# Patient Record
Sex: FEMALE | Race: WHITE | NOT HISPANIC OR LATINO | Employment: OTHER | URBAN - METROPOLITAN AREA
[De-identification: names, ages, dates, MRNs, and addresses within clinical notes are randomized per-mention and may not be internally consistent; named-entity substitution may affect disease eponyms.]

---

## 2017-01-04 ENCOUNTER — HOSPITAL ENCOUNTER (OUTPATIENT)
Dept: NEUROLOGY | Facility: AMBULATORY SURGERY CENTER | Age: 77
Discharge: HOME | End: 2017-01-04
Payer: COMMERCIAL

## 2017-01-04 DIAGNOSIS — M79.671 RIGHT FOOT PAIN: ICD-10-CM

## 2017-01-04 PROCEDURE — 95909 NRV CNDJ TST 5-6 STUDIES: CPT

## 2017-01-04 PROCEDURE — 95886 MUSC TEST DONE W/N TEST COMP: CPT

## 2017-01-11 ENCOUNTER — ALLSCRIPTS OFFICE VISIT (OUTPATIENT)
Dept: OTHER | Facility: OTHER | Age: 77
End: 2017-01-11

## 2017-03-10 ENCOUNTER — GENERIC CONVERSION - ENCOUNTER (OUTPATIENT)
Dept: OTHER | Facility: OTHER | Age: 77
End: 2017-03-10

## 2017-03-10 ENCOUNTER — HOSPITAL ENCOUNTER (OUTPATIENT)
Dept: RADIOLOGY | Age: 77
Discharge: HOME | End: 2017-03-10
Payer: COMMERCIAL

## 2017-03-10 DIAGNOSIS — Z12.31 VISIT FOR SCREENING MAMMOGRAM: ICD-10-CM

## 2017-03-10 PROCEDURE — G0202 SCR MAMMO BI INCL CAD: HCPCS

## 2017-04-03 ENCOUNTER — GENERIC CONVERSION - ENCOUNTER (OUTPATIENT)
Dept: OTHER | Facility: OTHER | Age: 77
End: 2017-04-03

## 2017-05-15 ENCOUNTER — GENERIC CONVERSION - ENCOUNTER (OUTPATIENT)
Dept: OTHER | Facility: OTHER | Age: 77
End: 2017-05-15

## 2017-05-22 ENCOUNTER — GENERIC CONVERSION - ENCOUNTER (OUTPATIENT)
Dept: OTHER | Facility: OTHER | Age: 77
End: 2017-05-22

## 2017-05-22 DIAGNOSIS — Z79.899 OTHER LONG TERM (CURRENT) DRUG THERAPY: ICD-10-CM

## 2017-05-22 DIAGNOSIS — J44.9 CHRONIC OBSTRUCTIVE PULMONARY DISEASE (HCC): ICD-10-CM

## 2017-05-22 DIAGNOSIS — K76.89 OTHER SPECIFIED DISORDERS OF LIVER: ICD-10-CM

## 2017-05-22 DIAGNOSIS — B02.29 OTHER POSTHERPETIC NERVOUS SYSTEM INVOLVEMENT: ICD-10-CM

## 2017-05-22 DIAGNOSIS — M47.26 OTHER SPONDYLOSIS WITH RADICULOPATHY, LUMBAR REGION: ICD-10-CM

## 2017-05-22 DIAGNOSIS — Z72.0 TOBACCO USE: ICD-10-CM

## 2017-05-22 DIAGNOSIS — N28.9 DISORDER OF KIDNEY AND URETER: ICD-10-CM

## 2017-05-22 DIAGNOSIS — H40.9 GLAUCOMA: ICD-10-CM

## 2017-05-22 DIAGNOSIS — M85.80 OTHER SPECIFIED DISORDERS OF BONE DENSITY AND STRUCTURE, UNSPECIFIED SITE: ICD-10-CM

## 2017-06-08 ENCOUNTER — LAB CONVERSION - ENCOUNTER (OUTPATIENT)
Dept: OTHER | Facility: OTHER | Age: 77
End: 2017-06-08

## 2017-06-08 LAB
25(OH)D3 SERPL-MCNC: 35 NG/ML (ref 30–100)
A/G RATIO (HISTORICAL): 1.3 (CALC) (ref 1–2.5)
ALBUMIN SERPL BCP-MCNC: 3.6 G/DL (ref 3.6–5.1)
ALP SERPL-CCNC: 80 U/L (ref 33–130)
ALT SERPL W P-5'-P-CCNC: 12 U/L (ref 6–29)
AST SERPL W P-5'-P-CCNC: 22 U/L (ref 10–35)
BILIRUB SERPL-MCNC: 0.5 MG/DL (ref 0.2–1.2)
BUN SERPL-MCNC: 15 MG/DL (ref 7–25)
BUN/CREA RATIO (HISTORICAL): 16 (CALC) (ref 6–22)
CALCIUM SERPL-MCNC: 9 MG/DL (ref 8.6–10.4)
CHLORIDE SERPL-SCNC: 102 MMOL/L (ref 98–110)
CHOLEST SERPL-MCNC: 159 MG/DL (ref 125–200)
CHOLEST/HDLC SERPL: 2.7 (CALC)
CO2 SERPL-SCNC: 31 MMOL/L (ref 20–31)
CREAT SERPL-MCNC: 0.95 MG/DL (ref 0.6–0.93)
EGFR AFRICAN AMERICAN (HISTORICAL): 67 ML/MIN/1.73M2
EGFR-AMERICAN CALC (HISTORICAL): 58 ML/MIN/1.73M2
GAMMA GLOBULIN (HISTORICAL): 2.7 G/DL (CALC) (ref 1.9–3.7)
GLUCOSE (HISTORICAL): 84 MG/DL (ref 65–99)
HDLC SERPL-MCNC: 58 MG/DL
LDL CHOLESTEROL (HISTORICAL): 73 MG/DL (CALC)
NON-HDL-CHOL (CHOL-HDL) (HISTORICAL): 101 MG/DL (CALC)
POTASSIUM SERPL-SCNC: 4.1 MMOL/L (ref 3.5–5.3)
SODIUM SERPL-SCNC: 137 MMOL/L (ref 135–146)
TOTAL PROTEIN (HISTORICAL): 6.3 G/DL (ref 6.1–8.1)
TRIGL SERPL-MCNC: 139 MG/DL
TSH SERPL DL<=0.05 MIU/L-ACNC: 2.01 MIU/L (ref 0.4–4.5)

## 2017-06-12 ENCOUNTER — ALLSCRIPTS OFFICE VISIT (OUTPATIENT)
Dept: OTHER | Facility: OTHER | Age: 77
End: 2017-06-12

## 2017-07-06 ENCOUNTER — HOSPITAL ENCOUNTER (OUTPATIENT)
Dept: RADIOLOGY | Age: 77
Discharge: HOME | End: 2017-07-06
Payer: COMMERCIAL

## 2017-07-07 ENCOUNTER — GENERIC CONVERSION - ENCOUNTER (OUTPATIENT)
Dept: OTHER | Facility: OTHER | Age: 77
End: 2017-07-07

## 2017-12-04 DIAGNOSIS — K76.89 OTHER SPECIFIED DISORDERS OF LIVER: ICD-10-CM

## 2017-12-04 DIAGNOSIS — N28.9 DISORDER OF KIDNEY AND URETER: ICD-10-CM

## 2017-12-04 DIAGNOSIS — M51.36 OTHER INTERVERTEBRAL DISC DEGENERATION, LUMBAR REGION: ICD-10-CM

## 2017-12-04 DIAGNOSIS — Z72.0 TOBACCO USE: ICD-10-CM

## 2017-12-04 DIAGNOSIS — J44.9 CHRONIC OBSTRUCTIVE PULMONARY DISEASE (HCC): ICD-10-CM

## 2017-12-22 LAB
A/G RATIO (HISTORICAL): 1.3 (CALC) (ref 1–2.5)
ALBUMIN SERPL BCP-MCNC: 3.7 G/DL (ref 3.6–5.1)
ALP SERPL-CCNC: 71 U/L (ref 33–130)
ALT SERPL W P-5'-P-CCNC: 11 U/L (ref 6–29)
AST SERPL W P-5'-P-CCNC: 22 U/L (ref 10–35)
BILIRUB SERPL-MCNC: 0.3 MG/DL (ref 0.2–1.2)
BUN SERPL-MCNC: 19 MG/DL (ref 7–25)
BUN/CREA RATIO (HISTORICAL): 18 (CALC) (ref 6–22)
CALCIUM SERPL-MCNC: 9 MG/DL (ref 8.6–10.4)
CHLORIDE SERPL-SCNC: 111 MMOL/L (ref 98–110)
CHOLEST SERPL-MCNC: 131 MG/DL
CHOLEST/HDLC SERPL: 2.8 (CALC)
CO2 SERPL-SCNC: 28 MMOL/L (ref 20–31)
CREAT SERPL-MCNC: 1.06 MG/DL (ref 0.6–0.93)
EGFR AFRICAN AMERICAN (HISTORICAL): 58 ML/MIN/1.73M2
EGFR-AMERICAN CALC (HISTORICAL): 50 ML/MIN/1.73M2
GAMMA GLOBULIN (HISTORICAL): 2.9 G/DL (CALC) (ref 1.9–3.7)
GLUCOSE (HISTORICAL): 98 MG/DL (ref 65–99)
HDLC SERPL-MCNC: 47 MG/DL
LDL CHOLESTEROL (HISTORICAL): 63 MG/DL (CALC)
NON-HDL-CHOL (CHOL-HDL) (HISTORICAL): 84 MG/DL (CALC)
POTASSIUM SERPL-SCNC: 4.5 MMOL/L (ref 3.5–5.3)
SODIUM SERPL-SCNC: 145 MMOL/L (ref 135–146)
TOTAL PROTEIN (HISTORICAL): 6.6 G/DL (ref 6.1–8.1)
TRIGL SERPL-MCNC: 126 MG/DL

## 2017-12-28 ENCOUNTER — GENERIC CONVERSION - ENCOUNTER (OUTPATIENT)
Dept: OTHER | Facility: OTHER | Age: 77
End: 2017-12-28
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
## 2018-01-12 NOTE — RESULT NOTES
Message   Recorded as Task   Date: 10/07/2016 03:19 PM, Created By: Saul Delgado   Task Name: Follow Up   Assigned To: [facility]   Regarding Patient: Levon Talavera, Status: In Progress   CommentLauren Carroll - 07 Oct 2016 3:19 PM     TASK CREATED  Pt  left message on [facility] procedure line stating she will be out of Gabapentin 600mg on Sunday  She wants to know if she can have a refill on this? If so, she would like it sent to [facility]  Liat Chen - 07 Oct 2016 4:13 PM     TASK REPLIED TO: Previously Assigned To [facility]                      Renewed   Yessy Gandhi - 07 Oct 2016 4:27 PM     TASK EDITED                Attempted to contact pt, LMOM that script was sent to pharmacy and she should call with questions or concerns  **********   Lorelei Guido - 10 Oct 2016 9:24 AM     TASK EDITED  2nd vm left for pt on both home & cell letting pt know her RX was sent to her pharmacy on friday, if she has already p/u the RX there is no need to c/b  Pt only to c/b if she has questions or concerns          Signatures   Electronically signed by : Renetta Walls, ; Oct 11 2016  9:20AM EST                       (Author)

## 2018-01-12 NOTE — PROGRESS NOTES
Assessment    1  Chronic obstructive pulmonary disease (496) (J44 9)   2  Osteopenia (733 90) (M85 80)   3  Postherpetic polyneuropathy (053 13) (B02 23)   4  Bilateral impacted cerumen (380 4) (H61 23)   5  Tobacco use (305 1) (Z72 0)   6  Radicular pain of right lower extremity (724 4) (M54 10)   7  Paresthesia of right foot (782 0) (R20 2)   8  Encounter for preventive health examination (V70 0) (Z00 00)    Plan   Chronic obstructive pulmonary disease    · (1) COMPREHENSIVE METABOLIC PANEL; Status:Active; Requested for:46Njn0943;   Paresthesia of right foot, Radicular pain of right lower extremity    · Meloxicam 7 5 MG Oral Tablet; TAKE 1 -2 TABLET DAILY W/ FOOD  X ONE WEEK  then as needed   · * MRI LUMBAR SPINE WO CONTRAST; Status:Need Information - Financial  Authorization; Requested for:53Xue2280;     Medicare Annual Wellness Visit; Status:Resulted - Requires Verification;   Done: 97CQH2626 12:00AM  ZDE:74HKG4891;JAXRUQW;    For:Health Maintenance; Ordered By:Jesse Wilkinson;      Discussion/Summary  Impression: Initial Annual Wellness Visit, with preventive exam as well as age and risk appropriate counseling completed  Cardiovascular screening and counseling: the risks and benefits of screening were discussed, the patient declines screening, counseling was given on maintaining a healthy diet, counseling was given on maintaining a healthy weight and counseling was given on tobacco cessation  Diabetes screening and counseling: the risks and benefits of screening were discussed and screening not indicated  Colorectal cancer screening and counseling: screening not indicated  Breast cancer screening and counseling: H/O BREASTCA - FOLLOWS W/ DR LUIS  Cervical cancer screening and counseling: screening not indicated  Osteoporosis screening and counseling: the risks and benefits of screening were discussed and screening is current     Abdominal aortic aneurysm screening and counseling: the risks and benefits of screening were discussed and screening not indicated  HIV screening and counseling: the risks and benefits of screening were discussed and screening not indicated  Immunizations: the patient declines the influenza vaccination, the risks and benefits of pneumococcal vaccination were discussed with the patient, hepatitis B vaccination series is not indicated at this time due to the patient's low risk of raquel the disease, the risks and benefits of the Zostavax vaccine were discussed with the patient and the patient declines the Zostavax vaccine  Tobacco Cessation: an intermediate session is done today and tobacco cessation counseling declined  Advance Directive Planning: not complete, paperwork and instructions were given to the patient  Patient Discussion: plan discussed with the patient, follow-up visit needed in one year  Self Referrals: No      History of Present Illness  The patient is being seen for the initial annual wellness visit  Medicare Screening and Risk Factors   Hospitalizations: no previous hospitalizations  Once per lifetime medicare screening tests: ECG has not been done and AAA screening US has not yet been done  Medicare Screening Tests Risk Questions   Osteoporosis risk assessment: , female gender, over 48years of age and tobacco use  HIV risk assessment: none indicated  Drug and Alcohol Use: The patient is a current cigarette smoker, currently smokes 1 packs per day and less than a 1ppd  She has smoked for 50 year(s) and has 1 pack year(s) of cigarette use  She has declined tobacco cessation intervention and is cutting back on tobacco use  The patient reports drinking 2 beers with ice juice drinks per day  Alcohol concern:   The patient has no concerns about alcohol abuse  She has never used illicit drugs     Diet and Physical Activity: Current diet includes well balanced meals, 2 servings of fruit per day, 1 servings of vegetables per day, 1 servings of meat per day, 2 servings of whole grains per day, 2 servings of simple carbohydrates per day, 1 servings of dairy products per day, 1 cups of coffee per day, 5 cups of tea per day, 0 cans of regular soda per day, 0 cans of diet soda per day and 4 glasses of water a day  The patient does not exercise  Mood Disorder and Cognitive Impairment Screening: She denies feeling down, depressed, or hopeless over the past two weeks  She denies feeling little interest or pleasure in doing things over the past two weeks  Cognitive impairment screening: denies difficulty learning/retaining new information, denies difficulty handling complex tasks, denies difficulty with reasoning, denies difficulty with spatial ability and orientation, denies difficulty with language and denies difficulty with behavior  Functional Ability/Level of Safety: Hearing is normal bilaterally and a hearing aid is not used  She denies hearing difficulties  The patient is currently able to do activities of daily living without limitations, able to do instrumental activities of daily living without limitations, able to participate in social activities without limitations and able to drive without limitations  Activities of daily living details: does not need help using the phone, no transportation help needed, does not need help shopping, no meal preparation help needed, does not need help doing housework, does not need help doing laundry, does not need help managing medications and does not need help managing money  Fall risk factors: The patient fell 0 times in the past 12 months , no polypharmacy, no alcohol use, no mobility impairment, no antidepressant use, no deconditioning, no postural hypotension, no sedative use, no visual impairment, no urinary incontinence, no antihypertensive use, no cognitive impairment, up and go test was normal and no previous fall   Home safety risk factors:  no unfamiliar surroundings, no loose rugs, no poor household lighting, no uneven floors, no household clutter, grab bars in the bathroom and handrails on the stairs  Advance Directives: Advance directives: no living will, no durable power of  for health care directives and no advance directives  Co-Managers and Medical Equipment/Suppliers: See Patient Care Team   Falls Risk: The patient fell 0 times in the past 12 months  The patient currently has no urinary incontinence symptoms  Date of last glaucoma screen was 3/2016      Patient Care Team    Care Team Member Role Specialty Office Number   [address] 8 DPM  Podiatry [phone]     Active Problems    1  Bilateral impacted cerumen (380 4) (H61 23)   2  Chronic obstructive pulmonary disease (496) (J44 9)   3  Encounter for Hemoccult screening (V76 51) (Z12 11)   4  Encounter for immunization (V03 89) (Z23)   5  Encounter for screening colonoscopy (V76 51) (Z12 11)   6  Encounter for smoking cessation counseling (V65 42,305 1) (Z71 6,Z72 0)   7  Glaucoma (365 9) (H40 9)   8  High risk medication use (V58 69) (Z79 899)   9  Osteopenia (733 90) (M85 80)   10  Postherpetic polyneuropathy (053 13) (B02 23)   11  Tobacco use (305 1) (Z72 0)    Past Medical History    1  Encounter for smoking cessation counseling (V65 42,305 1) (Z71 6,Z72 0)   2  Glaucoma (365 9) (H40 9)   3  History of contact dermatitis (V13 3) (Z87 2)   4  History of herpes zoster (V12 09) (Z86 19)   5  History of sciatica (V12 49) (Z86 69)    Surgical History    1  History of Breast Surgery Lumpectomy   2  History of Hysterectomy    Family History  Mother    1  Family history of Breast Cancer (V16 3)   2  Family history of Parkinsonism   3  Family history of Stroke Syndrome (V17 1)  Father    4  Family history of Heart Disease (V17 49)    Social History    · Current Every Day Smoker (305 1)   · Never Drank Alcohol   · Occupation:   · Tobacco use (305 1) (Z72 0)    Current Meds   1   Anoro Ellipta 62 5-25 MCG/INH Inhalation Aerosol Powder Breath Activated; one puff   daily; Therapy: 87UXL5367 to (Last R66QRC2391) Ordered   2  Calcium + D TABS; Therapy: (Recorded:2016) to Recorded   3  Gabapentin 400 MG Oral Capsule; take 1 capsule three times a day; Therapy: 24DMQ9489 to Recorded   4  Vitamin D3 2000 UNIT Oral Tablet; Use two tab daily for one month then decrease to one   tab daily; Therapy: 59AXE1452 to (Evaluate:97Tsz1706); Last LO:39MMT4211 Ordered    Allergies    1  Penicillins    Immunizations  Influenza --- Victor M Shown: Temporarily Deferred: Pt refuses, As of: 77KHM9229, Defer for 1 Years   Prevnar 13 Intramuscular Suspension --- Victor M Shown: 79JBM4274   Tdap --- Victor M Shown: Temporarily Deferred: Pt refuses, As of: 97FVH7171, Defer for 1 Years     Vitals  Signs [Data Includes: Current Encounter]   Recorded: 69Ejk1674 11:30AM   Temperature: 98 5 F  Heart Rate: 70  Systolic: 558  Diastolic: 58  Height: 4 ft 11 in  Weight: 115 lb 4 00 oz  BMI Calculated: 23 28  BSA Calculated: 1 46  O2 Saturation: 94  Pain Scale: 0    Health Management  Health Maintenance   Medicare Annual Wellness Visit; every 1 year; Last 54WBK4801; Next Due: 05PBX9947; Active    Attending Note  Collaborating Physician Note: Collaborating Note: I agree with the Advanced Practitioner note        Future Appointments    Date/Time Provider Specialty Site   2016 11:00 AM Tan[facility] Solorzano [facility]   2016 11:00 AM [address]ane Sol[address]ano [address] [facility]     Signatures   Electronically signed by : Chet Hilliard; 2016  1:12PM EST                       (Author)    Electronically signed by : YUMIKO Diaz ; 2016  8:59AM EST                       (Author)

## 2018-01-13 VITALS
TEMPERATURE: 98.6 F | DIASTOLIC BLOOD PRESSURE: 62 MMHG | HEART RATE: 64 BPM | SYSTOLIC BLOOD PRESSURE: 118 MMHG | BODY MASS INDEX: 22.98 KG/M2 | WEIGHT: 114 LBS | HEIGHT: 59 IN

## 2018-01-13 NOTE — MISCELLANEOUS
Message   Recorded as Task   Date: 05/12/2017 09:20 AM, Created By: Dara Coley   Task Name: Miscellaneous   Assigned To: [facility]   Regarding Patient: Josse Najera, Status: In Progress   Comment:    QuinnkarenStephanie johnston - 12 May 2017 9:20 AM     TASK CREATED  Pt called requesting refill of Gabapentin  She will be out of meds on Sat (5 pills left)  She uses [facility] on [address]  in [place]  Pt can be reached at [phone] after 2:00  She also made f/u w/Dr Madhav Coughlin in [place] for 6/26/17  Nano Rasheed - 12 May 2017 10:19 AM     TASK EDITED  Looks like last ordered on 3/10 c/ 1 refill  AS oncall   **** [facility] pt thanks****   DENZELNMPO - 12 May 2017 10:23 AM     TASK REPLIED TO: Previously Assigned To Nano Barnard - 12 May 2017 10:26 AM     TASK EDITED  Attempted to call pt's cell phone number and left a detailed mom in regards to the previous task  Nano Rasheed - 12 May 2017 10:26 AM     TASK IN PROGRESS        Active Problems    1  Bilateral impacted cerumen (380 4) (H61 23)   2  Chronic obstructive pulmonary disease (496) (J44 9)   3  Encounter for Hemoccult screening (V76 51) (Z12 11)   4  Encounter for immunization (V03 89) (Z23)   5  Encounter for screening colonoscopy (V76 51) (Z12 11)   6  Encounter for smoking cessation counseling (V65 42,305 1) (Z71 6,Z72 0)   7  Glaucoma (365 9) (H40 9)   8  High risk medication use (V58 69) (Z79 899)   9  Influenza vaccine needed (V04 81) (Z23)   10  Liver cyst (573 8) (K76 89)   11  Osteoarthritis of spine with radiculopathy, lumbar region (721 3) (M47 26)   12  Osteopenia (733 90) (M85 80)   13  Pain in right foot (729 5) (M79 671)   14  Paresthesia of right foot (782 0) (R20 2)   15  Post herpetic neuralgia (053 19) (B02 29)   16  Postherpetic polyneuropathy (053 13) (B02 23)   17  Radicular pain of right lower extremity (724 4) (M54 10)   18  Renal insufficiency (593 9) (N28 9)   19   Screening for other and unspecified genitourinary condition (V81 6) (Z13 89)   20  Special screening for other neurological conditions (V80 09) (Z13 89)   21  Tobacco use (305 1) (Z72 0)    Current Meds   1  Anoro Ellipta 62 5-25 MCG/INH Inhalation Aerosol Powder Breath Activated; one puff   daily; Therapy: 51SJE2374 to (Last Bartolome Gregory)  Requested for: 25Oct2016 Ordered   2  Calcium + D TABS; Therapy: (Recorded:08Jan2016) to Recorded   3  Gabapentin 600 MG Oral Tablet; TAKE 1 TABLET 3 TIMES DAILY; Therapy: 47Zey1466 to (Evaluate:11Jun2017)  Requested for: 94HYB9933; Last   Rx:12May2017 Ordered   4  Lidocaine 5 % External Patch; APPLY 1 PATCH TO THE AFFECTED AREA AND LEAVE   IN PLACE FOR 12 HOURS, THEN REMOVE AND LEAVE OFF FOR 12 HOURS; Therapy: 23PGV3610 to (Evaluate:12Mar2017)  Requested for: 70OPS4249; Last   Rx:11Jan2017 Ordered   5  Vitamin D3 2000 UNIT Oral Tablet; Use two tab daily for one month then decrease to one   tab daily; Therapy: 92QTA6440 to (Evaluate:07May2016); Last MICHAEL:39GVY6677 Ordered    Allergies    1   Penicillins    Signatures   Electronically signed by : Dee Maravilla, ; May 15 2017  8:08AM EST                       (Author)

## 2018-01-14 NOTE — PROGRESS NOTES
Assessment    1  Chronic obstructive pulmonary disease (496) (J44 9)   2  Osteopenia (733 90) (M85 80)   3  Postherpetic polyneuropathy (053 13) (B02 23)   4  Bilateral impacted cerumen (380 4) (H61 23)   · tiny canals   5  Tobacco use (305 1) (Z72 0)   6  Radicular pain of right lower extremity (724 4) (M54 10)   7  Paresthesia of right foot (782 0) (R20 2)   8  Encounter for preventive health examination (V70 0) (Z00 00)    Plan   Chronic obstructive pulmonary disease    · (1) COMPREHENSIVE METABOLIC PANEL; Status:Active; Requested for:11Nov2016;   Paresthesia of right foot, Radicular pain of right lower extremity    · Meloxicam 7 5 MG Oral Tablet; TAKE 1 -2 TABLET DAILY W/ FOOD  X ONE WEEK  then as needed   · * MRI LUMBAR SPINE WO CONTRAST; Status:Need Information - Financial  Authorization; Requested for:[id number];     Medicare Annual Wellness Visit; Status:Resulted - Requires Verification;   Done: 02KEF6425 12:00AM  OPD:[id number];XGTPGDB;    For:Health Maintenance; Ordered By:Sathya Wilkinson;      Discussion/Summary    F/U in one month - RT extrem radicular pain - review MRI  Exam nl - no weakness  Denies saddle anesth  F/U in 4 mos after labs  No chg in meds  Possible side effects of new medications were reviewed with the patient/guardian today  The treatment plan was reviewed with the patient/guardian  The patient/guardian understands and agrees with the treatment plan   The patient was counseled regarding diagnostic results, instructions for management, risk factor reductions, prognosis, patient and family education, impressions, risks and benefits of treatment options, importance of compliance with treatment  Self Referrals: No      Chief Complaint  follow up COPD and osteopenia  stop taking Lumigan, Debrox and now taking Gabapentin 400mg 3 tablets a day      History of Present Illness  COPD - STATES REVELES UPON STEPS IS DECREASED - BETTER EXERCISE TOLERANCE - Has a 3 story home    +smoke 15 cigs a day   Smoking since 23 yo   1ppd x 50 years  Cutting down/ not interested in further assistance - does not want to quit  Glaucoma - s/p laser- followed by specialist  5 min discussion about COPD progression and smoking  only complaint is new white phlegm production ea am     OSTEOPENIA: on calcium/vit d daily - hard working  CC: worsening numbness/tingling rt foot 3-4 months  H/O of shingles years back - post herpetic neuropathy  On gabapentin 400 mg 3 x a day; not getting better w/ inc med  Reports pain is worse at end of day - and that pain character is dull ache/w numbness-tingling  Labs noted  Review of Systems    Constitutional: feeling poorly, but No fever, no chills, feels well, no tiredness, no recent weight gain or weight loss and as noted in HPI  Eyes: No complaints of eye pain, no red eyes, no eyesight problems, no discharge, no dry eyes, no itching of eyes  ENT: no complaints of earache, no loss of hearing, no nose bleeds, no nasal discharge, no sore throat, no hoarseness  Cardiovascular: No complaints of slow heart rate, no fast heart rate, no chest pain, no palpitations, no leg claudication, no lower extremity edema  Respiratory: COPD, but No complaints of shortness of breath, no wheezing, no cough, no SOB on exertion, no orthopnea, no PND and as noted in HPI  Gastrointestinal: No complaints of abdominal pain, no constipation, no nausea or vomiting, no diarrhea, no bloody stools  Genitourinary: No complaints of dysuria, no incontinence, no pelvic pain, no dysmenorrhea, no vaginal discharge or bleeding  Musculoskeletal: No complaints of arthralgias, no myalgias, no joint swelling or stiffness, no limb pain or swelling and as noted in HPI  Integumentary: No complaints of skin rash or lesions, no itching, no skin wounds, no breast pain or lump     Neurological: numbness, but No complaints of headache, no confusion, no convulsions, no numbness, no dizziness or fainting, no tingling, no limb weakness, no difficulty walking and as noted in HPI  Psychiatric: Not suicidal, no sleep disturbance, no anxiety or depression, no change in personality, no emotional problems  Endocrine: No complaints of proptosis, no hot flashes, no muscle weakness, no deepening of the voice, no feelings of weakness  Hematologic/Lymphatic: No complaints of swollen glands, no swollen glands in the neck, does not bleed easily, does not bruise easily  Active Problems    1  Bilateral impacted cerumen (380 4) (H61 23)   2  Chronic obstructive pulmonary disease (496) (J44 9)   3  Encounter for Hemoccult screening (V76 51) (Z12 11)   4  Encounter for immunization (V03 89) (Z23)   5  Encounter for screening colonoscopy (V76 51) (Z12 11)   6  Encounter for smoking cessation counseling (V65 42,305 1) (Z71 6,Z72 0)   7  Glaucoma (365 9) (H40 9)   8  High risk medication use (V58 69) (Z79 899)   9  Osteopenia (733 90) (M85 80)   10  Postherpetic polyneuropathy (053 13) (B02 23)   11  Tobacco use (305 1) (Z72 0)    Past Medical History    1  Encounter for smoking cessation counseling (V65 42,305 1) (Z71 6,Z72 0)   2  Glaucoma (365 9) (H40 9)   3  History of contact dermatitis (V13 3) (Z87 2)   4  History of herpes zoster (V12 09) (Z86 19)   5  History of sciatica (V12 49) (Z86 69)    The active problems and past medical history were reviewed and updated today  Surgical History    1  History of Breast Surgery Lumpectomy   2  History of Hysterectomy    The surgical history was reviewed and updated today  Family History  Mother    1  Family history of Breast Cancer (V16 3)   2  Family history of Parkinsonism   3  Family history of Stroke Syndrome (V17 1)  Father    4  Family history of Heart Disease (V17 49)  Family History    5  Family history of Does not use illicit drugs   6  No family history of mental disorder    The family history was reviewed and updated today         Social History    · Current Every Day Smoker (305 1)   · Does not exercise (V69 0) (Z72 3)   · Does not use illicit drugs (D14 72) (Z78 9)   · Never Drank Alcohol   · Occupation:   · Seeing a dentist   · Tobacco use (305 1) (Z72 0)  The social history was reviewed and updated today  Current Meds   1  Anoro Ellipta 62 5-25 MCG/INH Inhalation Aerosol Powder Breath Activated; one puff   daily; Therapy: 39VPE4895 to (Last AN:29JAH2563) Ordered   2  Calcium + D TABS; Therapy: (Recorded:08Jan2016) to Recorded   3  Gabapentin 400 MG Oral Capsule; take 1 capsule three times a day; Therapy: 73LHC0661 to Recorded   4  Vitamin D3 2000 UNIT Oral Tablet; Use two tab daily for one month then decrease to one   tab daily; Therapy: 97ZSK9022 to (Evaluate:14Ufe0171); Last Rx:08Jan2016 Ordered    The medication list was reviewed and updated today  Allergies    1  Penicillins    Vitals  Vital Signs [Data Includes: Current Encounter]    Recorded: 01Irq3572 11:30AM   Temperature 98 5 F   Heart Rate 70   Systolic 329   Diastolic 58   Height 4 ft 11 in   Weight 115 lb 4 00 oz   BMI Calculated 23 28   BSA Calculated 1 46   O2 Saturation 94   Pain Scale 0     Physical Exam    Constitutional   General appearance: No acute distress, well appearing and well nourished  Eyes   Conjunctiva and lids: No swelling, erythema or discharge  Pupils and irises: Equal, round and reactive to light  Ears, Nose, Mouth, and Throat   External inspection of ears and nose: Normal     Otoscopic examination: Abnormal   The left tympanic membrane was obscured  The right external canal had a cerumen impaction  The left external canal had a cerumen impaction  Nasal mucosa, septum, and turbinates: Normal without edema or erythema  Oropharynx: Normal with no erythema, edema, exudate or lesions  Pulmonary   Respiratory effort: No increased work of breathing or signs of respiratory distress  Auscultation of lungs: Clear to auscultation      Cardiovascular Palpation of heart: Normal PMI, no thrills  Auscultation of heart: Normal rate and rhythm, normal S1 and S2, without murmurs  Examination of extremities for edema and/or varicosities: Normal     Carotid pulses: Normal     Abdomen   Abdomen: Non-tender, no masses  Liver and spleen: No hepatomegaly or splenomegaly  Lymphatic   Palpation of lymph nodes in neck: No lymphadenopathy  Musculoskeletal   Gait and station: Normal     Digits and nails: Normal without clubbing or cyanosis  Inspection/palpation of joints, bones, and muscles: Normal     Skin   Skin and subcutaneous tissue: Normal without rashes or lesions  Neurologic   Cranial nerves: Cranial nerves 2-12 intact  Reflexes: 2+ and symmetric  Sensation: No sensory loss  Psychiatric   Orientation to person, place, and time: Normal     Mood and affect: Normal          Results/Data  Results   (1) COMPREHENSIVE METABOLIC PANEL 76ZDQ1087 06:37NO Adonis RegainGo     Test Name Result Flag Reference   GLUCOSE 82 mg/dL  65-99   Fasting reference interval   UREA NITROGEN (BUN) 19 mg/dL  7-25   CREATININE 0 88 mg/dL  0 60-0 93   For patients >52years of age, the reference limit  for Creatinine is approximately 13% higher for people  identified as -American  eGFR NON-AFR   AMERICAN 64 mL/min/1 73m2  > OR = 60   eGFR AFRICAN AMERICAN 74 mL/min/1 73m2  > OR = 60   BUN/CREATININE RATIO   5-79   NOT APPLICABLE (calc)   SODIUM 139 mmol/L  135-146   POTASSIUM 4 5 mmol/L  3 5-5 3   CHLORIDE 103 mmol/L     CARBON DIOXIDE 27 mmol/L  19-30   CALCIUM 9 2 mg/dL  8 6-10 4   PROTEIN, TOTAL 6 4 g/dL  6 1-8 1   ALBUMIN 3 7 g/dL  3 6-5 1   GLOBULIN 2 7 g/dL (calc)  1 9-3 7   ALBUMIN/GLOBULIN RATIO 1 4 (calc)  1 0-2 5   BILIRUBIN, TOTAL 0 5 mg/dL  0 2-1 2   ALKALINE PHOSPHATASE 69 U/L     AST 21 U/L  10-35   ALT 13 U/L  6-29     (1) LIPID PANEL, FASTING 32BYB8826 09:17AM Adonis RegainGo     Test Name Result Flag Reference   CHOLESTEROL, TOTAL 153 mg/dL  125-200   HDL CHOLESTEROL 56 mg/dL  > OR = 46   TRIGLICERIDES 457 mg/dL  <150   LDL-CHOLESTEROL 70 mg/dL (calc)  <130   Desirable range <100 mg/dL for patients with CHD or  diabetes and <70 mg/dL for diabetic patients with  known heart disease  CHOL/HDLC RATIO 2 7 (calc)  < OR = 5 0   NON HDL CHOLESTEROL 97 mg/dL (calc)     Target for non-HDL cholesterol is 30 mg/dL higher than   LDL cholesterol target  (1) CBC/PLT/DIFF 66IWE8055 09:17AM Oneida Camacho     Test Name Result Flag Reference   WHITE BLOOD CELL COUNT 7 7 Thousand/uL  3 8-10 8   RED BLOOD CELL COUNT 5 15 Million/uL H 3 80-5 10   HEMOGLOBIN 15 2 g/dL  11 7-15 5   HEMATOCRIT 46 8 % H 35 0-45 0   MCV 90 9 fL  80 0-100 0   MCH 29 5 pg  27 0-33 0   MCHC 32 5 g/dL  32 0-36 0   RDW 13 6 %  11 0-15 0   PLATELET COUNT 662 Thousand/uL  140-400   MPV 8 5 fL  7 5-11 5   ABSOLUTE NEUTROPHILS 5113 cells/uL  2854-7375   ABSOLUTE LYMPHOCYTES 1740 cells/uL  850-3900   ABSOLUTE MONOCYTES 624 cells/uL  200-950   ABSOLUTE EOSINOPHILS 177 cells/uL     ABSOLUTE BASOPHILS 46 cells/uL  0-200   NEUTROPHILS 66 4 %     LYMPHOCYTES 22 6 %     MONOCYTES 8 1 %     EOSINOPHILS 2 3 %     BASOPHILS 0 6 %       (Q) TSH, 3RD GENERATION W/REFLEX TO FT4 [id number] 09:17AM Oneida Camacho   REPORT COMMENT:  FASTING:YES     Test Name Result Flag Reference   TSH W/REFLEX TO FT4 1 85 mIU/L  0 40-4 50     * Dexa Scan Axial Skel (Hip, Pelvis, Spine) 13FKE3167 02:16PM Oneida Doug     Test Name Result Flag Reference   Dexa Scan (Report)     [facility];[address] [facility];;[place];[address]   01/05/2016 1500   01/05/2016 1530   N/A     CENTRAL DXA SCAN     CLINICAL HISTORY-  67year old post-menopausal  female  Osteopenia  TECHNIQUE- Bone densitometry was performed using a Horizon A bone   densitometer  Regions of interest appear properly placed  There are no   obvious fractures or other confounding variables which could limit the   study  COMPARISON- 10/15/2013     RESULTS-    LUMBAR SPINE- L1-L4-   BMD 0 905 gm/cm2   T-score -1 3   Z-score 1 2     LEFT TOTAL HIP-   BMD 0 866 gm/cm2   T-score -0 6   Z-score 1 2     LEFT FEMORAL NECK-   BMD 0 706 gm/cm2   T-score -1 3   Z-score 0 9         ASSESSMENT-   1  Based on the WHO classification, the T-score of -1 3 is consistent   with low bone mineral density  2  Since the prior study, there have been no statistically significant   changes  3  The 10 year risk of hip fracture is 22 %, with the 10 year risk of   major osteoporotic fracture being 32 %, as calculated by the WHO   fracture risk assessment tool (FRAX)  The current NOF guidelines   recommend treating patients with FRAX 10 year risk score of >3% for hip   fracture and >20% for major osteoporotic fracture  4  Any secondary causes of low bone mineral density should be excluded   prior to treatment, if clinically indicated  5  A daily intake of at least 1200 mg calcium and 800 - 1000 IU of   Vitamin D, as well as weight bearing and muscle strengthening exercise,   fall prevention and avoidance of tobacco and excessive alcohol intake   as basic preventive measures are suggested  6  Repeat DXA scan in 18-24 months as clinically indicated           WHO CLASSIFICATION-   Normal (a T-score of -1 0 or higher)   Low bone mineral density (a T-score of less than -1 0 but higher than   -2 5)   Osteoporosis (a T-score of -2 5 or less)   Severe osteoporosis (a T-score of -2 5 or less with a fragility   fracture)               Transcribed on- [id number]     - MARCEL Blackman MD   Reading Radiologist- MARCEL Blackman MD   Electronically Signed- MARCEL Blackman MD   Released Date Time- 01/05/16 1524   ------------------------------------------------------------------------------   42974^ALFREDO LING   90663^ALFREDO LING     *(Q) VITAMIN D, 25-HYDROXY, LC/MS/MS 86RYR4326 09:00AM Sena Stone   REPORT COMMENT:  FASTING:YES     Test Name Result Flag Reference   VITAMIN D, 25-OH, TOTAL 34 ng/mL     Vitamin D Status         25-OH Vitamin D:     Deficiency:                    <20 ng/mL  Insufficiency:             20 - 29 ng/mL  Optimal:                 > or = 30 ng/mL     For 25-OH Vitamin D testing on patients on   D2-supplementation and patients for whom quantitation   of D2 and D3 fractions is required, the QuestAssureD(TM)  25-OH VIT D, (D2,D3), LC/MS/MS is recommended: order   code 02612 (patients >2yrs)  For more information on this test, go to:  http://Nanoference/faq/XIC613     Health Management  Health Maintenance   Medicare Annual Wellness Visit; every 1 year; Last 84LWX9240; Next Due: 24JPC4852; Active    Attending Note  Collaborating Note: I agree with the Advanced Practitioner note        Future Appointments    Date/Time Provider Specialty Site   08/16/2016 11:00 AM Yumiko Shelton[facility] [address]   11/14/2016 11:00 AM Ariane Shelton [address] [facility]     Signatures   Electronically signed by : Nii Pozo; Jul 11 2016  1:31PM EST                       (Author)    Electronically signed by : YUMIKO Foy ; Jul 12 2016  8:59AM EST                       (Author)

## 2018-01-15 VITALS
WEIGHT: 114 LBS | BODY MASS INDEX: 22.38 KG/M2 | SYSTOLIC BLOOD PRESSURE: 114 MMHG | DIASTOLIC BLOOD PRESSURE: 58 MMHG | HEART RATE: 72 BPM | TEMPERATURE: 98.2 F | RESPIRATION RATE: 20 BRPM | OXYGEN SATURATION: 96 % | HEIGHT: 60 IN

## 2018-01-15 NOTE — MISCELLANEOUS
Signatures   Electronically signed by : Mayank Zavala DO;  Apr  3 2017  4:20PM EST                       (Author)

## 2018-01-15 NOTE — MISCELLANEOUS
Message   Recorded as Task   Date: 08/31/2016 11:47 AM, Created By: [address] [place] [address]   Task Name: Care Coordination   Assigned To: [facility] [place] [facility],Team   Regarding Patient: Bryson Gu, Status: In Progress   Comment:    Coni Richards - 31 Aug 2016 11:47 AM     TASK CREATED  Caller: Josesito Ambrocio, Pharmacist; Care Coordination; [phone]  Pharmacist from [facility] is inquiring in the sdtatus of the pt's authorization for lidocaine patches  Form was sent to the [address] [place] [address] office on 8/26/16  Requesting a callback at [facility] - 31 Aug 2016 1:21 PM     TASK EDITED   Dawitemma Camposagne - 01 Sep 8987 4:73 PM     TASK IN PROGRESS   Dawit Andresagne - 01 Sep 9000 4:37 PM     TASK EDITED                 Faxed PA for Lidocine patch     Sent through cover [facility]   Dawitemma Camposagne - 06 Sep 1171 07:86 AM     TASK EDITED                Lidocaine patch approved from 7/1/2016 until 8/31/2017     MSG left for pharmacy to fill and advise patient  Active Problems    1  Bilateral impacted cerumen (380 4) (H61 23)   2  Chronic obstructive pulmonary disease (496) (J44 9)   3  Encounter for Hemoccult screening (V76 51) (Z12 11)   4  Encounter for immunization (V03 89) (Z23)   5  Encounter for screening colonoscopy (V76 51) (Z12 11)   6  Encounter for smoking cessation counseling (V65 42,305 1) (Z71 6,Z72 0)   7  Glaucoma (365 9) (H40 9)   8  High risk medication use (V58 69) (Z79 899)   9  Liver cyst (573 8) (K76 89)   10  Osteoarthritis of spine with radiculopathy, lumbar region (721 3) (M47 26)   11  Osteopenia (733 90) (M85 80)   12  Pain in right foot (729 5) (M79 671)   13  Paresthesia of right foot (782 0) (R20 2)   14  Post herpetic neuralgia (053 19) (B02 29)   15  Postherpetic polyneuropathy (053 13) (B02 23)   16  Radicular pain of right lower extremity (724 4) (M54 10)   17  Tobacco use (305 1) (Z72 0)    Current Meds   1   Anoro Ellipta 62 5-25 MCG/INH Inhalation Aerosol Powder Breath Activated; one puff   daily; Therapy: 99FDD4584 to (Last FW:60FGZ9203) Ordered   2  Calcium + D TABS; Therapy: (Recorded:08Jan2016) to Recorded   3  Gabapentin 400 MG Oral Capsule; take 1 capsule three times a day; Therapy: 92CNY9452 to Recorded   4  Gabapentin 600 MG Oral Tablet; TAKE 1 TABLET 3 TIMES DAILY; Therapy: 63Kuj2958 to (Evaluate:09Uhe3879)  Requested for: 60Upl4141; Last   Rx:[id number] Ordered   5  Lidocaine 5 % External Patch; APPLY 1 PATCH TO THE AFFECTED AREA AND LEAVE   IN PLACE FOR 12 HOURS, THEN REMOVE AND LEAVE OFF FOR 12 HOURS; Therapy: 42Wtj3424 to (Evaluate:68Ull3568)  Requested for: 84Orp5473; Last   Rx:24Xuc2297 Ordered   6  Meloxicam 7 5 MG Oral Tablet; Therapy: (Alysa Johnson) to Recorded   7  Vitamin D3 2000 UNIT Oral Tablet; Use two tab daily for one month then decrease to one   tab daily; Therapy: 50OTC7947 to (Evaluate:06Nou5063); Last RD:99YNT5053 Ordered    Allergies    1   Penicillins    Signatures   Electronically signed by : Tony Shepherd, ; Sep  6 2016 10:49AM EST                       (Author)

## 2018-01-16 NOTE — RESULT NOTES
Message   No sign abn - needs follow up visit  Verified Results  (1) COMPREHENSIVE METABOLIC PANEL [id number] 52:43YQ Wash Dais     Test Name Result Flag Reference   GLUCOSE 82 mg/dL  65-99   Fasting reference interval   UREA NITROGEN (BUN) 19 mg/dL  7-25   CREATININE 0 88 mg/dL  0 60-0 93   For patients >52years of age, the reference limit  for Creatinine is approximately 13% higher for people  identified as -American  eGFR NON-AFR  AMERICAN 64 mL/min/1 73m2  > OR = 60   eGFR AFRICAN AMERICAN 74 mL/min/1 73m2  > OR = 60   BUN/CREATININE RATIO   9-80   NOT APPLICABLE (calc)   SODIUM 139 mmol/L  135-146   POTASSIUM 4 5 mmol/L  3 5-5 3   CHLORIDE 103 mmol/L     CARBON DIOXIDE 27 mmol/L  19-30   CALCIUM 9 2 mg/dL  8 6-10 4   PROTEIN, TOTAL 6 4 g/dL  6 1-8 1   ALBUMIN 3 7 g/dL  3 6-5 1   GLOBULIN 2 7 g/dL (calc)  1 9-3 7   ALBUMIN/GLOBULIN RATIO 1 4 (calc)  1 0-2 5   BILIRUBIN, TOTAL 0 5 mg/dL  0 2-1 2   ALKALINE PHOSPHATASE 69 U/L     AST 21 U/L  10-35   ALT 13 U/L  6-29     (1) LIPID PANEL, FASTING 57YDU2430 09:17AM Wash Dais     Test Name Result Flag Reference   CHOLESTEROL, TOTAL 153 mg/dL  125-200   HDL CHOLESTEROL 56 mg/dL  > OR = 46   TRIGLICERIDES 542 mg/dL  <150   LDL-CHOLESTEROL 70 mg/dL (calc)  <130   Desirable range <100 mg/dL for patients with CHD or  diabetes and <70 mg/dL for diabetic patients with  known heart disease  CHOL/HDLC RATIO 2 7 (calc)  < OR = 5 0   NON HDL CHOLESTEROL 97 mg/dL (calc)     Target for non-HDL cholesterol is 30 mg/dL higher than   LDL cholesterol target       (1) CBC/PLT/DIFF 90PMT9359 09:17AM Wash Dais     Test Name Result Flag Reference   WHITE BLOOD CELL COUNT 7 7 Thousand/uL  3 8-10 8   RED BLOOD CELL COUNT 5 15 Million/uL H 3 80-5 10   HEMOGLOBIN 15 2 g/dL  11 7-15 5   HEMATOCRIT 46 8 % H 35 0-45 0   MCV 90 9 fL  80 0-100 0   MCH 29 5 pg  27 0-33 0   MCHC 32 5 g/dL  32 0-36 0   RDW 13 6 %  11 0-15 0   PLATELET COUNT 847 Thousand/uL 140-400   MPV 8 5 fL  7 5-11 5   ABSOLUTE NEUTROPHILS 5113 cells/uL  1707-0464   ABSOLUTE LYMPHOCYTES 1740 cells/uL  850-3900   ABSOLUTE MONOCYTES 624 cells/uL  200-950   ABSOLUTE EOSINOPHILS 177 cells/uL     ABSOLUTE BASOPHILS 46 cells/uL  0-200   NEUTROPHILS 66 4 %     LYMPHOCYTES 22 6 %     MONOCYTES 8 1 %     EOSINOPHILS 2 3 %     BASOPHILS 0 6 %       (Q) TSH, 3RD GENERATION W/REFLEX TO FT4 [id number] 09:17AM Debra Wilkinson   REPORT COMMENT:  FASTING:YES     Test Name Result Flag Reference   TSH W/REFLEX TO FT4 1 85 mIU/L  0 40-4 50

## 2018-01-17 NOTE — RESULT NOTES
Message   MRI discussed w/ pt - she is amendable to Spine and pain consult - and possible CHAYA injection  She reprots that she has had a Liver cyst for years - has no sxs of abd pain/n/v/weight loss - and declining any further work up  She was encouraged to call if she changes her mind or has any inc sxs/chg in sxs  She has been informed that smoking cessation would help her back sxs  She is not interested in cessation at this time  Verified Results  * MRI LUMBAR SPINE WO CONTRAST [id number] 12:09PM Katarina Sindy     Test Name Result Flag Reference   MRI LUMBAR SPINE WO CONTRAST (Report)     MRI LUMBAR SPINE WITHOUT CONTRAST     INDICATION: Right leg pain for last few months  Paresthesias  COMPARISON: Radiograph 5/13/2013     TECHNIQUE: Sagittal T1, sagittal T2, sagittal inversion recovery, axial T1 and axial T2, coronal T2       IMAGE QUALITY: Motion artifact limits portions of the study  Some diagnostic formation is obtained  FINDINGS:     ALIGNMENT: Kyphotic angulation lower thoracic spine secondary to mild, chronic anterior wedge deformity of T11  There is approximately 25% loss of height of T11  Mild dextroscoliosis of the lower thoracic spine  Minimal levoscoliosis mid to lower    lumbar spine  No subluxation  MARROW SIGNAL: Mild scattered multilevel degenerative endplate changes  No focally suspicious marrow lesions  No compression abnormality  Endplate edema at E4-N8 without bony destruction or T2 hyperintensity likely related to active endplate    degeneration  DISTAL CORD AND CONUS: Normal size and signal within the distal cord and conus  The conus ends at the inferior endplate of L1 level  PARASPINAL SOFT TISSUES: Liver is enlarged with a prominent Riedel's lobe  There is either a bilobed structure or 2 adjacent structures in aggregate measuring 7 5 x 5 2 cm within the liver, markedly hyperintense on T2-weighted images, possibly cysts    but incompletely imaged   Right upper quadrant ultrasound advised further characterization  SACRUM: Normal signal within the sacrum  No evidence of insufficiency or stress fracture  LOWER THORACIC DISC SPACES: Small central disc protrusions T9-T10, T10-T11, T11-T12 and T12-L1 with minimal central canal narrowing  No gross neural foraminal narrowing  LUMBAR DISC SPACES:        L1-L2: Normal      L2-L3: Mild loss of disc height and signal  Small left neural foraminal disc protrusion  Mild left neural foraminal narrowing  Central canal and right neural foramen patent  L3-L4: There is a diffuse disk bulge  No significant central canal or neural foraminal narrowing  L4-L5: There is a diffuse disk bulge  No significant central canal or neural foraminal narrowing  L5-S1: There is loss of disc height and signal  There is a diffuse disk bulge  No significant central canal or neural foraminal narrowing  IMPRESSION:       1  Multilevel spondylosis overall mild with endplate edema at U5-F6 suggesting active endplate degeneration of these levels  2  Chronic, mild anterior compression abnormality of T11   3  Prominent T2 hyperintense structure in the liver, possibly a bilobed cyst or 2 adjacent cysts  Other hepatic mass lesions not entirely excluded    Right upper quadrant ultrasound advised for further characterization  ##[id number]##sigslh       Workstation performed: [id number]     Signed by:    Corry Sierra MD   7/25/16       Plan  Chronic obstructive pulmonary disease    · Anoro Ellipta 62 5-25 MCG/INH Inhalation Aerosol Powder Breath Activated;  one puff daily  Osteoarthritis of spine with radiculopathy, lumbar region    · *0 - [address] [facility] Physician Referral  Consult for \[facility] [place] [facility]\"" & HEALTH SERVICES injection  Status:  Active  Requested for: [id number]  Care Summary provided  : Yes

## 2018-01-17 NOTE — RESULT NOTES
Verified Results  * CT LUNG SCREENING PROGRAM [id number] 02:11AM Beatrice Davis Order Number: [id number]    - Patient Instructions: To schedule this appointment, please contact Central Scheduling at [phone]  Test Name Result Flag Reference   CT LUNG SCREENING PROGRAM (Report)     CT CHEST LUNG CANCER SCREENING WITHOUT IV CONTRAST     INDICATION: Long term smoking history  COMPARISON: None  TECHNIQUE: Unenhanced CT examination of the chest was performed utilizing a low dose protocol  Reformatted images were created in axial, sagittal, and coronal planes  Radiation dose length product (DLP) for this visit: 61 mGy-cm   This examination, like all CT scans performed in the [facility], was performed utilizing techniques to minimize radiation dose exposure, including the use of iterative    reconstruction and automated exposure control  FINDINGS:     LUNGS: Scarring in the lingula and right middle lobe  Upper lobe emphysema  No pulmonary mass  No tracheal or endobronchial lesion  PLEURA: Unremarkable  HEART/GREAT VESSELS: Unremarkable for patient's age  MEDIASTINUM AND CINDY: Unremarkable  CHEST WALL AND LOWER NECK: Right axillary clips  VISUALIZED STRUCTURES IN THE UPPER ABDOMEN: Hepatic cysts the largest in the posterior right lobe measures 7 7 x 7 7 cm  Upper abdomen otherwise unremarkable  OSSEOUS STRUCTURES: No acute fracture  No destructive osseous lesion  IMPRESSION:     Upper lobe emphysema  No lung nodule or focal consolidation  Hepatic cysts the largest measuring 7 7 x 7 7 cm  Lung-RADS: Lung-RADS1, negative  Continued annual screening with LDCT in 12 months  Workstation performed: [id number]     Signed by:    Klaus Khan MD   7/7/17

## 2018-01-17 NOTE — RESULT NOTES
Message   Recorded as Task   Date: 03/10/2017 09:44 AM, Created By: Orly Blanc   Task Name: Miscellaneous   Assigned To: [facility],Team   Regarding Patient: Carissa Vera, Status: Active   Comment:    LicoStephanie - 10 Mar 2017 9:44 AM     TASK CREATED  Pt called requesting a medication refill prior to her 4/3 appt  She does not have enough to last until then  Pls call pt at [phone]  Nano Rasheed - 10 Mar 2017 9:52 AM     TASK EDITED  S/w the pt  and she would like a refill on the gabapentin 600mg TID, last ordered on 1/17 c/ 1 refill  She stated she does not have enough until 4/3 sovs  Ashley Philip to advise  Thanks   Rip Ramirez - 10 Mar 2017 10:18 AM     TASK REPLIED TO: Previously Assigned To Rip Ramirez                      Refill sent to pharmacy   Nano Rasheed - 10 Mar 2017 10:27 AM     TASK EDITED  S/w the pt  and she is aware          Signatures   Electronically signed by : Louie Aguilar, ; Mar 10 2017 10:27AM EST                       (Author)

## 2018-01-24 VITALS
BODY MASS INDEX: 21.79 KG/M2 | HEIGHT: 60 IN | DIASTOLIC BLOOD PRESSURE: 72 MMHG | TEMPERATURE: 98 F | OXYGEN SATURATION: 95 % | WEIGHT: 111 LBS | RESPIRATION RATE: 18 BRPM | HEART RATE: 71 BPM | SYSTOLIC BLOOD PRESSURE: 130 MMHG

## 2018-01-29 DIAGNOSIS — N18.2 CHRONIC KIDNEY DISEASE, STAGE II (MILD): ICD-10-CM

## 2018-02-15 ENCOUNTER — OFFICE VISIT (OUTPATIENT)
Dept: FAMILY MEDICINE CLINIC | Facility: CLINIC | Age: 78
End: 2018-02-15
Payer: COMMERCIAL

## 2018-02-15 VITALS
HEIGHT: 59 IN | BODY MASS INDEX: 22.78 KG/M2 | WEIGHT: 113 LBS | OXYGEN SATURATION: 95 % | DIASTOLIC BLOOD PRESSURE: 60 MMHG | HEART RATE: 89 BPM | TEMPERATURE: 98.7 F | SYSTOLIC BLOOD PRESSURE: 110 MMHG

## 2018-02-15 DIAGNOSIS — Z13.820 SCREENING FOR OSTEOPOROSIS: ICD-10-CM

## 2018-02-15 DIAGNOSIS — Z00.00 HEALTHCARE MAINTENANCE: ICD-10-CM

## 2018-02-15 DIAGNOSIS — N18.2 CHRONIC KIDNEY DISEASE (CKD), STAGE II (MILD): Primary | ICD-10-CM

## 2018-02-15 PROBLEM — M51.36 DISC DEGENERATION, LUMBAR: Status: ACTIVE | Noted: 2017-06-12

## 2018-02-15 PROBLEM — M51.369 DISC DEGENERATION, LUMBAR: Status: ACTIVE | Noted: 2017-06-12

## 2018-02-15 PROBLEM — D22.9 SUSPICIOUS NEVUS: Status: ACTIVE | Noted: 2017-12-28

## 2018-02-15 PROCEDURE — 1101F PT FALLS ASSESS-DOCD LE1/YR: CPT | Performed by: NURSE PRACTITIONER

## 2018-02-15 PROCEDURE — 3725F SCREEN DEPRESSION PERFORMED: CPT | Performed by: NURSE PRACTITIONER

## 2018-02-15 PROCEDURE — G0439 PPPS, SUBSEQ VISIT: HCPCS | Performed by: NURSE PRACTITIONER

## 2018-02-15 RX ORDER — GABAPENTIN 600 MG/1
1 TABLET ORAL 3 TIMES DAILY
COMMUNITY
Start: 2016-08-26 | End: 2018-04-12 | Stop reason: SDUPTHER

## 2018-02-15 RX ORDER — ACETAMINOPHEN 160 MG
TABLET,DISINTEGRATING ORAL
COMMUNITY
Start: 2016-01-08

## 2018-02-15 RX ORDER — LIDOCAINE 50 MG/G
1 PATCH TOPICAL
COMMUNITY
Start: 2016-10-17 | End: 2019-04-24

## 2018-02-15 RX ORDER — LANOLIN ALCOHOL/MO/W.PET/CERES
CREAM (GRAM) TOPICAL
COMMUNITY

## 2018-02-15 NOTE — PATIENT INSTRUCTIONS

## 2018-02-15 NOTE — PROGRESS NOTES
Assessment/Plan:  awv updated  Refuses vaccines  Update dexa  Cont mammo d/t breats cancer history  Add hep c titer  CKD -2 stable = cont to monitor  Has f/u in May after labs       Diagnoses and all orders for this visit:    Chronic kidney disease (CKD), stage II (mild)    Healthcare maintenance  -     Hepatitis C antibody; Future    Screening for osteoporosis  -     DXA bone density spine hip and pelvis    Other orders  -     Umeclidinium-Vilanterol (ANORO ELLIPTA) 62 5-25 MCG/INH AEPB; Inhale  -     calcium citrate-vitamin D (CITRACAL+D) 315-200 MG-UNIT per tablet; Take by mouth  -     gabapentin (NEURONTIN) 600 MG tablet; Take 1 tablet by mouth 3 (three) times a day  -     lidocaine (LIDODERM) 5 %; Apply 1 patch topically  -     Cholecalciferol (VITAMIN D3) 2000 units capsule; Take by mouth          Subjective:   Pt here for follow up visit  Pt had labs done  Pt declined flu vaccine today  Depression scale negative     Patient ID: Soo Guerrero is a 65 y o  female  Patient here for a [employer] and review of BMP after asking patient to increase water intake  Patient was diagnosed last visit with chronic kidney disease stage 2 with a GFR in the 46s  BMP MP was reviewed and GFR remains stable however patient does admit to not drinking any additional water  A [place] paper brought in and updated in the EMR  Patient was without falls  She does continue to work at [employer] as a   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  COPD is under control with her current inhaler  She needs updated DEXA scan  Mammogram is up-to-date  -she follows due to history of breast cancer status post lumpectomy right breast in 1998  Took estrogen for 5 years  Took tamoxifen for 5 years  Patient is an every day smoker, and has smoked for 41 years  Patient's BMI is 22 7  Patient has cut down on smoking to half a pack per day          The following portions of the patient's history were reviewed and updated as appropriate:   She  has a past medical history of Glaucoma  She  does not have any pertinent problems on file  She  has a past surgical history that includes Breast lumpectomy and Hysterectomy  Her family history includes Breast cancer in her mother; Heart disease in her father; Parkinsonism in her mother; Stroke in her mother  She  reports that she has been smoking  She has never used smokeless tobacco  She reports that she does not drink alcohol or use drugs  Current Outpatient Prescriptions   Medication Sig Dispense Refill    calcium citrate-vitamin D (CITRACAL+D) 315-200 MG-UNIT per tablet Take by mouth      Cholecalciferol (VITAMIN D3) 2000 units capsule Take by mouth      gabapentin (NEURONTIN) 600 MG tablet Take 1 tablet by mouth 3 (three) times a day      lidocaine (LIDODERM) 5 % Apply 1 patch topically      Umeclidinium-Vilanterol (ANORO ELLIPTA) 62 5-25 MCG/INH AEPB Inhale       No current facility-administered medications for this visit  Nayla Sandoval HPI:  Yasmeen Galloway is a 65 y o  female here for her Subsequent Wellness Visit      Patient Active Problem List   Diagnosis    Chronic kidney disease (CKD), stage II (mild)    Chronic obstructive pulmonary disease (HCC)    Disc degeneration, lumbar    Glaucoma    Liver cyst    Osteopenia    Post herpetic neuralgia    Suspicious nevus     Past Medical History:   Diagnosis Date    Glaucoma      Past Surgical History:   Procedure Laterality Date    BREAST LUMPECTOMY      HYSTERECTOMY       Family History   Problem Relation Age of Onset   Radha Mare Breast cancer Mother     Parkinsonism Mother     Stroke Mother     Heart disease Father      History   Smoking Status    Current Every Day Smoker   Smokeless Tobacco    Never Used     Comment: 48 pp Year cutting down slowly now at 1/2 a day     History   Alcohol Use No      History   Drug Use No       Current Outpatient Prescriptions   Medication Sig Dispense Refill    calcium citrate-vitamin D (CITRACAL+D) 315-200 MG-UNIT per tablet Take by mouth      Cholecalciferol (VITAMIN D3) 2000 units capsule Take by mouth      gabapentin (NEURONTIN) 600 MG tablet Take 1 tablet by mouth 3 (three) times a day      lidocaine (LIDODERM) 5 % Apply 1 patch topically      Umeclidinium-Vilanterol (ANORO ELLIPTA) 62 5-25 MCG/INH AEPB Inhale       No current facility-administered medications for this visit        Allergies   Allergen Reactions    Penicillins Hives     Immunization History   Administered Date(s) Administered    Influenza 11/14/2016    Influenza Split High Dose Preservative Free IM 11/14/2016    Pneumococcal Conjugate 13-Valent 01/08/2016       Patient Care Team:  Anne Marie Laughlin as PCP - TREVOR Salinas Notice, DO      Medicare Screening Tests and Risk Assessments:  [facility]     ISAR:       Once in a Lifetime Medicare Screening:       Medicare Screening Tests and Risk Assessment:   AAA Risk Assessment     Family history of AAA:  Yes   Osteoporosis Risk Assessment    HIV Risk Assessment        Drug and Alcohol Use:   Tobacco use    Tobacco use duration    Tobacco Cessation Readiness    Alcohol use    Alcohol Treatment Readiness   Illicit Drug Use        Diet & Exercise:   Diet   How many servings a day of the following:   Exercise        Cognitive Impairment Screening:   Cognitive Impairment Screening        Functional Ability/Level of Safety:   Hearing    Hearing Impairment Assessment    Current Activities    Help needed with the folllowing:    ADL    Fall Risk   Injury History       Home Safety:   Home Safety Risk Factors       Advanced Directives:   Advanced Directives    Patient's End of Life Decisions        Urinary Incontinence:       Glaucoma:            Provider Screening     Preventative Screening/Counseling:   Cardiovascular Screening/Counseling:   (Labs Q5 years, EKG optional one-time)   General:  Risks and Benefits Discussed, Screening Current Counseling:  Healthy Diet, Healthy Weight, Tobacco Cessation          Diabetes Screening/Counseling:   (2 tests/year if Pre-Diabetes or 1 test/year if no Diabetes)   General:  Risks and Benefits Discussed, Screening Current Counseling:  Improve Physical Activity          Colorectal Cancer Screening/Counseling:   (FOBT Q1 yr; Flex Sig Q4 yrs or Q10 yrs after Screening Colonoscopy; Screening Colonoscpy Q2 yrs High Risk or Q10 yrs Low Risk; Barium Enema Q2 yrs High Risk or Q4 yrs Low Risk)   General:  Screening Not Indicated Counseling:  high fiber diet          Prostate Cancer Screening/Counseling:   (Annual)          Breast Cancer Screening/Counseling:   (Baseline Age 28 - 43; Annual Age 36+)   General:  Screening Not Indicated          Cervical Cancer Screening/Counseling:   (Annual for High Risk or Childbearing Age with Abnormal Pap in Last 3 yrs; Every 2 all others)   General:  Screening Not Indicated           Osteoporosis Screening/Counseling:   (Every 2 Yrs if at risk or more if medically necessary)   General:  Risks and Benefits Discussed Counseling:  Calcium and Vitamin D Intake, Regular Weightbearing Exercise Due for: Studies:  Bone Density Ultrasound         AAA Screening/Counseling:   (Once per Lifetime with risk factors)    Family History of AAA:  Yes     General:  Risks and Benefits Discussed Counseling:  tobacco cessation counseling given          Glaucoma Screening/Counseling:   (Annual)   General:  Risks and Benefits Discussed, Screening Current          HIV Screening/Counseling:   (Voluntary; Once annually for high risk OR 3 times for Pregnancy at diagnosis of IUP; 3rd trimester; and at Labor   General:  Screening Not Indicated           Hepatitis C Screening:   Hepatitis C Counseling Provided:  Yes Hepatitis C Screening Accepted:   Yes              Immunizations:   Influenza (annual):  Patient Declines   Pneumococcal (Once in a Lifetime):  Patient Declines   Hepatitis B Series (low risk patients): Prevention Counseling   Hepatitis B Series (medium to high risk patients scheduled series):  Patient Declines   Zostavax (Medicare D Coverage, Pt >70 yo):  Vaccine Status Unknown   Tdap (Non-Medicare Wellness Visit required):  Vaccine Status Unknown       Other Preventative Couseling (Non-Medicare Wellness Visit Required):       Referrals (Non-Medicare Wellness Visit Required):       Medical Equipment/Suppliers:           Reviewed Updated [facility] Prior Wellness Visits:   Last Medicare wellness visit information was reviewed, patient interviewed , no change since last AWVyes  Last Medicare wellness visit information was reviewed, patient interviewed and updates made to the record today yes    Assessment and Plan:  1  Chronic kidney disease (CKD), stage II (mild)     2  Healthcare maintenance  Hepatitis C antibody   3  Screening for osteoporosis  DXA bone density spine hip and pelvis       Health Maintenance Due   Topic Date Due    SLP PLAN OF CARE  1940    DTaP,Tdap,and Td Vaccines (1 - Tdap) 09/10/1947    Fall Risk  09/10/2004    Urinary Incontinence Screening  09/10/2004    PNEUMOCOCCAL POLYSACCHARIDE VACCINE AGE 65 AND OVER  09/10/2004    GLAUCOMA SCREENING 67+ YR  09/10/2006         Review of Systems   Constitutional: Negative for activity change and appetite change  HENT: Negative  Eyes: Negative  Respiratory: Negative  Cardiovascular: Negative  Negative for chest pain  Gastrointestinal: Negative  Endocrine: Negative  Negative for cold intolerance  Genitourinary: Negative  Musculoskeletal: Negative  Skin: Negative  Allergic/Immunologic: Negative  Neurological: Negative  Hematological: Negative  Psychiatric/Behavioral: Negative  All other systems reviewed and are negative          Objective:    Vitals:    02/15/18 1606   BP: 110/60   Pulse: 89   Temp: 98 7 °F (37 1 °C)   SpO2: 95%        Physical Exam   Constitutional: She is oriented to person, place, and time  She appears well-developed and well-nourished  HENT:   Head: Normocephalic  Right Ear: External ear normal    Left Ear: External ear normal    Mouth/Throat: Oropharynx is clear and moist    Eyes: Conjunctivae are normal  Pupils are equal, round, and reactive to light  Neck: Normal range of motion  Neck supple  No thyromegaly present  Cardiovascular: Normal rate, regular rhythm, normal heart sounds and intact distal pulses  No murmur heard  Pulmonary/Chest: Effort normal and breath sounds normal  No respiratory distress  Abdominal: Soft  Bowel sounds are normal    Musculoskeletal: Normal range of motion  Neurological: She is alert and oriented to person, place, and time  She has normal reflexes  Skin: Skin is warm and dry  Psychiatric: She has a normal mood and affect   Her behavior is normal  Judgment and thought content normal

## 2018-04-12 DIAGNOSIS — R20.2 TINGLING: Primary | ICD-10-CM

## 2018-04-12 RX ORDER — GABAPENTIN 600 MG/1
TABLET ORAL
Qty: 90 TABLET | Refills: 5 | Status: SHIPPED | OUTPATIENT
Start: 2018-04-12 | End: 2018-10-12 | Stop reason: SDUPTHER

## 2018-04-19 ENCOUNTER — TELEPHONE (OUTPATIENT)
Dept: FAMILY MEDICINE CLINIC | Facility: CLINIC | Age: 78
End: 2018-04-19

## 2018-04-19 DIAGNOSIS — R20.2 TINGLING: ICD-10-CM

## 2018-04-19 RX ORDER — GABAPENTIN 600 MG/1
600 TABLET ORAL 3 TIMES DAILY
Qty: 90 TABLET | Refills: 0 | Status: CANCELLED | OUTPATIENT
Start: 2018-04-19

## 2018-05-28 DIAGNOSIS — Z72.0 TOBACCO USE: ICD-10-CM

## 2018-05-28 DIAGNOSIS — N18.2 CHRONIC KIDNEY DISEASE, STAGE II (MILD): ICD-10-CM

## 2018-05-28 DIAGNOSIS — K76.89 OTHER SPECIFIED DISEASES OF LIVER (CODE): ICD-10-CM

## 2018-05-28 DIAGNOSIS — D22.9 MELANOCYTIC NEVUS: ICD-10-CM

## 2018-05-28 DIAGNOSIS — J44.9 CHRONIC OBSTRUCTIVE PULMONARY DISEASE (HCC): ICD-10-CM

## 2018-05-28 DIAGNOSIS — M85.80 OTHER SPECIFIED DISORDERS OF BONE DENSITY AND STRUCTURE, UNSPECIFIED SITE (CODE): ICD-10-CM

## 2018-06-18 ENCOUNTER — OFFICE VISIT (OUTPATIENT)
Dept: FAMILY MEDICINE CLINIC | Facility: CLINIC | Age: 78
End: 2018-06-18
Payer: COMMERCIAL

## 2018-06-18 VITALS
OXYGEN SATURATION: 95 % | SYSTOLIC BLOOD PRESSURE: 120 MMHG | BODY MASS INDEX: 21.87 KG/M2 | TEMPERATURE: 98.4 F | WEIGHT: 111.4 LBS | HEIGHT: 60 IN | HEART RATE: 70 BPM | RESPIRATION RATE: 16 BRPM | DIASTOLIC BLOOD PRESSURE: 62 MMHG

## 2018-06-18 DIAGNOSIS — Z01.818 PRE-OP EVALUATION: Primary | ICD-10-CM

## 2018-06-18 PROCEDURE — 4040F PNEUMOC VAC/ADMIN/RCVD: CPT | Performed by: NURSE PRACTITIONER

## 2018-06-18 PROCEDURE — 4004F PT TOBACCO SCREEN RCVD TLK: CPT | Performed by: NURSE PRACTITIONER

## 2018-06-18 PROCEDURE — 1160F RVW MEDS BY RX/DR IN RCRD: CPT | Performed by: NURSE PRACTITIONER

## 2018-06-18 PROCEDURE — 99214 OFFICE O/P EST MOD 30 MIN: CPT | Performed by: NURSE PRACTITIONER

## 2018-06-18 PROCEDURE — 3008F BODY MASS INDEX DOCD: CPT | Performed by: NURSE PRACTITIONER

## 2018-06-18 RX ORDER — BESIFLOXACIN 6 MG/ML
SUSPENSION OPHTHALMIC
Refills: 0 | COMMUNITY
Start: 2018-06-04 | End: 2019-04-24

## 2018-06-18 RX ORDER — KETOROLAC TROMETHAMINE 5 MG/ML
SOLUTION OPHTHALMIC
Refills: 0 | COMMUNITY
Start: 2018-06-01 | End: 2019-04-24

## 2018-06-18 RX ORDER — TRIPROLIDINE/PSEUDOEPHEDRINE 2.5MG-60MG
TABLET ORAL
Refills: 0 | COMMUNITY
Start: 2018-06-01 | End: 2019-04-24

## 2018-06-18 NOTE — PROGRESS NOTES
Assessment/Plan:  Assessment:    65 y o  female with planned surgery  Known risk factors for perioperative complications: Renal dysfunction and copd    Cardiac Risk Estimation: low risk for cv risk hilda op for elective cataract surg under MAC  Copd stable on current regimen - ANORO daily - can do 2 flights of stairs and walk 2 blocks w/out sign REVELES  Creat 12/2017 1 06    Current medications which may produce withdrawal symptoms if withheld perioperatively: none    Plan:    1  Preoperative workup as follows ECG - NSR; no STTW abn  2  Change in medication regimen before surgery: none, continue medication regimen including morning of surgery, with sip of water  3  Prophylaxis for cardiac events with perioperative beta-blockers: should be considered, specific regimen per anesthesia  4  Invasive hemodynamic monitoring perioperatively: not indicated  5  Deep vein thrombosis prophylaxis postoperatively:regimen to be chosen by surgical team   6  Surveillance for postoperative MI with ECG immediately postoperatively and on postoperative days 1 and 2 AND troponin levels 24 hours postoperatively and on day 4 or hospital discharge (whichever comes first): at the discretion of anesthesiologist   7  Other measures: Preoperative tobacco abstention 2-3 days  Diagnoses and all orders for this visit:    Pre-op evaluation    Other orders  -     BESIVANCE 0 6 % SUSP;   -     DUREZOL 0 05 % EMUL; ISTILL 1 DROP OPTHALMICALLY FOUR TIMES A DAY INTO THE OPERATED EY     (REFER TO PRESCRIPTION NOTES)  -     ketorolac (ACULAR) 0 5 % ophthalmic solution; INSTILL 1 DROP OPTHALMICALLY INTO BOTH EYES TWO TIMES A DAY, START 2 DAYS BEFORE SURGERY  Subjective:   Chief Complaint   Patient presents with    Pre-op Exam     Patient will be having right eye cateract surgery by Dr Rufina Workman   EKG done       Patient ID: Ajnelica Gomez is a 65 y o  female      Subjective:    Anjelica Gomez is a 65 y o  female who presents to the office today for a preoperative consultation at the request of surgeon DR Alva Vickers who plans on performing cataract removal Rt eye on June 20  This consultation is requested for the specific conditions prompting preoperative evaluation (i e  because of potential affect on operative risk): [place] Planned anesthesia: IV sedation  The patient has the following known anesthesia issues: none  Patients bleeding risk: no recent abnormal bleeding  Patient does not have objections to receiving blood products if needed  Predictors of intubation difficulty:   Morbid obesity? no   Anatomically abnormal facies? no   Prominent incisors? no   Receding mandible? no   Short, thick neck? no   Neck range of motion: normal   Mallampati score: III (soft and hard palate and base of uvula visible)    Dentition: No chipped, loose, or missing teeth  Cardiographics  ECG: normal sinus rhythm, no blocks or conduction defects, no ischemic changes  Echocardiogram: not done    Imaging  Chest x-ray: not done     Lab Review   Lab Results       Component                Value               Date                       NA                       145                 12/21/2017                 K                        4 5                 12/21/2017                 CL                       111 (H)             12/21/2017                 CO2                      28                  12/21/2017                 BUN                      19                  12/21/2017                 CREATININE               1 06 (H)            12/21/2017                 CALCIUM                  9 0                 12/21/2017                      The following portions of the patient's history were reviewed and updated as appropriate: allergies, past social history, past surgical history and problem list     Review of Systems   Constitutional: Negative for activity change and appetite change  HENT: Negative  Negative for congestion  Eyes: Negative  Respiratory: Negative  Negative for cough, shortness of breath and wheezing  Cardiovascular: Negative  Negative for chest pain, palpitations and leg swelling  Gastrointestinal: Negative  Negative for abdominal pain  Endocrine: Negative  Negative for cold intolerance  Genitourinary: Negative  Musculoskeletal: Negative  Skin: Negative  Allergic/Immunologic: Negative  Neurological: Negative  Hematological: Negative  Psychiatric/Behavioral: Negative  All other systems reviewed and are negative  Objective:      /62 (BP Location: Left arm, Patient Position: Sitting, Cuff Size: Adult)   Pulse 70   Temp 98 4 °F (36 9 °C) (Oral)   Resp 16   Ht 4' 11 84" (1 52 m)   Wt 50 5 kg (111 lb 6 4 oz)   SpO2 95%   BMI 21 87 kg/m²          Physical Exam   Constitutional: She is oriented to person, place, and time  She appears well-developed and well-nourished  No distress  HENT:   Head: Normocephalic  Right Ear: Tympanic membrane and external ear normal  No decreased hearing is noted  Left Ear: Tympanic membrane and external ear normal  No decreased hearing is noted  Mouth/Throat: Oropharynx is clear and moist    Eyes: Conjunctivae are normal  Pupils are equal, round, and reactive to light  Neck: Normal range of motion  Neck supple  No thyromegaly present  Cardiovascular: Normal rate, regular rhythm, normal heart sounds and intact distal pulses  No murmur heard  Pulmonary/Chest: Effort normal and breath sounds normal  No respiratory distress  Abdominal: Soft  Bowel sounds are normal  She exhibits no distension  Musculoskeletal: Normal range of motion  Lymphadenopathy:     She has no cervical adenopathy  Neurological: She is alert and oriented to person, place, and time  She has normal reflexes  No cranial nerve deficit  Coordination normal    Skin: Skin is warm and dry  Psychiatric: She has a normal mood and affect   Her behavior is normal  Judgment and thought content normal

## 2018-10-12 DIAGNOSIS — R20.2 TINGLING: ICD-10-CM

## 2018-10-12 RX ORDER — GABAPENTIN 600 MG/1
TABLET ORAL
Qty: 90 TABLET | Refills: 5 | Status: SHIPPED | OUTPATIENT
Start: 2018-10-12 | End: 2019-04-11 | Stop reason: SDUPTHER

## 2018-11-13 DIAGNOSIS — J43.9 PULMONARY EMPHYSEMA, UNSPECIFIED EMPHYSEMA TYPE (HCC): Primary | ICD-10-CM

## 2018-11-13 RX ORDER — UMECLIDINIUM BROMIDE AND VILANTEROL TRIFENATATE 62.5; 25 UG/1; UG/1
POWDER RESPIRATORY (INHALATION)
Qty: 1 INHALER | Refills: 6 | Status: SHIPPED | OUTPATIENT
Start: 2018-11-13 | End: 2019-06-14 | Stop reason: SDUPTHER

## 2019-04-08 ENCOUNTER — TELEPHONE (OUTPATIENT)
Dept: FAMILY MEDICINE CLINIC | Facility: CLINIC | Age: 79
End: 2019-04-08

## 2019-04-08 DIAGNOSIS — J44.9 CHRONIC OBSTRUCTIVE PULMONARY DISEASE, UNSPECIFIED COPD TYPE (HCC): Primary | ICD-10-CM

## 2019-04-08 DIAGNOSIS — K76.89 LIVER CYST: ICD-10-CM

## 2019-04-08 DIAGNOSIS — N18.2 CHRONIC KIDNEY DISEASE (CKD), STAGE II (MILD): ICD-10-CM

## 2019-04-11 DIAGNOSIS — R20.2 TINGLING: ICD-10-CM

## 2019-04-11 RX ORDER — GABAPENTIN 600 MG/1
600 TABLET ORAL 3 TIMES DAILY
Qty: 90 TABLET | Refills: 5 | Status: SHIPPED | OUTPATIENT
Start: 2019-04-11 | End: 2019-10-13 | Stop reason: SDUPTHER

## 2019-04-24 ENCOUNTER — OFFICE VISIT (OUTPATIENT)
Dept: FAMILY MEDICINE CLINIC | Facility: CLINIC | Age: 79
End: 2019-04-24
Payer: COMMERCIAL

## 2019-04-24 VITALS
TEMPERATURE: 97.9 F | RESPIRATION RATE: 16 BRPM | DIASTOLIC BLOOD PRESSURE: 80 MMHG | SYSTOLIC BLOOD PRESSURE: 130 MMHG | OXYGEN SATURATION: 97 % | BODY MASS INDEX: 23.48 KG/M2 | HEART RATE: 67 BPM | WEIGHT: 116.5 LBS | HEIGHT: 59 IN

## 2019-04-24 DIAGNOSIS — Z00.00 MEDICARE ANNUAL WELLNESS VISIT, SUBSEQUENT: ICD-10-CM

## 2019-04-24 DIAGNOSIS — N18.2 CHRONIC KIDNEY DISEASE (CKD), STAGE II (MILD): ICD-10-CM

## 2019-04-24 DIAGNOSIS — B02.29 POST HERPETIC NEURALGIA: ICD-10-CM

## 2019-04-24 DIAGNOSIS — L30.9 DERMATITIS: ICD-10-CM

## 2019-04-24 DIAGNOSIS — Z23 NEED FOR PNEUMOCOCCAL VACCINATION: ICD-10-CM

## 2019-04-24 DIAGNOSIS — J43.9 PULMONARY EMPHYSEMA, UNSPECIFIED EMPHYSEMA TYPE (HCC): Primary | ICD-10-CM

## 2019-04-24 DIAGNOSIS — Z72.0 TOBACCO ABUSE: ICD-10-CM

## 2019-04-24 DIAGNOSIS — M85.80 OSTEOPENIA, UNSPECIFIED LOCATION: ICD-10-CM

## 2019-04-24 PROCEDURE — 1160F RVW MEDS BY RX/DR IN RCRD: CPT | Performed by: NURSE PRACTITIONER

## 2019-04-24 PROCEDURE — 90732 PPSV23 VACC 2 YRS+ SUBQ/IM: CPT

## 2019-04-24 PROCEDURE — 99214 OFFICE O/P EST MOD 30 MIN: CPT | Performed by: NURSE PRACTITIONER

## 2019-04-24 PROCEDURE — G0439 PPPS, SUBSEQ VISIT: HCPCS | Performed by: NURSE PRACTITIONER

## 2019-04-24 PROCEDURE — G0009 ADMIN PNEUMOCOCCAL VACCINE: HCPCS

## 2019-04-24 PROCEDURE — 1125F AMNT PAIN NOTED PAIN PRSNT: CPT | Performed by: NURSE PRACTITIONER

## 2019-04-24 PROCEDURE — 4040F PNEUMOC VAC/ADMIN/RCVD: CPT

## 2019-04-24 PROCEDURE — 1170F FXNL STATUS ASSESSED: CPT | Performed by: NURSE PRACTITIONER

## 2019-04-24 PROCEDURE — 3725F SCREEN DEPRESSION PERFORMED: CPT | Performed by: NURSE PRACTITIONER

## 2019-04-24 PROCEDURE — 1101F PT FALLS ASSESS-DOCD LE1/YR: CPT | Performed by: NURSE PRACTITIONER

## 2019-04-24 PROCEDURE — 4004F PT TOBACCO SCREEN RCVD TLK: CPT | Performed by: NURSE PRACTITIONER

## 2019-04-24 RX ORDER — MOMETASONE FUROATE 1 MG/G
CREAM TOPICAL DAILY
Qty: 45 G | Refills: 0 | Status: SHIPPED | OUTPATIENT
Start: 2019-04-24

## 2019-06-04 ENCOUNTER — HOSPITAL ENCOUNTER (OUTPATIENT)
Dept: RADIOLOGY | Age: 79
End: 2019-06-04
Payer: COMMERCIAL

## 2019-06-04 ENCOUNTER — HOSPITAL ENCOUNTER (OUTPATIENT)
Dept: RADIOLOGY | Age: 79
Discharge: HOME | End: 2019-06-04
Payer: COMMERCIAL

## 2019-06-04 DIAGNOSIS — M85.80 OSTEOPENIA, UNSPECIFIED LOCATION: ICD-10-CM

## 2019-06-04 DIAGNOSIS — N18.2 CHRONIC KIDNEY DISEASE (CKD), STAGE II (MILD): ICD-10-CM

## 2019-06-04 PROCEDURE — 77080 DXA BONE DENSITY AXIAL: CPT

## 2019-06-14 DIAGNOSIS — J43.9 PULMONARY EMPHYSEMA, UNSPECIFIED EMPHYSEMA TYPE (HCC): ICD-10-CM

## 2019-10-13 DIAGNOSIS — R20.2 TINGLING: ICD-10-CM

## 2019-10-14 RX ORDER — GABAPENTIN 600 MG/1
TABLET ORAL
Qty: 90 TABLET | Refills: 1 | Status: SHIPPED | OUTPATIENT
Start: 2019-10-14 | End: 2019-12-19 | Stop reason: SDUPTHER

## 2019-12-19 DIAGNOSIS — R20.2 TINGLING: ICD-10-CM

## 2019-12-19 RX ORDER — GABAPENTIN 600 MG/1
TABLET ORAL
Qty: 90 TABLET | Refills: 0 | Status: SHIPPED | OUTPATIENT
Start: 2019-12-19 | End: 2020-01-16 | Stop reason: SDUPTHER

## 2019-12-19 NOTE — TELEPHONE ENCOUNTER
Called and spoke with patient she says she will call in January to schedule her appointment      Patient advised to get he labs done before appointment

## 2020-01-16 ENCOUNTER — OFFICE VISIT (OUTPATIENT)
Dept: FAMILY MEDICINE CLINIC | Facility: CLINIC | Age: 80
End: 2020-01-16
Payer: COMMERCIAL

## 2020-01-16 VITALS
RESPIRATION RATE: 16 BRPM | WEIGHT: 114 LBS | DIASTOLIC BLOOD PRESSURE: 70 MMHG | OXYGEN SATURATION: 98 % | TEMPERATURE: 97.6 F | BODY MASS INDEX: 22.98 KG/M2 | SYSTOLIC BLOOD PRESSURE: 120 MMHG | HEIGHT: 59 IN | HEART RATE: 71 BPM

## 2020-01-16 DIAGNOSIS — B02.29 POST HERPETIC NEURALGIA: ICD-10-CM

## 2020-01-16 DIAGNOSIS — N18.2 CHRONIC KIDNEY DISEASE (CKD), STAGE II (MILD): ICD-10-CM

## 2020-01-16 DIAGNOSIS — J43.9 PULMONARY EMPHYSEMA, UNSPECIFIED EMPHYSEMA TYPE (HCC): Primary | ICD-10-CM

## 2020-01-16 DIAGNOSIS — Z72.0 TOBACCO ABUSE: ICD-10-CM

## 2020-01-16 DIAGNOSIS — M85.80 OSTEOPENIA, UNSPECIFIED LOCATION: ICD-10-CM

## 2020-01-16 DIAGNOSIS — J43.9 PULMONARY EMPHYSEMA, UNSPECIFIED EMPHYSEMA TYPE (HCC): ICD-10-CM

## 2020-01-16 DIAGNOSIS — R20.2 TINGLING: ICD-10-CM

## 2020-01-16 DIAGNOSIS — E87.5 HYPERKALEMIA: ICD-10-CM

## 2020-01-16 PROCEDURE — 99214 OFFICE O/P EST MOD 30 MIN: CPT | Performed by: NURSE PRACTITIONER

## 2020-01-16 PROCEDURE — 4004F PT TOBACCO SCREEN RCVD TLK: CPT | Performed by: NURSE PRACTITIONER

## 2020-01-16 PROCEDURE — 1160F RVW MEDS BY RX/DR IN RCRD: CPT | Performed by: NURSE PRACTITIONER

## 2020-01-16 RX ORDER — GABAPENTIN 600 MG/1
600 TABLET ORAL 3 TIMES DAILY
Qty: 90 TABLET | Refills: 5 | Status: SHIPPED | OUTPATIENT
Start: 2020-01-16 | End: 2020-07-21 | Stop reason: SDUPTHER

## 2020-01-16 NOTE — PROGRESS NOTES
Subjective  Follow up for chronic disease - no complaints    Uli Flanagan is a [de-identified] y o  female here for  Follow up chronic disease management and evaluation and treatment of COPD, post herpetic neuralgia and mild CKD-2  The patient is not currently have symptoms / an exacerbation  The patient has COPD for approximately 5 years  Symptoms in previous episodes have included dyspnea and cough, and typically relieved w/ rest  excellent control w/ ANORO  Previous episodes have been exacerbated by climbing stairs and strenuous activity  Current treatment includes ANORO, which generally provides complete resolution of symptoms  She uses 2 pillows at night  Patient currently is not on home oxygen therapy    The patient is having no constitutional symptoms, denying fever, chills, anorexia, or weight loss  The patient has not been hospitalized for this condition before  She currently smokes 1/2 packs per day  The patient is experiencing exercise intolerance (difficulty climbing 2 flights of stairs)      Previous Report Reviewed: historical medical records and imaging reports: lung CT + upper lobe emphysema - no nodules 2017      The following portions of the patient's history were reviewed in this encounter and updated as appropriate:     Review of Systems  Constitutional: negative for anorexia, chills, fatigue and weight loss  Eyes: negative  Ears, nose, mouth, throat, and face: positive for hearing loss and cerumen ( appointment to see ENT)  Respiratory: positive for cough, dyspnea on exertion and emphysema  Cardiovascular: negative for chest pain, exertional chest pressure/discomfort, irregular heart beat, lower extremity edema, palpitations and syncope  Gastrointestinal: negative  Genitourinary:negative  Integument/breast: positive for s/p lumpectomy for brest ca rt breast 2004  Hematologic/lymphatic: negative  Musculoskeletal:negative  Neurological: positive for paresthesia  Behavioral/Psych: negative  Endocrine: negative  Allergic/Immunologic: negative    Objective     /70 (BP Location: Left arm, Patient Position: Sitting, Cuff Size: Adult)   Pulse 71   Temp 97 6 °F (36 4 °C) (Tympanic)   Resp 16   Ht 4' 11 45" (1 51 m)   Wt 51 7 kg (114 lb)   SpO2 98%   BMI 22 68 kg/m²     General Appearance:    Alert, cooperative, no distress, appears stated age   Head:    Normocephalic, without obvious abnormality, atraumatic   Eyes:    PERRL, conjunctiva/corneas clear, EOM's intact, fundi     benign, both eyes   Ears:    Cerumen impaction   Nose:   Nares normal, septum midline, mucosa normal, no drainage    or sinus tenderness   Throat:   Lips, mucosa, and tongue normal; teeth and gums normal   Neck:   Supple, symmetrical, trachea midline, no adenopathy;     thyroid:  no enlargement/tenderness/nodules; no carotid    bruit or JVD   Back:     Symmetric, no curvature, ROM normal, no CVA tenderness   Lungs:     Clear to auscultation bilaterally, respirations unlabored   Chest Wall:    No tenderness or deformity    Heart:    Regular rate and rhythm, S1 and S2 normal, no murmur, rub   or gallop   Breast Exam:    No tenderness, masses, or nipple abnormality   Abdomen:     Soft, non-tender, bowel sounds active all four quadrants,     no masses, no organomegaly   Genitalia:   defer   Rectal:   defer   Extremities:   Extremities normal, atraumatic, no cyanosis or edema   Pulses:   2+ and symmetric all extremities   Skin:   Skin color, texture, turgor normal, no rashes or lesions   Lymph nodes:   Cervical, supraclavicular, and axillary nodes normal   Neurologic:   CNII-XII intact, normal strength, sensation and reflexes     throughout       Diagnostic Review  CT of chest performed on 2017 without contrast revealed  Upper lobe emphysema - INS denied subsequent CT scan  Assessment/Plan    Diagnosis ICD-10-CM Associated Orders   1  Pulmonary emphysema, unspecified emphysema type ([address] ) J43 9 CT chest wo contrast   2   Post herpetic neuralgia B02 29 gabapentin (NEURONTIN) 600 MG tablet   3  Chronic kidney disease (CKD), stage II (mild) N18 2    4  Osteopenia, unspecified location M85 80    5  Tobacco abuse Z72 0 CT chest wo contrast   6  Tingling R20 2    7  Hyperkalemia A32 2 Basic metabolic panel     Emphysema  Stable/ controlled on Anoro daily    Discussed diagnosis, its evaluation, treatment and usual course  All questions answered  Follow up with PCP in 6 month or as needed

## 2020-01-16 NOTE — ASSESSMENT & PLAN NOTE
Creatinine at baseline - however potassium 5 3 not on diuretics, and denies new vitamins/herbs ; water consumption at 8 ou 3 times a day  RE emeliak ABDON in one month

## 2020-01-16 NOTE — ASSESSMENT & PLAN NOTE
Unable to cut back anymore then 1/2 pack a day  LDCT pt aged out  INS Denied last lung ct scan  - pt wants to try again this year

## 2020-07-06 ENCOUNTER — TELEPHONE (OUTPATIENT)
Dept: FAMILY MEDICINE CLINIC | Facility: CLINIC | Age: 80
End: 2020-07-06

## 2020-07-06 DIAGNOSIS — E87.5 HYPERKALEMIA: ICD-10-CM

## 2020-07-06 DIAGNOSIS — N18.2 CHRONIC KIDNEY DISEASE (CKD), STAGE II (MILD): Primary | ICD-10-CM

## 2020-07-06 DIAGNOSIS — E55.9 VITAMIN D DEFICIENCY: ICD-10-CM

## 2020-07-06 DIAGNOSIS — E78.1 HYPERTRIGLYCERIDEMIA, ESSENTIAL: ICD-10-CM

## 2020-07-20 DIAGNOSIS — J43.9 PULMONARY EMPHYSEMA, UNSPECIFIED EMPHYSEMA TYPE (HCC): ICD-10-CM

## 2020-07-20 NOTE — TELEPHONE ENCOUNTER
Will be here for her visit but is using her last one today  Plus she will be out and about tomorrow if you could fill this today she would appreciate it

## 2020-07-21 ENCOUNTER — OFFICE VISIT (OUTPATIENT)
Dept: FAMILY MEDICINE CLINIC | Facility: CLINIC | Age: 80
End: 2020-07-21
Payer: COMMERCIAL

## 2020-07-21 VITALS
OXYGEN SATURATION: 96 % | RESPIRATION RATE: 16 BRPM | BODY MASS INDEX: 23.39 KG/M2 | WEIGHT: 116 LBS | HEART RATE: 63 BPM | HEIGHT: 59 IN | TEMPERATURE: 98.3 F | DIASTOLIC BLOOD PRESSURE: 78 MMHG | SYSTOLIC BLOOD PRESSURE: 158 MMHG

## 2020-07-21 DIAGNOSIS — J43.9 PULMONARY EMPHYSEMA, UNSPECIFIED EMPHYSEMA TYPE (HCC): ICD-10-CM

## 2020-07-21 DIAGNOSIS — Z72.0 TOBACCO ABUSE: ICD-10-CM

## 2020-07-21 DIAGNOSIS — B02.29 POST HERPETIC NEURALGIA: ICD-10-CM

## 2020-07-21 DIAGNOSIS — N18.31 CKD STAGE G3A/A1, GFR 45-59 AND ALBUMIN CREATININE RATIO <30 MG/G (HCC): Primary | ICD-10-CM

## 2020-07-21 PROBLEM — E87.5 HYPERKALEMIA: Status: RESOLVED | Noted: 2020-01-16 | Resolved: 2020-07-21

## 2020-07-21 PROCEDURE — 4004F PT TOBACCO SCREEN RCVD TLK: CPT | Performed by: FAMILY MEDICINE

## 2020-07-21 PROCEDURE — 3077F SYST BP >= 140 MM HG: CPT | Performed by: FAMILY MEDICINE

## 2020-07-21 PROCEDURE — 1170F FXNL STATUS ASSESSED: CPT | Performed by: FAMILY MEDICINE

## 2020-07-21 PROCEDURE — 1160F RVW MEDS BY RX/DR IN RCRD: CPT | Performed by: FAMILY MEDICINE

## 2020-07-21 PROCEDURE — 3078F DIAST BP <80 MM HG: CPT | Performed by: FAMILY MEDICINE

## 2020-07-21 PROCEDURE — 99213 OFFICE O/P EST LOW 20 MIN: CPT | Performed by: FAMILY MEDICINE

## 2020-07-21 PROCEDURE — 1125F AMNT PAIN NOTED PAIN PRSNT: CPT | Performed by: FAMILY MEDICINE

## 2020-07-21 PROCEDURE — 3008F BODY MASS INDEX DOCD: CPT | Performed by: FAMILY MEDICINE

## 2020-07-21 PROCEDURE — 4040F PNEUMOC VAC/ADMIN/RCVD: CPT | Performed by: FAMILY MEDICINE

## 2020-07-21 PROCEDURE — G0439 PPPS, SUBSEQ VISIT: HCPCS | Performed by: FAMILY MEDICINE

## 2020-07-21 RX ORDER — ALBUTEROL SULFATE 90 UG/1
2 AEROSOL, METERED RESPIRATORY (INHALATION) EVERY 6 HOURS PRN
Qty: 1 INHALER | Refills: 5 | Status: SHIPPED | OUTPATIENT
Start: 2020-07-21 | End: 2021-11-22

## 2020-07-21 RX ORDER — GABAPENTIN 600 MG/1
600 TABLET ORAL 2 TIMES DAILY
Qty: 180 TABLET | Refills: 1 | Status: SHIPPED | OUTPATIENT
Start: 2020-07-21 | End: 2021-02-03 | Stop reason: SDUPTHER

## 2020-07-21 RX ORDER — GABAPENTIN 100 MG/1
100 CAPSULE ORAL 2 TIMES DAILY
Qty: 180 CAPSULE | Refills: 1 | Status: SHIPPED | OUTPATIENT
Start: 2020-07-21 | End: 2021-01-11

## 2020-07-21 NOTE — ASSESSMENT & PLAN NOTE
Well controlled  Smokes 1/2 PPD  Does not want to quit  UTD with pneumococcal vaccines  Sent albuterol today

## 2020-07-21 NOTE — PATIENT INSTRUCTIONS
Medicare Preventive Visit Patient Instructions  Thank you for completing your Welcome to Medicare Visit or Medicare Annual Wellness Visit today  Your next wellness visit will be due in one year (7/21/2021)  The screening/preventive services that you may require over the next 5-10 years are detailed below  Some tests may not apply to you based off risk factors and/or age  Screening tests ordered at today's visit but not completed yet may show as past due  Also, please note that scanned in results may not display below  Preventive Screenings:  Service Recommendations Previous Testing/Comments   Colorectal Cancer Screening  * Colonoscopy    * Fecal Occult Blood Test (FOBT)/Fecal Immunochemical Test (FIT)  * Fecal DNA/Cologuard Test  * Flexible Sigmoidoscopy Age: 54-65 years old   Colonoscopy: every 10 years (may be performed more frequently if at higher risk)  OR  FOBT/FIT: every 1 year  OR  Cologuard: every 3 years  OR  Sigmoidoscopy: every 5 years  Screening may be recommended earlier than age 48 if at higher risk for colorectal cancer  Also, an individualized decision between you and your healthcare provider will decide whether screening between the ages of 74-80 would be appropriate  Colonoscopy: Not on file  FOBT/FIT: Not on file  Cologuard: Not on file  Sigmoidoscopy: Not on file         Breast Cancer Screening Age: 36 years old  Frequency: every 1-2 years  Not required if history of left and right mastectomy Mammogram: 03/10/2017       Cervical Cancer Screening Between the ages of 21-29, pap smear recommended once every 3 years  Between the ages of 33-67, can perform pap smear with HPV co-testing every 5 years     Recommendations may differ for women with a history of total hysterectomy, cervical cancer, or abnormal pap smears in past  Pap Smear: Not on file    Screening Not Indicated   Hepatitis C Screening Once for adults born between [facility]  More frequently in patients at high risk for Hepatitis C Hep C Antibody: Not on file       Diabetes Screening 1-2 times per year if you're at risk for diabetes or have pre-diabetes Fasting glucose: No results in last 5 years   A1C: No results in last 5 years       Cholesterol Screening Once every 5 years if you don't have a lipid disorder  May order more often based on risk factors  Lipid panel: 12/21/2017    Screening Not Indicated  History Lipid Disorder     Other Preventive Screenings Covered by Medicare:  1  Abdominal Aortic Aneurysm (AAA) Screening: covered once if your at risk  You're considered to be at risk if you have a family history of AAA  2  Lung Cancer Screening: covers low dose CT scan once per year if you meet all of the following conditions: (1) Age 50-69; (2) No signs or symptoms of lung cancer; (3) Current smoker or have quit smoking within the last 15 years; (4) You have a tobacco smoking history of at least 30 pack years (packs per day multiplied by number of years you smoked); (5) You get a written order from a healthcare provider  3  Glaucoma Screening: covered annually if you're considered high risk: (1) You have diabetes OR (2) Family history of glaucoma OR (3)  aged 48 and older OR (3)  American aged 72 and older  3  Osteoporosis Screening: covered every 2 years if you meet one of the following conditions: (1) You're estrogen deficient and at risk for osteoporosis based off medical history and other findings; (2) Have a vertebral abnormality; (3) On glucocorticoid therapy for more than 3 months; (4) Have primary hyperparathyroidism; (5) On osteoporosis medications and need to assess response to drug therapy  · Last bone density test (DXA Scan): 06/04/2019   5  HIV Screening: covered annually if you're between the age of 15-65  Also covered annually if you are younger than 13 and older than 72 with risk factors for HIV infection   For pregnant patients, it is covered up to 3 times per pregnancy  Immunizations:  Immunization Recommendations   Influenza Vaccine Annual influenza vaccination during flu season is recommended for all persons aged >= 6 months who do not have contraindications   Pneumococcal Vaccine (Prevnar and Pneumovax)  * Prevnar = PCV13  * Pneumovax = PPSV23   Adults 25-60 years old: 1-3 doses may be recommended based on certain risk factors  Adults 72 years old: Prevnar (PCV13) vaccine recommended followed by Pneumovax (PPSV23) vaccine  If already received PPSV23 since turning 65, then PCV13 recommended at least one year after PPSV23 dose  Hepatitis B Vaccine 3 dose series if at intermediate or high risk (ex: diabetes, end stage renal disease, liver disease)   Tetanus (Td) Vaccine - COST NOT COVERED BY MEDICARE PART B Following completion of primary series, a booster dose should be given every 10 years to maintain immunity against tetanus  Td may also be given as tetanus wound prophylaxis  Tdap Vaccine - COST NOT COVERED BY MEDICARE PART B Recommended at least once for all adults  For pregnant patients, recommended with each pregnancy  Shingles Vaccine (Shingrix) - COST NOT COVERED BY MEDICARE PART B  2 shot series recommended in those aged [address] and above     Health Maintenance Due:  There are no preventive care reminders to display for this patient  Immunizations Due:      Topic Date Due    DTaP,Tdap,and Td Vaccines (1 - Tdap) 09/10/1951    Influenza Vaccine  07/01/2020     Advance Directives   What are advance directives? Advance directives are legal documents that state your wishes and plans for medical care  These plans are made ahead of time in case you lose your ability to make decisions for yourself  Advance directives can apply to any medical decision, such as the treatments you want, and if you want to donate organs  What are the types of advance directives? There are many types of advance directives, and each state has rules about how to use them   You may choose a combination of any of the following:  · Living will: This is a written record of the treatment you want  You can also choose which treatments you do not want, which to limit, and which to stop at a certain time  This includes surgery, medicine, IV fluid, and tube feedings  · Durable power of  for healthcare [facility]): This is a written record that states who you want to make healthcare choices for you when you are unable to make them for yourself  This person, called a proxy, is usually a family member or a friend  You may choose more than 1 proxy  · Do not resuscitate (DNR) order:  A DNR order is used in case your heart stops beating or you stop breathing  It is a request not to have certain forms of treatment, such as CPR  A DNR order may be included in other types of advance directives  · Medical directive: This covers the care that you want if you are in a coma, near death, or unable to make decisions for yourself  You can list the treatments you want for each condition  Treatment may include pain medicine, surgery, blood transfusions, dialysis, IV or tube feedings, and a ventilator (breathing machine)  · Values history: This document has questions about your views, beliefs, and how you feel and think about life  This information can help others choose the care that you would choose  Why are advance directives important? An advance directive helps you control your care  Although spoken wishes may be used, it is better to have your wishes written down  Spoken wishes can be misunderstood, or not followed  Treatments may be given even if you do not want them  An advance directive may make it easier for your family to make difficult choices about your care  Cigarette Smoking and Your Health   Risks to your health if you smoke:  Nicotine and other chemicals found in tobacco damage every cell in your body   Even if you are a light smoker, you have an increased risk for cancer, heart disease, and lung disease  If you are pregnant or have diabetes, smoking increases your risk for complications  Benefits to your health if you stop smoking:   · You decrease respiratory symptoms such as coughing, wheezing, and shortness of breath  · You reduce your risk for cancers of the lung, mouth, throat, kidney, bladder, pancreas, stomach, and cervix  If you already have cancer, you increase the benefits of chemotherapy  You also reduce your risk for cancer returning or a second cancer from developing  · You reduce your risk for heart disease, blood clots, heart attack, and stroke  · You reduce your risk for lung infections, and diseases such as pneumonia, asthma, chronic bronchitis, and emphysema  · Your circulation improves  More oxygen can be delivered to your body  If you have diabetes, you lower your risk for complications, such as kidney, artery, and eye diseases  You also lower your risk for nerve damage  Nerve damage can lead to amputations, poor vision, and blindness  · You improve your body's ability to heal and to fight infections  For more information and support to stop smoking:   · [facility]  Phone: 1- 937 - 810-0805  Web Address: www [employer]   [employer] 2018 Information is for End User's use only and may not be sold, redistributed or otherwise used for commercial purposes   All illustrations and images included in CareNotes® are the copyrighted property of A D A M , Inc  or [address] [place] [address]

## 2020-07-21 NOTE — PROGRESS NOTES
Assessment/Plan:    CKD stage G3a/A1, GFR 45-59 and albumin creatinine ratio <30 mg/g (HCC)  Decrease neurontin to 700 bid because of kidneyfunction 6000 TID is too much  Increase fluids  Does not use NSAIDS  Recheck 6 months  Chronic obstructive pulmonary disease (HCC)  Well controlled  Smokes 1/2 PPD  Does not want to quit  UTD with pneumococcal vaccines  Sent albuterol today  Tobacco abuse  Tobacco Cessation Counseling: Tobacco cessation counseling and education was provided  The patient is sincerely urged to quit consumption of tobacco  She is not ready to quit tobacco  The numerous health risks of tobacco consumption were discussed  If she decides to quit, there are a number of helpful adjunctive aids, and she can see me to discuss nicotine replacement therapy, chantix, or bupropion anytime in the future  Diagnoses and all orders for this visit:    CKD stage G3a/A1, GFR 45-59 and albumin creatinine ratio <30 mg/g (HCC)    Post herpetic neuralgia    Pulmonary emphysema, unspecified emphysema type (HCC)    Tobacco abuse          Subjective: Annual wellness visit     Patient ID: Anthony Evans is a [de-identified] y o  female  With a history of CKD stage 2, COPD, osteopenia, tobacco abuse  HPI   NO complaints today  Labs reviewed with patient GFR decreased mildly from 60 lb of 51  Lipid panel total cholesterol 151 triglycerides 114 HDL 54 and LDL 74  Vitamin-D 61  The following portions of the patient's history were reviewed and updated as appropriate: allergies, current medications, past family history, past medical history, past social history, past surgical history and problem list     Review of Systems   Constitutional: Negative for fever and unexpected weight change  HENT: Negative for ear pain, sore throat and trouble swallowing  Eyes: Negative for pain and visual disturbance  Respiratory: Negative for cough, chest tightness, shortness of breath and wheezing  Cardiovascular: Negative for chest pain  Gastrointestinal: Negative for abdominal distention, abdominal pain, blood in stool, constipation, diarrhea, nausea and vomiting  Endocrine: Negative for polydipsia and polyuria  Genitourinary: Negative for dysuria and hematuria  Musculoskeletal: Negative for back pain and myalgias  Skin: Negative for rash  Neurological: Negative for syncope and headaches  Psychiatric/Behavioral: Negative for suicidal ideas  PHQ-9 Depression Screening    PHQ-9:    Frequency of the following problems over the past two weeks:       Little interest or pleasure in doing things:  0 - not at all  Feeling down, depressed, or hopeless:  0 - not at all  PHQ-2 Score:  0           Objective:      /78 (BP Location: Left arm, Patient Position: Sitting, Cuff Size: Standard)   Pulse 63   Temp 98 3 °F (36 8 °C) (Tympanic)   Resp 16   Ht 4' 11" (1 499 m)   Wt 52 6 kg (116 lb)   SpO2 96%   BMI 23 43 kg/m²          Physical Exam   Constitutional: She is oriented to person, place, and time  She appears well-developed and well-nourished  HENT:   Head: Normocephalic and atraumatic  Right Ear: External ear normal    Left Ear: External ear normal    Mouth/Throat: No oropharyngeal exudate  Eyes: Pupils are equal, round, and reactive to light  Conjunctivae and EOM are normal  No scleral icterus  Neck: Normal range of motion  Neck supple  Cardiovascular: Normal rate, regular rhythm and intact distal pulses  Exam reveals no gallop and no friction rub  No murmur heard  Pulmonary/Chest: Effort normal and breath sounds normal  No respiratory distress  She has no wheezes  She has no rales  Abdominal: Soft  Bowel sounds are normal  She exhibits no distension and no mass  There is no tenderness  There is no rebound  Musculoskeletal: Normal range of motion  She exhibits no edema  Neurological: She is alert and oriented to person, place, and time     Skin: Skin is warm and dry    Psychiatric: She has a normal mood and affect

## 2020-07-21 NOTE — ASSESSMENT & PLAN NOTE
Decrease neurontin to 700 bid because of kidneyfunction 6000 TID is too much  Increase fluids  Does not use NSAIDS  Recheck 6 months

## 2020-08-05 ENCOUNTER — CLINICAL SUPPORT (OUTPATIENT)
Dept: FAMILY MEDICINE CLINIC | Facility: CLINIC | Age: 80
End: 2020-08-05

## 2020-08-05 VITALS — SYSTOLIC BLOOD PRESSURE: 160 MMHG | DIASTOLIC BLOOD PRESSURE: 78 MMHG

## 2020-08-05 DIAGNOSIS — I10 ESSENTIAL HYPERTENSION: Primary | ICD-10-CM

## 2020-08-05 RX ORDER — LISINOPRIL 10 MG/1
10 TABLET ORAL DAILY
Qty: 30 TABLET | Refills: 2 | Status: SHIPPED | OUTPATIENT
Start: 2020-08-05 | End: 2020-10-28

## 2020-09-14 ENCOUNTER — OFFICE VISIT (OUTPATIENT)
Dept: FAMILY MEDICINE CLINIC | Facility: CLINIC | Age: 80
End: 2020-09-14
Payer: COMMERCIAL

## 2020-09-14 VITALS
HEIGHT: 59 IN | RESPIRATION RATE: 16 BRPM | OXYGEN SATURATION: 96 % | DIASTOLIC BLOOD PRESSURE: 60 MMHG | WEIGHT: 113.2 LBS | TEMPERATURE: 98.3 F | SYSTOLIC BLOOD PRESSURE: 128 MMHG | HEART RATE: 64 BPM | BODY MASS INDEX: 22.82 KG/M2

## 2020-09-14 DIAGNOSIS — I10 ESSENTIAL HYPERTENSION: ICD-10-CM

## 2020-09-14 DIAGNOSIS — N18.31 CKD STAGE G3A/A1, GFR 45-59 AND ALBUMIN CREATININE RATIO <30 MG/G (HCC): ICD-10-CM

## 2020-09-14 DIAGNOSIS — E55.9 VITAMIN D DEFICIENCY: Primary | ICD-10-CM

## 2020-09-14 DIAGNOSIS — E78.1 HYPERTRIGLYCERIDEMIA, ESSENTIAL: ICD-10-CM

## 2020-09-14 PROCEDURE — 4004F PT TOBACCO SCREEN RCVD TLK: CPT | Performed by: FAMILY MEDICINE

## 2020-09-14 PROCEDURE — 99213 OFFICE O/P EST LOW 20 MIN: CPT | Performed by: FAMILY MEDICINE

## 2020-09-14 PROCEDURE — 1101F PT FALLS ASSESS-DOCD LE1/YR: CPT | Performed by: FAMILY MEDICINE

## 2020-09-14 PROCEDURE — 3074F SYST BP LT 130 MM HG: CPT | Performed by: FAMILY MEDICINE

## 2020-09-14 PROCEDURE — 3078F DIAST BP <80 MM HG: CPT | Performed by: FAMILY MEDICINE

## 2020-09-14 PROCEDURE — 1160F RVW MEDS BY RX/DR IN RCRD: CPT | Performed by: FAMILY MEDICINE

## 2020-09-14 PROCEDURE — 3288F FALL RISK ASSESSMENT DOCD: CPT | Performed by: FAMILY MEDICINE

## 2020-09-14 NOTE — ASSESSMENT & PLAN NOTE
- Controlled  - Blood pressure goal per JNC VIII would be <150/90  - On  Lisinopril 10,  Patient educated to hold lisinopril if sick or dehydrated  - Restrict daily salt intake up to 2 4 g  - Advised to walk 30 minutes a day 5 times a week and practice stress relieving measures

## 2020-09-14 NOTE — PROGRESS NOTES
Assessment/Plan:    Essential hypertension  - Controlled  - Blood pressure goal per JNC VIII would be <150/90  - On  Lisinopril 10,  Patient educated to hold lisinopril if sick or dehydrated  - Restrict daily salt intake up to 2 4 g  - Advised to walk 30 minutes a day 5 times a week and practice stress relieving measures         Diagnoses and all orders for this visit:    Vitamin D deficiency  -     Vitamin D 25 hydroxy; Future    Hypertriglyceridemia, essential  -     Lipid panel; Future    CKD stage G3a/A1, GFR 45-59 and albumin creatinine ratio <30 mg/g ([facility])  -     Basic metabolic panel; Future    Essential hypertension          Subjective:  Blood pressure elevation     Patient ID: Anthony Evans is a 79 y o  female  HPI   the patient is here for a evaluation her blood pressure  She was seen as a nurse visit about a month ago and blood pressure remains significantly elevated therefore she was started on lisinopril 10 mg daily  A BMP was checked and there was no acute kidney injury  The following portions of the patient's history were reviewed and updated as appropriate: allergies, current medications, past family history, past medical history, past social history, past surgical history and problem list     Review of Systems      PHQ-9 Depression Screening    PHQ-9:    Frequency of the following problems over the past two weeks:                Objective:      /60 (BP Location: Left arm, Patient Position: Sitting, Cuff Size: Adult)   Pulse 64   Temp 98 3 °F (36 8 °C) (Tympanic)   Resp 16   Ht 4' 11" (1 499 m)   Wt 51 3 kg (113 lb 3 2 oz)   SpO2 96%   BMI 22 86 kg/m²          Physical Exam  Constitutional:       Appearance: She is well-developed  HENT:      Head: Normocephalic and atraumatic  Right Ear: External ear normal       Left Ear: External ear normal       Mouth/Throat:      Pharynx: No oropharyngeal exudate  Eyes:      General: No scleral icterus       Conjunctiva/sclera: Conjunctivae normal       Pupils: Pupils are equal, round, and reactive to light  Neck:      Musculoskeletal: Normal range of motion and neck supple  Cardiovascular:      Rate and Rhythm: Normal rate and regular rhythm  Heart sounds: No murmur  No friction rub  No gallop  Pulmonary:      Effort: Pulmonary effort is normal  No respiratory distress  Breath sounds: Normal breath sounds  No wheezing or rales  Musculoskeletal: Normal range of motion  Skin:     General: Skin is warm and dry  Neurological:      Mental Status: She is alert and oriented to person, place, and time

## 2020-10-28 DIAGNOSIS — I10 ESSENTIAL HYPERTENSION: ICD-10-CM

## 2020-10-28 RX ORDER — LISINOPRIL 10 MG/1
TABLET ORAL
Qty: 30 TABLET | Refills: 2 | Status: SHIPPED | OUTPATIENT
Start: 2020-10-28 | End: 2021-01-04 | Stop reason: SDUPTHER

## 2021-01-04 DIAGNOSIS — I10 ESSENTIAL HYPERTENSION: ICD-10-CM

## 2021-01-04 RX ORDER — LISINOPRIL 10 MG/1
10 TABLET ORAL DAILY
Qty: 30 TABLET | Refills: 2 | Status: SHIPPED | OUTPATIENT
Start: 2021-01-04 | End: 2021-04-29

## 2021-01-11 DIAGNOSIS — B02.29 POST HERPETIC NEURALGIA: ICD-10-CM

## 2021-01-11 RX ORDER — GABAPENTIN 100 MG/1
CAPSULE ORAL
Qty: 180 CAPSULE | Refills: 1 | Status: SHIPPED | OUTPATIENT
Start: 2021-01-11 | End: 2021-05-04

## 2021-01-13 DIAGNOSIS — J43.9 PULMONARY EMPHYSEMA, UNSPECIFIED EMPHYSEMA TYPE (HCC): ICD-10-CM

## 2021-01-13 RX ORDER — UMECLIDINIUM BROMIDE AND VILANTEROL TRIFENATATE 62.5; 25 UG/1; UG/1
POWDER RESPIRATORY (INHALATION)
Qty: 1 INHALER | Refills: 5 | Status: SHIPPED | OUTPATIENT
Start: 2021-01-13 | End: 2021-07-15

## 2021-02-03 DIAGNOSIS — B02.29 POST HERPETIC NEURALGIA: ICD-10-CM

## 2021-02-03 RX ORDER — GABAPENTIN 600 MG/1
600 TABLET ORAL 2 TIMES DAILY
Qty: 180 TABLET | Refills: 1 | Status: SHIPPED | OUTPATIENT
Start: 2021-02-03 | End: 2021-08-09 | Stop reason: SDUPTHER

## 2021-03-12 ENCOUNTER — IMMUNIZATIONS (OUTPATIENT)
Dept: FAMILY MEDICINE CLINIC | Facility: HOSPITAL | Age: 81
End: 2021-03-12

## 2021-03-12 DIAGNOSIS — Z23 ENCOUNTER FOR IMMUNIZATION: Primary | ICD-10-CM

## 2021-03-12 PROCEDURE — 91300 SARS-COV-2 / COVID-19 MRNA VACCINE (PFIZER-BIONTECH) 30 MCG: CPT

## 2021-03-12 PROCEDURE — 0001A SARS-COV-2 / COVID-19 MRNA VACCINE (PFIZER-BIONTECH) 30 MCG: CPT

## 2021-04-02 ENCOUNTER — IMMUNIZATIONS (OUTPATIENT)
Dept: FAMILY MEDICINE CLINIC | Facility: HOSPITAL | Age: 81
End: 2021-04-02

## 2021-04-02 DIAGNOSIS — Z23 ENCOUNTER FOR IMMUNIZATION: Primary | ICD-10-CM

## 2021-04-02 PROCEDURE — 0002A SARS-COV-2 / COVID-19 MRNA VACCINE (PFIZER-BIONTECH) 30 MCG: CPT

## 2021-04-02 PROCEDURE — 91300 SARS-COV-2 / COVID-19 MRNA VACCINE (PFIZER-BIONTECH) 30 MCG: CPT

## 2021-04-29 DIAGNOSIS — I10 ESSENTIAL HYPERTENSION: ICD-10-CM

## 2021-04-29 RX ORDER — LISINOPRIL 10 MG/1
TABLET ORAL
Qty: 30 TABLET | Refills: 2 | Status: SHIPPED | OUTPATIENT
Start: 2021-04-29 | End: 2021-08-01

## 2021-05-03 DIAGNOSIS — B02.29 POST HERPETIC NEURALGIA: ICD-10-CM

## 2021-05-04 RX ORDER — GABAPENTIN 100 MG/1
CAPSULE ORAL
Qty: 180 CAPSULE | Refills: 1 | Status: SHIPPED | OUTPATIENT
Start: 2021-05-04 | End: 2022-02-03 | Stop reason: SDUPTHER

## 2021-07-15 DIAGNOSIS — J43.9 PULMONARY EMPHYSEMA, UNSPECIFIED EMPHYSEMA TYPE (HCC): ICD-10-CM

## 2021-07-15 RX ORDER — UMECLIDINIUM BROMIDE AND VILANTEROL TRIFENATATE 62.5; 25 UG/1; UG/1
POWDER RESPIRATORY (INHALATION)
Qty: 60 BLISTER | Refills: 6 | Status: SHIPPED | OUTPATIENT
Start: 2021-07-15 | End: 2022-02-16

## 2021-07-31 DIAGNOSIS — I10 ESSENTIAL HYPERTENSION: ICD-10-CM

## 2021-08-01 RX ORDER — LISINOPRIL 10 MG/1
TABLET ORAL
Qty: 30 TABLET | Refills: 2 | Status: SHIPPED | OUTPATIENT
Start: 2021-08-01 | End: 2021-09-29 | Stop reason: SDUPTHER

## 2021-08-09 DIAGNOSIS — B02.29 POST HERPETIC NEURALGIA: ICD-10-CM

## 2021-08-09 RX ORDER — GABAPENTIN 600 MG/1
600 TABLET ORAL 2 TIMES DAILY
Qty: 180 TABLET | Refills: 1 | Status: SHIPPED | OUTPATIENT
Start: 2021-08-09 | End: 2022-02-10 | Stop reason: SDUPTHER

## 2021-08-31 ENCOUNTER — RA CDI HCC (OUTPATIENT)
Dept: OTHER | Facility: HOSPITAL | Age: 81
End: 2021-08-31

## 2021-08-31 NOTE — PROGRESS NOTES
Re: Chandni Zamudio    Based on clinical documentation indicated in your record, it appears that the patient may have the following conditions:    N18 30 Chronic kidney disease, stage 3      J44 9 COPD       If this is correct, please document and assess at your next visit     [place] [address]  coding opportunities          Number of diagnosis code(s) already on the problem list added to FYI fla                     Patients insurance company: [employer] (Medicare Advantage and Commercial)             [facility]  coding opportunities          Number of diagnosis code(s) already on the problem list added to [place] [facility] fla                     Patients insurance company: [employer] (Medicare Advantage and Commercial)     Visit status: Patient canceled the appointment

## 2021-09-21 ENCOUNTER — TELEPHONE (OUTPATIENT)
Dept: FAMILY MEDICINE CLINIC | Facility: CLINIC | Age: 81
End: 2021-09-21

## 2021-09-21 NOTE — TELEPHONE ENCOUNTER
Patient went for blood work this morning but she went with an old slip dated for 09/03/2020 they kept the blood but needs a new order so we can fax it over to [facility]  Fax# [phone]

## 2021-09-21 NOTE — TELEPHONE ENCOUNTER
[facility] and pt went to get a Lipid, Vit D, CMP, TSH done and her order was  they need a new order put in so I can fax to them   They did draw the pt blood they just needs a updated order    Fax# [phone]

## 2021-09-22 ENCOUNTER — RA CDI HCC (OUTPATIENT)
Dept: OTHER | Facility: HOSPITAL | Age: 81
End: 2021-09-22

## 2021-09-22 NOTE — TELEPHONE ENCOUNTER
Disregard message patient is going to call the lab and have them discard the blood work they karin yesterday    They can't keep it any longer

## 2021-09-29 ENCOUNTER — OFFICE VISIT (OUTPATIENT)
Dept: FAMILY MEDICINE CLINIC | Facility: CLINIC | Age: 81
End: 2021-09-29
Payer: COMMERCIAL

## 2021-09-29 VITALS
HEIGHT: 59 IN | OXYGEN SATURATION: 97 % | RESPIRATION RATE: 16 BRPM | WEIGHT: 109.4 LBS | TEMPERATURE: 98.7 F | HEART RATE: 70 BPM | SYSTOLIC BLOOD PRESSURE: 140 MMHG | DIASTOLIC BLOOD PRESSURE: 70 MMHG | BODY MASS INDEX: 22.05 KG/M2

## 2021-09-29 DIAGNOSIS — Z00.00 ENCOUNTER FOR ANNUAL WELLNESS VISIT (AWV) IN MEDICARE PATIENT: Primary | ICD-10-CM

## 2021-09-29 DIAGNOSIS — M85.80 OSTEOPENIA, UNSPECIFIED LOCATION: ICD-10-CM

## 2021-09-29 DIAGNOSIS — Z72.0 TOBACCO ABUSE: ICD-10-CM

## 2021-09-29 DIAGNOSIS — H61.23 BILATERAL IMPACTED CERUMEN: ICD-10-CM

## 2021-09-29 DIAGNOSIS — Z78.0 POST-MENOPAUSAL: ICD-10-CM

## 2021-09-29 DIAGNOSIS — J43.9 PULMONARY EMPHYSEMA, UNSPECIFIED EMPHYSEMA TYPE (HCC): ICD-10-CM

## 2021-09-29 DIAGNOSIS — N18.31 CKD STAGE G3A/A1, GFR 45-59 AND ALBUMIN CREATININE RATIO <30 MG/G (HCC): ICD-10-CM

## 2021-09-29 DIAGNOSIS — Z12.2 ENCOUNTER FOR SCREENING FOR LUNG CANCER: ICD-10-CM

## 2021-09-29 DIAGNOSIS — I10 ESSENTIAL HYPERTENSION: ICD-10-CM

## 2021-09-29 PROBLEM — Z98.890 H/O LUMPECTOMY: Status: ACTIVE | Noted: 2021-09-29

## 2021-09-29 PROCEDURE — 1125F AMNT PAIN NOTED PAIN PRSNT: CPT | Performed by: FAMILY MEDICINE

## 2021-09-29 PROCEDURE — G0439 PPPS, SUBSEQ VISIT: HCPCS | Performed by: FAMILY MEDICINE

## 2021-09-29 PROCEDURE — 99214 OFFICE O/P EST MOD 30 MIN: CPT | Performed by: FAMILY MEDICINE

## 2021-09-29 PROCEDURE — 1170F FXNL STATUS ASSESSED: CPT | Performed by: FAMILY MEDICINE

## 2021-09-29 PROCEDURE — 1160F RVW MEDS BY RX/DR IN RCRD: CPT | Performed by: FAMILY MEDICINE

## 2021-09-29 PROCEDURE — 3725F SCREEN DEPRESSION PERFORMED: CPT | Performed by: FAMILY MEDICINE

## 2021-09-29 PROCEDURE — 3288F FALL RISK ASSESSMENT DOCD: CPT | Performed by: FAMILY MEDICINE

## 2021-09-29 PROCEDURE — 4004F PT TOBACCO SCREEN RCVD TLK: CPT | Performed by: FAMILY MEDICINE

## 2021-09-29 RX ORDER — LISINOPRIL 10 MG/1
10 TABLET ORAL DAILY
Qty: 90 TABLET | Refills: 1 | Status: SHIPPED | OUTPATIENT
Start: 2021-09-29 | End: 2022-04-25

## 2021-09-29 NOTE — ASSESSMENT & PLAN NOTE
Colonoscopy never done  Pt has been refusing for years  No further screening will be attempted  Dexa due  Osteopenia found in 2019  Aged out of CT lung screening  Aged out of cervical cancer screening  Mammogram last done 2017

## 2021-09-29 NOTE — ASSESSMENT & PLAN NOTE
Had breast cancer of the right breast s/p lumpectomy and radiation  This was about 20 years ago  Pt declines futher mammograms

## 2021-09-29 NOTE — ASSESSMENT & PLAN NOTE
Lab Results   Component Value Date    CREATININE 1 06 (H) 12/21/2017    CREATININE 0 95 (H) 06/07/2017    CREATININE 1 07 (H) 11/04/2016   Stable  Recheck q6m

## 2021-09-29 NOTE — PROGRESS NOTES
Assessment and Plan:     Problem List Items Addressed This Visit        Respiratory    Chronic obstructive pulmonary disease ([address] )     Well controlled  Smokes 1/2 PPD  Does not want to quit  UTD with pneumococcal vaccines  Cardiovascular and Mediastinum    Essential hypertension     - Controlled  - Blood pressure goal per [facility] would be <150/90  - On  Lisinopril 10,  Patient educated to hold lisinopril if sick or dehydrated  - Restrict daily salt intake up to 2 4 g  - Advised to walk 30 minutes a day 5 times a week and practice stress relieving measures           Relevant Medications    lisinopril (ZESTRIL) 10 mg tablet    Other Relevant Orders    Basic metabolic panel       Musculoskeletal and Integument    Osteopenia       Genitourinary    CKD stage G3a/A1, GFR 45-59 and albumin creatinine ratio <30 mg/g (formerly [facility])     Lab Results   Component Value Date    CREATININE 1 06 (H) 12/21/2017    CREATININE 0 95 (H) 06/07/2017    CREATININE 1 07 (H) 11/04/2016   Stable  Recheck q6m  Relevant Orders    Basic metabolic panel       Other    Tobacco abuse    Encounter for annual wellness visit (AWV) in Medicare patient - Primary     Colonoscopy never done  Pt has been refusing for years  No further screening will be attempted  Dexa due  Osteopenia found in 2019  Aged out of CT lung screening  Aged out of cervical cancer screening  Mammogram last done 2017  Other Visit Diagnoses     Post-menopausal        Relevant Orders    DXA bone density spine hip and pelvis    Encounter for screening for lung cancer        Bilateral impacted cerumen        Relevant Orders    Ambulatory Referral to Otolaryngology          Depression Screening and Follow-up Plan:   Patient was screened for depression during today's encounter  They screened negative with a PHQ-2 score of 0        Preventive health issues were discussed with patient, and age appropriate screening tests were ordered as noted in patient's After Visit Summary  Personalized health advice and appropriate referrals for health education or preventive services given if needed, as noted in patient's After Visit Summary       History of Present Illness:     Patient presents for Medicare Annual Wellness visit    Patient Care Team:  Victor Manuel Bradley MD as PCP - General (Family Medicine)  TREVOR Gao DO     Problem List:     Patient Active Problem List   Diagnosis    CKD stage G3a/A1, GFR 45-59 and albumin creatinine ratio <30 mg/g (HCC)    Chronic obstructive pulmonary disease (HCC)    Disc degeneration, lumbar    Glaucoma    Liver cyst    Osteopenia    Post herpetic neuralgia    Suspicious nevus    Tobacco abuse    Essential hypertension    Encounter for annual wellness visit (AWV) in Medicare patient    H/O lumpectomy      Past Medical and Surgical History:     Past Medical History:   Diagnosis Date    Glaucoma      Past Surgical History:   Procedure Laterality Date    BREAST LUMPECTOMY      HYSTERECTOMY        Family History:     Family History   Problem Relation Age of Onset   Keli Russell Breast cancer Mother     Parkinsonism Mother     Stroke Mother     Heart disease Father       Social History:     Social History     Socioeconomic History    Marital status: Single     Spouse name: None    Number of children: None    Years of education: None    Highest education level: None   Occupational History    Occupation: Food Services   Tobacco Use    Smoking status: Current Every Day Smoker    Smokeless tobacco: Never Used    Tobacco comment: 48 pp Year cutting down slowly now at 1/2 a day   Substance and Sexual Activity    Alcohol use: No    Drug use: No    Sexual activity: None   Other Topics Concern    None   Social History Narrative    Does not exercise    Seeing a dentist     Social Determinants of Health     Financial Resource Strain:     Difficulty of Paying Living Expenses:    Food Insecurity:     Worried About [facility] in the Last Year:    [address] in the Last Year:    Transportation Needs:     Lack of Transportation (Medical):  Lack of Transportation (Non-Medical):    Physical Activity:     Days of Exercise per Week:     Minutes of Exercise per Session:    Stress:     Feeling of Stress :    Social Connections:     Frequency of Communication with Friends and Family:     Frequency of Social Gatherings with Friends and Family:     Attends Judaism Services:     Active Member of Clubs or Organizations:     Attends Club or Organization Meetings:     Marital Status:    Intimate Partner Violence:     Fear of Current or Ex-Partner:     Emotionally Abused:     Physically Abused:     Sexually Abused:       Medications and Allergies:     Current Outpatient Medications   Medication Sig Dispense Refill    albuterol (PROVENTIL HFA,VENTOLIN HFA) 90 mcg/act inhaler Inhale 2 puffs every 6 (six) hours as needed for wheezing or shortness of breath 1 Inhaler 5    Anoro Ellipta 62 5-25 MCG/INH inhaler inhale 1 puff BY MOUTH daily 60 blister 6    calcium citrate-vitamin D (CITRACAL+D) 315-200 MG-UNIT per tablet Take by mouth      Cholecalciferol (VITAMIN D3) 2000 units capsule Take by mouth      gabapentin (NEURONTIN) 100 mg capsule take 1 capsule by mouth twice a day 180 capsule 1    gabapentin (NEURONTIN) 600 MG tablet Take 1 tablet (600 mg total) by mouth 2 (two) times a day 180 tablet 1    lisinopril (ZESTRIL) 10 mg tablet Take 1 tablet (10 mg total) by mouth daily 90 tablet 1    mometasone (ELOCON) 0 1 % cream Apply topically daily (Patient not taking: Reported on 9/29/2021) 45 g 0     No current facility-administered medications for this visit       Allergies   Allergen Reactions    Penicillins Hives      Immunizations:     Immunization History   Administered Date(s) Administered    INFLUENZA 11/14/2016    Influenza Split High Dose Preservative Free IM 11/14/2016    Pneumococcal Conjugate 13-Valent 01/08/2016    Pneumococcal Polysaccharide PPV23 04/24/2019    SARS-CoV-2 / COVID-19 mRNA IM (Pfizer-BioNTech) 03/12/2021, 04/02/2021      Health Maintenance: There are no preventive care reminders to display for this patient  Topic Date Due    DTaP,Tdap,and Td Vaccines (1 - Tdap) Never done    Influenza Vaccine (1) 09/01/2021      Medicare Health Risk Assessment:     /70 (BP Location: Left arm, Patient Position: Sitting, Cuff Size: Adult)   Pulse 70   Temp 98 7 °F (37 1 °C) (Tympanic)   Resp 16   Ht 4' 10 66" (1 49 m)   Wt 49 6 kg (109 lb 6 4 oz)   SpO2 97%   BMI 22 35 kg/m²      [facility] is here for her Subsequent Wellness visit  Health Risk Assessment:   Patient rates overall health as fair  Patient feels that their physical health rating is same  Patient is satisfied with their life  Eyesight was rated as same  Hearing was rated as same  Patient feels that their emotional and mental health rating is same  Patients states they are never, rarely angry  Patient states they are sometimes unusually tired/fatigued  Pain experienced in the last 7 days has been some  Patient's pain rating has been 10/10  Patient states that she has experienced no weight loss or gain in last 6 months  Patient reports pain in her right foot     Depression Screening:   PHQ-2 Score: 0      Fall Risk Screening: In the past year, patient has experienced: history of falling in past year    Number of falls: 1  Injured during fall?: No    Feels unsteady when standing or walking?: Yes    Worried about falling?: No      Urinary Incontinence Screening:   Patient has not leaked urine accidently in the last six months  Home Safety:  Patient does not have trouble with stairs inside or outside of their home  Patient has working smoke alarms and has working carbon monoxide detector  Home safety hazards include: none  Nutrition:   Current diet is Regular       Medications:   Patient is not currently taking any over-the-counter supplements  Patient is able to manage medications  Activities of Daily Living (ADLs)/Instrumental Activities of Daily Living (IADLs):   Walk and transfer into and out of bed and chair?: Yes  Dress and groom yourself?: Yes    Bathe or shower yourself?: Yes    Feed yourself?  Yes  Do your laundry/housekeeping?: Yes  Manage your money, pay your bills and track your expenses?: Yes  Make your own meals?: Yes    Do your own shopping?: Yes    Previous Hospitalizations:   Any hospitalizations or ED visits within the last 12 months?: No      Advance Care Planning:     Advanced directive: No    Five wishes given: No      Comments: Patient has the five wishes at home she has not completed yet    Cognitive Screening:   Provider or family/friend/caregiver concerned regarding cognition?: No    PREVENTIVE SCREENINGS      Cardiovascular Screening:    General: Screening Not Indicated, History Lipid Disorder and Screening Current      Diabetes Screening:     General: Screening Current      Colorectal Cancer Screening:     General: Patient Declines      Breast Cancer Screening:     General: Patient Declines      Cervical Cancer Screening:    General: Screening Not Indicated      Osteoporosis Screening:    General: Risks and Benefits Discussed    Due for: DXA Axial      Abdominal Aortic Aneurysm (AAA) Screening:        General: Screening Not Indicated      Lung Cancer Screening:     General: Screening Not Indicated      Hepatitis C Screening:    General: Screening Not Indicated    Screening, Brief Intervention, and Referral to Treatment (SBIRT)    Screening    Typical number of drinks in a week: 10    Single Item Drug Screening:  How often have you used an illegal drug (including marijuana) or a prescription medication for non-medical reasons in the past year? never    Single Item Drug Screen Score: 0  Interpretation: Negative screen for possible drug use disorder      Taran Lovelace MD

## 2021-09-29 NOTE — PATIENT INSTRUCTIONS
Medicare Preventive Visit Patient Instructions  Thank you for completing your Welcome to Medicare Visit or Medicare Annual Wellness Visit today  Your next wellness visit will be due in one year (9/30/2022)  The screening/preventive services that you may require over the next 5-10 years are detailed below  Some tests may not apply to you based off risk factors and/or age  Screening tests ordered at today's visit but not completed yet may show as past due  Also, please note that scanned in results may not display below  Preventive Screenings:  Service Recommendations Previous Testing/Comments   Colorectal Cancer Screening  * Colonoscopy    * Fecal Occult Blood Test (FOBT)/Fecal Immunochemical Test (FIT)  * Fecal DNA/Cologuard Test  * Flexible Sigmoidoscopy Age: 54-65 years old   Colonoscopy: every 10 years (may be performed more frequently if at higher risk)  OR  FOBT/FIT: every 1 year  OR  Cologuard: every 3 years  OR  Sigmoidoscopy: every 5 years  Screening may be recommended earlier than age 48 if at higher risk for colorectal cancer  Also, an individualized decision between you and your healthcare provider will decide whether screening between the ages of 74-80 would be appropriate  Colonoscopy: Not on file  FOBT/FIT: Not on file  Cologuard: Not on file  Sigmoidoscopy: Not on file          Breast Cancer Screening Age: 36 years old  Frequency: every 1-2 years  Not required if history of left and right mastectomy Mammogram: 03/10/2017        Cervical Cancer Screening Between the ages of 21-29, pap smear recommended once every 3 years  Between the ages of 33-67, can perform pap smear with HPV co-testing every 5 years     Recommendations may differ for women with a history of total hysterectomy, cervical cancer, or abnormal pap smears in past  Pap Smear: Not on file    Screening Not Indicated   Hepatitis C Screening Once for adults born between [facility]  More frequently in patients at high risk for Hepatitis C Hep C Antibody: Not on file        Diabetes Screening 1-2 times per year if you're at risk for diabetes or have pre-diabetes Fasting glucose: No results in last 5 years   A1C: No results in last 5 years        Cholesterol Screening Once every 5 years if you don't have a lipid disorder  May order more often based on risk factors  Lipid panel: 12/21/2017    Screening Not Indicated  History Lipid Disorder     Other Preventive Screenings Covered by Medicare:  1  Abdominal Aortic Aneurysm (AAA) Screening: covered once if your at risk  You're considered to be at risk if you have a family history of AAA  2  Lung Cancer Screening: covers low dose CT scan once per year if you meet all of the following conditions: (1) Age 50-69; (2) No signs or symptoms of lung cancer; (3) Current smoker or have quit smoking within the last 15 years; (4) You have a tobacco smoking history of at least 30 pack years (packs per day multiplied by number of years you smoked); (5) You get a written order from a healthcare provider  3  Glaucoma Screening: covered annually if you're considered high risk: (1) You have diabetes OR (2) Family history of glaucoma OR (3)  aged 48 and older OR (3)  American aged 72 and older  3  Osteoporosis Screening: covered every 2 years if you meet one of the following conditions: (1) You're estrogen deficient and at risk for osteoporosis based off medical history and other findings; (2) Have a vertebral abnormality; (3) On glucocorticoid therapy for more than 3 months; (4) Have primary hyperparathyroidism; (5) On osteoporosis medications and need to assess response to drug therapy  · Last bone density test (DXA Scan): 06/04/2019   5  HIV Screening: covered annually if you're between the age of 15-65  Also covered annually if you are younger than 13 and older than 72 with risk factors for HIV infection   For pregnant patients, it is covered up to 3 times per pregnancy  Immunizations:  Immunization Recommendations   Influenza Vaccine Annual influenza vaccination during flu season is recommended for all persons aged >= 6 months who do not have contraindications   Pneumococcal Vaccine (Prevnar and Pneumovax)  * Prevnar = PCV13  * Pneumovax = PPSV23   Adults 25-60 years old: 1-3 doses may be recommended based on certain risk factors  Adults 72 years old: Prevnar (PCV13) vaccine recommended followed by Pneumovax (PPSV23) vaccine  If already received PPSV23 since turning 65, then PCV13 recommended at least one year after PPSV23 dose  Hepatitis B Vaccine 3 dose series if at intermediate or high risk (ex: diabetes, end stage renal disease, liver disease)   Tetanus (Td) Vaccine - COST NOT COVERED BY MEDICARE PART B Following completion of primary series, a booster dose should be given every 10 years to maintain immunity against tetanus  Td may also be given as tetanus wound prophylaxis  Tdap Vaccine - COST NOT COVERED BY MEDICARE PART B Recommended at least once for all adults  For pregnant patients, recommended with each pregnancy  Shingles Vaccine (Shingrix) - COST NOT COVERED BY MEDICARE PART B  2 shot series recommended in those aged 48 and above     Health Maintenance Due:  There are no preventive care reminders to display for this patient  Immunizations Due:      Topic Date Due    DTaP,Tdap,and Td Vaccines (1 - Tdap) Never done    Influenza Vaccine (1) 09/01/2021     Advance Directives   What are advance directives? Advance directives are legal documents that state your wishes and plans for medical care  These plans are made ahead of time in case you lose your ability to make decisions for yourself  Advance directives can apply to any medical decision, such as the treatments you want, and if you want to donate organs  What are the types of advance directives? There are many types of advance directives, and each state has rules about how to use them   You may choose a combination of any of the following:  · Living will: This is a written record of the treatment you want  You can also choose which treatments you do not want, which to limit, and which to stop at a certain time  This includes surgery, medicine, IV fluid, and tube feedings  · Durable power of  for healthcare [facility]): This is a written record that states who you want to make healthcare choices for you when you are unable to make them for yourself  This person, called a proxy, is usually a family member or a friend  You may choose more than 1 proxy  · Do not resuscitate (DNR) order:  A DNR order is used in case your heart stops beating or you stop breathing  It is a request not to have certain forms of treatment, such as CPR  A DNR order may be included in other types of advance directives  · Medical directive: This covers the care that you want if you are in a coma, near death, or unable to make decisions for yourself  You can list the treatments you want for each condition  Treatment may include pain medicine, surgery, blood transfusions, dialysis, IV or tube feedings, and a ventilator (breathing machine)  · Values history: This document has questions about your views, beliefs, and how you feel and think about life  This information can help others choose the care that you would choose  Why are advance directives important? An advance directive helps you control your care  Although spoken wishes may be used, it is better to have your wishes written down  Spoken wishes can be misunderstood, or not followed  Treatments may be given even if you do not want them  An advance directive may make it easier for your family to make difficult choices about your care  Fall Prevention    Fall prevention  includes ways to make your home and other areas safer  It also includes ways you can move more carefully to prevent a fall   Health conditions that cause changes in your blood pressure, vision, or muscle strength and coordination may increase your risk for falls  Medicines may also increase your risk for falls if they make you dizzy, weak, or sleepy  Fall prevention tips:   · Stand or sit up slowly  · Use assistive devices as directed  · Wear shoes that fit well and have soles that   · Wear a personal alarm  · Stay active  · Manage your medical conditions  Home Safety Tips:  · Add items to prevent falls in the bathroom  · Keep paths clear  · Install bright lights in your home  · Keep items you use often on shelves within reach  · Paint or place reflective tape on the edges of your stairs  Cigarette Smoking and Your Health   Risks to your health if you smoke:  Nicotine and other chemicals found in tobacco damage every cell in your body  Even if you are a light smoker, you have an increased risk for cancer, heart disease, and lung disease  If you are pregnant or have diabetes, smoking increases your risk for complications  Benefits to your health if you stop smoking:   · You decrease respiratory symptoms such as coughing, wheezing, and shortness of breath  · You reduce your risk for cancers of the lung, mouth, throat, kidney, bladder, pancreas, stomach, and cervix  If you already have cancer, you increase the benefits of chemotherapy  You also reduce your risk for cancer returning or a second cancer from developing  · You reduce your risk for heart disease, blood clots, heart attack, and stroke  · You reduce your risk for lung infections, and diseases such as pneumonia, asthma, chronic bronchitis, and emphysema  · Your circulation improves  More oxygen can be delivered to your body  If you have diabetes, you lower your risk for complications, such as kidney, artery, and eye diseases  You also lower your risk for nerve damage  Nerve damage can lead to amputations, poor vision, and blindness  · You improve your body's ability to heal and to fight infections    For more information and support to stop smoking:   · Smokefree  gov  Phone: 1- 292 - 119-6979  Web Address: www smokefree  [address] [place] [address] [address] Bustillos Information is for End User's use only and may not be sold, redistributed or otherwise used for commercial purposes   All illustrations and images included in CareNotes® are the copyrighted property of A D A M , Inc  or [address] [place] [address]

## 2021-11-21 DIAGNOSIS — J43.9 PULMONARY EMPHYSEMA, UNSPECIFIED EMPHYSEMA TYPE (HCC): ICD-10-CM

## 2021-11-22 RX ORDER — ALBUTEROL SULFATE 90 UG/1
AEROSOL, METERED RESPIRATORY (INHALATION)
Qty: 8.5 G | Refills: 1 | Status: SHIPPED | OUTPATIENT
Start: 2021-11-22 | End: 2022-07-13 | Stop reason: SDUPTHER

## 2022-01-03 ENCOUNTER — IMMUNIZATIONS (OUTPATIENT)
Dept: FAMILY MEDICINE CLINIC | Facility: HOSPITAL | Age: 82
End: 2022-01-03

## 2022-01-03 DIAGNOSIS — Z23 ENCOUNTER FOR IMMUNIZATION: Primary | ICD-10-CM

## 2022-01-03 PROCEDURE — 0001A COVID-19 PFIZER VACC 0.3 ML: CPT

## 2022-01-03 PROCEDURE — 91300 COVID-19 PFIZER VACC 0.3 ML: CPT

## 2022-01-06 ENCOUNTER — OFFICE VISIT (OUTPATIENT)
Dept: PODIATRY | Facility: CLINIC | Age: 82
End: 2022-01-06
Payer: COMMERCIAL

## 2022-01-06 VITALS
HEART RATE: 67 BPM | HEIGHT: 58 IN | BODY MASS INDEX: 23.97 KG/M2 | WEIGHT: 114.2 LBS | DIASTOLIC BLOOD PRESSURE: 70 MMHG | SYSTOLIC BLOOD PRESSURE: 134 MMHG

## 2022-01-06 DIAGNOSIS — B35.1 ONYCHOMYCOSIS: ICD-10-CM

## 2022-01-06 DIAGNOSIS — G62.9 POLYNEUROPATHY: Primary | ICD-10-CM

## 2022-01-06 PROCEDURE — 99202 OFFICE O/P NEW SF 15 MIN: CPT | Performed by: PODIATRIST

## 2022-01-06 PROCEDURE — 4004F PT TOBACCO SCREEN RCVD TLK: CPT | Performed by: PODIATRIST

## 2022-01-06 PROCEDURE — 1160F RVW MEDS BY RX/DR IN RCRD: CPT | Performed by: PODIATRIST

## 2022-01-06 NOTE — PROGRESS NOTES
PATIENT:  Danita Palmer  1940       ASSESSMENT:     1  Polyneuropathy     2  Onychomycosis               PLAN:  1  Patient was counseled and educated on the condition and the diagnosis  2  The diagnosis, treatment options and prognosis were discussed with the patient  3  She is not a good candidate for medical treatment of onychomycosis  Recommended periodic foot care  All toenails were debrided today  4  Educated disease prevention and risks related to neuropathy  Educated proper daily foot care and exam   Instructed proper skin care / protection and footwear  5  Patient will return in 9 weeks for re-evaluation  Subjective:       HPI  The patient presents with chief complaint of thick, elongated toenails  She has difficulty trimming her nails at home  She has neuropathy, especially right foot for 5 years  She is on Gabapentin  She has numbness and paresthesia in right foot  No significant weakness  The following portions of the patient's history were reviewed and updated as appropriate: allergies, current medications, past family history, past medical history, past social history, past surgical history and problem list   All pertinent labs and images were reviewed        Past Medical History  Past Medical History:   Diagnosis Date    Glaucoma     Hypertension        Past Surgical History  Past Surgical History:   Procedure Laterality Date    BREAST LUMPECTOMY      HYSTERECTOMY          Allergies:  Penicillins    Medications:  Current Outpatient Medications   Medication Sig Dispense Refill    albuterol (PROVENTIL HFA,VENTOLIN HFA) 90 mcg/act inhaler inhale 2 puffs by mouth and INTO THE LUNGS every 6 hours if needed for wheezing or shortness of breath 8 5 g 1    Anoro Ellipta 62 5-25 MCG/INH inhaler inhale 1 puff BY MOUTH daily 60 blister 6    calcium citrate-vitamin D (CITRACAL+D) 315-200 MG-UNIT per tablet Take by mouth      Cholecalciferol (VITAMIN D3) 2000 units capsule Take by mouth      gabapentin (NEURONTIN) 100 mg capsule take 1 capsule by mouth twice a day 180 capsule 1    lisinopril (ZESTRIL) 10 mg tablet Take 1 tablet (10 mg total) by mouth daily 90 tablet 1    gabapentin (NEURONTIN) 600 MG tablet Take 1 tablet (600 mg total) by mouth 2 (two) times a day 180 tablet 1    mometasone (ELOCON) 0 1 % cream Apply topically daily (Patient not taking: Reported on 9/29/2021) 45 g 0     No current facility-administered medications for this visit  Social History:  Social History     Socioeconomic History    Marital status: Single     Spouse name: None    Number of children: None    Years of education: None    Highest education level: None   Occupational History    Occupation: Food Services   Tobacco Use    Smoking status: Current Every Day Smoker    Smokeless tobacco: Never Used    Tobacco comment: 48 pp Year cutting down slowly now at 1/2 a day   Vaping Use    Vaping Use: Never used   Substance and Sexual Activity    Alcohol use: No    Drug use: No    Sexual activity: None   Other Topics Concern    None   Social History Narrative    Does not exercise    Seeing a dentist     Social Determinants of Health     Financial Resource Strain: Not on file   Food Insecurity: Not on file   Transportation Needs: Not on file   Physical Activity: Not on file   Stress: Not on file   Social Connections: Not on file   Intimate Partner Violence: Not on file   Housing Stability: Not on file          Review of Systems   Constitutional: Negative for appetite change, chills and fever  Respiratory: Negative for cough and shortness of breath  Cardiovascular: Negative for chest pain  Gastrointestinal: Negative for diarrhea, nausea and vomiting  Musculoskeletal: Negative for gait problem  Skin: Negative for wound  Psychiatric/Behavioral: Negative for behavioral problems and confusion           Objective:      /70   Pulse 67   Ht 4' 10" (1 473 m) Comment: verbal  Wt 51 8 kg (114 lb 3 2 oz)   BMI 23 87 kg/m²          Physical Exam  Constitutional:       General: She is not in acute distress  Appearance: She is not toxic-appearing or diaphoretic  Cardiovascular:      Rate and Rhythm: Normal rate and regular rhythm  Pulses:           Dorsalis pedis pulses are 1+ on the right side and 1+ on the left side  Posterior tibial pulses are 1+ on the right side and 1+ on the left side  Pulmonary:      Effort: Pulmonary effort is normal  No respiratory distress  Musculoskeletal:         General: No swelling, tenderness or signs of injury  Feet:      Right foot:      Protective Sensation: 10 sites tested  6 sites sensed  Left foot:      Protective Sensation: 10 sites tested  10 sites sensed  Skin:     Capillary Refill: Capillary refill takes less than 2 seconds  Coloration: Skin is not cyanotic or mottled  Findings: No abscess, ecchymosis, erythema or rash  Nails: There is no clubbing  Comments: Thick, mycotic nails X 7 with discoloration and onycholysis  She has class findings with lack of pedal hairs, nail dystrophy, and skin atrophy  Neurological:      General: No focal deficit present  Mental Status: She is alert and oriented to person, place, and time  Cranial Nerves: No cranial nerve deficit  Motor: No weakness  Coordination: Coordination normal    Psychiatric:         Mood and Affect: Mood normal          Behavior: Behavior normal          Thought Content:  Thought content normal          Judgment: Judgment normal

## 2022-02-03 DIAGNOSIS — B02.29 POST HERPETIC NEURALGIA: ICD-10-CM

## 2022-02-03 RX ORDER — GABAPENTIN 100 MG/1
100 CAPSULE ORAL 2 TIMES DAILY
Qty: 180 CAPSULE | Refills: 1 | Status: SHIPPED | OUTPATIENT
Start: 2022-02-03 | End: 2022-08-02

## 2022-02-10 DIAGNOSIS — B02.29 POST HERPETIC NEURALGIA: ICD-10-CM

## 2022-02-11 RX ORDER — GABAPENTIN 600 MG/1
600 TABLET ORAL 2 TIMES DAILY
Qty: 180 TABLET | Refills: 1 | Status: SHIPPED | OUTPATIENT
Start: 2022-02-11 | End: 2022-05-12

## 2022-02-16 DIAGNOSIS — J43.9 PULMONARY EMPHYSEMA, UNSPECIFIED EMPHYSEMA TYPE (HCC): ICD-10-CM

## 2022-02-16 RX ORDER — UMECLIDINIUM BROMIDE AND VILANTEROL TRIFENATATE 62.5; 25 UG/1; UG/1
POWDER RESPIRATORY (INHALATION)
Qty: 60 BLISTER | Refills: 6 | Status: SHIPPED | OUTPATIENT
Start: 2022-02-16

## 2022-06-01 DIAGNOSIS — I10 ESSENTIAL HYPERTENSION: ICD-10-CM

## 2022-06-01 RX ORDER — LISINOPRIL 10 MG/1
10 TABLET ORAL DAILY
Qty: 30 TABLET | Refills: 0 | Status: SHIPPED | OUTPATIENT
Start: 2022-06-01 | End: 2022-07-08

## 2022-07-07 DIAGNOSIS — I10 ESSENTIAL HYPERTENSION: ICD-10-CM

## 2022-07-08 RX ORDER — LISINOPRIL 10 MG/1
10 TABLET ORAL DAILY
Qty: 30 TABLET | Refills: 0 | Status: SHIPPED | OUTPATIENT
Start: 2022-07-08 | End: 2022-08-08

## 2022-07-13 DIAGNOSIS — J43.9 PULMONARY EMPHYSEMA, UNSPECIFIED EMPHYSEMA TYPE (HCC): ICD-10-CM

## 2022-07-13 RX ORDER — ALBUTEROL SULFATE 90 UG/1
1 AEROSOL, METERED RESPIRATORY (INHALATION) EVERY 4 HOURS PRN
Qty: 8.5 G | Refills: 1 | Status: SHIPPED | OUTPATIENT
Start: 2022-07-13

## 2022-08-19 DIAGNOSIS — B02.29 POST HERPETIC NEURALGIA: ICD-10-CM

## 2022-08-19 RX ORDER — GABAPENTIN 600 MG/1
600 TABLET ORAL 2 TIMES DAILY
Qty: 180 TABLET | Refills: 1 | Status: SHIPPED | OUTPATIENT
Start: 2022-08-19 | End: 2022-11-17

## 2022-09-06 ENCOUNTER — VBI (OUTPATIENT)
Dept: ADMINISTRATIVE | Facility: OTHER | Age: 82
End: 2022-09-06

## 2022-09-06 NOTE — TELEPHONE ENCOUNTER
09/06/22 10:25 AM     See documentation in the VB [employer] SmartForm       [facility]

## 2022-09-19 DIAGNOSIS — J43.9 PULMONARY EMPHYSEMA, UNSPECIFIED EMPHYSEMA TYPE (HCC): ICD-10-CM

## 2022-10-12 PROBLEM — Z00.00 ENCOUNTER FOR ANNUAL WELLNESS VISIT (AWV) IN MEDICARE PATIENT: Status: RESOLVED | Noted: 2021-09-29 | Resolved: 2022-10-12

## 2022-11-09 DIAGNOSIS — B02.29 POST HERPETIC NEURALGIA: ICD-10-CM

## 2022-11-11 RX ORDER — GABAPENTIN 100 MG/1
100 CAPSULE ORAL 2 TIMES DAILY
Qty: 180 CAPSULE | Refills: 0 | Status: SHIPPED | OUTPATIENT
Start: 2022-11-11

## 2023-02-03 DIAGNOSIS — I10 ESSENTIAL HYPERTENSION: ICD-10-CM

## 2023-02-03 RX ORDER — LISINOPRIL 10 MG/1
10 TABLET ORAL DAILY
Qty: 30 TABLET | Refills: 0 | Status: SHIPPED | OUTPATIENT
Start: 2023-02-03 | End: 2023-03-05

## 2023-02-05 DIAGNOSIS — B02.29 POST HERPETIC NEURALGIA: ICD-10-CM

## 2023-02-05 RX ORDER — GABAPENTIN 100 MG/1
CAPSULE ORAL
Qty: 180 CAPSULE | Refills: 0 | Status: SHIPPED | OUTPATIENT
Start: 2023-02-05

## 2023-02-10 DIAGNOSIS — I10 ESSENTIAL HYPERTENSION: ICD-10-CM

## 2023-02-13 RX ORDER — LISINOPRIL 10 MG/1
10 TABLET ORAL DAILY
Qty: 90 TABLET | Refills: 0 | Status: SHIPPED | OUTPATIENT
Start: 2023-02-13 | End: 2023-03-15

## 2023-02-27 DIAGNOSIS — B02.29 POST HERPETIC NEURALGIA: ICD-10-CM

## 2023-02-27 RX ORDER — GABAPENTIN 600 MG/1
600 TABLET ORAL 2 TIMES DAILY
Qty: 180 TABLET | Refills: 1 | Status: SHIPPED | OUTPATIENT
Start: 2023-02-27 | End: 2023-05-28

## 2023-03-23 ENCOUNTER — OFFICE VISIT (OUTPATIENT)
Dept: PODIATRY | Facility: CLINIC | Age: 83
End: 2023-03-23

## 2023-03-23 VITALS
DIASTOLIC BLOOD PRESSURE: 75 MMHG | HEART RATE: 69 BPM | BODY MASS INDEX: 20.96 KG/M2 | WEIGHT: 111 LBS | HEIGHT: 61 IN | SYSTOLIC BLOOD PRESSURE: 129 MMHG

## 2023-03-23 DIAGNOSIS — G62.9 POLYNEUROPATHY: Primary | ICD-10-CM

## 2023-03-23 DIAGNOSIS — B35.1 ONYCHOMYCOSIS: ICD-10-CM

## 2023-03-23 NOTE — PROGRESS NOTES
Krzysztof   1940    ASSESSMENT/PLAN:  1  Polyneuropathy        2  Onychomycosis  Debridement            Orders Placed This Encounter   Procedures   • Debridement      The patient was educated in proper foot wear  Also discussed daily foot assessment and proper foot care  The patient will follow up in 9 weeks for foot exam and care  PROCEDURE:  All mycotic toenails were reduced and debrided in length, width, and girth using a nail nipper and dremel  Patient tolerated procedure(s) well without complications  HPI:  Maritza Martin is a 79 y  o year old female seen for at risk foot exam and care  The patient has class findings with neuropathy  She has numbness and paresthesia in her feet  The patient complained of thick toenails  The patient denied any acute pedal disorder, redness, acute swelling, or recent injury            PAST MEDICAL HISTORY:  Past Medical History:   Diagnosis Date   • Glaucoma    • Hypertension        PAST SURGICAL HISTORY:  Past Surgical History:   Procedure Laterality Date   • BREAST LUMPECTOMY     • HYSTERECTOMY          ALLERGIES:  Penicillins    MEDICATIONS:  Current Outpatient Medications   Medication Sig Dispense Refill   • albuterol (PROVENTIL HFA,VENTOLIN HFA) 90 mcg/act inhaler Inhale 1 puff every 4 (four) hours as needed for wheezing 8 5 g 1   • Anoro Ellipta 62 5-25 MCG/INH inhaler inhale 1 puff by mouth and INTO THE LUNGS once daily 60 blister 6   • calcium citrate-vitamin D (CITRACAL+D) 315-200 MG-UNIT per tablet Take by mouth     • gabapentin (NEURONTIN) 600 MG tablet Take 1 tablet (600 mg total) by mouth 2 (two) times a day 180 tablet 1   • lisinopril (ZESTRIL) 10 mg tablet Take 1 tablet (10 mg total) by mouth daily 90 tablet 0   • Cholecalciferol (VITAMIN D3) 2000 units capsule Take by mouth (Patient not taking: Reported on 3/23/2023)     • gabapentin (NEURONTIN) 100 mg capsule take 1 capsule by mouth twice a day (Patient not taking: Reported on 3/23/2023) 180 capsule 0   • mometasone (ELOCON) 0 1 % cream Apply topically daily (Patient not taking: Reported on 9/29/2021) 45 g 0     No current facility-administered medications for this visit  SOCIAL HISTORY:  Social History     Socioeconomic History   • Marital status: Single     Spouse name: None   • Number of children: None   • Years of education: None   • Highest education level: None   Occupational History   • Occupation: Food Services   Tobacco Use   • Smoking status: Every Day   • Smokeless tobacco: Never   • Tobacco comments:     48 pp Year cutting down slowly now at 1/2 a day   Vaping Use   • Vaping Use: Never used   Substance and Sexual Activity   • Alcohol use: No   • Drug use: No   • Sexual activity: None   Other Topics Concern   • None   Social History Narrative    Does not exercise    Seeing a dentist     Social Determinants of Health     Financial Resource Strain: Not on file   Food Insecurity: Not on file   Transportation Needs: Not on file   Physical Activity: Not on file   Stress: Not on file   Social Connections: Not on file   Intimate Partner Violence: Not on file   Housing Stability: Not on file        REVIEW OF SYSTEMS:  GENERAL: No fever or chills  HEART: No chest pain, or palpitation  RESPIRATORY:  No acute SOB or cough  GI: No Nausea, vomit or diarrhea  NEUROLOGIC: No syncope or acute weakness    PHYSICAL EXAM:    /75   Pulse 69   Ht 5' 1" (1 549 m) Comment: verbal  Wt 50 3 kg (111 lb)   BMI 20 97 kg/m²     VASCULAR EXAM  Dorsalis pedis  +1, Posterior tibial artery  +1  The patient has class findings with skin atrophy, lack of digital hair, and nail dystrophy  There is trace lower extremity edema bilaterally  Venous stasis skin changes noted BLE  NEUROLOGIC EXAM  Sensation is intact to light touch  Sensation is decreased to 10gm monofilament  No focal neurologic deficit  DERMATOLOGIC EXAM:   No ulcer or cellulitis noted    The patient has hypertrophic toenails X 7 with discoloration, onycholysis, and subungal debris  No notable skin lesion  MUSCULOSKELETAL EXAM:   No acute joint pain, edema, or redness  No acute musculoskeletal problem

## 2023-04-20 PROBLEM — E44.1 MILD PROTEIN-CALORIE MALNUTRITION (HCC): Status: ACTIVE | Noted: 2023-04-20

## 2023-04-26 DIAGNOSIS — J43.9 PULMONARY EMPHYSEMA, UNSPECIFIED EMPHYSEMA TYPE (HCC): ICD-10-CM

## 2023-04-26 RX ORDER — ALBUTEROL SULFATE 90 UG/1
1 AEROSOL, METERED RESPIRATORY (INHALATION) EVERY 4 HOURS PRN
Qty: 8.5 G | Refills: 1 | OUTPATIENT
Start: 2023-04-26

## 2023-05-05 DIAGNOSIS — B02.29 POST HERPETIC NEURALGIA: ICD-10-CM

## 2023-05-05 RX ORDER — GABAPENTIN 100 MG/1
CAPSULE ORAL
Qty: 180 CAPSULE | Refills: 0 | Status: SHIPPED | OUTPATIENT
Start: 2023-05-05

## 2023-06-19 PROBLEM — Z00.00 ENCOUNTER FOR ANNUAL WELLNESS EXAM IN MEDICARE PATIENT: Status: RESOLVED | Noted: 2021-09-29 | Resolved: 2023-06-19

## 2023-07-27 ENCOUNTER — OFFICE VISIT (OUTPATIENT)
Dept: PODIATRY | Facility: CLINIC | Age: 83
End: 2023-07-27
Payer: COMMERCIAL

## 2023-07-27 VITALS
HEART RATE: 70 BPM | HEIGHT: 61 IN | SYSTOLIC BLOOD PRESSURE: 119 MMHG | WEIGHT: 107.8 LBS | BODY MASS INDEX: 20.35 KG/M2 | DIASTOLIC BLOOD PRESSURE: 64 MMHG

## 2023-07-27 DIAGNOSIS — B35.1 ONYCHOMYCOSIS: ICD-10-CM

## 2023-07-27 DIAGNOSIS — G62.9 POLYNEUROPATHY: Primary | ICD-10-CM

## 2023-07-27 PROCEDURE — 11721 DEBRIDE NAIL 6 OR MORE: CPT | Performed by: PODIATRIST

## 2023-07-27 NOTE — PROGRESS NOTES
PATIENTRoberto Kumari  1940    ASSESSMENT/PLAN:  1. Polyneuropathy        2. Onychomycosis  Debridement            Orders Placed This Encounter   Procedures   • Debridement      The patient was educated in proper foot wear. Also discussed daily foot assessment and proper foot care. The patient will follow up in 9 weeks for foot exam and care. PROCEDURE:  All mycotic toenails were reduced and debrided in length, width, and girth using a nail nipper and dremel. Patient tolerated procedure(s) well without complications. HPI:  Roberto Kumari is a 79 y. o.year old female seen for at risk foot exam and care. The patient has class findings with neuropathy. She has numbness and paresthesia in her feet. The patient complained of thick toenails. The patient denied any acute pedal disorder or recent injury. PAST MEDICAL HISTORY:  Past Medical History:   Diagnosis Date   • Glaucoma    • Hypertension        PAST SURGICAL HISTORY:  Past Surgical History:   Procedure Laterality Date   • BREAST LUMPECTOMY     • HYSTERECTOMY          ALLERGIES:  Penicillins    MEDICATIONS:  Current Outpatient Medications   Medication Sig Dispense Refill   • albuterol (PROVENTIL HFA,VENTOLIN HFA) 90 mcg/act inhaler Inhale 1 puff every 4 (four) hours as needed for wheezing 8.5 g 1   • calcium citrate-vitamin D (CITRACAL+D) 315-200 MG-UNIT per tablet Take by mouth     • Cholecalciferol (VITAMIN D3) 2000 units capsule Take by mouth     • gabapentin (NEURONTIN) 100 mg capsule take 1 capsule by mouth twice a day 180 capsule 0   • gabapentin (NEURONTIN) 600 MG tablet Take 1 tablet (600 mg total) by mouth 2 (two) times a day 180 tablet 1   • lisinopril (ZESTRIL) 10 mg tablet Take 1 tablet (10 mg total) by mouth daily 90 tablet 1   • umeclidinium-vilanterol (Anoro Ellipta) 62.5-25 mcg/actuation inhaler Inhale 1 puff daily 180 blister 3     No current facility-administered medications for this visit.        SOCIAL HISTORY:  Social History     Socioeconomic History   • Marital status: Single     Spouse name: None   • Number of children: None   • Years of education: None   • Highest education level: None   Occupational History   • Occupation: Food Services   Tobacco Use   • Smoking status: Every Day   • Smokeless tobacco: Never   • Tobacco comments:     48 pp Year cutting down slowly now at 1/2 a day   Vaping Use   • Vaping Use: Never used   Substance and Sexual Activity   • Alcohol use: No   • Drug use: No   • Sexual activity: None   Other Topics Concern   • None   Social History Narrative    Does not exercise    Seeing a dentist     Social Determinants of Health     Financial Resource Strain: Low Risk  (4/20/2023)    Overall Financial Resource Strain (CARDIA)    • Difficulty of Paying Living Expenses: Not hard at all   Food Insecurity: No Food Insecurity (1/16/2020)    Hunger Vital Sign    • Worried About Running Out of Food in the Last Year: Never true    • Ran Out of Food in the Last Year: Never true   Transportation Needs: No Transportation Needs (4/20/2023)    PRAPARE - Transportation    • Lack of Transportation (Medical): No    • Lack of Transportation (Non-Medical):  No   Physical Activity: Inactive (1/16/2020)    Exercise Vital Sign    • Days of Exercise per Week: 0 days    • Minutes of Exercise per Session: 0 min   Stress: No Stress Concern Present (1/16/2020)    [facility]    • Feeling of Stress : Not at all   Social Connections: Unknown (1/16/2020)    Social Connection and Isolation Panel [NHANES]    • Frequency of Communication with Friends and Family: Patient refused    • Frequency of Social Gatherings with Friends and Family: Patient refused    • Attends Sabianism Services: Patient refused    • Active Member of Clubs or Organizations: Patient refused    • Attends Club or Organization Meetings: Patient refused    • Marital Status: Patient refused Intimate Partner Violence: Not At Risk (1/16/2020)    Humiliation, Afraid, Rape, and Kick questionnaire    • Fear of Current or Ex-Partner: No    • Emotionally Abused: No    • Physically Abused: No    • Sexually Abused: No   Housing Stability: Not on file        REVIEW OF SYSTEMS:  GENERAL: No fever or chills  HEART: No chest pain, or palpitation  RESPIRATORY:  No acute SOB or cough  GI: No Nausea, vomit or diarrhea  NEUROLOGIC: No syncope or acute weakness    PHYSICAL EXAM:    /64   Pulse 70   Ht 5' 1" (1.549 m)   Wt 48.9 kg (107 lb 12.8 oz)   BMI 20.37 kg/m²     VASCULAR EXAM  Dorsalis pedis  +1, Posterior tibial artery  +1. The patient has class findings with skin atrophy, lack of digital hair, and nail dystrophy. There is trace lower extremity edema bilaterally. Venous stasis skin changes noted BLE. NEUROLOGIC EXAM  Sensation is intact to light touch. Sensation is decreased to 10gm monofilament. No focal neurologic deficit. DERMATOLOGIC EXAM:   No ulcer or cellulitis noted. The patient has hypertrophic toenails X 7 with discoloration, onycholysis, and subungal debris. No notable skin lesion. MUSCULOSKELETAL EXAM:   No acute joint pain, edema, or redness. No acute musculoskeletal problem.

## 2023-08-14 DIAGNOSIS — B02.29 POST HERPETIC NEURALGIA: ICD-10-CM

## 2023-08-14 RX ORDER — GABAPENTIN 100 MG/1
100 CAPSULE ORAL 2 TIMES DAILY
Qty: 180 CAPSULE | Refills: 0 | Status: SHIPPED | OUTPATIENT
Start: 2023-08-14

## 2023-09-01 DIAGNOSIS — B02.29 POST HERPETIC NEURALGIA: ICD-10-CM

## 2023-09-01 RX ORDER — GABAPENTIN 600 MG/1
600 TABLET ORAL 2 TIMES DAILY
Qty: 180 TABLET | Refills: 1 | Status: SHIPPED | OUTPATIENT
Start: 2023-09-01 | End: 2023-11-30

## 2023-10-26 ENCOUNTER — OFFICE VISIT (OUTPATIENT)
Dept: PODIATRY | Facility: CLINIC | Age: 83
End: 2023-10-26
Payer: COMMERCIAL

## 2023-10-26 VITALS
HEART RATE: 66 BPM | HEIGHT: 61 IN | BODY MASS INDEX: 20.01 KG/M2 | SYSTOLIC BLOOD PRESSURE: 131 MMHG | WEIGHT: 106 LBS | DIASTOLIC BLOOD PRESSURE: 75 MMHG

## 2023-10-26 DIAGNOSIS — B35.1 ONYCHOMYCOSIS: ICD-10-CM

## 2023-10-26 DIAGNOSIS — G62.9 POLYNEUROPATHY: Primary | ICD-10-CM

## 2023-10-26 PROCEDURE — 11721 DEBRIDE NAIL 6 OR MORE: CPT | Performed by: PODIATRIST

## 2023-10-26 NOTE — PROGRESS NOTES
PATIENTJim Sequeira  1940    ASSESSMENT/PLAN:  1. Polyneuropathy        2. Onychomycosis  Debridement            Orders Placed This Encounter   Procedures    Debridement      The patient was educated in proper foot wear. Also discussed daily foot assessment and proper foot care. The patient will follow up in 9 weeks for foot exam and care. PROCEDURE:  All mycotic toenails were reduced and debrided in length, width, and girth using a nail nipper and dremel. Patient tolerated procedure(s) well without complications. HPI:  Jim Sequeira is a 79 y. o.year old female seen for at risk foot exam and care. The patient has class findings with neuropathy. She has numbness and paresthesia in her feet. The patient complained of thick toenails. The patient denied any acute pedal disorder. PAST MEDICAL HISTORY:  Past Medical History:   Diagnosis Date    Glaucoma     Hypertension        PAST SURGICAL HISTORY:  Past Surgical History:   Procedure Laterality Date    BREAST LUMPECTOMY      HYSTERECTOMY          ALLERGIES:  Penicillins    MEDICATIONS:  Current Outpatient Medications   Medication Sig Dispense Refill    albuterol (PROVENTIL HFA,VENTOLIN HFA) 90 mcg/act inhaler Inhale 1 puff every 4 (four) hours as needed for wheezing 8.5 g 1    calcium citrate-vitamin D (CITRACAL+D) 315-200 MG-UNIT per tablet Take by mouth      Cholecalciferol (VITAMIN D3) 2000 units capsule Take by mouth      gabapentin (NEURONTIN) 100 mg capsule Take 1 capsule (100 mg total) by mouth 2 (two) times a day 180 capsule 0    gabapentin (NEURONTIN) 600 MG tablet Take 1 tablet (600 mg total) by mouth 2 (two) times a day 180 tablet 1    lisinopril (ZESTRIL) 10 mg tablet Take 1 tablet (10 mg total) by mouth daily 90 tablet 1    umeclidinium-vilanterol (Anoro Ellipta) 62.5-25 mcg/actuation inhaler Inhale 1 puff daily 180 blister 3     No current facility-administered medications for this visit.        SOCIAL HISTORY:  Social History     Socioeconomic History    Marital status: Single     Spouse name: None    Number of children: None    Years of education: None    Highest education level: None   Occupational History    Occupation: Food Services   Tobacco Use    Smoking status: Every Day    Smokeless tobacco: Never    Tobacco comments:     48 pp Year cutting down slowly now at 1/2 a day   Vaping Use    Vaping Use: Never used   Substance and Sexual Activity    Alcohol use: No    Drug use: No    Sexual activity: None   Other Topics Concern    None   Social History Narrative    Does not exercise    Seeing a dentist     Social Determinants of Health     Financial Resource Strain: Low Risk  (4/20/2023)    Overall Financial Resource Strain (CARDIA)     Difficulty of Paying Living Expenses: Not hard at all   Food Insecurity: No Food Insecurity (1/16/2020)    Hunger Vital Sign     Worried About Running Out of Food in the Last Year: Never true     Ran Out of Food in the Last Year: Never true   Transportation Needs: No Transportation Needs (4/20/2023)    PRAPARE - Transportation     Lack of Transportation (Medical): No     Lack of Transportation (Non-Medical):  No   Physical Activity: Inactive (1/16/2020)    Exercise Vital Sign     Days of Exercise per Week: 0 days     Minutes of Exercise per Session: 0 min   Stress: No Stress Concern Present (1/16/2020)    [facility]     Feeling of Stress : Not at all   Social Connections: Unknown (1/16/2020)    Social Connection and Isolation Panel [NHANES]     Frequency of Communication with Friends and Family: Patient refused     Frequency of Social Gatherings with Friends and Family: Patient refused     Attends Christian Services: Patient refused     Active Member of Clubs or Organizations: Patient refused     Attends Club or Organization Meetings: Patient refused     Marital Status: Patient refused   Intimate Partner Violence: Not At Risk (1/16/2020)    Humiliation, Afraid, Rape, and Kick questionnaire     Fear of Current or Ex-Partner: No     Emotionally Abused: No     Physically Abused: No     Sexually Abused: No   Housing Stability: Not on file        REVIEW OF SYSTEMS:  GENERAL: No fever or chills  HEART: No chest pain, or palpitation  RESPIRATORY:  No acute SOB or cough  GI: No Nausea, vomit or diarrhea  NEUROLOGIC: No syncope or acute weakness    PHYSICAL EXAM:    /75   Pulse 66   Ht 5' 1" (1.549 m)   Wt 48.1 kg (106 lb)   BMI 20.03 kg/m²     VASCULAR EXAM  Dorsalis pedis  +1, Posterior tibial artery  +1. The patient has class findings with skin atrophy, lack of digital hair, and nail dystrophy. There is trace lower extremity edema bilaterally. Venous stasis skin changes noted BLE. NEUROLOGIC EXAM  Sensation is intact to light touch. Sensation is decreased to 10gm monofilament. No focal neurologic deficit. DERMATOLOGIC EXAM:   No ulcer or cellulitis noted. The patient has hypertrophic toenails X 7 with discoloration, onycholysis, and subungal debris. MUSCULOSKELETAL EXAM:   No acute joint pain, edema, or redness. No acute musculoskeletal problem.

## 2023-11-02 DIAGNOSIS — I10 ESSENTIAL HYPERTENSION: ICD-10-CM

## 2023-11-02 RX ORDER — LISINOPRIL 10 MG/1
10 TABLET ORAL DAILY
Qty: 90 TABLET | Refills: 1 | Status: SHIPPED | OUTPATIENT
Start: 2023-11-02

## 2023-11-14 DIAGNOSIS — J43.9 PULMONARY EMPHYSEMA, UNSPECIFIED EMPHYSEMA TYPE (HCC): ICD-10-CM

## 2023-11-14 DIAGNOSIS — B02.29 POST HERPETIC NEURALGIA: ICD-10-CM

## 2023-11-14 RX ORDER — GABAPENTIN 100 MG/1
100 CAPSULE ORAL 2 TIMES DAILY
Qty: 180 CAPSULE | Refills: 0 | Status: SHIPPED | OUTPATIENT
Start: 2023-11-14

## 2023-11-14 RX ORDER — ALBUTEROL SULFATE 90 UG/1
1 AEROSOL, METERED RESPIRATORY (INHALATION) EVERY 4 HOURS PRN
Qty: 8.5 G | Refills: 1 | Status: SHIPPED | OUTPATIENT
Start: 2023-11-14

## 2024-02-06 ENCOUNTER — TELEPHONE (OUTPATIENT)
Dept: PODIATRY | Facility: CLINIC | Age: 84
End: 2024-02-06

## 2024-02-14 DIAGNOSIS — B02.29 POST HERPETIC NEURALGIA: ICD-10-CM

## 2024-02-14 RX ORDER — GABAPENTIN 100 MG/1
100 CAPSULE ORAL 2 TIMES DAILY
Qty: 180 CAPSULE | Refills: 0 | Status: SHIPPED | OUTPATIENT
Start: 2024-02-14

## 2024-03-01 DIAGNOSIS — B02.29 POST HERPETIC NEURALGIA: ICD-10-CM

## 2024-03-04 RX ORDER — GABAPENTIN 600 MG/1
600 TABLET ORAL 2 TIMES DAILY
Qty: 180 TABLET | Refills: 1 | Status: SHIPPED | OUTPATIENT
Start: 2024-03-04 | End: 2024-06-02

## 2024-03-06 ENCOUNTER — CONSULT EP (OUTPATIENT)
Dept: URBAN - METROPOLITAN AREA CLINIC 6 | Facility: CLINIC | Age: 84
End: 2024-03-06

## 2024-03-06 DIAGNOSIS — H35.3112: ICD-10-CM

## 2024-03-06 DIAGNOSIS — H25.812: ICD-10-CM

## 2024-03-06 DIAGNOSIS — H40.1420: ICD-10-CM

## 2024-03-06 DIAGNOSIS — H40.1122: ICD-10-CM

## 2024-03-06 DIAGNOSIS — Z96.1: ICD-10-CM

## 2024-03-06 PROCEDURE — 92133 CPTRZD OPH DX IMG PST SGM ON: CPT

## 2024-03-06 PROCEDURE — 76514 ECHO EXAM OF EYE THICKNESS: CPT

## 2024-03-06 PROCEDURE — 92020 GONIOSCOPY: CPT

## 2024-03-06 PROCEDURE — 92002 INTRM OPH EXAM NEW PATIENT: CPT

## 2024-03-06 ASSESSMENT — VISUAL ACUITY
OS_CC: 20/40+1
OD_CC: CF 3FT

## 2024-03-06 ASSESSMENT — TONOMETRY
OS_IOP_MMHG: 13
OD_IOP_MMHG: 14

## 2024-03-06 ASSESSMENT — PACHYMETRY
OS_CT_UM: 550
OD_CT_UM: 533

## 2024-04-01 ENCOUNTER — TELEPHONE (OUTPATIENT)
Dept: FAMILY MEDICINE CLINIC | Facility: CLINIC | Age: 84
End: 2024-04-01

## 2024-04-01 DIAGNOSIS — I10 ESSENTIAL HYPERTENSION: Primary | ICD-10-CM

## 2024-04-01 DIAGNOSIS — E55.9 VITAMIN D DEFICIENCY: ICD-10-CM

## 2024-04-01 DIAGNOSIS — Z13.1 SCREENING FOR DIABETES MELLITUS (DM): ICD-10-CM

## 2024-04-01 DIAGNOSIS — M85.80 OSTEOPENIA, UNSPECIFIED LOCATION: ICD-10-CM

## 2024-04-01 DIAGNOSIS — N18.31 CKD STAGE G3A/A1, GFR 45-59 AND ALBUMIN CREATININE RATIO <30 MG/G (HCC): ICD-10-CM

## 2024-04-01 DIAGNOSIS — R79.9 ABNORMAL FINDING OF BLOOD CHEMISTRY, UNSPECIFIED: ICD-10-CM

## 2024-04-01 DIAGNOSIS — Z13.220 SCREENING FOR HYPERLIPIDEMIA: ICD-10-CM

## 2024-04-10 ENCOUNTER — PROCEDURE ONLY (OUTPATIENT)
Dept: URBAN - METROPOLITAN AREA CLINIC 6 | Facility: CLINIC | Age: 84
End: 2024-04-10

## 2024-04-10 DIAGNOSIS — H40.1122: ICD-10-CM

## 2024-04-10 PROCEDURE — 65855 TRABECULOPLASTY LASER SURG: CPT | Mod: 25,LT

## 2024-04-10 ASSESSMENT — TONOMETRY
OS_IOP_MMHG: 14
OD_IOP_MMHG: 14
OS_IOP_MMHG: 10

## 2024-04-10 ASSESSMENT — VISUAL ACUITY
OD_SC: CF 3FT
OS_SC: 20/40+1

## 2024-04-16 ENCOUNTER — OFFICE VISIT (OUTPATIENT)
Dept: PODIATRY | Facility: CLINIC | Age: 84
End: 2024-04-16
Payer: COMMERCIAL

## 2024-04-16 VITALS
BODY MASS INDEX: 19.45 KG/M2 | DIASTOLIC BLOOD PRESSURE: 45 MMHG | SYSTOLIC BLOOD PRESSURE: 93 MMHG | HEART RATE: 62 BPM | WEIGHT: 103 LBS | HEIGHT: 61 IN | RESPIRATION RATE: 18 BRPM

## 2024-04-16 DIAGNOSIS — L60.3 NAIL DYSTROPHY: ICD-10-CM

## 2024-04-16 DIAGNOSIS — G62.9 POLYNEUROPATHY: Primary | ICD-10-CM

## 2024-04-16 PROCEDURE — 99212 OFFICE O/P EST SF 10 MIN: CPT | Performed by: PODIATRIST

## 2024-04-16 NOTE — PROGRESS NOTES
Patient, an 83-year-old female with known neuropathy presents for assessment and care.  The patient notes that her toenails are elongated.  The toenails are not painful.  She notes that she is under medical care for neuropathy.  She does relate a history of back trouble also possibly shingles.    She is taking approximately 1400 mg of gabapentin daily.  She states that she has increased foot pain without it.  She notes that the right foot hurts throughout the day but is not painful at night.  Pain is present throughout the foot.    On exam, pedal pulses are palpable bilateral.  All toenails are elongated and were trimmed.  Advised patient to increase gabapentin dosage to 600 mg 3 times daily.  Point as needed

## 2024-04-19 DIAGNOSIS — J43.9 PULMONARY EMPHYSEMA, UNSPECIFIED EMPHYSEMA TYPE (HCC): ICD-10-CM

## 2024-04-19 RX ORDER — UMECLIDINIUM BROMIDE AND VILANTEROL TRIFENATATE 62.5; 25 UG/1; UG/1
POWDER RESPIRATORY (INHALATION)
Qty: 180 BLISTER | Refills: 3 | Status: SHIPPED | OUTPATIENT
Start: 2024-04-19

## 2024-04-22 ENCOUNTER — OFFICE VISIT (OUTPATIENT)
Dept: FAMILY MEDICINE CLINIC | Facility: CLINIC | Age: 84
End: 2024-04-22
Payer: COMMERCIAL

## 2024-04-22 VITALS
BODY MASS INDEX: 19.07 KG/M2 | HEART RATE: 70 BPM | TEMPERATURE: 97.7 F | OXYGEN SATURATION: 96 % | DIASTOLIC BLOOD PRESSURE: 56 MMHG | SYSTOLIC BLOOD PRESSURE: 119 MMHG | WEIGHT: 101 LBS | RESPIRATION RATE: 16 BRPM | HEIGHT: 61 IN

## 2024-04-22 DIAGNOSIS — Z00.00 MEDICARE ANNUAL WELLNESS VISIT, SUBSEQUENT: Primary | ICD-10-CM

## 2024-04-22 DIAGNOSIS — Z72.0 TOBACCO ABUSE: ICD-10-CM

## 2024-04-22 DIAGNOSIS — G62.9 NEUROPATHY: ICD-10-CM

## 2024-04-22 DIAGNOSIS — J44.9 CHRONIC OBSTRUCTIVE PULMONARY DISEASE, UNSPECIFIED COPD TYPE (HCC): ICD-10-CM

## 2024-04-22 DIAGNOSIS — N18.31 CKD STAGE G3A/A1, GFR 45-59 AND ALBUMIN CREATININE RATIO <30 MG/G (HCC): ICD-10-CM

## 2024-04-22 DIAGNOSIS — M85.80 OSTEOPENIA, UNSPECIFIED LOCATION: ICD-10-CM

## 2024-04-22 DIAGNOSIS — Z23 NEED FOR VACCINATION: ICD-10-CM

## 2024-04-22 DIAGNOSIS — M51.36 DISC DEGENERATION, LUMBAR: ICD-10-CM

## 2024-04-22 DIAGNOSIS — I10 ESSENTIAL HYPERTENSION: ICD-10-CM

## 2024-04-22 PROCEDURE — 99214 OFFICE O/P EST MOD 30 MIN: CPT | Performed by: FAMILY MEDICINE

## 2024-04-22 PROCEDURE — G0439 PPPS, SUBSEQ VISIT: HCPCS | Performed by: FAMILY MEDICINE

## 2024-04-22 RX ORDER — LATANOPROST 50 UG/ML
SOLUTION/ DROPS OPHTHALMIC
COMMUNITY
Start: 2024-04-18

## 2024-04-22 RX ORDER — GABAPENTIN 600 MG/1
600 TABLET ORAL 3 TIMES DAILY
Qty: 270 TABLET | Refills: 1 | Status: SHIPPED | OUTPATIENT
Start: 2024-04-22

## 2024-04-22 RX ORDER — ZOSTER VACCINE RECOMBINANT, ADJUVANTED 50 MCG/0.5
0.5 KIT INTRAMUSCULAR ONCE
Qty: 1 EACH | Refills: 1 | Status: SHIPPED | OUTPATIENT
Start: 2024-04-22 | End: 2024-04-22

## 2024-04-22 NOTE — PATIENT INSTRUCTIONS
Medicare Preventive Visit Patient Instructions  Thank you for completing your Welcome to Medicare Visit or Medicare Annual Wellness Visit today. Your next wellness visit will be due in one year (4/23/2025).  The screening/preventive services that you may require over the next 5-10 years are detailed below. Some tests may not apply to you based off risk factors and/or age. Screening tests ordered at today's visit but not completed yet may show as past due. Also, please note that scanned in results may not display below.  Preventive Screenings:  Service Recommendations Previous Testing/Comments   Colorectal Cancer Screening  * Colonoscopy    * Fecal Occult Blood Test (FOBT)/Fecal Immunochemical Test (FIT)  * Fecal DNA/Cologuard Test  * Flexible Sigmoidoscopy Age: 45-75 years old   Colonoscopy: every 10 years (may be performed more frequently if at higher risk)  OR  FOBT/FIT: every 1 year  OR  Cologuard: every 3 years  OR  Sigmoidoscopy: every 5 years  Screening may be recommended earlier than age 45 if at higher risk for colorectal cancer. Also, an individualized decision between you and your healthcare provider will decide whether screening between the ages of 76-85 would be appropriate. Colonoscopy: Not on file  FOBT/FIT: Not on file  Cologuard: Not on file  Sigmoidoscopy: Not on file          Breast Cancer Screening Age: 40+ years old  Frequency: every 1-2 years  Not required if history of left and right mastectomy Mammogram: 03/10/2017        Cervical Cancer Screening Between the ages of 21-29, pap smear recommended once every 3 years.   Between the ages of 30-65, can perform pap smear with HPV co-testing every 5 years.   Recommendations may differ for women with a history of total hysterectomy, cervical cancer, or abnormal pap smears in past. Pap Smear: Not on file    Screening Not Indicated   Hepatitis C Screening Once for adults born between 1945 and 1965  More frequently in patients at high risk for Hepatitis  C Hep C Antibody: Not on file        Diabetes Screening 1-2 times per year if you're at risk for diabetes or have pre-diabetes Fasting glucose: No results in last 5 years (No results in last 5 years)  A1C: No results in last 5 years (No results in last 5 years)      Cholesterol Screening Once every 5 years if you don't have a lipid disorder. May order more often based on risk factors. Lipid panel: Not on file          Other Preventive Screenings Covered by Medicare:  Abdominal Aortic Aneurysm (AAA) Screening: covered once if your at risk. You're considered to be at risk if you have a family history of AAA.  Lung Cancer Screening: covers low dose CT scan once per year if you meet all of the following conditions: (1) Age 55-77; (2) No signs or symptoms of lung cancer; (3) Current smoker or have quit smoking within the last 15 years; (4) You have a tobacco smoking history of at least 20 pack years (packs per day multiplied by number of years you smoked); (5) You get a written order from a healthcare provider.  Glaucoma Screening: covered annually if you're considered high risk: (1) You have diabetes OR (2) Family history of glaucoma OR (3)  aged 50 and older OR (4)  American aged 65 and older  Osteoporosis Screening: covered every 2 years if you meet one of the following conditions: (1) You're estrogen deficient and at risk for osteoporosis based off medical history and other findings; (2) Have a vertebral abnormality; (3) On glucocorticoid therapy for more than 3 months; (4) Have primary hyperparathyroidism; (5) On osteoporosis medications and need to assess response to drug therapy.   Last bone density test (DXA Scan): 06/04/2019.  HIV Screening: covered annually if you're between the age of 15-65. Also covered annually if you are younger than 15 and older than 65 with risk factors for HIV infection. For pregnant patients, it is covered up to 3 times per  pregnancy.    Immunizations:  Immunization Recommendations   Influenza Vaccine Annual influenza vaccination during flu season is recommended for all persons aged >= 6 months who do not have contraindications   Pneumococcal Vaccine   * Pneumococcal conjugate vaccine = PCV13 (Prevnar 13), PCV15 (Vaxneuvance), PCV20 (Prevnar 20)  * Pneumococcal polysaccharide vaccine = PPSV23 (Pneumovax) Adults 19-65 yo with certain risk factors or if 65+ yo  If never received any pneumonia vaccine: recommend Prevnar 20 (PCV20)  Give PCV20 if previously received 1 dose of PCV13 or PPSV23   Hepatitis B Vaccine 3 dose series if at intermediate or high risk (ex: diabetes, end stage renal disease, liver disease)   Respiratory syncytial virus (RSV) Vaccine - COVERED BY MEDICARE PART D  * RSVPreF3 (Arexvy) CDC recommends that adults 60 years of age and older may receive a single dose of RSV vaccine using shared clinical decision-making (SCDM)   Tetanus (Td) Vaccine - COST NOT COVERED BY MEDICARE PART B Following completion of primary series, a booster dose should be given every 10 years to maintain immunity against tetanus. Td may also be given as tetanus wound prophylaxis.   Tdap Vaccine - COST NOT COVERED BY MEDICARE PART B Recommended at least once for all adults. For pregnant patients, recommended with each pregnancy.   Shingles Vaccine (Shingrix) - COST NOT COVERED BY MEDICARE PART B  2 shot series recommended in those 19 years and older who have or will have weakened immune systems or those 50 years and older     Health Maintenance Due:  There are no preventive care reminders to display for this patient.  Immunizations Due:      Topic Date Due   • Influenza Vaccine (1) 09/01/2023   • COVID-19 Vaccine (4 - 2023-24 season) 09/01/2023     Advance Directives   What are advance directives?  Advance directives are legal documents that state your wishes and plans for medical care. These plans are made ahead of time in case you lose your  ability to make decisions for yourself. Advance directives can apply to any medical decision, such as the treatments you want, and if you want to donate organs.   What are the types of advance directives?  There are many types of advance directives, and each state has rules about how to use them. You may choose a combination of any of the following:  Living will:  This is a written record of the treatment you want. You can also choose which treatments you do not want, which to limit, and which to stop at a certain time. This includes surgery, medicine, IV fluid, and tube feedings.   Durable power of  for healthcare (DPAHC):  This is a written record that states who you want to make healthcare choices for you when you are unable to make them for yourself. This person, called a proxy, is usually a family member or a friend. You may choose more than 1 proxy.  Do not resuscitate (DNR) order:  A DNR order is used in case your heart stops beating or you stop breathing. It is a request not to have certain forms of treatment, such as CPR. A DNR order may be included in other types of advance directives.  Medical directive:  This covers the care that you want if you are in a coma, near death, or unable to make decisions for yourself. You can list the treatments you want for each condition. Treatment may include pain medicine, surgery, blood transfusions, dialysis, IV or tube feedings, and a ventilator (breathing machine).  Values history:  This document has questions about your views, beliefs, and how you feel and think about life. This information can help others choose the care that you would choose.  Why are advance directives important?  An advance directive helps you control your care. Although spoken wishes may be used, it is better to have your wishes written down. Spoken wishes can be misunderstood, or not followed. Treatments may be given even if you do not want them. An advance directive may make it easier  for your family to make difficult choices about your care.   Cigarette Smoking and Your Health   Risks to your health if you smoke:  Nicotine and other chemicals found in tobacco damage every cell in your body. Even if you are a light smoker, you have an increased risk for cancer, heart disease, and lung disease. If you are pregnant or have diabetes, smoking increases your risk for complications.   Benefits to your health if you stop smoking:   You decrease respiratory symptoms such as coughing, wheezing, and shortness of breath.   You reduce your risk for cancers of the lung, mouth, throat, kidney, bladder, pancreas, stomach, and cervix. If you already have cancer, you increase the benefits of chemotherapy. You also reduce your risk for cancer returning or a second cancer from developing.   You reduce your risk for heart disease, blood clots, heart attack, and stroke.   You reduce your risk for lung infections, and diseases such as pneumonia, asthma, chronic bronchitis, and emphysema.  Your circulation improves. More oxygen can be delivered to your body. If you have diabetes, you lower your risk for complications, such as kidney, artery, and eye diseases. You also lower your risk for nerve damage. Nerve damage can lead to amputations, poor vision, and blindness.  You improve your body's ability to heal and to fight infections.  For more information and support to stop smoking:   Smokefree.gov  Phone: 8- 144 - 684-0289  Web Address: www.smokefree.gov     © Copyright [employer] 2018 Information is for End User's use only and may not be sold, redistributed or otherwise used for commercial purposes. All illustrations and images included in CareNotes® are the copyrighted property of [employer] or [employer]

## 2024-04-22 NOTE — PROGRESS NOTES
Assessment and Plan:     Problem List Items Addressed This Visit     CKD stage G3a/A1, GFR 45-59 and albumin creatinine ratio <30 mg/g (HCC)    Relevant Orders    Comprehensive metabolic panel    Chronic obstructive pulmonary disease (HCC)    Relevant Orders    Complete PFT with post bronchodilator    Disc degeneration, lumbar    Osteopenia    Tobacco abuse    Relevant Orders    Complete PFT with post bronchodilator    Essential hypertension    Relevant Orders    CBC and differential    Comprehensive metabolic panel    TSH, 3rd generation with Free T4 reflex    UA w Reflex to Microscopic w Reflex to Culture    Neuropathy    Relevant Medications    gabapentin (Neurontin) 600 MG tablet   Other Visit Diagnoses     Medicare annual wellness visit, subsequent    -  Primary    Relevant Orders    CBC and differential    Comprehensive metabolic panel    Lipid panel    UA w Reflex to Microscopic w Reflex to Culture    Need for vaccination        Relevant Medications    Zoster Vac Recomb Adjuvanted (Shingrix) 50 MCG/0.5ML SUSR          Tobacco Cessation Counseling: Tobacco cessation counseling was provided. The patient is sincerely urged to quit consumption of tobacco. She is not ready to quit tobacco. Medication options discussed. Pt pre-contemplative.      Regarding her Medicare annual well visit patient is given age and diagnosis appropriate evaluation and care.  Patient's recent blood work and urine were discussed with patient as well including her sugar as per her request.  Her A1c went down to 5.2.  Her lipid panel is normal including her LFTs.  Patient advised to not have so much candy/sweets especially hard candy secondary to her chipped tooth today.  Patient still advised to cut down and ultimately quit tobacco.  Continue taking multivitamin and calcium with vitamin D.  She is ordered blood work and urine to be done a week before I see her back in 6 months.  Regarding her neuropathy/DJD, discussed with patient.   Patient advised to quit tobacco.  Patient is send the gabapentin 600 mg 3 times a day prescription to her pharmacy.  Let us know if is not working and or she cannot tolerate it.  And patient follow-up with her podiatrist as well who advised it and is recommending it  in the first place.  Patient education.  Regarding her hypertension and CKD 3, GFR of 50, patient is stable.  Patient advised to quit tobacco.  Patient advised to have a low-salt condiment diet.  Hydrate well.  Recheck labs again in 6 months.  Regarding his COPD and tobacco use, discussed with patient.  Patient precontemplative regarding quitting tobacco.  Patient will continue her maintenance and as needed inhaler.  Patient is ordered a PFT as well.  Patient also ordered a CT of the lungs without contrast.  Regarding her osteopenia, discussed with patient.  Patient advised to quit tobacco.  Calcium supplementation.  Patient ordered a bone density as well.  RTO 6 months and do the blood work and urine before.      Preventive health issues were discussed with patient, and age appropriate screening tests were ordered as noted in patient's After Visit Summary.  Personalized health advice and appropriate referrals for health education or preventive services given if needed, as noted in patient's After Visit Summary.     History of Present Illness:     Patient presents for a Medicare Wellness Visit    83-year-old female here for her Medicare annual wellness.  Patient also would like to be evaluated for her sugar because she says she eats a lot of candy and her lipids.  Patient did have blood work and urine last week.  Patient recently saw her podiatrist and was advised to take her gabapentin 603 times a day to help her with her neuropathy.  Patient also have lumbar spine DJD.  Patient is a smoker, a chronic smoker but precontemplative regarding cutting down and or quitting.  Patient also asserts that she has 1-2 beers nightly for a long time.  Per patient  she says she finds the cheapest beer and soria it down anyway.  Patient declined to do mammogram.  Patient does need the shingles vaccine.  Patient does not get the flu vaccines as per patient.  Patient lives with her daughter.  Of note patient will be seeing her dentist today or tomorrow because she chipped her right lower front tooth by biting on hard candy right here at our office as per patient.       Patient Care Team:  Duke Barnes MD as PCP - General (Family Medicine)  TREVOR Crowley, DO     Review of Systems:     Review of Systems   Constitutional:  Negative for activity change, appetite change, chills, fatigue, fever and unexpected weight change.   HENT:  Negative for congestion, hearing loss and sore throat.    Eyes:  Negative for visual disturbance.   Respiratory:  Negative for cough and shortness of breath.    Cardiovascular:  Negative for chest pain and palpitations.   Gastrointestinal:  Negative for abdominal pain, blood in stool, constipation, diarrhea, nausea and vomiting.   Genitourinary:  Negative for dysuria and hematuria.   Musculoskeletal:  Positive for arthralgias and back pain.   Skin:  Negative for rash.   Neurological:  Negative for dizziness and headaches.   Psychiatric/Behavioral:  Negative for dysphoric mood and sleep disturbance. The patient is not nervous/anxious.         Problem List:     Patient Active Problem List   Diagnosis   • CKD stage G3a/A1, GFR 45-59 and albumin creatinine ratio <30 mg/g (HCC)   • Chronic obstructive pulmonary disease (HCC)   • Disc degeneration, lumbar   • Glaucoma   • Liver cyst   • Osteopenia   • Post herpetic neuralgia   • Suspicious nevus   • Tobacco abuse   • Essential hypertension   • H/O lumpectomy   • Mild protein-calorie malnutrition (HCC)   • Neuropathy      Past Medical and Surgical History:     Past Medical History:   Diagnosis Date   • Glaucoma    • Hypertension      Past Surgical History:   Procedure Laterality  Date   • BREAST LUMPECTOMY     • HYSTERECTOMY        Family History:     Family History   Problem Relation Age of Onset   • Breast cancer Mother    • Parkinsonism Mother    • Stroke Mother    • Heart disease Father       Social History:     Social History     Socioeconomic History   • Marital status: Single     Spouse name: None   • Number of children: None   • Years of education: None   • Highest education level: None   Occupational History   • Occupation: Food Services   Tobacco Use   • Smoking status: Every Day   • Smokeless tobacco: Never   • Tobacco comments:     50 pp Year cutting down slowly now at 1/2 a day   Vaping Use   • Vaping status: Never Used   Substance and Sexual Activity   • Alcohol use: No   • Drug use: No   • Sexual activity: None   Other Topics Concern   • None   Social History Narrative    Does not exercise    Seeing a dentist     Social Determinants of Health     Financial Resource Strain: Low Risk  (4/20/2023)    Overall Financial Resource Strain (CARDIA)    • Difficulty of Paying Living Expenses: Not hard at all   Food Insecurity: No Food Insecurity (4/22/2024)    Hunger Vital Sign    • Worried About Running Out of Food in the Last Year: Never true    • Ran Out of Food in the Last Year: Never true   Transportation Needs: No Transportation Needs (4/22/2024)    PRAPARE - Transportation    • Lack of Transportation (Medical): No    • Lack of Transportation (Non-Medical): No   Physical Activity: Inactive (1/16/2020)    Exercise Vital Sign    • Days of Exercise per Week: 0 days    • Minutes of Exercise per Session: 0 min   Stress: No Stress Concern Present (1/16/2020)    [employer] [place] [employer] - Occupational Stress Questionnaire    • Feeling of Stress : Not at all   Social Connections: Unknown (1/16/2020)    Social Connection and Isolation Panel [NHANES]    • Frequency of Communication with Friends and Family: Patient declined    • Frequency of Social Gatherings with Friends and  Family: Patient declined    • Attends Mosque Services: Patient declined    • Active Member of Clubs or Organizations: Patient declined    • Attends Club or Organization Meetings: Patient declined    • Marital Status: Patient declined   Intimate Partner Violence: Not At Risk (1/16/2020)    Humiliation, Afraid, Rape, and Kick questionnaire    • Fear of Current or Ex-Partner: No    • Emotionally Abused: No    • Physically Abused: No    • Sexually Abused: No   Housing Stability: Unknown (4/22/2024)    Housing Stability Vital Sign    • Unable to Pay for Housing in the Last Year: No    • Number of Places Lived in the Last Year: Not on file    • Unstable Housing in the Last Year: No      Medications and Allergies:     Current Outpatient Medications   Medication Sig Dispense Refill   • albuterol (PROVENTIL HFA,VENTOLIN HFA) 90 mcg/act inhaler Inhale 1 puff every 4 (four) hours as needed for wheezing 8.5 g 1   • gabapentin (Neurontin) 600 MG tablet Take 1 tablet (600 mg total) by mouth 3 (three) times a day 270 tablet 1   • latanoprost (XALATAN) 0.005 % ophthalmic solution place 1 drop into left eye every evening WITH PUNCTAL OCCLUSION. DISCARD AFTER 6 WEEKS     • Zoster Vac Recomb Adjuvanted (Shingrix) 50 MCG/0.5ML SUSR Inject 0.5 mL into a muscle once for 1 dose Repeat dose in 2 to 6 months 1 each 1   • Anoro Ellipta 62.5-25 MCG/ACT inhaler inhale 1 puff by mouth and INTO THE LUNGS once daily 180 blister 3   • calcium citrate-vitamin D (CITRACAL+D) 315-200 MG-UNIT per tablet Take by mouth     • Cholecalciferol (VITAMIN D3) 2000 units capsule Take by mouth     • lisinopril (ZESTRIL) 10 mg tablet take 1 tablet by mouth once daily 90 tablet 1     No current facility-administered medications for this visit.     Allergies   Allergen Reactions   • Penicillins Hives      Immunizations:     Immunization History   Administered Date(s) Administered   • COVID-19 PFIZER VACCINE 0.3 ML IM 03/12/2021, 04/02/2021, 01/03/2022   •  INFLUENZA 11/14/2016   • Influenza Split High Dose Preservative Free IM 11/14/2016   • Pneumococcal Conjugate 13-Valent 01/08/2016   • Pneumococcal Polysaccharide PPV23 04/24/2019      Health Maintenance:     There are no preventive care reminders to display for this patient.      Topic Date Due   • Influenza Vaccine (1) 09/01/2023   • COVID-19 Vaccine (4 - 2023-24 season) 09/01/2023      Medicare Screening Tests and Risk Assessments:     Danita is here for her Subsequent Wellness visit. Last Medicare Wellness visit information reviewed, patient interviewed and updates made to the record today.      Health Risk Assessment:   Patient rates overall health as good. Patient feels that their physical health rating is same. Patient is satisfied with their life. Eyesight was rated as same. Hearing was rated as same. Patient feels that their emotional and mental health rating is same. Patients states they are never, rarely angry. Patient states they are never, rarely unusually tired/fatigued. Pain experienced in the last 7 days has been none. Patient states that she has experienced weight loss or gain in last 6 months.     Fall Risk Screening:   In the past year, patient has experienced: no history of falling in past year      Urinary Incontinence Screening:   Patient has not leaked urine accidently in the last six months.     Home Safety:  Patient has trouble with stairs inside or outside of their home. Patient has working smoke alarms and has working carbon monoxide detector. Home safety hazards include: none.     Nutrition:   Current diet is Regular.     Medications:   Patient is not currently taking any over-the-counter supplements. Patient is able to manage medications.     Activities of Daily Living (ADLs)/Instrumental Activities of Daily Living (IADLs):   Walk and transfer into and out of bed and chair?: Yes  Dress and groom yourself?: Yes    Bathe or shower yourself?: Yes    Feed yourself? Yes  Do your  laundry/housekeeping?: Yes  Manage your money, pay your bills and track your expenses?: Yes  Make your own meals?: Yes    Do your own shopping?: Yes    Previous Hospitalizations:   Any hospitalizations or ED visits within the last 12 months?: No      Advance Care Planning:   Living will: No    Advanced directive: Yes    ACP document given: Yes      Comments: Discussed with patient today.    Cognitive Screening:   Provider or family/friend/caregiver concerned regarding cognition?: No    PREVENTIVE SCREENINGS      Cardiovascular Screening:    General: Screening Current      Diabetes Screening:     General: Screening Current      Cervical Cancer Screening:    General: Screening Not Indicated      Lung Cancer Screening:     General: Screening Not Indicated    Screening, Brief Intervention, and Referral to Treatment (SBIRT)    Screening  Typical number of drinks in a day: 0  Typical number of drinks in a week: 14  Interpretation: Low risk drinking behavior.    AUDIT-C Screenin) How often did you have a drink containing alcohol in the past year? 2 to 3 times a week  2) How many drinks did you have on a typical day when you were drinking in the past year? 1 to 2  3) How often did you have 6 or more drinks on one occasion in the past year? never    AUDIT-C Score: 3  Interpretation: Score 3-12 (female): POSITIVE screen for alcohol misuse    AUDIT Screenin) How often during the last year have you found that you were not able to stop drinking once you had started? 0 - never  5) How often during the last year have you failed to do what was normally expected from you because of drinking? 0 - never  6) How often during the last year have you needed a first drink in the morning to get yourself going after a heavy drinking session? 0 - never  7) How often during the last year have you had a feeling of guilt or remorse after drinking? 0 - never  8) How often during the last year have you been unable to remember what  "happened the night before because you had been drinking? 0 - never  9) Have you or someone else been injured as a result of your drinking? 0 - no  10) Has a relative or friend or a doctor or another health worker been concerned about your drinking or suggested you cut down? 0 - no    AUDIT Score: 3  Interpretation: Low risk alcohol consumption    Other Counseling Topics:   Car/seat belt/driving safety, skin self-exam, sunscreen and calcium and vitamin D intake and regular weightbearing exercise.     No results found.     Physical Exam:     /56 (BP Location: Left arm, Patient Position: Sitting, Cuff Size: Adult)   Pulse 70   Temp 97.7 °F (36.5 °C) (Temporal)   Resp 16   Ht 5' 1\" (1.549 m)   Wt 45.8 kg (101 lb)   SpO2 96%   BMI 19.08 kg/m²     Physical Exam  Vitals reviewed.   Constitutional:       General: She is not in acute distress.     Appearance: Normal appearance. She is not ill-appearing.   HENT:      Head: Normocephalic and atraumatic.      Right Ear: Tympanic membrane, ear canal and external ear normal.      Left Ear: Tympanic membrane, ear canal and external ear normal.      Mouth/Throat:      Mouth: Mucous membranes are moist.      Pharynx: Oropharynx is clear.   Eyes:      Extraocular Movements: Extraocular movements intact.      Conjunctiva/sclera: Conjunctivae normal.   Neck:      Vascular: No carotid bruit.   Cardiovascular:      Rate and Rhythm: Normal rate and regular rhythm.   Pulmonary:      Effort: Pulmonary effort is normal.      Breath sounds: Normal breath sounds.   Abdominal:      General: Bowel sounds are normal.      Palpations: Abdomen is soft.      Tenderness: There is no abdominal tenderness. There is no right CVA tenderness or left CVA tenderness.   Musculoskeletal:         General: No tenderness.      Cervical back: Neck supple.      Right lower leg: No edema.      Left lower leg: No edema.      Comments: Patient has slight kyphosis.  No calf tenderness bilateral. "   Lymphadenopathy:      Cervical: No cervical adenopathy.   Skin:     General: Skin is warm.      Findings: No rash.   Neurological:      General: No focal deficit present.      Mental Status: She is alert and oriented to person, place, and time.   Psychiatric:         Mood and Affect: Mood normal.         Behavior: Behavior normal.         Thought Content: Thought content normal.          Duke Barnes MD

## 2024-04-30 DIAGNOSIS — I10 ESSENTIAL HYPERTENSION: ICD-10-CM

## 2024-05-02 RX ORDER — LISINOPRIL 10 MG/1
10 TABLET ORAL DAILY
Qty: 90 TABLET | Refills: 1 | Status: SHIPPED | OUTPATIENT
Start: 2024-05-02

## 2024-05-22 ENCOUNTER — IOP CHECK (OUTPATIENT)
Dept: URBAN - METROPOLITAN AREA CLINIC 6 | Facility: CLINIC | Age: 84
End: 2024-05-22

## 2024-05-22 DIAGNOSIS — Z98.890: ICD-10-CM

## 2024-05-22 DIAGNOSIS — H40.1420: ICD-10-CM

## 2024-05-22 DIAGNOSIS — H25.812: ICD-10-CM

## 2024-05-22 DIAGNOSIS — H40.1122: ICD-10-CM

## 2024-05-22 DIAGNOSIS — Z96.1: ICD-10-CM

## 2024-05-22 PROCEDURE — 92012 INTRM OPH EXAM EST PATIENT: CPT

## 2024-05-22 PROCEDURE — 92133 CPTRZD OPH DX IMG PST SGM ON: CPT | Mod: NC

## 2024-05-22 ASSESSMENT — TONOMETRY
OS_IOP_MMHG: 11
OD_IOP_MMHG: 13
OS_IOP_MMHG: 13
OD_IOP_MMHG: 13

## 2024-05-22 ASSESSMENT — VISUAL ACUITY
OS_CC: 20/50-1
OS_PH: 20/40-2
OD_CC: CF 3FT

## 2024-05-29 ENCOUNTER — HOSPITAL ENCOUNTER (OUTPATIENT)
Dept: PULMONOLOGY | Facility: HOSPITAL | Age: 84
Discharge: HOME | End: 2024-05-29
Attending: FAMILY MEDICINE
Payer: COMMERCIAL

## 2024-05-29 DIAGNOSIS — Z72.0 TOBACCO ABUSE: ICD-10-CM

## 2024-05-29 DIAGNOSIS — J44.9 CHRONIC OBSTRUCTIVE PULMONARY DISEASE, UNSPECIFIED COPD TYPE (HCC): ICD-10-CM

## 2024-05-29 PROCEDURE — 94060 EVALUATION OF WHEEZING: CPT

## 2024-05-29 PROCEDURE — 94726 PLETHYSMOGRAPHY LUNG VOLUMES: CPT | Performed by: STUDENT IN AN ORGANIZED HEALTH CARE EDUCATION/TRAINING PROGRAM

## 2024-05-29 PROCEDURE — 94729 DIFFUSING CAPACITY: CPT

## 2024-05-29 PROCEDURE — 94729 DIFFUSING CAPACITY: CPT | Performed by: STUDENT IN AN ORGANIZED HEALTH CARE EDUCATION/TRAINING PROGRAM

## 2024-05-29 PROCEDURE — 94060 EVALUATION OF WHEEZING: CPT | Performed by: STUDENT IN AN ORGANIZED HEALTH CARE EDUCATION/TRAINING PROGRAM

## 2024-05-29 PROCEDURE — 94760 N-INVAS EAR/PLS OXIMETRY 1: CPT

## 2024-05-29 PROCEDURE — 94726 PLETHYSMOGRAPHY LUNG VOLUMES: CPT

## 2024-05-29 RX ORDER — ALBUTEROL SULFATE 2.5 MG/3ML
2.5 SOLUTION RESPIRATORY (INHALATION) ONCE
Status: COMPLETED | OUTPATIENT
Start: 2024-05-29 | End: 2024-05-29

## 2024-05-29 RX ADMIN — ALBUTEROL SULFATE 2.5 MG: 2.5 SOLUTION RESPIRATORY (INHALATION) at 15:04

## 2024-05-30 ENCOUNTER — TELEPHONE (OUTPATIENT)
Dept: FAMILY MEDICINE CLINIC | Facility: CLINIC | Age: 84
End: 2024-05-30

## 2024-05-30 NOTE — TELEPHONE ENCOUNTER
----- Message from Duke Barnes MD sent at 5/30/2024  9:14 AM EDT -----  Please call the patient regarding her abnormal result.  Has COPD. Sees me 10 24 24. See me sooner for any breathing issues. ty   independent

## 2024-05-30 NOTE — TELEPHONE ENCOUNTER
Called and made patient aware of results, advised her to call for an appointment should she have any trouble breathing.

## 2024-09-05 ENCOUNTER — OFFICE VISIT (OUTPATIENT)
Dept: PODIATRY | Facility: CLINIC | Age: 84
End: 2024-09-05
Payer: COMMERCIAL

## 2024-09-05 VITALS
RESPIRATION RATE: 18 BRPM | HEIGHT: 61 IN | WEIGHT: 104 LBS | BODY MASS INDEX: 19.63 KG/M2 | DIASTOLIC BLOOD PRESSURE: 70 MMHG | HEART RATE: 60 BPM | SYSTOLIC BLOOD PRESSURE: 110 MMHG

## 2024-09-05 DIAGNOSIS — G62.9 POLYNEUROPATHY: Primary | ICD-10-CM

## 2024-09-05 PROCEDURE — 99212 OFFICE O/P EST SF 10 MIN: CPT | Performed by: PODIATRIST

## 2024-09-05 NOTE — PROGRESS NOTES
Patient presents for pedal assessment.  Patient now taking gabapentin 1800 mg daily up from the 1400 mg at last visit.  She feels that it is helpful although she still has paresthesia in her feet.  Do not recommend increasing the dosage any higher.    Patient also has elongated toenails which were trimmed this date.  Pedal pulses are within normal limits.  Explained to patient that if only nail care was performed at visits, that this is not a covered service.  Reappoint as needed

## 2024-10-22 ENCOUNTER — TELEPHONE (OUTPATIENT)
Age: 84
End: 2024-10-22

## 2024-10-22 NOTE — TELEPHONE ENCOUNTER
Patient called stating she was returning call from Stella but was not sure nature of call.  No notes in chart.  Clinical unavailable.  Patient also wanted to make sure lab results were received from [facility]. Phone number for [facility] is [phone].  Please contact patient.

## 2024-10-23 ENCOUNTER — OFFICE VISIT (OUTPATIENT)
Dept: FAMILY MEDICINE CLINIC | Facility: CLINIC | Age: 84
End: 2024-10-23
Payer: COMMERCIAL

## 2024-10-23 VITALS
TEMPERATURE: 97.9 F | WEIGHT: 104 LBS | HEART RATE: 72 BPM | RESPIRATION RATE: 16 BRPM | SYSTOLIC BLOOD PRESSURE: 114 MMHG | OXYGEN SATURATION: 97 % | BODY MASS INDEX: 19.63 KG/M2 | DIASTOLIC BLOOD PRESSURE: 58 MMHG | HEIGHT: 61 IN

## 2024-10-23 DIAGNOSIS — I10 ESSENTIAL HYPERTENSION: Primary | ICD-10-CM

## 2024-10-23 DIAGNOSIS — E78.1 HYPERTRIGLYCERIDEMIA: ICD-10-CM

## 2024-10-23 DIAGNOSIS — Z72.0 TOBACCO ABUSE: ICD-10-CM

## 2024-10-23 DIAGNOSIS — N18.31 CKD STAGE G3A/A1, GFR 45-59 AND ALBUMIN CREATININE RATIO <30 MG/G (HCC): ICD-10-CM

## 2024-10-23 DIAGNOSIS — M85.80 OSTEOPENIA, UNSPECIFIED LOCATION: ICD-10-CM

## 2024-10-23 DIAGNOSIS — J44.9 CHRONIC OBSTRUCTIVE PULMONARY DISEASE, UNSPECIFIED COPD TYPE (HCC): ICD-10-CM

## 2024-10-23 PROCEDURE — G2211 COMPLEX E/M VISIT ADD ON: HCPCS | Performed by: FAMILY MEDICINE

## 2024-10-23 PROCEDURE — 99214 OFFICE O/P EST MOD 30 MIN: CPT | Performed by: FAMILY MEDICINE

## 2024-10-23 NOTE — ASSESSMENT & PLAN NOTE
Lab Results   Component Value Date    CREATININE 1.06 (H) 12/21/2017    CREATININE 0.95 (H) 06/07/2017    CREATININE 1.07 (H) 11/04/2016     Discussed with patient.  Current GFR 55 in September of this year.  Patient advised well hydration.  Low-sodium diet.  DC tobacco.  Continue to monitor and recheck labs in 6 months.

## 2024-10-23 NOTE — ASSESSMENT & PLAN NOTE
LFTs normal.  Total cholesterol is 163, triglycerides 189, HDL 57 and her LDL was 68.  Patient is not on a statin.  Patient advised to cut down on her starches.  DC tobacco.  Exercise as tolerated.  Recheck labs in 6 months.  Orders:    Comprehensive metabolic panel; Future    Lipid panel; Future

## 2024-10-23 NOTE — ASSESSMENT & PLAN NOTE
Discussed with patient.  Supplement with calcium and vitamin D.  Patient declined to get a bone density test.

## 2024-10-23 NOTE — ASSESSMENT & PLAN NOTE
Patient is stable on her inhalers.  Says she hardly needs to use albuterol inhaler as a rescue.  Patient is a chronic smoker and precontemplative.  But patient is still advised to quit tobacco completely.  Patient declined the CT of the lungs.  Will continue to monitor.

## 2024-10-23 NOTE — PROGRESS NOTES
Ambulatory Visit  Name: Danita Palmer      : 1940      MRN: [id number]  Encounter Provider: Duke Barnes MD  Encounter Date: 10/23/2024   Encounter department: [facility]    Assessment & Plan  Essential hypertension  BP well-controlled.  Patient advised to cut down to completely quit tobacco.  Patient advised well hydration.  Low-sodium diet.  Continue current therapy.  Recheck labs again in 6 months.  Orders:    CBC and differential; Future    Comprehensive metabolic panel; Future    TSH, 3rd generation with Free T4 reflex; Future    UA w Reflex to Microscopic w Reflex to Culture; Future    CKD stage G3a/A1, GFR 45-59 and albumin creatinine ratio <30 mg/g ([facility])  Lab Results   Component Value Date    CREATININE 1.06 (H) 2017    CREATININE 0.95 (H) 2017    CREATININE 1.07 (H) 2016     Discussed with patient.  Current GFR 55 in September of this year.  Patient advised well hydration.  Low-sodium diet.  DC tobacco.  Continue to monitor and recheck labs in 6 months.       Hypertriglyceridemia  LFTs normal.  Total cholesterol is 163, triglycerides 189, HDL 57 and her LDL was 68.  Patient is not on a statin.  Patient advised to cut down on her starches.  DC tobacco.  Exercise as tolerated.  Recheck labs in 6 months.  Orders:    Comprehensive metabolic panel; Future    Lipid panel; Future    Chronic obstructive pulmonary disease, unspecified COPD type ([facility])  Patient is stable on her inhalers.  Says she hardly needs to use albuterol inhaler as a rescue.  Patient is a chronic smoker and precontemplative.  But patient is still advised to quit tobacco completely.  Patient declined the CT of the lungs.  Will continue to monitor.       Tobacco abuse  Please see the above discussion on her COPD.  Patient advised to quit tobacco completely.       Osteopenia, unspecified location  Discussed with patient.  Supplement with calcium and vitamin D.  Patient declined to get a  "bone density test.         Depression Screening and Follow-up Plan: Patient was screened for depression during today's encounter. They screened negative with a PHQ-2 score of 0.        RTO 6 months and do the blood work and urine before.  Patient continued prior to that for the also between.          History of Present Illness     84-year-old female here for an evaluation of her hypertension, hyperlipidemia.  Patient did blood work and urine in September.  Patient is a chronic smoker and precontemplative.  Patient also declined to get the CT of the lungs.  Patient also has history of osteopenia and her last bone density was in 2019.  Patient also declined to get another bone density.  Patient declined flu vaccine, the COVID 19 updated vaccine and all vaccines as per patient.          Review of Systems   Constitutional:  Negative for chills, fatigue, fever and unexpected weight change.   HENT:  Negative for congestion and sore throat.    Eyes:  Negative for visual disturbance.   Respiratory:  Negative for cough and shortness of breath.    Cardiovascular:  Negative for chest pain and palpitations.   Gastrointestinal:  Negative for abdominal pain.   Genitourinary:  Negative for dysuria and hematuria.   Neurological:  Negative for dizziness and headaches.   Psychiatric/Behavioral:  Negative for dysphoric mood. The patient is not nervous/anxious.            Objective     /58 (BP Location: Left arm, Patient Position: Sitting, Cuff Size: Adult)   Pulse 72   Temp 97.9 °F (36.6 °C) (Temporal)   Resp 16   Ht 5' 1\" (1.549 m)   Wt 47.2 kg (104 lb)   SpO2 97%   BMI 19.65 kg/m²     Physical Exam  Vitals reviewed.   Constitutional:       General: She is not in acute distress.     Appearance: Normal appearance. She is not ill-appearing.   Neck:      Vascular: No carotid bruit.   Cardiovascular:      Rate and Rhythm: Normal rate and regular rhythm.   Pulmonary:      Effort: Pulmonary effort is normal. No respiratory " distress.      Breath sounds: Normal breath sounds. No wheezing or rhonchi.      Comments: Slightly far breath sounds.  Patient is a chronic smoker.  Not acute in nature.  Musculoskeletal:      Right lower leg: No edema.      Left lower leg: No edema.      Comments: No calf tenderness bilateral.   Neurological:      General: No focal deficit present.      Mental Status: She is alert and oriented to person, place, and time.   Psychiatric:         Mood and Affect: Mood normal.         Behavior: Behavior normal.         Thought Content: Thought content normal.

## 2024-10-27 DIAGNOSIS — I10 ESSENTIAL HYPERTENSION: ICD-10-CM

## 2024-10-28 RX ORDER — LISINOPRIL 10 MG/1
10 TABLET ORAL DAILY
Qty: 90 TABLET | Refills: 1 | Status: SHIPPED | OUTPATIENT
Start: 2024-10-28

## 2024-12-26 DIAGNOSIS — G62.9 NEUROPATHY: ICD-10-CM

## 2024-12-26 RX ORDER — GABAPENTIN 600 MG/1
600 TABLET ORAL 3 TIMES DAILY
Qty: 270 TABLET | Refills: 1 | Status: SHIPPED | OUTPATIENT
Start: 2024-12-26

## 2024-12-26 NOTE — TELEPHONE ENCOUNTER
Reason for call:   [x] Refill   [] Prior Auth  [] Other:     Office:   [x] PCP/Provider - Duke Barnes MD   [] Specialty/Provider -     Medication: gabapentin (Neurontin) 600 MG tablet     Dose/Frequency: : Take 1 tablet (600 mg total) by mouth 3 (three) times a day,     Quantity: 270    Pharmacy: [id number]  [place], [place] - [address]      Does the patient have enough for 3 days?   [] Yes   [x] No - Send as HP to POD

## 2025-01-29 NOTE — ASSESSMENT & PLAN NOTE
BP well-controlled.  Patient advised to cut down to completely quit tobacco.  Patient advised well hydration.  Low-sodium diet.  Continue current therapy.  Recheck labs again in 6 months.  Orders:    CBC and differential; Future    Comprehensive metabolic panel; Future    TSH, 3rd generation with Free T4 reflex; Future    UA w Reflex to Microscopic w Reflex to Culture; Future     Last OV 09/19/2024  Upcoming OV 09/18/2025  CBC 09/2024  CMP 09/2024  EKG 09/2024

## 2025-02-16 NOTE — PROGRESS NOTES
Assessment/Plan:    Essential hypertension  - Controlled  - Blood pressure goal per [facility] would be <150/90  - On  Lisinopril 10,  Patient educated to hold lisinopril if sick or dehydrated  - Restrict daily salt intake up to 2 4 g  - Advised to walk 30 minutes a day 5 times a week and practice stress relieving measures      CKD stage G3a/A1, GFR 45-59 and albumin creatinine ratio <30 mg/g (HCC)  Lab Results   Component Value Date    CREATININE 1 06 (H) 12/21/2017    CREATININE 0 95 (H) 06/07/2017    CREATININE 1 07 (H) 11/04/2016   Stable  Recheck q6m  Chronic obstructive pulmonary disease (HCC)  Well controlled  Smokes 1/2 PPD  Does not want to quit  UTD with pneumococcal vaccines  Encounter for annual wellness visit (AWV) in Medicare patient  Colonoscopy never done  Pt has been refusing for years  No further screening will be attempted  Dexa due  Osteopenia found in 2019  Aged out of CT lung screening  Aged out of cervical cancer screening  Mammogram last done 2017  H/O lumpectomy  Had breast cancer of the right breast s/p lumpectomy and radiation  This was about 20 years ago  Pt declines futher mammograms  Diagnoses and all orders for this visit:    Encounter for annual wellness visit (AWV) in Medicare patient    Essential hypertension  -     Basic metabolic panel; Future  -     lisinopril (ZESTRIL) 10 mg tablet; Take 1 tablet (10 mg total) by mouth daily    CKD stage G3a/A1, GFR 45-59 and albumin creatinine ratio <30 mg/g (HCC)  -     Basic metabolic panel; Future    Pulmonary emphysema, unspecified emphysema type (HCC)    Osteopenia, unspecified location    Post-menopausal  -     DXA bone density spine hip and pelvis; Future    Encounter for screening for lung cancer    Tobacco abuse    Bilateral impacted cerumen  -     Ambulatory Referral to Otolaryngology;  Future          Subjective: chronic conditions check up      Patient ID: Donnie Welsh is a 79 y o  female with a history of CKD stage 2, COPD, osteopenia, tobacco abuse  Darron Wolf HPI  Labs from [facility] reviewed  CKD stage 3a stable with a GFR of 53  The lipid panel is well controlled with a T cholesterol of 148, tg 116, hdl 65 and LDL of 83  Vit d 59  No complaints today  The following portions of the patient's history were reviewed and updated as appropriate: allergies, current medications, past family history, past medical history, past social history, past surgical history and problem list     Review of Systems   Constitutional: Negative for fever and unexpected weight change  HENT: Negative for ear pain, sore throat and trouble swallowing  Eyes: Negative for pain and visual disturbance  Respiratory: Negative for cough, chest tightness, shortness of breath and wheezing  Cardiovascular: Negative for chest pain  Gastrointestinal: Negative for abdominal distention, abdominal pain, blood in stool, constipation, diarrhea, nausea and vomiting  Endocrine: Negative for polydipsia and polyuria  Genitourinary: Negative for dysuria and hematuria  Musculoskeletal: Negative for back pain and myalgias  Skin: Negative for rash  Neurological: Negative for syncope and headaches  Psychiatric/Behavioral: Negative for suicidal ideas  PHQ-9 Depression Screening    PHQ-9:   Frequency of the following problems over the past two weeks:      Little interest or pleasure in doing things: 0 - not at all  Feeling down, depressed, or hopeless: 0 - not at all  PHQ-2 Score: 0           Objective:      /70 (BP Location: Left arm, Patient Position: Sitting, Cuff Size: Adult)   Pulse 70   Temp 98 7 °F (37 1 °C) (Tympanic)   Resp 16   Ht 4' 10 66" (1 49 m)   Wt 49 6 kg (109 lb 6 4 oz)   SpO2 97%   BMI 22 35 kg/m²          Physical Exam  Constitutional:       Appearance: She is well-developed  HENT:      Head: Normocephalic and atraumatic  Right Ear: External ear normal  There is no impacted cerumen        Left Ear: External ear normal  There is no impacted cerumen  Mouth/Throat:      Pharynx: No oropharyngeal exudate  Eyes:      General: No scleral icterus  Conjunctiva/sclera: Conjunctivae normal       Pupils: Pupils are equal, round, and reactive to light  Cardiovascular:      Rate and Rhythm: Normal rate and regular rhythm  Heart sounds: No murmur heard  No friction rub  No gallop  Pulmonary:      Effort: Pulmonary effort is normal  No respiratory distress  Breath sounds: Normal breath sounds  No wheezing or rales  Abdominal:      General: Bowel sounds are normal  There is no distension  Palpations: Abdomen is soft  There is no mass  Tenderness: There is no abdominal tenderness  There is no rebound  Musculoskeletal:         General: Normal range of motion  Cervical back: Normal range of motion and neck supple  Skin:     General: Skin is warm and dry  Neurological:      Mental Status: She is alert and oriented to person, place, and time  Vital Signs: I have reviewed the initial vital signs.  Constitutional: NAD, well-nourished, appears stated age, no acute distress.  HEENT: Airway patent, moist MM, no erythema/swelling/deformity of oral structures. EOMI, PERRLA. (+) R TM bulging and erythematous, no evidence of externa, no mastoid tenderness. L TM WNL  CV: regular rate, regular rhythm, well-perfused extremities, 2+ b/l DP and radial pulses equal.  Lungs: BCTA, no increased WOB.  ABD: NTND, no guarding or rebound, no pulsatile mass, no hernias.   MSK: Neck supple, nontender, nl ROM, no stepoff. Chest nontender. Back nontender in TLS spine or to b/l bony structures or flanks. Ext nontender, nl rom, no deformity.   INTEG: Skin warm, dry, no rash.  NEURO: A&Ox3, normal strength, nl sensation throughout, normal speech.   PSYCH: Calm, cooperative, normal affect and interaction.

## 2025-03-03 ENCOUNTER — TELEPHONE (OUTPATIENT)
Age: 85
End: 2025-03-03

## 2025-03-03 NOTE — TELEPHONE ENCOUNTER
Pt called in concerning labs that were ordered. Said that she does not have HTN so unsure why it is on her lab work. Advised that she does and takes BP medication. Pt verbalized understanding.

## 2025-03-31 LAB
ALBUMIN SERPL-MCNC: 3.7 G/DL (ref 3.5–5.7)
ALP SERPL-CCNC: 63 U/L (ref 35–120)
ALT SERPL-CCNC: 11 U/L
ANION GAP SERPL CALCULATED.3IONS-SCNC: 7 MMOL/L (ref 3–11)
AST SERPL-CCNC: 23 U/L
BILIRUB SERPL-MCNC: 0.5 MG/DL (ref 0.2–1)
BUN SERPL-MCNC: 14 MG/DL (ref 7–25)
CALCIUM SERPL-MCNC: 9.3 MG/DL (ref 8.5–10.5)
CHLORIDE SERPL-SCNC: 103 MMOL/L (ref 100–109)
CHOLEST SERPL-MCNC: 163 MG/DL
CHOLEST/HDLC SERPL: 2.7 {RATIO}
CO2 SERPL-SCNC: 32 MMOL/L (ref 21–31)
CREAT SERPL-MCNC: 1.08 MG/DL (ref 0.4–1.1)
CYTOLOGY CMNT CVX/VAG CYTO-IMP: ABNORMAL
GFR/BSA.PRED SERPLBLD CYS-BASED-ARV: 50 ML/MIN/{1.73_M2}
GLUCOSE SERPL-MCNC: 94 MG/DL (ref 65–99)
HDLC SERPL-MCNC: 60 MG/DL (ref 23–92)
LDLC SERPL CALC-MCNC: 70 MG/DL
NONHDLC SERPL-MCNC: 103 MG/DL
POTASSIUM SERPL-SCNC: 4.2 MMOL/L (ref 3.5–5.2)
PROT SERPL-MCNC: 6.4 G/DL (ref 6.3–8.3)
SODIUM SERPL-SCNC: 142 MMOL/L (ref 135–145)
TRIGL SERPL-MCNC: 165 MG/DL
TSH SERPL-ACNC: 1.75 UIU/ML (ref 0.45–5.33)

## 2025-04-01 ENCOUNTER — RESULTS FOLLOW-UP (OUTPATIENT)
Dept: FAMILY MEDICINE CLINIC | Facility: CLINIC | Age: 85
End: 2025-04-01

## 2025-04-01 LAB
BASOPHILS # BLD AUTO: 0.1 THOU/CMM (ref 0–0.1)
BASOPHILS NFR BLD AUTO: 2 %
DIFFERENTIAL METHOD BLD: ABNORMAL
EOSINOPHIL # BLD AUTO: 0.1 THOU/CMM (ref 0–0.5)
EOSINOPHIL NFR BLD AUTO: 2 %
ERYTHROCYTE [DISTWIDTH] IN BLOOD BY AUTOMATED COUNT: 13.8 % (ref 12–16)
HCT VFR BLD AUTO: 39.7 % (ref 35–43)
HGB BLD-MCNC: 13.4 G/DL (ref 11.5–14.5)
LYMPHOCYTES # BLD AUTO: 1.4 THOU/CMM (ref 1–3)
LYMPHOCYTES NFR BLD AUTO: 17 %
MCH RBC QN AUTO: 30.1 PG (ref 26–34)
MCHC RBC AUTO-ENTMCNC: 33.8 G/DL (ref 32–37)
MCV RBC AUTO: 89 FL (ref 80–100)
MONOCYTES # BLD AUTO: 0.7 THOU/CMM (ref 0.3–1)
MONOCYTES NFR BLD AUTO: 9 %
NEUTROPHILS # BLD AUTO: 5.8 THOU/CMM (ref 1.8–7.8)
NEUTROPHILS NFR BLD AUTO: 70 %
PLATELET # BLD AUTO: 381 THOU/CMM (ref 140–350)
PMV BLD REES-ECKER: 8 FL (ref 7.5–11.3)
RBC # BLD AUTO: 4.45 MILL/CMM (ref 3.7–4.7)
WBC # BLD AUTO: 8.1 THOU/CMM (ref 4–10)

## 2025-04-01 NOTE — RESULT ENCOUNTER NOTE
Please call the patient regarding her abnormal result.  Elev trigs, advise less starches. Will see pt at her upcoming appt also. ty

## 2025-04-01 NOTE — TELEPHONE ENCOUNTER
----- Message from Dkue Barnes MD sent at 4/1/2025 10:56 AM EDT -----  Please call the patient regarding her abnormal result.  Elev trigs, advise less starches. Will see pt at her upcoming appt also. ty

## 2025-04-02 NOTE — TELEPHONE ENCOUNTER
Pt called to follow up on lab result. Reviewed the following Provider message with Pt:     Duke Barnes MD to [facility] Regarding results: 3       4/1/25 10:56 AM  Note      Please call the patient regarding her abnormal result.  Elev trigs, advise less starches. Will see pt at her upcoming appt also. ty          Pt verbalized understanding and denies further questions or concerns at this time.

## 2025-04-12 DIAGNOSIS — J43.9 PULMONARY EMPHYSEMA, UNSPECIFIED EMPHYSEMA TYPE (HCC): ICD-10-CM

## 2025-04-13 RX ORDER — UMECLIDINIUM BROMIDE AND VILANTEROL TRIFENATATE 62.5; 25 UG/1; UG/1
POWDER RESPIRATORY (INHALATION)
Qty: 180 BLISTER | Refills: 3 | Status: SHIPPED | OUTPATIENT
Start: 2025-04-13

## 2025-04-22 DIAGNOSIS — I10 ESSENTIAL HYPERTENSION: ICD-10-CM

## 2025-04-23 RX ORDER — LISINOPRIL 10 MG/1
10 TABLET ORAL DAILY
Qty: 90 TABLET | Refills: 1 | Status: SHIPPED | OUTPATIENT
Start: 2025-04-23

## 2025-05-07 ENCOUNTER — OFFICE VISIT (OUTPATIENT)
Dept: FAMILY MEDICINE CLINIC | Facility: CLINIC | Age: 85
End: 2025-05-07
Payer: COMMERCIAL

## 2025-05-07 VITALS
WEIGHT: 106.6 LBS | BODY MASS INDEX: 20.12 KG/M2 | TEMPERATURE: 96.7 F | RESPIRATION RATE: 16 BRPM | SYSTOLIC BLOOD PRESSURE: 118 MMHG | HEART RATE: 73 BPM | OXYGEN SATURATION: 95 % | DIASTOLIC BLOOD PRESSURE: 59 MMHG | HEIGHT: 61 IN

## 2025-05-07 DIAGNOSIS — Z72.0 TOBACCO ABUSE: ICD-10-CM

## 2025-05-07 DIAGNOSIS — E78.1 HYPERTRIGLYCERIDEMIA: ICD-10-CM

## 2025-05-07 DIAGNOSIS — N18.31 CKD STAGE G3A/A1, GFR 45-59 AND ALBUMIN CREATININE RATIO <30 MG/G (HCC): ICD-10-CM

## 2025-05-07 DIAGNOSIS — Z00.00 MEDICARE ANNUAL WELLNESS VISIT, SUBSEQUENT: Primary | ICD-10-CM

## 2025-05-07 DIAGNOSIS — I10 ESSENTIAL HYPERTENSION: ICD-10-CM

## 2025-05-07 DIAGNOSIS — J44.9 CHRONIC OBSTRUCTIVE PULMONARY DISEASE, UNSPECIFIED COPD TYPE (HCC): ICD-10-CM

## 2025-05-07 DIAGNOSIS — Z23 NEED FOR VACCINATION: ICD-10-CM

## 2025-05-07 DIAGNOSIS — J43.9 PULMONARY EMPHYSEMA, UNSPECIFIED EMPHYSEMA TYPE (HCC): ICD-10-CM

## 2025-05-07 PROCEDURE — G2211 COMPLEX E/M VISIT ADD ON: HCPCS | Performed by: FAMILY MEDICINE

## 2025-05-07 PROCEDURE — G0439 PPPS, SUBSEQ VISIT: HCPCS | Performed by: FAMILY MEDICINE

## 2025-05-07 PROCEDURE — 99214 OFFICE O/P EST MOD 30 MIN: CPT | Performed by: FAMILY MEDICINE

## 2025-05-07 RX ORDER — ALBUTEROL SULFATE 90 UG/1
1 INHALANT RESPIRATORY (INHALATION) EVERY 4 HOURS PRN
Qty: 8.5 G | Refills: 1 | Status: SHIPPED | OUTPATIENT
Start: 2025-05-07

## 2025-05-07 RX ORDER — ZOSTER VACCINE RECOMBINANT, ADJUVANTED 50 MCG/0.5
0.5 KIT INTRAMUSCULAR ONCE
Qty: 1 EACH | Refills: 1 | Status: SHIPPED | OUTPATIENT
Start: 2025-05-07 | End: 2025-05-07

## 2025-05-07 RX ORDER — NICOTINE 21 MG/24HR
1 PATCH, TRANSDERMAL 24 HOURS TRANSDERMAL EVERY 24 HOURS
Qty: 28 PATCH | Refills: 0 | Status: SHIPPED | OUTPATIENT
Start: 2025-05-07

## 2025-05-07 NOTE — ASSESSMENT & PLAN NOTE
Controlled.  Advised low-sodium diet.  Hydrate well.  Continue current therapy.  DC tobacco.  Labs reviewed and recheck labs in 6 months also.  Orders:  •  Comprehensive metabolic panel; Future  •  UA w Reflex to Microscopic w Reflex to Culture; Future

## 2025-05-07 NOTE — ASSESSMENT & PLAN NOTE
Slightly elevated.  LFTs normal.  Her total cholesterol is 163, triglycerides 165, HDL 60 and her LDL 70.  Advised patient to cut down on her starches fats and sweets.  DC tobacco.  Continue current therapy.  Recheck labs again in 6 months.  Orders:  •  Comprehensive metabolic panel; Future  •  Lipid panel; Future

## 2025-05-07 NOTE — ASSESSMENT & PLAN NOTE
Likely secondary to her chronic smoking.  Positive emphysema also.  Stable.  Advised patient DC tobacco.  I refilled her albuterol inhaler to use as needed as per her request.

## 2025-05-07 NOTE — ASSESSMENT & PLAN NOTE
Stable.  DC tobacco.  Albuterol inhaler refilled as per her request.  Orders:  •  albuterol (PROVENTIL HFA,VENTOLIN HFA) 90 mcg/act inhaler; Inhale 1 puff every 4 (four) hours as needed for wheezing

## 2025-05-07 NOTE — ASSESSMENT & PLAN NOTE
Lab Results   Component Value Date    EGFR 50 (L) 03/31/2025    CREATININE 1.08 03/31/2025    CREATININE 1.06 (H) 12/21/2017    CREATININE 0.95 (H) 06/07/2017   Controlled/stable.  Hydrate well/more.  DC tobacco.  Blood pressure control.  Labs reviewed and recheck labs in 6 months.    Orders:  •  Comprehensive metabolic panel; Future

## 2025-05-07 NOTE — ASSESSMENT & PLAN NOTE
Chronic.  Patient is willing to retry the nicotine patches after discussion today with me regarding possible options to help her quit.  Patient asserts that she smokes 1 pack a day.  Etlan decision was made to start at the 21 mg patch.  Do that for 4 weeks and then cut it down to 14 mg if she is okay with it.  Patient is also given referral to the smoking station program.  Will continue to monitor and follow-up.  Orders:  •  nicotine (NICODERM CQ) 21 mg/24 hr TD 24 hr patch; Place 1 patch on the skin over 24 hours every 24 hours  •  Ambulatory Referral to Smoking Cessation Program; Future

## 2025-05-07 NOTE — PROGRESS NOTES
Name: Danita Palmer      : 1940      MRN: [id number]  Encounter Provider: Duke Barnes MD  Encounter Date: 2025   Encounter department: [facility]  :  Assessment & Plan  Medicare annual wellness visit, subsequent  Regarding her Medicare annual well visit, patient is given age and diagnosis appropriate evaluation and care.  Patient's recent blood work was discussed with patient.  Patient is ordered more blood work and urine before her next visit.  Patient advised to quit tobacco.  Patient is willing to try the patch; see below for more details.  Patient advised to do routine self skin checks and use sunscreen.  Eat 3 healthy meals on time.  Hydrate more/well.  Diet and exercise as tolerated.  Take a multivitamin.  Take vitamin D3 daily.  Take calcium vitamin D.  Orders:  •  CBC and differential; Future  •  Comprehensive metabolic panel; Future  •  Lipid panel; Future  •  UA w Reflex to Microscopic w Reflex to Culture; Future    Essential hypertension  Controlled.  Advised low-sodium diet.  Hydrate well.  Continue current therapy.  DC tobacco.  Labs reviewed and recheck labs in 6 months also.  Orders:  •  Comprehensive metabolic panel; Future  •  UA w Reflex to Microscopic w Reflex to Culture; Future    CKD stage G3a/A1, GFR 45-59 and albumin creatinine ratio <30 mg/g ([facility])  Lab Results   Component Value Date    EGFR 50 (L) 2025    CREATININE 1.08 2025    CREATININE 1.06 (H) 2017    CREATININE 0.95 (H) 2017   Controlled/stable.  Hydrate well/more.  DC tobacco.  Blood pressure control.  Labs reviewed and recheck labs in 6 months.    Orders:  •  Comprehensive metabolic panel; Future    Hypertriglyceridemia  Slightly elevated.  LFTs normal.  Her total cholesterol is 163, triglycerides 165, HDL 60 and her LDL 70.  Advised patient to cut down on her starches fats and sweets.  DC tobacco.  Continue current therapy.  Recheck labs again in 6  months.  Orders:  •  Comprehensive metabolic panel; Future  •  Lipid panel; Future    Chronic obstructive pulmonary disease, unspecified COPD type (HCC)  Likely secondary to her chronic smoking.  Positive emphysema also.  Stable.  Advised patient DC tobacco.  I refilled her albuterol inhaler to use as needed as per her request.       Pulmonary emphysema, unspecified emphysema type (HCC)  Stable.  DC tobacco.  Albuterol inhaler refilled as per her request.  Orders:  •  albuterol (PROVENTIL HFA,VENTOLIN HFA) 90 mcg/act inhaler; Inhale 1 puff every 4 (four) hours as needed for wheezing    Tobacco abuse  Chronic.  Patient is willing to retry the nicotine patches after discussion today with me regarding possible options to help her quit.  Patient asserts that she smokes 1 pack a day.  Hopkins decision was made to start at the 21 mg patch.  Do that for 4 weeks and then cut it down to 14 mg if she is okay with it.  Patient is also given referral to the smoking station program.  Will continue to monitor and follow-up.  Orders:  •  nicotine (NICODERM CQ) 21 mg/24 hr TD 24 hr patch; Place 1 patch on the skin over 24 hours every 24 hours  •  Ambulatory Referral to Smoking Cessation Program; Future    Need for vaccination  Discussed with patient.  Orders:  •  tetanus-diphtheria-acellular pertussis (ADACEL) 5-2-15.5 LF-mcg/0.5 injection; Inject 0.5 mL into a muscle once for 1 dose  •  RSVPreF3 Vac Recomb Adjuvanted 120 MCG/0.5ML SUSR; Inject 0.5 mL into a muscle 1 (one) time for 1 dose  •  Zoster Vac Recomb Adjuvanted (Shingrix) 50 MCG/0.5ML SUSR; Inject 0.5 mL into a muscle once for 1 dose Repeat dose in 2 to 6 months      Depression Screening and Follow-up Plan: Patient was screened for depression during today's encounter. They screened negative with a PHQ-2 score of 0.      Tobacco Cessation Counseling: Tobacco cessation counseling was provided. The patient is sincerely urged to quit consumption of tobacco. She is ready to  quit tobacco. Medication options and side effects of medication discussed. Patient agreed to medication. Nicotine patch was prescribed.           RTO 6 months and do the blood work and urine before.  Patient can see me prior to that for anything else in between.        Preventive health issues were discussed with patient, and age appropriate screening tests were ordered as noted in patient's After Visit Summary. Personalized health advice and appropriate referrals for health education or preventive services given if needed, as noted in patient's After Visit Summary.    History of Present Illness     84-year-old female here for her Medicare annual well visit.  Patient also like to be evaluated for her hypertension, CKD and hyperlipidemia.  Patient did blood work at the end of March this year.  Patient is a chronic smoker and willing to try the patch after my discussion today.  Patient is due for the Tdap, Shingrix and RSV vaccines.  No history of an adverse reaction to vaccines in the past.  And she is requesting a refill on her albuterol inhaler.       Patient Care Team:  Duke Barnes MD as PCP - General (Family Medicine)  TREVOR Crowley, DO    Review of Systems   Constitutional:  Negative for activity change, appetite change, chills, fatigue, fever and unexpected weight change.   HENT:  Negative for congestion, hearing loss and sore throat.    Eyes:  Negative for visual disturbance.   Respiratory:  Negative for cough and shortness of breath.    Cardiovascular:  Negative for chest pain and palpitations.   Gastrointestinal:  Negative for abdominal pain, blood in stool, constipation, diarrhea, nausea and vomiting.   Genitourinary:  Negative for dysuria and hematuria.   Musculoskeletal:  Positive for arthralgias.   Skin:  Negative for rash.   Neurological:  Negative for dizziness and headaches.   Psychiatric/Behavioral:  Negative for dysphoric mood and sleep disturbance. The patient is  not nervous/anxious.      Medical History Reviewed by provider this encounter:  Tobacco  Allergies  Meds  Problems  Med Hx  Surg Hx  Fam Hx       Annual Wellness Visit Questionnaire   Danita is here for her Subsequent Wellness visit. Last Medicare Wellness visit information reviewed, patient interviewed and updates made to the record today.      Health Risk Assessment:   Patient rates overall health as fair. Patient is satisfied with their life. Eyesight was rated as same. Hearing was rated as same. Patient feels that their emotional and mental health rating is same. Patients states they are never, rarely angry. Patient states they are always unusually tired/fatigued. Pain experienced in the last 7 days has been none.     Depression Screening:   PHQ-2 Score: 0      Fall Risk Screening:   In the past year, patient has experienced: no history of falling in past year      Urinary Incontinence Screening:   Patient has not leaked urine accidently in the last six months.     Home Safety:  Patient does not have trouble with stairs inside or outside of their home. Patient has working smoke alarms and has no working carbon monoxide detector. Home safety hazards include: none.     Nutrition:   Current diet is Regular.     Medications:   Patient is not currently taking any over-the-counter supplements. Patient is able to manage medications.     Activities of Daily Living (ADLs)/Instrumental Activities of Daily Living (IADLs):   Walk and transfer into and out of bed and chair?: Yes  Dress and groom yourself?: Yes    Bathe or shower yourself?: Yes    Feed yourself? Yes  Do your laundry/housekeeping?: Yes  Manage your money, pay your bills and track your expenses?: Yes  Make your own meals?: Yes    Do your own shopping?: Yes    Previous Hospitalizations:   Any hospitalizations or ED visits within the last 12 months?: No      Advance Care Planning:   Living will: No    Durable POA for healthcare: Yes    Advanced directive:  No    ACP document given: Yes      Comments: Patient declined the five wishes at first with the medical assistant.  I discussed it with patient myself and patient agreed to take the 5 wishes envelope.    Cognitive Screening:   Provider or family/friend/caregiver concerned regarding cognition?: No    Preventive Screenings      Cardiovascular Screening:    General: Screening Not Indicated and History Lipid Disorder      Diabetes Screening:     General: Screening Current      Colorectal Cancer Screening:     General: Screening Not Indicated      Cervical Cancer Screening:    General: Screening Not Indicated      Lung Cancer Screening:     General: Screening Not Indicated    Immunizations:  - Immunizations due: Zoster (Shingrix)  - Risks/benefits immunizations discussed      Screening, Brief Intervention, and Referral to Treatment (SBIRT)     Screening  Typical number of drinks in a day: 2  Typical number of drinks in a week: 14  Interpretation: Low risk drinking behavior.    AUDIT-C Screenin) How often did you have a drink containing alcohol in the past year? monthly or less  2) How many drinks did you have on a typical day when you were drinking in the past year? 1 to 2  3) How often did you have 6 or more drinks on one occasion in the past year? never    AUDIT-C Score: 1  Interpretation: Score 0-2 (female): Negative screen for alcohol misuse    Single Item Drug Screening:  How often have you used an illegal drug (including marijuana) or a prescription medication for non-medical reasons in the past year? never    Single Item Drug Screen Score: 0  Interpretation: Negative screen for possible drug use disorder    Other Counseling Topics:   Car/seat belt/driving safety, skin self-exam, sunscreen and calcium and vitamin D intake and regular weightbearing exercise.     Social Drivers of Health     Financial Resource Strain: Low Risk  (2023)    Overall Financial Resource Strain (CARDIA)    • Difficulty of Paying  "Living Expenses: Not hard at all   Food Insecurity: No Food Insecurity (5/7/2025)    Hunger Vital Sign    • Worried About Running Out of Food in the Last Year: Never true    • Ran Out of Food in the Last Year: Never true   Transportation Needs: No Transportation Needs (5/7/2025)    PRAPARE - Transportation    • Lack of Transportation (Medical): No    • Lack of Transportation (Non-Medical): No   Housing Stability: Low Risk  (5/7/2025)    Housing Stability Vital Sign    • Unable to Pay for Housing in the Last Year: No    • Number of Times Moved in the Last Year: 0    • Homeless in the Last Year: No   Utilities: Not At Risk (5/7/2025)    [facility] Utilities    • Threatened with loss of utilities: No     No results found.    Objective   /59 (BP Location: Left arm, Patient Position: Sitting, Cuff Size: Standard)   Pulse 73   Temp (!) 96.7 °F (35.9 °C) (Tympanic)   Resp 16   Ht 5' 1\" (1.549 m)   Wt 48.4 kg (106 lb 9.6 oz)   SpO2 95%   BMI 20.14 kg/m²     Physical Exam  Vitals reviewed.   Constitutional:       General: She is not in acute distress.     Appearance: Normal appearance. She is normal weight. She is not ill-appearing.   HENT:      Head: Normocephalic and atraumatic.      Right Ear: Tympanic membrane, ear canal and external ear normal.      Left Ear: Tympanic membrane, ear canal and external ear normal.      Mouth/Throat:      Mouth: Mucous membranes are moist.      Pharynx: Oropharynx is clear.   Eyes:      Extraocular Movements: Extraocular movements intact.      Conjunctiva/sclera: Conjunctivae normal.   Neck:      Vascular: No carotid bruit.   Cardiovascular:      Rate and Rhythm: Normal rate and regular rhythm.   Pulmonary:      Effort: Pulmonary effort is normal.      Breath sounds: Normal breath sounds.   Abdominal:      General: Bowel sounds are normal.      Palpations: Abdomen is soft.      Tenderness: There is no abdominal tenderness. There is no right CVA tenderness or left CVA tenderness. "   Musculoskeletal:         General: Deformity present. No tenderness.      Right lower leg: No edema.      Left lower leg: No edema.      Comments: Thoracic spine kyphosis; not new.    No calf tenderness bilateral.   Lymphadenopathy:      Cervical: No cervical adenopathy.   Skin:     General: Skin is warm.   Neurological:      General: No focal deficit present.      Mental Status: She is alert and oriented to person, place, and time.   Psychiatric:         Mood and Affect: Mood normal.         Behavior: Behavior normal.         Thought Content: Thought content normal.

## 2025-05-07 NOTE — PATIENT INSTRUCTIONS
Medicare Preventive Visit Patient Instructions  Thank you for completing your Welcome to Medicare Visit or Medicare Annual Wellness Visit today. Your next wellness visit will be due in one year (5/8/2026).  The screening/preventive services that you may require over the next 5-10 years are detailed below. Some tests may not apply to you based off risk factors and/or age. Screening tests ordered at today's visit but not completed yet may show as past due. Also, please note that scanned in results may not display below.  Preventive Screenings:  Service Recommendations Previous Testing/Comments   Colorectal Cancer Screening  * Colonoscopy    * Fecal Occult Blood Test (FOBT)/Fecal Immunochemical Test (FIT)  * Fecal DNA/Cologuard Test  * Flexible Sigmoidoscopy Age: 45-75 years old   Colonoscopy: every 10 years (may be performed more frequently if at higher risk)  OR  FOBT/FIT: every 1 year  OR  Cologuard: every 3 years  OR  Sigmoidoscopy: every 5 years  Screening may be recommended earlier than age 45 if at higher risk for colorectal cancer. Also, an individualized decision between you and your healthcare provider will decide whether screening between the ages of 76-85 would be appropriate. Colonoscopy: Not on file  FOBT/FIT: Not on file  Cologuard: Not on file  Sigmoidoscopy: Not on file    Screening Not Indicated     Breast Cancer Screening Age: 40+ years old  Frequency: every 1-2 years  Not required if history of left and right mastectomy Mammogram: 03/10/2017        Cervical Cancer Screening Between the ages of 21-29, pap smear recommended once every 3 years.   Between the ages of 30-65, can perform pap smear with HPV co-testing every 5 years.   Recommendations may differ for women with a history of total hysterectomy, cervical cancer, or abnormal pap smears in past. Pap Smear: Not on file    Screening Not Indicated   Hepatitis C Screening Once for adults born between 1945 and 1965  More frequently in patients at  high risk for Hepatitis C Hep C Antibody: Not on file        Diabetes Screening 1-2 times per year if you're at risk for diabetes or have pre-diabetes Fasting glucose: No results in last 5 years (No results in last 5 years)  A1C: No results in last 5 years (No results in last 5 years)  Screening Current   Cholesterol Screening Once every 5 years if you don't have a lipid disorder. May order more often based on risk factors. Lipid panel: 03/31/2025    Screening Not Indicated  History Lipid Disorder     Other Preventive Screenings Covered by Medicare:  Abdominal Aortic Aneurysm (AAA) Screening: covered once if your at risk. You're considered to be at risk if you have a family history of AAA.  Lung Cancer Screening: covers low dose CT scan once per year if you meet all of the following conditions: (1) Age 55-77; (2) No signs or symptoms of lung cancer; (3) Current smoker or have quit smoking within the last 15 years; (4) You have a tobacco smoking history of at least 20 pack years (packs per day multiplied by number of years you smoked); (5) You get a written order from a healthcare provider.  Glaucoma Screening: covered annually if you're considered high risk: (1) You have diabetes OR (2) Family history of glaucoma OR (3)  aged 50 and older OR (4)  American aged 65 and older  Osteoporosis Screening: covered every 2 years if you meet one of the following conditions: (1) You're estrogen deficient and at risk for osteoporosis based off medical history and other findings; (2) Have a vertebral abnormality; (3) On glucocorticoid therapy for more than 3 months; (4) Have primary hyperparathyroidism; (5) On osteoporosis medications and need to assess response to drug therapy.   Last bone density test (DXA Scan): 06/04/2019.  HIV Screening: covered annually if you're between the age of 15-65. Also covered annually if you are younger than 15 and older than 65 with risk factors for HIV infection. For  pregnant patients, it is covered up to 3 times per pregnancy.    Immunizations:  Immunization Recommendations   Influenza Vaccine Annual influenza vaccination during flu season is recommended for all persons aged >= 6 months who do not have contraindications   Pneumococcal Vaccine   * Pneumococcal conjugate vaccine = PCV13 (Prevnar 13), PCV15 (Vaxneuvance), PCV20 (Prevnar 20)  * Pneumococcal polysaccharide vaccine = PPSV23 (Pneumovax) Adults 19-63 yo with certain risk factors or if 65+ yo  If never received any pneumonia vaccine: recommend Prevnar 20 (PCV20)  Give PCV20 if previously received 1 dose of PCV13 or PPSV23   Hepatitis B Vaccine 3 dose series if at intermediate or high risk (ex: diabetes, end stage renal disease, liver disease)   Respiratory syncytial virus (RSV) Vaccine - COVERED BY MEDICARE PART D  * RSVPreF3 (Arexvy) CDC recommends that adults 60 years of age and older may receive a single dose of RSV vaccine using shared clinical decision-making (SCDM)   Tetanus (Td) Vaccine - COST NOT COVERED BY MEDICARE PART B Following completion of primary series, a booster dose should be given every 10 years to maintain immunity against tetanus. Td may also be given as tetanus wound prophylaxis.   Tdap Vaccine - COST NOT COVERED BY MEDICARE PART B Recommended at least once for all adults. For pregnant patients, recommended with each pregnancy.   Shingles Vaccine (Shingrix) - COST NOT COVERED BY MEDICARE PART B  2 shot series recommended in those 19 years and older who have or will have weakened immune systems or those 50 years and older     Health Maintenance Due:  There are no preventive care reminders to display for this patient.  Immunizations Due:      Topic Date Due   • COVID-19 Vaccine (4 - 2024-25 season) 09/01/2024     Advance Directives   What are advance directives?  Advance directives are legal documents that state your wishes and plans for medical care. These plans are made ahead of time in case you  lose your ability to make decisions for yourself. Advance directives can apply to any medical decision, such as the treatments you want, and if you want to donate organs.   What are the types of advance directives?  There are many types of advance directives, and each state has rules about how to use them. You may choose a combination of any of the following:  Living will:  This is a written record of the treatment you want. You can also choose which treatments you do not want, which to limit, and which to stop at a certain time. This includes surgery, medicine, IV fluid, and tube feedings.   Durable power of  for healthcare (DPAHC):  This is a written record that states who you want to make healthcare choices for you when you are unable to make them for yourself. This person, called a proxy, is usually a family member or a friend. You may choose more than 1 proxy.  Do not resuscitate (DNR) order:  A DNR order is used in case your heart stops beating or you stop breathing. It is a request not to have certain forms of treatment, such as CPR. A DNR order may be included in other types of advance directives.  Medical directive:  This covers the care that you want if you are in a coma, near death, or unable to make decisions for yourself. You can list the treatments you want for each condition. Treatment may include pain medicine, surgery, blood transfusions, dialysis, IV or tube feedings, and a ventilator (breathing machine).  Values history:  This document has questions about your views, beliefs, and how you feel and think about life. This information can help others choose the care that you would choose.  Why are advance directives important?  An advance directive helps you control your care. Although spoken wishes may be used, it is better to have your wishes written down. Spoken wishes can be misunderstood, or not followed. Treatments may be given even if you do not want them. An advance directive may make  it easier for your family to make difficult choices about your care.   Cigarette Smoking and Your Health   Risks to your health if you smoke:  Nicotine and other chemicals found in tobacco damage every cell in your body. Even if you are a light smoker, you have an increased risk for cancer, heart disease, and lung disease. If you are pregnant or have diabetes, smoking increases your risk for complications.   Benefits to your health if you stop smoking:   You decrease respiratory symptoms such as coughing, wheezing, and shortness of breath.   You reduce your risk for cancers of the lung, mouth, throat, kidney, bladder, pancreas, stomach, and cervix. If you already have cancer, you increase the benefits of chemotherapy. You also reduce your risk for cancer returning or a second cancer from developing.   You reduce your risk for heart disease, blood clots, heart attack, and stroke.   You reduce your risk for lung infections, and diseases such as pneumonia, asthma, chronic bronchitis, and emphysema.  Your circulation improves. More oxygen can be delivered to your body. If you have diabetes, you lower your risk for complications, such as kidney, artery, and eye diseases. You also lower your risk for nerve damage. Nerve damage can lead to amputations, poor vision, and blindness.  You improve your body's ability to heal and to fight infections.  For more information and support to stop smoking:   Smokefree.gov  Phone: 8- 735 - 604-1814  Web Address: www.smokefree.gov     © Copyright [employer] 2018 Information is for End User's use only and may not be sold, redistributed or otherwise used for commercial purposes. All illustrations and images included in CareNotes® are the copyrighted property of [employer] Inc[employer] or [employer]

## 2025-05-14 ENCOUNTER — OFFICE VISIT (OUTPATIENT)
Dept: PODIATRY | Facility: CLINIC | Age: 85
End: 2025-05-14
Payer: COMMERCIAL

## 2025-05-14 VITALS — BODY MASS INDEX: 20.39 KG/M2 | WEIGHT: 108 LBS | HEIGHT: 61 IN

## 2025-05-14 DIAGNOSIS — G62.9 POLYNEUROPATHY: Primary | ICD-10-CM

## 2025-05-14 DIAGNOSIS — M79.674 PAIN IN TOES OF BOTH FEET: ICD-10-CM

## 2025-05-14 DIAGNOSIS — B35.1 ONYCHOMYCOSIS: ICD-10-CM

## 2025-05-14 DIAGNOSIS — M79.675 PAIN IN TOES OF BOTH FEET: ICD-10-CM

## 2025-05-14 PROCEDURE — 11721 DEBRIDE NAIL 6 OR MORE: CPT | Performed by: PODIATRIST

## 2025-05-14 PROCEDURE — RECHECK: Performed by: PODIATRIST

## 2025-05-14 NOTE — PROGRESS NOTES
"Name: Danita Palmer      : 1940      MRN: [id number]  Encounter Provider: Jabier Syed DPM  Encounter Date: 2025   Encounter department: [facility] [place]  :  Assessment & Plan  Onychomycosis       Debride mycotic nails and thin the nail plates x 8 with the use of a nail nipper manually and an electric Dremel bur was used to reduce the thickness of the nail beds and smoothed the distal aspect of the nails.   Polyneuropathy         Pain in toes of both feet         Discussed proper shoe gear, daily inspections of feet, and general foot health with patient. Patient has Q9  findings and is recommended for at risk foot care every 9-10 weeks.    Patients most recent complete clinical foot exam was on: 2024    Return in about 3 months (around 2025).     History of Present Illness   HPI  Danita Palmer is a 84 y.o. female who presents with chief complaint of painful thick nails on both feet.  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  Patient presents for at-risk foot care.  Patient has no acute concerns today.  Patient has significant lower extremity risk due to neuropathy, parasthesia, edema, and trophic skin changes to the lower extremity.   History obtained from: patient    Review of Systems  Medical History Reviewed by provider this encounter:     .  Medications Ordered Prior to Encounter[1]   Social History     Tobacco Use    Smoking status: Every Day    Smokeless tobacco: Never    Tobacco comments:     50 pp Year cutting down slowly now at 1/2 a day   Vaping Use    Vaping status: Never Used   Substance and Sexual Activity    Alcohol use: No    Drug use: No    Sexual activity: Not on file        Objective   Ht 5' 1\" (1.549 m) Comment: verbal  Wt 49 kg (108 lb)   BMI 20.41 kg/m²      Physical Exam  Vascular status is 1/4 DP PT normal distal cooling, negative digital hair, and immediate capillary refill bilaterally.  Capillary refill is approximately " 2 seconds.    Derm atrophic yellow-brown white discolorations with subungual debris x 6.  There is an increased thickness in the nails of approximately 1 to 3 mm.  There is excessive length in the nails ranging from 0.5 cm to 1 cm.    Ortho hammertoe deformities are present on the 2nd and 3rd digits bilaterally which has semifixed plantarflexed position at the PIPJ.    Neuro light touch is intact and equal bilaterally 0.5 monofilament test is absent on the plantar aspect of both feet.  The sites that were tested were the plantar aspect of the hallux, plantar aspect of the 3rd and 4th toes, submet 3 to her aspect of the arch.       [1]   Current Outpatient Medications on File Prior to Visit   Medication Sig Dispense Refill    albuterol (PROVENTIL HFA,VENTOLIN HFA) 90 mcg/act inhaler Inhale 1 puff every 4 (four) hours as needed for wheezing 8.5 g 1    Anoro Ellipta 62.5-25 MCG/ACT inhaler inhale 1 puff by mouth and INTO THE LUNGS once daily 180 blister 3    calcium citrate-vitamin D (CITRACAL+D) 315-200 MG-UNIT per tablet Take by mouth      Cholecalciferol (VITAMIN D3) 2000 units capsule Take by mouth      gabapentin (Neurontin) 600 MG tablet Take 1 tablet (600 mg total) by mouth 3 (three) times a day 270 tablet 1    latanoprost (XALATAN) 0.005 % ophthalmic solution       lisinopril (ZESTRIL) 10 mg tablet take 1 tablet by mouth once daily 90 tablet 1    nicotine (NICODERM CQ) 21 mg/24 hr TD 24 hr patch Place 1 patch on the skin over 24 hours every 24 hours 28 patch 0     No current facility-administered medications on file prior to visit.

## 2025-05-16 DIAGNOSIS — I10 ESSENTIAL HYPERTENSION: ICD-10-CM

## 2025-05-16 RX ORDER — LISINOPRIL 10 MG/1
10 TABLET ORAL DAILY
Qty: 90 TABLET | Refills: 0 | Status: SHIPPED | OUTPATIENT
Start: 2025-05-16

## 2025-05-16 NOTE — TELEPHONE ENCOUNTER
NOT A DUPLICATE  Patient told pharmacy she did not need medication last month and they deleted the whole Rx please resend     Reason for call:   [x] Refill   [] Prior Auth  [] Other:     Office:   [x] PCP/Provider - Cameron  [] Specialty/Provider -     Medication: Lisinopril 10mg    Dose/Frequency: 1 tab daily     Quantity: 90    Pharmacy: [id number] - [place], [place] - [phone]     Local Pharmacy   Does the patient have enough for 3 days?   [] Yes   X no meds

## 2025-06-25 ENCOUNTER — APPOINTMENT (EMERGENCY)
Dept: RADIOLOGY | Facility: HOSPITAL | Age: 85
DRG: 183 | End: 2025-06-25
Payer: COMMERCIAL

## 2025-06-25 ENCOUNTER — HOSPITAL ENCOUNTER (INPATIENT)
Facility: HOSPITAL | Age: 85
LOS: 17 days | Discharge: NURSING, REHAB OR LONG-TERM CARE | DRG: 183 | End: 2025-07-12
Admitting: INTERNAL MEDICINE
Payer: COMMERCIAL

## 2025-06-25 DIAGNOSIS — J43.9 PULMONARY EMPHYSEMA, UNSPECIFIED EMPHYSEMA TYPE (HCC): ICD-10-CM
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
## 2025-06-25 DIAGNOSIS — R26.2 AMBULATORY DYSFUNCTION: Primary | ICD-10-CM

## 2025-06-25 DIAGNOSIS — K21.9 GERD (GASTROESOPHAGEAL REFLUX DISEASE): ICD-10-CM

## 2025-06-25 DIAGNOSIS — R91.8 PULMONARY NODULES: ICD-10-CM

## 2025-06-25 DIAGNOSIS — S22.42XA CLOSED FRACTURE OF MULTIPLE RIBS OF LEFT SIDE, INITIAL ENCOUNTER: ICD-10-CM

## 2025-06-25 DIAGNOSIS — J96.01 ACUTE HYPOXIC RESPIRATORY FAILURE (HCC): ICD-10-CM

## 2025-06-25 PROBLEM — R53.1 GENERALIZED WEAKNESS: Status: ACTIVE | Noted: 2025-06-25

## 2025-06-25 PROBLEM — R91.1 LUNG NODULE: Status: ACTIVE | Noted: 2025-06-25
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
## 2025-06-25 LAB
25(OH)D3 SERPL-MCNC: 87.3 NG/ML (ref 30–100)
2HR DELTA HS TROPONIN: -1 NG/L
ALBUMIN SERPL BCG-MCNC: 3.7 G/DL (ref 3.5–5)
ALP SERPL-CCNC: 62 U/L (ref 34–104)
ALT SERPL W P-5'-P-CCNC: 19 U/L (ref 7–52)
ANION GAP SERPL CALCULATED.3IONS-SCNC: 7 MMOL/L (ref 4–13)
AST SERPL W P-5'-P-CCNC: 31 U/L (ref 13–39)
ATRIAL RATE: 72 BPM
BASOPHILS # BLD AUTO: 0.03 THOUSANDS/ÂΜL (ref 0–0.1)
BASOPHILS NFR BLD AUTO: 0 % (ref 0–1)
BILIRUB SERPL-MCNC: 0.37 MG/DL (ref 0.2–1)
BUN SERPL-MCNC: 19 MG/DL (ref 5–25)
CALCIUM SERPL-MCNC: 10.1 MG/DL (ref 8.4–10.2)
CARDIAC TROPONIN I PNL SERPL HS: 11 NG/L (ref ?–50)
CARDIAC TROPONIN I PNL SERPL HS: 12 NG/L (ref ?–50)
CHLORIDE SERPL-SCNC: 100 MMOL/L (ref 96–108)
CK SERPL-CCNC: 102 U/L (ref 26–192)
CO2 SERPL-SCNC: 27 MMOL/L (ref 21–32)
CREAT SERPL-MCNC: 1.13 MG/DL (ref 0.6–1.3)
EOSINOPHIL # BLD AUTO: 0.01 THOUSAND/ÂΜL (ref 0–0.61)
EOSINOPHIL NFR BLD AUTO: 0 % (ref 0–6)
ERYTHROCYTE [DISTWIDTH] IN BLOOD BY AUTOMATED COUNT: 13.8 % (ref 11.6–15.1)
FOLATE SERPL-MCNC: >22.3 NG/ML
GFR SERPL CREATININE-BSD FRML MDRD: 44 ML/MIN/1.73SQ M
GLUCOSE SERPL-MCNC: 109 MG/DL (ref 65–140)
HCT VFR BLD AUTO: 38.7 % (ref 34.8–46.1)
HGB BLD-MCNC: 12.7 G/DL (ref 11.5–15.4)
IMM GRANULOCYTES # BLD AUTO: 0.03 THOUSAND/UL (ref 0–0.2)
IMM GRANULOCYTES NFR BLD AUTO: 0 % (ref 0–2)
LYMPHOCYTES # BLD AUTO: 0.58 THOUSANDS/ÂΜL (ref 0.6–4.47)
LYMPHOCYTES NFR BLD AUTO: 6 % (ref 14–44)
MCH RBC QN AUTO: 29.8 PG (ref 26.8–34.3)
MCHC RBC AUTO-ENTMCNC: 32.8 G/DL (ref 31.4–37.4)
MCV RBC AUTO: 91 FL (ref 82–98)
MONOCYTES # BLD AUTO: 1.32 THOUSAND/ÂΜL (ref 0.17–1.22)
MONOCYTES NFR BLD AUTO: 14 % (ref 4–12)
NEUTROPHILS # BLD AUTO: 7.28 THOUSANDS/ÂΜL (ref 1.85–7.62)
NEUTS SEG NFR BLD AUTO: 80 % (ref 43–75)
NRBC BLD AUTO-RTO: 0 /100 WBCS
P AXIS: 76 DEGREES
PLATELET # BLD AUTO: 282 THOUSANDS/UL (ref 149–390)
PMV BLD AUTO: 9.1 FL (ref 8.9–12.7)
POTASSIUM SERPL-SCNC: 4.2 MMOL/L (ref 3.5–5.3)
PR INTERVAL: 156 MS
PROT SERPL-MCNC: 6.8 G/DL (ref 6.4–8.4)
QRS AXIS: 37 DEGREES
QRSD INTERVAL: 76 MS
QT INTERVAL: 374 MS
QTC INTERVAL: 409 MS
RBC # BLD AUTO: 4.26 MILLION/UL (ref 3.81–5.12)
SODIUM SERPL-SCNC: 134 MMOL/L (ref 135–147)
T WAVE AXIS: 83 DEGREES
TSH SERPL DL<=0.05 MIU/L-ACNC: 0.57 UIU/ML (ref 0.45–4.5)
VENTRICULAR RATE: 72 BPM
VIT B12 SERPL-MCNC: 1666 PG/ML (ref 180–914)
WBC # BLD AUTO: 9.25 THOUSAND/UL (ref 4.31–10.16)

## 2025-06-25 PROCEDURE — 82306 VITAMIN D 25 HYDROXY: CPT

## 2025-06-25 PROCEDURE — 80053 COMPREHEN METABOLIC PANEL: CPT

## 2025-06-25 PROCEDURE — 72125 CT NECK SPINE W/O DYE: CPT

## 2025-06-25 PROCEDURE — 84484 ASSAY OF TROPONIN QUANT: CPT

## 2025-06-25 PROCEDURE — 73130 X-RAY EXAM OF HAND: CPT

## 2025-06-25 PROCEDURE — 99223 1ST HOSP IP/OBS HIGH 75: CPT | Performed by: INTERNAL MEDICINE

## 2025-06-25 PROCEDURE — 99285 EMERGENCY DEPT VISIT HI MDM: CPT

## 2025-06-25 PROCEDURE — 73564 X-RAY EXAM KNEE 4 OR MORE: CPT

## 2025-06-25 PROCEDURE — 84443 ASSAY THYROID STIM HORMONE: CPT | Performed by: INTERNAL MEDICINE

## 2025-06-25 PROCEDURE — 85025 COMPLETE CBC W/AUTO DIFF WBC: CPT

## 2025-06-25 PROCEDURE — 93005 ELECTROCARDIOGRAM TRACING: CPT

## 2025-06-25 PROCEDURE — 93010 ELECTROCARDIOGRAM REPORT: CPT | Performed by: INTERNAL MEDICINE

## 2025-06-25 PROCEDURE — 71250 CT THORAX DX C-: CPT

## 2025-06-25 PROCEDURE — 82550 ASSAY OF CK (CPK): CPT

## 2025-06-25 PROCEDURE — 82746 ASSAY OF FOLIC ACID SERUM: CPT | Performed by: INTERNAL MEDICINE

## 2025-06-25 PROCEDURE — 36415 COLL VENOUS BLD VENIPUNCTURE: CPT

## 2025-06-25 PROCEDURE — 72072 X-RAY EXAM THORAC SPINE 3VWS: CPT

## 2025-06-25 PROCEDURE — 70450 CT HEAD/BRAIN W/O DYE: CPT

## 2025-06-25 PROCEDURE — 82607 VITAMIN B-12: CPT | Performed by: INTERNAL MEDICINE

## 2025-06-25 RX ORDER — LISINOPRIL 10 MG/1
10 TABLET ORAL DAILY
Status: DISCONTINUED | OUTPATIENT
Start: 2025-06-25 | End: 2025-07-12 | Stop reason: HOSPADM

## 2025-06-25 RX ORDER — GABAPENTIN 600 MG/1
600 TABLET ORAL 3 TIMES DAILY
Status: DISCONTINUED | OUTPATIENT
Start: 2025-06-25 | End: 2025-06-25

## 2025-06-25 RX ORDER — ONDANSETRON 2 MG/ML
4 INJECTION INTRAMUSCULAR; INTRAVENOUS EVERY 6 HOURS PRN
Status: DISCONTINUED | OUTPATIENT
Start: 2025-06-25 | End: 2025-07-12 | Stop reason: HOSPADM

## 2025-06-25 RX ORDER — HEPARIN SODIUM 5000 [USP'U]/ML
5000 INJECTION, SOLUTION INTRAVENOUS; SUBCUTANEOUS EVERY 8 HOURS SCHEDULED
Status: DISCONTINUED | OUTPATIENT
Start: 2025-06-25 | End: 2025-07-12 | Stop reason: HOSPADM

## 2025-06-25 RX ORDER — GABAPENTIN 300 MG/1
300 CAPSULE ORAL 3 TIMES DAILY
Status: DISCONTINUED | OUTPATIENT
Start: 2025-06-25 | End: 2025-07-12 | Stop reason: HOSPADM

## 2025-06-25 RX ORDER — ACETAMINOPHEN 325 MG/1
975 TABLET ORAL ONCE
Status: COMPLETED | OUTPATIENT
Start: 2025-06-25 | End: 2025-06-25

## 2025-06-25 RX ORDER — NICOTINE 21 MG/24HR
1 PATCH, TRANSDERMAL 24 HOURS TRANSDERMAL DAILY
Status: DISCONTINUED | OUTPATIENT
Start: 2025-06-25 | End: 2025-07-12 | Stop reason: HOSPADM

## 2025-06-25 RX ORDER — ACETAMINOPHEN 325 MG/1
650 TABLET ORAL EVERY 4 HOURS PRN
Status: DISCONTINUED | OUTPATIENT
Start: 2025-06-25 | End: 2025-06-26

## 2025-06-25 RX ORDER — OXYCODONE HYDROCHLORIDE 5 MG/1
5 TABLET ORAL EVERY 4 HOURS PRN
Status: DISCONTINUED | OUTPATIENT
Start: 2025-06-25 | End: 2025-07-12 | Stop reason: HOSPADM

## 2025-06-25 RX ORDER — UMECLIDINIUM BROMIDE AND VILANTEROL TRIFENATATE 62.5; 25 UG/1; UG/1
1 POWDER RESPIRATORY (INHALATION) DAILY
Status: DISCONTINUED | OUTPATIENT
Start: 2025-06-25 | End: 2025-07-02

## 2025-06-25 RX ORDER — ALBUTEROL SULFATE 90 UG/1
1 INHALANT RESPIRATORY (INHALATION) EVERY 4 HOURS PRN
Status: DISCONTINUED | OUTPATIENT
Start: 2025-06-25 | End: 2025-07-12 | Stop reason: HOSPADM

## 2025-06-25 RX ADMIN — ACETAMINOPHEN 650 MG: 325 TABLET ORAL at 16:23

## 2025-06-25 RX ADMIN — ACETAMINOPHEN 975 MG: 325 TABLET ORAL at 02:09

## 2025-06-25 RX ADMIN — LISINOPRIL 10 MG: 10 TABLET ORAL at 08:55

## 2025-06-25 RX ADMIN — GABAPENTIN 300 MG: 300 CAPSULE ORAL at 15:29

## 2025-06-25 RX ADMIN — GABAPENTIN 300 MG: 300 CAPSULE ORAL at 08:55

## 2025-06-25 RX ADMIN — HEPARIN SODIUM 5000 UNITS: 5000 INJECTION INTRAVENOUS; SUBCUTANEOUS at 06:04

## 2025-06-25 RX ADMIN — HEPARIN SODIUM 5000 UNITS: 5000 INJECTION INTRAVENOUS; SUBCUTANEOUS at 13:16

## 2025-06-25 RX ADMIN — GABAPENTIN 300 MG: 300 CAPSULE ORAL at 21:10

## 2025-06-25 RX ADMIN — Medication 2000 UNITS: at 08:55

## 2025-06-25 RX ADMIN — HEPARIN SODIUM 5000 UNITS: 5000 INJECTION INTRAVENOUS; SUBCUTANEOUS at 21:10

## 2025-06-25 NOTE — ASSESSMENT & PLAN NOTE
Continue PTA lisinopril 10 mg daily restrict hold parameters and monitor blood pressure per protocol

## 2025-06-25 NOTE — ASSESSMENT & PLAN NOTE
Presented after a fall in neighbors driveway, trauma imaging all negative for acute traumatic injuries  During ambulatory trial in ED patient unable to safely ambulate due to marked weakness  Labs with mild hyponatremia otherwise without marked abnormalities  Patient somewhat sleepy during admission evaluation and does not fully recall episode,?  If some degree of cognitive impairment.  CT head negative for acute intracranial pathology.    TSH, B12, vitamin D and folate unremarkable  PT/OT evaluations recommend level 2 rehab intensity

## 2025-06-25 NOTE — H&P
H&P - Hospitalist   Name: Danita Palmer 84 y.o. female I MRN: [id number]  Unit/Bed#: ED 05 I Date of Admission: 6/25/2025   Date of Service: 6/25/2025 I Hospital Day: 0     Assessment & Plan  Generalized weakness  Presented after a fall in [facility] driveway, trauma imaging all negative for acute traumatic injuries  During ambulatory trial in ED patient unable to safely ambulate due to marked weakness  Labs with mild hyponatremia otherwise without marked abnormalities  Patient somewhat sleepy during my evaluation and does not fully recall episode,?  If some degree of cognitive impairment.  CT head negative for acute intracranial pathology.  Check TSH/B12/folate  PT/OT evaluations  Fall at home, initial encounter  Presented after a fall in [facility] driveway, states she was walking when she could not stop her self and fell forward onto her knees without dizziness/lightheadedness or loss of consciousness  Trauma imaging all negative for acute traumatic injuries.  Patient with weakness with inability to safely ambulate, management of this as per primary problem  PT/OT evaluations  CKD stage G3a/A1, GFR 45-59 and albumin creatinine ratio <30 mg/g ([facility])  Lab Results   Component Value Date    EGFR 44 06/25/2025    EGFR 50 (L) 03/31/2025    CREATININE 1.13 06/25/2025    CREATININE 1.08 03/31/2025    CREATININE 1.06 (H) 12/21/2017   Renal function at baseline, monitor with BMP intermittently.  Avoid/limit nephrotoxins and hypotension  Tobacco abuse  Discussed need for cessation.  Patient declined nicotine patch at this time  Essential hypertension  Continue PTA lisinopril 10 mg daily restrict hold parameters and monitor blood pressure per protocol  Chronic obstructive pulmonary disease (HCC)  Not in acute exacerbation, continue PTA inhalers  Disc degeneration, lumbar  Chronic, patient denying worsening back pain  Continue gabapentin but will reduce dose to 300 mg 3 times daily as patient somewhat sleepy during my  evaluation  Lung nodule  7 mm spiculated nodule to right upper lobe incidentally noted on CT chest  Repeat noncontrast CT chest advised at 6-12 months, patient to follow-up with PCP to arrange this.  Will need to reiterate results with patient later today as patient somewhat sleepy during my evaluation      VTE Prophylaxis: Heparin  / sequential compression device   Code Status: Level 1 - Full Code   Discussion with family: Offered however patient declines at this time    Anticipated Length of Stay:  Patient will be admitted on an Inpatient basis with an anticipated length of stay of greater than 2 midnights.   Justification for Hospital Stay: Generalized weakness with inability to safely ambulate requiring PT/OT evaluations and disposition planning    Chief Complaint:     Fall  History of Present Illness:  Danita Palmer is a 84 y.o. female who has a past medical history significant for COPD, lumbar disc degeneration, hypertension, chronic disease stage III, nicotine dependence who presented after a fall.  Patient states she was ambulating in her neighbors driveway the morning of 6/24/2025 when she states she could not stop herself and fell forward striking knees then chest/hands without head strike or loss of consciousness.  This evening she noted increased knee pain which prompted presentation.  She denied any other systemic symptoms.  During ED evaluation she underwent trauma imaging which was without acute traumatic injuries although incidentally noting a 7 mm spiculated nodule at the right upper lobe, and labs significant only for a mild hyponatremia.  Ambulatory trial was attempted during ED evaluation however patient unable to safely ambulate this patient admitted to hospitalist service with plans for PT/OT evaluations and disposition planning.     Currently, patient is lying in bed in no acute distress and comfortable appearing.  Somewhat sleepy during my evaluation and with limited engagement with me.   Within this limit she offers no acute complaints at this time.    Review of Systems:    Constitutional:  Denies fever or chills   Eyes:  Denies change in visual acuity   HENT:  Denies nasal congestion or sore throat   Respiratory:  Denies cough or shortness of breath   Cardiovascular:  Denies chest pain or edema   GI:  Denies abdominal pain, nausea, vomiting, bloody stools or diarrhea   :  Denies dysuria   Musculoskeletal:  Denies back pain reported knee pain  Integument:  Denies rash   Neurologic:  Denies headache, focal weakness or sensory changes   Endocrine:  Denies polyuria or polydipsia   Lymphatic:  Denies swollen glands   Psychiatric:  Denies depression or anxiety     Past Medical and Surgical History:   Past Medical History[1]  Past Surgical History[2]    Meds/Allergies:  Current Outpatient Medications   Medication Instructions    albuterol (PROVENTIL HFA,VENTOLIN HFA) 90 mcg/act inhaler 1 puff, Inhalation, Every 4 hours PRN    Anoro Ellipta 62.5-25 MCG/ACT inhaler inhale 1 puff by mouth and INTO THE LUNGS once daily    calcium citrate-vitamin D (CITRACAL+D) 315-200 MG-UNIT per tablet Take by mouth    Cholecalciferol (VITAMIN D3) 2000 units capsule Take by mouth    gabapentin (NEURONTIN) 600 mg, Oral, 3 times daily    latanoprost (XALATAN) 0.005 % ophthalmic solution     lisinopril (ZESTRIL) 10 mg, Oral, Daily    nicotine (NICODERM CQ) 21 mg/24 hr TD 24 hr patch 1 patch, Transdermal, Every 24 hours         Allergies: Allergies[3]  History:  Marital Status: Single     Substance Use History:   Social History     Substance and Sexual Activity   Alcohol Use No     Tobacco Use History[4]  Social History     Substance and Sexual Activity   Drug Use No       Family History:  Family History[5]    Physical Exam:     Vitals:   Blood Pressure: 142/58 (06/25/25 0600)  Pulse: 66 (06/25/25 0600)  Temperature: 97.8 °F (36.6 °C) (06/25/25 0105)  Temp Source: Oral (06/25/25 0105)  Respirations: 18 (06/25/25 0600)  SpO2:  95 % (06/25/25 0600)    Constitutional:  Well developed, well nourished, no acute distress, non-toxic appearance   Eyes:  PERRL, conjunctiva normal   HENT:  Atraumatic, external ears normal, nose normal, oropharynx moist, no pharyngeal exudates. Neck- normal range of motion, no tenderness, supple   Respiratory:  No respiratory distress, normal breath sounds, no rales, no wheezing   Cardiovascular:  Normal rate, normal rhythm, no murmurs, no gallops, no rubs   GI:  Soft, nondistended, normal bowel sounds, nontender, no organomegaly, no mass, no rebound, no guarding   :  No costovertebral angle tenderness   Musculoskeletal:  No edema, no tenderness, no deformities. Back- no tenderness  Integument:  Well hydrated, superficial abrasion to bilateral knees  Lymphatic:  No lymphadenopathy noted   Neurologic:  Alert &awake, communicative, CN 2-12 normal, normal motor function, normal sensory function, no focal deficits noted   Psychiatric:  Speech and behavior appropriate       Lab Results: I have reviewed all lab data below:    Results from last 7 days   Lab Units 06/25/25  0213   WBC Thousand/uL 9.25   HEMOGLOBIN g/dL 12.7   HEMATOCRIT % 38.7   PLATELETS Thousands/uL 282   SEGS PCT % 80*   LYMPHO PCT % 6*   MONO PCT % 14*   EOS PCT % 0     Results from last 7 days   Lab Units 06/25/25  0213   SODIUM mmol/L 134*   POTASSIUM mmol/L 4.2   CHLORIDE mmol/L 100   CO2 mmol/L 27   BUN mg/dL 19   CREATININE mg/dL 1.13   ANION GAP mmol/L 7   CALCIUM mg/dL 10.1   ALBUMIN g/dL 3.7   TOTAL BILIRUBIN mg/dL 0.37   ALK PHOS U/L 62   ALT U/L 19   AST U/L 31   GLUCOSE RANDOM mg/dL 109                         EKG: Personally reviewed, normal sinus rhythm HR 72    Imaging: Results Review Statement: I reviewed radiology reports from this admission including: CT chest, CT head, CT C-spine, and xray(s).    CT chest without contrast  Result Date: 6/25/2025  Narrative: CT CHEST WITHOUT IV CONTRAST INDICATION: fall. COMPARISON: 7/6/2017  TECHNIQUE: CT examination of the chest was performed without intravenous contrast. Multiplanar 2D reformatted images were created from the source data. This examination, like all CT scans performed in the [facility], was performed utilizing techniques to minimize radiation dose exposure, including the use of iterative reconstruction and automated exposure control. Radiation dose length product (DLP) for this visit: 144.64 mGy-cm. FINDINGS: LUNGS: New right upper lobe spiculated nodule not present on CT examination of 7/6/2017 measuring 7 mm (4/44). Clustered nodules each measuring 4 mm at the left lower lobe also not present previously (4/76, 77). There is no tracheal or endobronchial lesion. PLEURA: Unremarkable. HEART/GREAT VESSELS: Heart is unremarkable for patient's age. No thoracic aortic aneurysm. MEDIASTINUM AND CINDY: Unremarkable. CHEST WALL AND LOWER NECK: Unremarkable. VISUALIZED STRUCTURES IN THE UPPER ABDOMEN: Involuting mildly complex cyst at the posterior right hepatic lobe with some wall calcification OSSEOUS STRUCTURES: Nondisplaced fractures of the anterolateral left fourth and fifth ribs     Impression: Nondisplaced fractures of the anterolateral left fourth and fifth ribs New 7 mm spiculated nodule at the right upper lobe. Based on current Fleischner Society 2017 Guidelines on incidental pulmonary nodule, follow-up non-contrast CT is recommended at 6-12 months from the initial examination and, if stable at that time, an additional follow-up is recommended for 18-24 months from the initial examination. Considerations related to the patient's age and/or comorbidities may be used to alter these recommendations. Clustered nodules at the left lower which may be infectious/inflammatory in nature and which can also be followed on subsequent CT imaging The study was marked in EPIC for significant notification. Computerized Assisted Algorithm (CAA) may have aided analysis of applicable  images. Workstation performed: [id number]     CT head wo contrast  Result Date: 6/25/2025  Narrative: CT BRAIN - WITHOUT CONTRAST INDICATION:   midline cervical tenderness. COMPARISON:  None. TECHNIQUE:  CT examination of the brain was performed.  Multiplanar 2D reformatted images were created from the source data. Radiation dose length product (DLP) for this visit:  907.61 mGy-cm. .  This examination, like all CT scans performed in the [facility], was performed utilizing techniques to minimize radiation dose exposure, including the use of iterative reconstruction and automated exposure control. IMAGE QUALITY:  Diagnostic. FINDINGS: PARENCHYMA: Decreased attenuation is noted in periventricular and subcortical white matter demonstrating an appearance that is statistically most likely to represent moderate microangiopathic change. No CT signs of acute infarction.  No intracranial mass, mass effect or midline shift.  No acute parenchymal hemorrhage. VENTRICLES AND EXTRA-AXIAL SPACES:  Normal for the patient's age. VISUALIZED ORBITS: Normal visualized orbits. PARANASAL SINUSES: Normal visualized paranasal sinuses. CALVARIUM AND EXTRACRANIAL SOFT TISSUES: Normal.     Impression: No acute intracranial abnormality.  Chronic microangiopathic changes. Workstation performed: [id number]     CT spine cervical without contrast  Result Date: 6/25/2025  Narrative: CT CERVICAL SPINE - WITHOUT CONTRAST INDICATION:   midline cervical tenderness. COMPARISON:  None. TECHNIQUE:  CT examination of the cervical spine was performed without intravenous contrast.  Contiguous axial images were obtained. Multiplanar 2D reformatted images were created from the source data. Radiation dose length product (DLP) for this visit:  228.28 mGy-cm. .  This examination, like all CT scans performed in the [facility], was performed utilizing techniques to minimize radiation dose exposure, including the use of iterative  reconstruction and automated exposure control. IMAGE QUALITY:  Diagnostic. FINDINGS: ALIGNMENT:  Normal alignment of the cervical spine. Grade 1 anterolisthesis C2 on C3. VERTEBRAE:  No fracture. DEGENERATIVE CHANGES:  Moderate multilevel cervical degenerative changes are noted. No critical central canal stenosis. PREVERTEBRAL AND PARASPINAL SOFT TISSUES: Unremarkable THORACIC INLET:  Normal.     Impression: No cervical spine fracture or traumatic malalignment. Workstation performed: [id number]         ** Please Note: Dragon 360 Dictation voice to text software was used in the creation of this document. **             [1]   Past Medical History:  Diagnosis Date    Glaucoma     Hypertension    [2]   Past Surgical History:  Procedure Laterality Date    BREAST LUMPECTOMY      HYSTERECTOMY     [3]   Allergies  Allergen Reactions    Penicillins Hives   [4]   Social History  Tobacco Use   Smoking Status Every Day   Smokeless Tobacco Never   Tobacco Comments    50 pp Year cutting down slowly now at 1/2 a day   [5]   Family History  Problem Relation Name Age of Onset    Breast cancer Mother      Parkinsonism Mother      Stroke Mother      Heart disease Father

## 2025-06-25 NOTE — ASSESSMENT & PLAN NOTE
Chronic, patient denying worsening back pain  Continue gabapentin but will reduce dose to 300 mg 3 times daily as patient somewhat sleepy during my evaluation

## 2025-06-25 NOTE — ASSESSMENT & PLAN NOTE
Presented after a fall in neighbors driveway, trauma imaging all negative for acute traumatic injuries  During ambulatory trial in ED patient unable to safely ambulate due to marked weakness  Labs with mild hyponatremia otherwise without marked abnormalities  Patient somewhat sleepy during my evaluation and does not fully recall episode,?  If some degree of cognitive impairment.  CT head negative for acute intracranial pathology.  Check TSH/B12/folate  PT/OT evaluations

## 2025-06-25 NOTE — PLAN OF CARE
Problem: PAIN - ADULT  Goal: Verbalizes/displays adequate comfort level or baseline comfort level  Description: Interventions:  - Encourage patient to monitor pain and request assistance  - Assess pain using appropriate pain scale  - Administer analgesics as ordered based on type and severity of pain and evaluate response  - Implement non-pharmacological measures as appropriate and evaluate response  - Consider cultural and social influences on pain and pain management  - Notify physician/advanced practitioner if interventions unsuccessful or patient reports new pain  - Educate patient/family on pain management process including their role and importance of  reporting pain   - Provide non-pharmacologic/complimentary pain relief interventions  Outcome: Progressing

## 2025-06-25 NOTE — ASSESSMENT & PLAN NOTE
Presented after a fall in neighbors driveway, states she was walking when she could not stop her self and fell forward onto her knees without dizziness/lightheadedness or loss of consciousness  Trauma imaging all negative for acute traumatic injuries.  Patient with weakness with inability to safely ambulate, management of this as per primary problem  PT/OT evaluations

## 2025-06-25 NOTE — INCIDENTAL FINDINGS
The following findings require follow up:  Radiographic finding   Finding: New 7 mm spiculated nodule at the right upper lobe   Follow up required: CT chest without contrast   Follow up should be done within 6-12 month(s)    Please notify the following clinician to assist with the follow up:   Dr. Duke Barnes MD     Incidental finding results were discussed with the Patient by RASHI Elam on 06/25/25.   They expressed understanding and all questions answered.

## 2025-06-25 NOTE — ED ATTENDING ATTESTATION
6/25/2025  IAugust DO, saw and evaluated the patient. I have discussed the patient with the resident/non-physician practitioner and agree with the resident's/non-physician practitioner's findings, Plan of Care, and MDM as documented in the resident's/non-physician practitioner's note, except where noted. All available labs and Radiology studies were reviewed.  I was present for key portions of any procedure(s) performed by the resident/non-physician practitioner and I was immediately available to provide assistance.       At this point I agree with the current assessment done in the Emergency Department.  I have conducted an independent evaluation of this patient a history and physical is as follows:    Patient is an 84 year old female with PMHx COPD, HTN, CKD, osteopenia, presenting to the ED with daughter at bedside for evaluation following mechanical fall. States that she was ambulating in the driveway yesterday at 1400, when she tripped and fell forward, falling to her knees, hands and wrists, and hitting her chest on the ground. Denies LOC and head strike. Patent presenting for bilateral hand and wrist pain, knee pain, chest soreness. Daughter is concerned about ambulation as she has noticed patient becoming generally weaker. Patient denies any recent fevers, chills, cough, congestion, shortness of breath, abdominal pain, urinary complaints.    ROS otherwise negative.    Focal exam findings:  Head atraumatic/NC. No scalp hematomas or facial trauma  Neck: tenderness to palpation over the bilateral paracervical tissues.  HEENT: Unremarkable  Heart: RRR  Lungs: CTA bilaterally  Abdomen soft and non-tender  Extremities; Mild soreness with ROM of the hands and wrists. No snuffbox tenderness. FDS and FDP intact bilaterally.Sensation intact. Able to close both hands in fist with equal  strength. Knees: No focal tenderness, swelling, ecchymosis, or impaired ROM   Neuro: GCS 15. Moving all  extremities well, following commands, No focal deficits.    Ddx: Mechanical fall, generalized weakness, ambulatory dysfunction, electrolyte abnormality.     Plan: Cardiac workup, CT scans to r/o traumatic injury, XR r/o fx, dislocation, labs, ambulation trial.    Imaging negative for acute traumatic injuries. Labs WNL. However, patient failed ambulation trial. Offered admission for ambulatory dysfunction and daughter is agreeable. Case discussed with NEHEMIAH; arrangements made for admission.    ED Course  ED Course as of 06/25/25 1856   Wed Jun 25, 2025   0500 Delta 2hr hsTnI: -1   0522 Patient failed ambulation trial.  Will admit for further evaluation, ambulatory dysfunction, PT OT eval.         Critical Care Time  Procedures

## 2025-06-25 NOTE — ASSESSMENT & PLAN NOTE
7 mm spiculated nodule to right upper lobe incidentally noted on CT chest  Repeat noncontrast CT chest advised at 6-12 months, patient to follow-up with PCP to arrange this.  Will need to reiterate results with patient later today as patient somewhat sleepy during my evaluation

## 2025-06-25 NOTE — ASSESSMENT & PLAN NOTE
7 mm spiculated nodule to right upper lobe incidentally noted on CT chest  Repeat noncontrast CT chest advised at 6-12 months, patient to follow-up with PCP to arrange this.  Results reviewed with patient and her son

## 2025-06-25 NOTE — Clinical Note
Case was discussed with  and the patient's admission status was agreed to be Admission Status: observation status to the service of .

## 2025-06-25 NOTE — ED PROVIDER NOTES
Time reflects when diagnosis was documented in both MDM as applicable and the Disposition within this note       Time User Action Codes Description Comment    6/25/2025  5:01 AM VoGarett Add [W19.XXXA] Fall, initial encounter     6/25/2025  5:01 AM Vo, Garett Add [W19.XXXA] Fall from standing, initial encounter     6/25/2025  5:21 AM Vo, Garett Add [R26.2] Ambulatory dysfunction     6/25/2025  5:21 AM VoMarloan Modify [W19.XXXA] Fall, initial encounter     6/25/2025  5:21 AM Vo, Garett Modify [R26.2] Ambulatory dysfunction     6/25/2025  5:27 AM Vo, Garett Add [R91.8] Pulmonary nodules           ED Disposition       ED Disposition   Admit    Condition   Stable    Date/Time   Wed Jun 25, 2025  5:28 AM    Comment   Case was discussed with NEHEMIAH and the patient's admission status was agreed to be Admission Status: observation status to the service of Dr. [place].               Assessment & Plan       Medical Decision Making  84 year old female with pmhx of HTN presents to the ED here for evaluation after an unwitnessed fall in the neighbor's driveway. Pt was walking and couldn't stop herself and fell forward onto her knees, and hand, and then her chest. Denies LOC and Head strike.  Patient currently asymptomatic at this time and denies any pain.  On exam no neuro focal deficit. abd snt. Imaging negative for fractures, there are pulmonary nodules that needs follow up in 6 months.  Pt failed to ambulate without assistant. Pt needs admission for PT/OT eval. These results were discussed with the patient and her daughter who expressed understanding agrees with plan for admission.     Amount and/or Complexity of Data Reviewed  Labs: ordered. Decision-making details documented in ED Course.  Radiology: ordered. Decision-making details documented in ED Course.    Risk  OTC drugs.  Decision regarding hospitalization.        ED Course as of 06/25/25 0537   Wed Jun 25, 2025   0259 Sodium(!): 134   0259 Total CK: 102   0500 7 mm spiculated  nodule at the right upper lobe.  Follow-up non-contrast CT is recommended at 6-12 months   0500 CT head wo contrast  No acute intracranial abnormality.  Chronic microangiopathic changes.   0510 Delta 2hr hsTnI: -1   0510 Total CK: 102   0530 Contact:  Da (Son)   Home: 879 - 891 - 2206.   Cell: [phone]       Medications   acetaminophen (TYLENOL) tablet 975 mg (975 mg Oral Given 6/25/25 0209)       ED Risk Strat Scores                    No data recorded        SBIRT 20yo+      Flowsheet Row Most Recent Value   Initial Alcohol Screen: US AUDIT-C     1. How often do you have a drink containing alcohol? 0 Filed at: 06/25/2025 0106   2. How many drinks containing alcohol do you have on a typical day you are drinking?  0 Filed at: 06/25/2025 0106   3a. Male UNDER 65: How often do you have five or more drinks on one occasion? 0 Filed at: 06/25/2025 0106   3b. FEMALE Any Age, or MALE 65+: How often do you have 4 or more drinks on one occassion? 0 Filed at: 06/25/2025 0106   Audit-C Score 0 Filed at: 06/25/2025 0106   ALEXANDRA: How many times in the past year have you...    Used an illegal drug or used a prescription medication for non-medical reasons? Never Filed at: 06/25/2025 0106                            History of Present Illness       Chief Complaint   Patient presents with    Fall     Patient reports having a fall yesterday at 11AM. -thinners, -LOC, -HS. States falling onto knees in driveway. Does not use any assistive devices to ambulate at baseline. Reports bilateral knee pain. Abrasion to R elbow.       Past Medical History[1]   Past Surgical History[2]   Family History[3]   Social History[4]   E-Cigarette/Vaping    E-Cigarette Use Never User       E-Cigarette/Vaping Substances    Nicotine No     THC No     CBD No     Flavoring No     Other No     Unknown No       I have reviewed and agree with the history as documented.       Fall  Associated symptoms: back pain and neck pain    Associated symptoms: no  abdominal pain, no chest pain, no headaches, no nausea, no seizures and no vomiting        Review of Systems   Constitutional:  Negative for appetite change, chills, diaphoresis, fever and unexpected weight change.   HENT:  Negative for dental problem, ear pain, facial swelling, sore throat and trouble swallowing.    Eyes:  Negative for pain and visual disturbance.   Respiratory:  Negative for cough, chest tightness and shortness of breath.    Cardiovascular:  Negative for chest pain, palpitations and leg swelling.   Gastrointestinal:  Negative for abdominal distention, abdominal pain, constipation, diarrhea, nausea and vomiting.   Endocrine: Negative for polyuria.   Genitourinary:  Negative for difficulty urinating, dysuria and hematuria.   Musculoskeletal:  Positive for back pain, gait problem, joint swelling, myalgias and neck pain. Negative for arthralgias and neck stiffness.   Skin:  Positive for wound. Negative for color change and rash.   Neurological:  Negative for dizziness, seizures, syncope, light-headedness and headaches.   Psychiatric/Behavioral:  Negative for confusion.    All other systems reviewed and are negative.          Objective       ED Triage Vitals [06/25/25 0105]   Temperature Pulse Blood Pressure Respirations SpO2 Patient Position - Orthostatic VS   97.8 °F (36.6 °C) 88 144/61 18 99 % Sitting      Temp Source Heart Rate Source BP Location FiO2 (%) Pain Score    Oral Monitor Right arm -- 7      Vitals      Date and Time Temp Pulse SpO2 Resp BP Pain Score FACES Pain Rating User   06/25/25 0500 -- 70 93 % 20 144/65 -- -- OO   06/25/25 0400 -- 73 94 % 18 116/57 -- -- OO   06/25/25 0345 -- 76 94 % 20 129/59 -- -- OO   06/25/25 0200 -- 86 93 % 18 155/65 -- -- ED   06/25/25 0105 97.8 °F (36.6 °C) 88 99 % 18 144/61 7 -- OO            Physical Exam  Vitals and nursing note reviewed.   Constitutional:       General: She is not in acute distress.     Appearance: Normal appearance. She is  well-developed. She is not ill-appearing.   HENT:      Head: Normocephalic and atraumatic.      Right Ear: External ear normal.      Left Ear: External ear normal.      Nose: Nose normal.      Mouth/Throat:      Mouth: Mucous membranes are moist.     Eyes:      General: No scleral icterus.        Right eye: No discharge.      Extraocular Movements: Extraocular movements intact.      Conjunctiva/sclera: Conjunctivae normal.     Neck:       Cardiovascular:      Rate and Rhythm: Normal rate and regular rhythm.      Pulses: Normal pulses.      Heart sounds: No murmur heard.  Pulmonary:      Effort: Pulmonary effort is normal. No respiratory distress.      Breath sounds: Normal breath sounds. No wheezing.   Chest:      Chest wall: No tenderness.   Abdominal:      General: Abdomen is flat. There is no distension.      Palpations: Abdomen is soft.      Tenderness: There is no abdominal tenderness.     Musculoskeletal:         General: No swelling, deformity or signs of injury. Normal range of motion.        Arms:       Cervical back: Normal range of motion and neck supple. No rigidity or tenderness.      Right lower leg: No deformity or bony tenderness. No edema.      Left lower leg: No deformity or bony tenderness. No edema.        Legs:       Comments: Bilateral upper and lower extremity neurovascularly intact.  Full range of motion.      Skin:     General: Skin is warm and dry.      Capillary Refill: Capillary refill takes less than 2 seconds.     Neurological:      General: No focal deficit present.      Mental Status: She is alert and oriented to person, place, and time.      Motor: No weakness.     Psychiatric:         Mood and Affect: Mood normal.         Results Reviewed       Procedure Component Value Units Date/Time    HS Troponin I 2hr [[id number]]  (Normal) Collected: 06/25/25 0405    Lab Status: Final result Specimen: Blood from Arm, Left Updated: 06/25/25 0445     hs TnI 2hr 11 ng/L      Delta 2hr hsTnI -1  ng/L     HS Troponin I 4hr [[id number]]     Lab Status: No result Specimen: Blood     CBC and differential [[id number]]  (Abnormal) Collected: 06/25/25 0213    Lab Status: Final result Specimen: Blood from Arm, Left Updated: 06/25/25 0332     WBC 9.25 Thousand/uL      RBC 4.26 Million/uL      Hemoglobin 12.7 g/dL      Hematocrit 38.7 %      MCV 91 fL      MCH 29.8 pg      MCHC 32.8 g/dL      RDW 13.8 %      MPV 9.1 fL      Platelets 282 Thousands/uL      nRBC 0 /100 WBCs      Segmented % 80 %      Immature Grans % 0 %      Lymphocytes % 6 %      Monocytes % 14 %      Eosinophils Relative 0 %      Basophils Relative 0 %      Absolute Neutrophils 7.28 Thousands/µL      Absolute Immature Grans 0.03 Thousand/uL      Absolute Lymphocytes 0.58 Thousands/µL      Absolute Monocytes 1.32 Thousand/µL      Eosinophils Absolute 0.01 Thousand/µL      Basophils Absolute 0.03 Thousands/µL     HS Troponin 0hr (reflex protocol) [[id number]]  (Normal) Collected: 06/25/25 0213    Lab Status: Final result Specimen: Blood from Arm, Left Updated: 06/25/25 0243     hs TnI 0hr 12 ng/L     CK [[id number]]  (Normal) Collected: 06/25/25 0213    Lab Status: Final result Specimen: Blood from Arm, Left Updated: 06/25/25 0243     Total  U/L     Comprehensive metabolic panel [[id number]]  (Abnormal) Collected: 06/25/25 0213    Lab Status: Final result Specimen: Blood from Arm, Left Updated: 06/25/25 0243     Sodium 134 mmol/L      Potassium 4.2 mmol/L      Chloride 100 mmol/L      CO2 27 mmol/L      ANION GAP 7 mmol/L      BUN 19 mg/dL      Creatinine 1.13 mg/dL      Glucose 109 mg/dL      Calcium 10.1 mg/dL      AST 31 U/L      ALT 19 U/L      Alkaline Phosphatase 62 U/L      Total Protein 6.8 g/dL      Albumin 3.7 g/dL      Total Bilirubin 0.37 mg/dL      eGFR 44 ml/min/1.73sq m     Narrative:      National Kidney Disease Foundation guidelines for Chronic Kidney Disease (CKD):     Stage 1 with normal or high GFR (GFR > 90 mL/min/1.73 square  meters)    Stage 2 Mild CKD (GFR = 60-89 mL/min/1.73 square meters)    Stage 3A Moderate CKD (GFR = 45-59 mL/min/1.73 square meters)    Stage 3B Moderate CKD (GFR = 30-44 mL/min/1.73 square meters)    Stage 4 Severe CKD (GFR = 15-29 mL/min/1.73 square meters)    Stage 5 End Stage CKD (GFR <15 mL/min/1.73 square meters)  Note: GFR calculation is accurate only with a steady state creatinine            CT head wo contrast   Final Interpretation by Catina Sam MD (06/25 3327)      No acute intracranial abnormality.  Chronic microangiopathic changes.                  Workstation performed: [id number]         CT spine cervical without contrast   Final Interpretation by Catina Sam MD (06/25 7812)      No cervical spine fracture or traumatic malalignment.                  Workstation performed: [id number]         CT chest without contrast   Final Interpretation by Catina Sam MD (06/25 0573)      Nondisplaced fractures of the anterolateral left fourth and fifth ribs      New 7 mm spiculated nodule at the right upper lobe. Based on current Fleischner Society 2017 Guidelines on incidental pulmonary nodule, follow-up non-contrast CT is recommended at 6-12 months from the initial examination and, if stable at that time, an    additional follow-up is recommended for 18-24 months from the initial examination. Considerations related to the patient's age and/or comorbidities may be used to alter these recommendations.      Clustered nodules at the left lower which may be infectious/inflammatory in nature and which can also be followed on subsequent CT imaging      The study was marked in EPIC for significant notification.            Computerized Assisted Algorithm (CAA) may have aided analysis of applicable images.      Workstation performed: [id number]         XR hand 3+ views LEFT    (Results Pending)   XR knee 4+ vw left injury    (Results Pending)   XR knee 4+ vw right injury    (Results Pending)   XR hand 3+ views RIGHT     (Results Pending)   XR spine thoracic 3 views    (Results Pending)       Procedures    ED Medication and Procedure Management   Prior to Admission Medications   Prescriptions Last Dose Informant Patient Reported? Taking?   Anoro Ellipta 62.5-25 MCG/ACT inhaler  Self No No   Sig: inhale 1 puff by mouth and INTO THE LUNGS once daily   Cholecalciferol (VITAMIN D3) 2000 units capsule  Self Yes No   Sig: Take by mouth   albuterol (PROVENTIL HFA,VENTOLIN HFA) 90 mcg/act inhaler   No No   Sig: Inhale 1 puff every 4 (four) hours as needed for wheezing   calcium citrate-vitamin D (CITRACAL+D) 315-200 MG-UNIT per tablet  Self Yes No   Sig: Take by mouth   gabapentin (Neurontin) 600 MG tablet  Self No No   Sig: Take 1 tablet (600 mg total) by mouth 3 (three) times a day   latanoprost (XALATAN) 0.005 % ophthalmic solution  Self Yes No   lisinopril (ZESTRIL) 10 mg tablet   No No   Sig: Take 1 tablet (10 mg total) by mouth daily   nicotine (NICODERM CQ) 21 mg/24 hr TD 24 hr patch   No No   Sig: Place 1 patch on the skin over 24 hours every 24 hours      Facility-Administered Medications: None     Patient's Medications   Discharge Prescriptions    No medications on file     No discharge procedures on file.  ED SEPSIS DOCUMENTATION   Time reflects when diagnosis was documented in both MDM as applicable and the Disposition within this note       Time User Action Codes Description Comment    6/25/2025  5:01 AM Garett Patton Add [W19.XXXA] Fall, initial encounter     6/25/2025  5:01 AM Garett Patton Add [W19.XXXA] Fall from standing, initial encounter     6/25/2025  5:21 AM Garett Patton Add [R26.2] Ambulatory dysfunction     6/25/2025  5:21 AM Garett Patton Modify [W19.XXXA] Fall, initial encounter     6/25/2025  5:21 AM Garett Patton Modify [R26.2] Ambulatory dysfunction     6/25/2025  5:27 AM Garett Patton Add [R91.8] Pulmonary nodules                    [1]   Past Medical History:  Diagnosis Date    Glaucoma     Hypertension    [2]   Past Surgical  History:  Procedure Laterality Date    BREAST LUMPECTOMY      HYSTERECTOMY     [3]   Family History  Problem Relation Name Age of Onset    Breast cancer Mother      Parkinsonism Mother      Stroke Mother      Heart disease Father     [4]   Social History  Tobacco Use    Smoking status: Every Day    Smokeless tobacco: Never    Tobacco comments:     50 pp Year cutting down slowly now at 1/2 a day   Vaping Use    Vaping status: Never Used   Substance Use Topics    Alcohol use: No    Drug use: No        Garett Patton DO  06/25/25 0557

## 2025-06-25 NOTE — ASSESSMENT & PLAN NOTE
Lab Results   Component Value Date    EGFR 44 06/25/2025    EGFR 50 (L) 03/31/2025    CREATININE 1.13 06/25/2025    CREATININE 1.08 03/31/2025    CREATININE 1.06 (H) 12/21/2017   Renal function at baseline, monitor with BMP intermittently.  Avoid/limit nephrotoxins and hypotension

## 2025-06-25 NOTE — ASSESSMENT & PLAN NOTE
Chronic, patient denying worsening back pain  Continue gabapentin but will reduce dose to 300 mg 3 times daily as patient somewhat sleepy during admission evaluation  6/26 patient denies increased pain with decreased dosage.  If she can tolerate would recommend continuing with lower dosage on discharge to decrease risk of future falls

## 2025-06-25 NOTE — ASSESSMENT & PLAN NOTE
Presented after a fall in neighbors driveway, states she was walking when she could not stop her self and fell forward onto her knees without dizziness/lightheadedness or loss of consciousness  Patient with weakness with inability to safely ambulate, management of this as per primary problem  PT/OT evaluations recommend rehab, case management assistance appreciated

## 2025-06-26 PROBLEM — S22.32XA LEFT RIB FRACTURE: Status: ACTIVE | Noted: 2025-06-26

## 2025-06-26 PROBLEM — K59.00 CONSTIPATION: Status: ACTIVE | Noted: 2025-06-26

## 2025-06-26 PROCEDURE — 97166 OT EVAL MOD COMPLEX 45 MIN: CPT

## 2025-06-26 PROCEDURE — 97163 PT EVAL HIGH COMPLEX 45 MIN: CPT

## 2025-06-26 PROCEDURE — 94664 DEMO&/EVAL PT USE INHALER: CPT

## 2025-06-26 PROCEDURE — 99232 SBSQ HOSP IP/OBS MODERATE 35: CPT

## 2025-06-26 RX ORDER — SENNOSIDES 8.6 MG
1 TABLET ORAL
Status: DISCONTINUED | OUTPATIENT
Start: 2025-06-26 | End: 2025-06-28

## 2025-06-26 RX ORDER — DOCUSATE SODIUM 100 MG/1
100 CAPSULE, LIQUID FILLED ORAL 2 TIMES DAILY
Status: DISCONTINUED | OUTPATIENT
Start: 2025-06-26 | End: 2025-07-05

## 2025-06-26 RX ORDER — POLYETHYLENE GLYCOL 3350 17 G/17G
17 POWDER, FOR SOLUTION ORAL DAILY PRN
Status: DISCONTINUED | OUTPATIENT
Start: 2025-06-26 | End: 2025-06-27

## 2025-06-26 RX ORDER — LIDOCAINE 50 MG/G
1 PATCH TOPICAL DAILY
Status: DISCONTINUED | OUTPATIENT
Start: 2025-06-26 | End: 2025-07-12 | Stop reason: HOSPADM

## 2025-06-26 RX ORDER — ACETAMINOPHEN 325 MG/1
975 TABLET ORAL EVERY 8 HOURS SCHEDULED
Status: DISCONTINUED | OUTPATIENT
Start: 2025-06-26 | End: 2025-07-12 | Stop reason: HOSPADM

## 2025-06-26 RX ADMIN — HEPARIN SODIUM 5000 UNITS: 5000 INJECTION INTRAVENOUS; SUBCUTANEOUS at 05:29

## 2025-06-26 RX ADMIN — Medication 2000 UNITS: at 08:21

## 2025-06-26 RX ADMIN — LIDOCAINE 1 PATCH: 700 PATCH TOPICAL at 08:20

## 2025-06-26 RX ADMIN — HEPARIN SODIUM 5000 UNITS: 5000 INJECTION INTRAVENOUS; SUBCUTANEOUS at 21:02

## 2025-06-26 RX ADMIN — GABAPENTIN 300 MG: 300 CAPSULE ORAL at 16:30

## 2025-06-26 RX ADMIN — NICOTINE 1 PATCH: 14 PATCH, EXTENDED RELEASE TRANSDERMAL at 08:21

## 2025-06-26 RX ADMIN — LISINOPRIL 10 MG: 10 TABLET ORAL at 08:21

## 2025-06-26 RX ADMIN — GABAPENTIN 300 MG: 300 CAPSULE ORAL at 21:02

## 2025-06-26 RX ADMIN — DOCUSATE SODIUM 100 MG: 100 CAPSULE, LIQUID FILLED ORAL at 17:35

## 2025-06-26 RX ADMIN — HEPARIN SODIUM 5000 UNITS: 5000 INJECTION INTRAVENOUS; SUBCUTANEOUS at 14:40

## 2025-06-26 RX ADMIN — UMECLIDINIUM BROMIDE AND VILANTEROL TRIFENATATE 1 PUFF: 62.5; 25 POWDER RESPIRATORY (INHALATION) at 14:40

## 2025-06-26 RX ADMIN — GABAPENTIN 300 MG: 300 CAPSULE ORAL at 08:22

## 2025-06-26 RX ADMIN — ACETAMINOPHEN 975 MG: 325 TABLET ORAL at 17:35

## 2025-06-26 NOTE — RESPIRATORY THERAPY NOTE
"RT Protocol Note  Danita Palmer 84 y.o. female MRN: [id number]  Unit/Bed#: [facility] 918-01 Encounter: [id number]    Assessment    Principal Problem:    Generalized weakness  Active Problems:    CKD stage G3a/A1, GFR 45-59 and albumin creatinine ratio <30 mg/g (HCC)    Chronic obstructive pulmonary disease (HCC)    Disc degeneration, lumbar    Tobacco abuse    Essential hypertension    Fall at home, initial encounter    Lung nodule    Left rib fracture      Home Pulmonary Medications:  Albuterol       Past Medical History[1]  Social History[2]    Subjective         Objective    Physical Exam:   Assessment Type: Assess only  General Appearance: Alert, Awake  Respiratory Pattern: Normal  Chest Assessment: Chest expansion symmetrical  Bilateral Breath Sounds: Coarse    Vitals:  Blood pressure 154/71, pulse 88, temperature 99 °F (37.2 °C), resp. rate 17, height 5' 1\" (1.549 m), weight 49.9 kg (110 lb), SpO2 (!) 86%.          Imaging and other studies: Results Review Statement: No pertinent imaging studies reviewed.          Plan       Airway Clearance Plan: Incentive Spirometer     Resp Comments: Pt in hospital following a fall. Pt shown how to use IS and told how often to use it, pt displayed understanding and good technique. Pt also given and shown how to use flutter device.        [1]   Past Medical History:  Diagnosis Date    Glaucoma     Hypertension    [2]   Social History  Socioeconomic History    Marital status: Single   Occupational History    Occupation: Food Services   Tobacco Use    Smoking status: Every Day    Smokeless tobacco: Never    Tobacco comments:     50 pp Year cutting down slowly now at 1/2 a day   Vaping Use    Vaping status: Never Used   Substance and Sexual Activity    Alcohol use: Not Currently    Drug use: No   Social History Narrative    Does not exercise    Seeing a dentist     Social Drivers of Health     Financial Resource Strain: Low Risk  (4/20/2023)    Overall Financial Resource Strain " (CARDIA)     Difficulty of Paying Living Expenses: Not hard at all   Food Insecurity: No Food Insecurity (6/25/2025)    Nursing - Inadequate Food Risk Classification     Worried About Running Out of Food in the Last Year: Never true     Ran Out of Food in the Last Year: Never true     Ran Out of Food in the Last Year: Never true   Transportation Needs: No Transportation Needs (6/25/2025)    Nursing - Transportation Risk Classification     Lack of Transportation: No   Physical Activity: Inactive (1/16/2020)    Exercise Vital Sign     Days of Exercise per Week: 0 days     Minutes of Exercise per Session: 0 min   Stress: No Stress Concern Present (1/16/2020)    [place] [employer] - Occupational Stress Questionnaire     Feeling of Stress : Not at all   Social Connections: Unknown (1/16/2020)    Social Connection and Isolation Panel     Frequency of Communication with Friends and Family: Patient declined     Frequency of Social Gatherings with Friends and Family: Patient declined     Attends Mosque Services: Patient declined     Active Member of Clubs or Organizations: Patient declined     Attends Club or Organization Meetings: Patient declined     Marital Status: Patient declined   Intimate Partner Violence: Unknown (6/25/2025)    Nursing IPS     Physically Hurt by Someone: No     Hurt or Threatened by Someone: No   Housing Stability: Unknown (6/25/2025)    Nursing: Inadequate Housing Risk Classification     Unable to Pay for Housing in the Last Year: No     Has Housing: No

## 2025-06-26 NOTE — UTILIZATION REVIEW
Initial Clinical Review    Admission: Date/Time/Statement:   Admission Orders (From admission, onward)       Ordered        06/25/25 0554  Inpatient Admission  Once                          Orders Placed This Encounter   Procedures    Inpatient Admission     Standing Status:   Standing     Number of Occurrences:   1     Level of Care:   Med Surg [16]     Estimated length of stay:   More than 2 Midnights     Certification:   I certify that inpatient services are medically necessary for this patient for a duration of greater than two midnights. See H&P and MD Progress Notes for additional information about the patient's course of treatment.     ED Arrival Information       Expected   -    Arrival   6/25/2025 00:59    Acuity   Urgent              Means of arrival   Ambulance    Escorted by   [facility]    Service   Hospitalist    Admission type   Emergency              Arrival complaint   Fall             Chief Complaint   Patient presents with    Fall     Patient reports having a fall yesterday at 11AM. -thinners, -LOC, -HS. States falling onto knees in driveway. Does not use any assistive devices to ambulate at baseline. Reports bilateral knee pain. Abrasion to R elbow.       Initial Presentation: 84 y.o. female presented to ED via EMS as inpatient admission for generalized weakness. Past medical history significant for COPD, lumbar disc degeneration, hypertension, chronic disease stage III, nicotine dependence who presented after a fall. Patient states she was ambulating in her neighbors driveway the morning of 6/24/2025 when she states she could not stop herself and fell forward striking knees then chest/hands without head strike or loss of consciousness. This evening she noted increased knee pain  Ambulatory trial was attempted during ED evaluation however patient unable to safely ambulate this patient admitted to hospitalist service with plans for PT/OT evaluations and disposition planning. On exam  superficial abrasion to bilateral knees . Plan PT/OT SCD IS q 1 h WA  and supportive care        Anticipated Length of Stay/Certification Statement:   Patient will be admitted on an Inpatient basis with an anticipated length of stay of greater than 2 midnights.   Justification for Hospital Stay: Generalized weakness with inability to safely ambulate requiring PT/OT evaluations and disposition planning     Date: 06-26-25   Day 2: Patient somewhat sleepy during admission evaluation and does not fully recall episode,?  If some degree of cognitive impairment.  CT head negative for acute intracranial pathology.  PT/OT evaluations recommend level 2 rehab intensity STR Cm aware and referral placed. Patient denies pain at this time. She reports her shoulder has been sore and they are getting a heating pad for it. She reports she is feeling constipated but is passing gas Continues to work with PT. Encourage deep breathing IS q 1 hr W A Aqua K       Certification Statement: The patient will continue to require additional inpatient hospital stay due to hypoxia, constipation, and rehab placement         Date: 06-27-24  Day 3: Has surpassed a 2nd midnight with active treatments and services.Reports that the left rib pain is managed however she did need to take some pain medication. Fair appetite no nausea or vomiting. Reports that she is passing gas but still no bowel movement On exam thin, frail elderly female resting quietly in bed  Shallow breaths; encouraged incentive spirometry (left rib fractures) Left arm limited ROM by rib pain, pale Plan PT/OT SCD IS q 1 h WA  and supportive care awaiting placement         Certification Statement: The patient will continue to require additional inpatient hospital stay due to pain     ED Treatment-Medication Administration from 06/25/2025 0059 to 06/25/2025 1603         Date/Time Order Dose Route Action     06/25/2025 0209 acetaminophen (TYLENOL) tablet 975 mg 975 mg Oral Given      06/25/2025 0855 Cholecalciferol (VITAMIN D3) tablet 2,000 Units 2,000 Units Oral Given     06/25/2025 0855 lisinopril (ZESTRIL) tablet 10 mg 10 mg Oral Given     06/25/2025 0604 heparin (porcine) subcutaneous injection 5,000 Units 5,000 Units Subcutaneous Given     06/25/2025 1316 heparin (porcine) subcutaneous injection 5,000 Units 5,000 Units Subcutaneous Given     06/25/2025 0855 gabapentin (NEURONTIN) capsule 300 mg 300 mg Oral Given     06/25/2025 1529 gabapentin (NEURONTIN) capsule 300 mg 300 mg Oral Given            Scheduled Medications:  Cholecalciferol, 2,000 Units, Oral, Daily  gabapentin, 300 mg, Oral, TID  heparin (porcine), 5,000 Units, Subcutaneous, Q8H JOSE MANUEL  lidocaine, 1 patch, Topical, Daily  lisinopril, 10 mg, Oral, Daily  nicotine, 1 patch, Transdermal, Daily  umeclidinium-vilanterol, 1 puff, Inhalation, Daily      Continuous IV Infusions:     PRN Meds:  acetaminophen, 650 mg, Oral, Q4H PRN  albuterol, 1 puff, Inhalation, Q4H PRN  ondansetron, 4 mg, Intravenous, Q6H PRN  oxyCODONE, 2.5 mg, Oral, Q4H PRN   Or  oxyCODONE, 5 mg, Oral, Q4H PRN  X 1       ED Triage Vitals [06/25/25 0105]   Temperature Pulse Respirations Blood Pressure SpO2 Pain Score   97.8 °F (36.6 °C) 88 18 144/61 99 % 7     Weight (last 2 days)       Date/Time Weight    06/25/25 1623 49.9 (110)            Vital Signs (last 3 days)       Date/Time Temp Pulse Resp BP MAP (mmHg) SpO2 Calculated FIO2 (%) - Nasal Cannula Nasal Cannula O2 Flow Rate (L/min) O2 Device Patient Position - Orthostatic VS Marlene Coma Scale Score Pain    06/26/25 1209 -- -- -- -- -- -- -- -- -- -- -- No Pain    06/26/25 0900 -- -- -- -- -- -- -- -- -- -- 15 4    06/26/25 08:03:37 99 °F (37.2 °C) 88 17 154/71 99 86 % -- -- -- -- -- --    06/25/25 23:00:10 99.2 °F (37.3 °C) 79 19 156/83 107 86 % -- -- -- -- -- --    06/25/25 2300 -- -- -- -- -- 86 % -- -- -- -- 15 No Pain    06/25/25 2000 -- -- -- -- -- -- -- -- -- -- 15 3    06/25/25 1700 -- -- -- -- -- -- 28 2  L/min Nasal cannula -- 15 5 06/25/25 1642 -- -- -- -- -- -- -- -- -- -- -- 5 06/25/25 1623 -- -- -- -- -- -- 28 2 L/min Nasal cannula -- -- 5 06/25/25 16:19:28 99.7 °F (37.6 °C) -- -- 144/74 97 -- -- -- -- -- -- --    06/25/25 1500 -- 77 20 139/74 101 90 % -- -- None (Room air) Lying -- --    06/25/25 1400 -- 73 20 141/64 92 92 % -- -- None (Room air) Lying -- --    06/25/25 1300 -- 71 20 125/58 83 91 % -- -- None (Room air) Lying -- --    06/25/25 1200 -- 66 -- 116/57 82 93 % -- -- -- Lying -- --    06/25/25 1100 -- 67 20 124/60 87 94 % -- -- None (Room air) Lying -- --    06/25/25 1000 -- 74 18 122/60 86 91 % -- -- None (Room air) Lying -- --    06/25/25 0900 -- 71 18 138/62 89 95 % -- -- None (Room air) Lying -- --    06/25/25 0855 -- -- -- 140/65 -- -- -- -- -- -- -- --    06/25/25 0800 -- 64 18 107/55 77 93 % -- -- None (Room air) Lying -- --    06/25/25 0700 -- 64 18 109/56 81 92 % -- -- None (Room air) Lying -- --    06/25/25 0600 -- 66 18 142/58 84 95 % -- -- None (Room air) Lying -- --    06/25/25 0500 -- 70 20 144/65 93 93 % -- -- None (Room air) Lying -- --    06/25/25 0400 -- 73 18 116/57 82 94 % -- -- None (Room air) Lying -- --    06/25/25 0345 -- 76 20 129/59 85 94 % -- -- None (Room air) Lying -- --    06/25/25 0200 -- 86 18 155/65 94 93 % -- -- None (Room air) Lying -- --    06/25/25 0108 -- -- -- -- -- -- -- -- None (Room air) -- -- --    06/25/25 0107 -- -- -- -- -- -- -- -- -- -- 15 --    06/25/25 0105 97.8 °F (36.6 °C) 88 18 144/61 88 99 % -- -- None (Room air) Sitting -- 7              Pertinent Labs/Diagnostic Test Results:   Radiology:  CT head wo contrast   Final Interpretation by Catina Sam MD (06/25 6921)      No acute intracranial abnormality.  Chronic microangiopathic changes.                  Workstation performed: [id number]         CT spine cervical without contrast   Final Interpretation by Catina Sam MD (06/25 6216)      No cervical spine fracture or traumatic  malalignment.                  Workstation performed: [id number]         CT chest without contrast   Final Interpretation by Catina Sam MD (06/25 5470)      Nondisplaced fractures of the anterolateral left fourth and fifth ribs      New 7 mm spiculated nodule at the right upper lobe. Based on current Fleischner Society 2017 Guidelines on incidental pulmonary nodule, follow-up non-contrast CT is recommended at 6-12 months from the initial examination and, if stable at that time, an    additional follow-up is recommended for 18-24 months from the initial examination. Considerations related to the patient's age and/or comorbidities may be used to alter these recommendations.      Clustered nodules at the left lower which may be infectious/inflammatory in nature and which can also be followed on subsequent CT imaging      The study was marked in EPIC for significant notification.            Computerized Assisted Algorithm (CAA) may have aided analysis of applicable images.      Workstation performed: [id number]         XR hand 3+ views LEFT   Final Interpretation by Brad Hagen MD (06/25 1352)      No acute osseous abnormality.         Computerized Assisted Algorithm (CAA) may have been used to analyze all applicable images.         Workstation performed: [id number]         XR knee 4+ vw left injury   Final Interpretation by Brad Hagen MD (06/25 1351)      No acute osseous abnormality.         Computerized Assisted Algorithm (CAA) may have been used to analyze all applicable images.         Workstation performed: [id number]         XR knee 4+ vw right injury   Final Interpretation by Brad Hagen MD (06/25 1351)      Mild arthritis. No acute findings         Computerized Assisted Algorithm (CAA) may have been used to analyze all applicable images.         Workstation performed: [id number]         XR hand 3+ views RIGHT   Final Interpretation by Brad Hagen MD (06/25  1350)      No acute osseous abnormality.         Computerized Assisted Algorithm (CAA) may have been used to analyze all applicable images.         Workstation performed: [id number]         XR spine thoracic 3 views   Final Interpretation by Brad Hagen MD (06/25 1348)      Mild inferior endplate compression deformity of T11 which is favored to be chronic although clinical correlation is advised.      Multilevel chronic degenerative disc disease of the lower thoracic spine.      Scoliosis         Computerized Assisted Algorithm (CAA) may have been used to analyze all applicable images.         Workstation performed: [id number]           Cardiology:  ECG 12 lead   Final Result by Evelyne Huston MD (06/25 7463)   Normal sinus rhythm   Nonspecific T wave abnormality   Abnormal ECG   When compared with ECG of 19-May-1998 11:01,   No significant change was found   Confirmed by Evelyne Huston ([id number]) on 6/25/2025 6:12:10 PM        GI:  No orders to display           Results from last 7 days   Lab Units 06/25/25  0213   WBC Thousand/uL 9.25   HEMOGLOBIN g/dL 12.7   HEMATOCRIT % 38.7   PLATELETS Thousands/uL 282   TOTAL NEUT ABS Thousands/µL 7.28         Results from last 7 days   Lab Units 06/25/25  0213   SODIUM mmol/L 134*   POTASSIUM mmol/L 4.2   CHLORIDE mmol/L 100   CO2 mmol/L 27   ANION GAP mmol/L 7   BUN mg/dL 19   CREATININE mg/dL 1.13   EGFR ml/min/1.73sq m 44   CALCIUM mg/dL 10.1     Results from last 7 days   Lab Units 06/25/25  0213   AST U/L 31   ALT U/L 19   ALK PHOS U/L 62   TOTAL PROTEIN g/dL 6.8   ALBUMIN g/dL 3.7   TOTAL BILIRUBIN mg/dL 0.37         Results from last 7 days   Lab Units 06/25/25  0213   GLUCOSE RANDOM mg/dL 109         Results from last 7 days   Lab Units 06/25/25  0213   CK TOTAL U/L 102     Results from last 7 days   Lab Units 06/25/25  0405 06/25/25  0213   HS TNI 0HR ng/L  --  12   HS TNI 2HR ng/L 11  --    HSTNI D2 ng/L -1  --              Results from last 7 days   Lab  Units 06/25/25  0405   TSH 3RD GENERATON uIU/mL 0.575               Past Medical History[1]  Present on Admission:   Generalized weakness   CKD stage G3a/A1, GFR 45-59 and albumin creatinine ratio <30 mg/g (HCC)   Essential hypertension   Tobacco abuse   Lung nodule   Chronic obstructive pulmonary disease (HCC)   Disc degeneration, lumbar      Admitting Diagnosis: Pulmonary nodules [R91.8]  Fall, initial encounter [W19.XXXA]  Fall from standing, initial encounter [W19.XXXA]  Ambulatory dysfunction [R26.2]  Unspecified multiple injuries, initial encounter [T07.XXXA]  Age/Sex: 84 y.o. female    Network Utilization Review Department  ATTENTION: Please call with any questions or concerns to [phone] and carefully listen to the prompts so that you are directed to the right person. All voicemails are confidential.   For Discharge needs, contact Care Management DC Support Team at [phone] opt. [phone]  Send all requests for admission clinical reviews, approved or denied determinations and any other requests to dedicated fax number below belonging to the campus where the patient is receiving treatment. List of dedicated fax numbers for the Facilities:  FACILITY NAME UR FAX NUMBER   ADMISSION DENIALS (Administrative/Medical Necessity) [phone]   DISCHARGE SUPPORT TEAM (NETWORK) [phone]   PARENT CHILD HEALTH (Maternity/NICU/Pediatrics) [phone]   [facility] [phone]   [facility] [phone]   [facility] [phone]   [facility] [phone]   [facility] [phone]   [facility] [phone]   [facility] [phone]   [facility] [phone]   [facility] [phone]   [facility] [phone]   [facility]  [place] [phone]   [facility] [phone]              [1]   Past Medical History:  Diagnosis Date    Glaucoma     Hypertension

## 2025-06-26 NOTE — PLAN OF CARE
Problem: OCCUPATIONAL THERAPY ADULT  Goal: Performs self-care activities at highest level of function for planned discharge setting.  See evaluation for individualized goals.  Description: Treatment Interventions: ADL retraining, Functional transfer training, UE strengthening/ROM, Endurance training, Cognitive reorientation, Patient/family training, Equipment evaluation/education, Compensatory technique education, Continued evaluation, Energy conservation, Activityengagement          See flowsheet documentation for full assessment, interventions and recommendations.   Note: Limitation: Decreased ADL status, Decreased UE strength, Decreased Safe judgement during ADL, Decreased cognition, Decreased endurance, Decreased self-care trans, Decreased high-level ADLs  Prognosis: Fair  Assessment: Pt is an 83 y/o F who presents to [facility] s/p unwitnessed fall in her neighbor's driveway, dx'd w/ generalized weakness. Pt  has a past medical history of Glaucoma and Hypertension. At baseline, pt lives w/ her daughter in a 2SH w/ 6 STEVEN. Pt previously I w/ ADLs, receives assistance w/ IADLs, and I w/ functional mobility and transfers. Pt drives. Pt has active OT consult and OOB orders. Upon OT entry, pt found supine in bed and agreeable to participate in therapy. Pt is currently performing at S for UB ADLs, [place] for LB ADLs, and modA x 1 for functional mobility and transfers. Upon OT evaluation, pt demonstrates impairments w/ UE strength, sitting balance/tolerance, standing balance/tolerance, endurance, activity tolerance, forward functional reach, trunk control, motor planning, attention, safety awareness, and insight. OT to address the listed impairments in the following occupational performance areas: grooming, dressing, bathing, toileting, functional mobility, transfers, and cognition. Pt is currently functioning below baseline performance and demonstrates need for OT services. Pt will be seen 2-3x/week w/ goals to  within  10-14 days. OT to recommend moderate resource intensity upon d/c. Will continue to follow.     Rehab Resource Intensity Level, OT: II (Moderate Resource Intensity)     Zohra Jimenez OTS

## 2025-06-26 NOTE — OCCUPATIONAL THERAPY NOTE
Occupational Therapy Evaluation     Patient Name: Danita Palmer  Today's Date: 6/26/2025  Problem List  Principal Problem:    Generalized weakness  Active Problems:    CKD stage G3a/A1, GFR 45-59 and albumin creatinine ratio <30 mg/g (HCC)    Chronic obstructive pulmonary disease (HCC)    Disc degeneration, lumbar    Tobacco abuse    Essential hypertension    Fall at home, initial encounter    Lung nodule    Left rib fracture    Past Medical History  Past Medical History[1]  Past Surgical History  Past Surgical History[2]        06/26/25 1208   OT Last Visit   OT Visit Date 06/26/25   Note Type   Note type Evaluation   Pain Assessment   Pain Assessment Tool 0-10   Pain Score No Pain  (At rest, reports non-rated L shoulder pain during ambulation)   Restrictions/Precautions   Weight Bearing Precautions Per Order No   Other Precautions Cognitive;Chair Alarm;Bed Alarm;Multiple lines;Telemetry;O2;Fall Risk   Home Living   Type of Home House   Home Layout Two level;Bed/bath upstairs;Stairs to enter with rails  (6 STEVEN)   Bathroom Shower/Tub Tub/shower unit   Bathroom Toilet Standard   Bathroom Equipment Other (Comment)  (Denies any)   Home Equipment Other (Comment)  (Denies any)   Additional Comments Pt lives w/ her daughter in a 2SH w/ 6 STEVEN   Prior Function   Level of [place] Independent with ADLs;Needs assistance with IADLS;Independent with functional mobility  (Pt shares IADLs w/ her daughter)   Lives With Daughter   Receives Help From Family   IADLs Independent with driving;Independent with medication management;Family/Friend/Other provides meals   Falls in the last 6 months 1 to 4  (1 leading to current admission)   Vocational Retired   Comments PTA, pt I w/ ADLs and shares IADLs w/ her daughter; (+) ; retired   Lifestyle   Autonomy I w/ ADLs, receives assistance w/ IADLs, I w/ functional mobility and transfers   Reciprocal Relationships Daughter   Service to Others Retired  (Former  at [employer]  [place])   Intrinsic Gratification Going to the casino   ADL   Eating Assistance 5  Supervision/Setup   Grooming Assistance 5  Supervision/Setup   UB Bathing Assistance 5  Supervision/Setup   LB Bathing Assistance 4  Minimal Assistance   UB Dressing Assistance 5  Supervision/Setup   LB Dressing Assistance 4  Minimal Assistance   Toileting Assistance  4  Minimal Assistance   Functional Assistance 3  Moderate Assistance   Additional Comments Pt is S for UB ADLs and Anahy for LB ADLs   Bed Mobility   Supine to Sit 3  Moderate assistance   Additional items Assist x 1;HOB elevated;Increased time required;Verbal cues;LE management   Sit to Supine Unable to assess   Additional Comments Pt left OOB in recliner w/ chair alarm activated and all needs within reach at EOS   Transfers   Sit to Stand 3  Moderate assistance   Additional items Assist x 1;Increased time required;Verbal cues   Stand to Sit 3  Moderate assistance   Additional items Assist x 1;Increased time required;Verbal cues   Additional Comments HHA used   Functional Mobility   Functional Mobility 3  Moderate assistance   Additional Comments Assist x 1; pt able to ambulate household distance; pt O2 desatted to 84% during functional mobility but satted to 94% upon rest break   Additional items Hand hold assistance   Balance   Static Sitting Poor +   Dynamic Sitting Poor   Static Standing Poor   Dynamic Standing Poor   Ambulatory Poor   Activity Tolerance   Activity Tolerance Patient limited by fatigue   Medical Staff Made Aware RN; PT, Halie   Nurse Made Aware RN cleared   RUE Assessment   RUE Assessment WFL   LUE Assessment   LUE Assessment WFL   Hand Function   Gross Motor Coordination Functional   Fine Motor Coordination Functional   Sensation   Light Touch No apparent deficits   Vision-Basic Assessment   Current Vision Wears glasses all the time   Cognition   Overall Cognitive Status Impaired   Arousal/Participation Alert;Responsive;Cooperative   Attention  Attends with cues to redirect   Orientation Level Oriented X4   Memory Decreased recall of precautions   Following Commands Follows one step commands with increased time or repetition   Comments Pt pleasant and cooperative; requires redirection to task   Assessment   Limitation Decreased ADL status;Decreased UE strength;Decreased Safe judgement during ADL;Decreased cognition;Decreased endurance;Decreased self-care trans;Decreased high-level ADLs   Prognosis Fair   Assessment Pt is an 83 y/o F who presents to B s/p unwitnessed fall in her neighbor's driveway, dx'd w/ generalized weakness. Pt  has a past medical history of Glaucoma and Hypertension. At baseline, pt lives w/ her daughter in a 2SH w/ 6 STEVEN. Pt previously I w/ ADLs, receives assistance w/ IADLs, and I w/ functional mobility and transfers. Pt drives. Pt has active OT consult and OOB orders. Upon OT entry, pt found supine in bed and agreeable to participate in therapy. Pt is currently performing at S for UB ADLs, Anahy for LB ADLs, and modA x 1 for functional mobility and transfers. Upon OT evaluation, pt demonstrates impairments w/ UE strength, sitting balance/tolerance, standing balance/tolerance, endurance, activity tolerance, forward functional reach, trunk control, motor planning, attention, safety awareness, and insight. OT to address the listed impairments in the following occupational performance areas: grooming, dressing, bathing, toileting, functional mobility, transfers, and cognition. Pt is currently functioning below baseline performance and demonstrates need for OT services. Pt will be seen 2-3x/week w/ goals to  within 10-14 days. OT to recommend moderate resource intensity upon d/c. Will continue to follow.   Goals   Patient Goals To return home   LTG Time Frame 10-14   Long Term Goal #1 See below defined goals   Plan   Treatment Interventions ADL retraining;Functional transfer training;UE strengthening/ROM;Endurance  training;Cognitive reorientation;Patient/family training;Equipment evaluation/education;Compensatory technique education;Continued evaluation;Energy conservation;Activityengagement   Goal Expiration Date 07/10/25   OT Treatment Day 0   OT Frequency 2-3x/wk   Discharge Recommendation   Rehab Resource Intensity Level, OT II (Moderate Resource Intensity)   Additional Comments  Pt seen as a co-session due to the patient's co-morbidities, clinically unstable presentation, and present impairments which are a regression from the patient's baseline.   Additional Comments 2 The patient's raw score on the -PAC Daily Activity Inpatient Short Form is 18. A raw score of less than 19 suggests the patient may benefit from discharge to post-acute rehabilitation services. Please refer to the recommendation of the Occupational Therapist for safe discharge planning.   AM-PAC Daily Activity Inpatient   Lower Body Dressing 3   Bathing 3   Toileting 3   Upper Body Dressing 3   Grooming 3   Eating 3   Daily Activity Raw Score 18   Daily Activity Standardized Score (Calc for Raw Score >=11) 38.66   AM-PAC Applied Cognition Inpatient   Following a Speech/Presentation 3   Understanding Ordinary Conversation 3   Taking Medications 2   Remembering Where Things Are Placed or Put Away 2   Remembering List of 4-5 Errands 2   Taking Care of Complicated Tasks 2   Applied Cognition Raw Score 14   Applied Cognition Standardized Score 32.02   End of Consult   Education Provided Yes   Patient Position at End of Consult Bedside chair;Bed/Chair alarm activated;All needs within reach   Nurse Communication Nurse aware of consult     Goals   1) Pt will complete UB ADLs w/ mod I to increase functional independence.     2) Pt will complete LB ADLs w/ mod I to increase functional independence.      3) Pt will complete functional mobility w/ mod I, with or without use of AD/DME, to increase independence and safety during functional tasks.      4) Pt will  complete all functional transfers w/ mod I, with or without use of AD/DME, to increase independence and safety w/ functional transfers.      5) Pt will tolerate 10 minutes of standing w/ mod I, with or without use of AD/DME, to improve endurance to participate in functional activities.     6) Pt will complete toileting w/ mod I, with or without use of AD/DME, to increase independence w/ self-care.     7) Pt will complete bed mobility w/ mod I and sit EOB w/ G balance as a prerequisite for seated ADLs.     8) Pt will remain 100% attentive throughout formal cognitive assessment.     9) Pt will increase BUE strength +1/2 MMT grade to increase independence w/ self-care.    ALEJANDRO Badillo              [1]   Past Medical History:  Diagnosis Date    Glaucoma     Hypertension    [2]   Past Surgical History:  Procedure Laterality Date    BREAST LUMPECTOMY      HYSTERECTOMY

## 2025-06-26 NOTE — PLAN OF CARE
Problem: PHYSICAL THERAPY ADULT  Goal: Performs mobility at highest level of function for planned discharge setting.  See evaluation for individualized goals.  Description: Treatment/Interventions: Functional transfer training, LE strengthening/ROM, Therapeutic exercise, Elevations, Endurance training, Equipment eval/education, Patient/family training, Bed mobility, Gait training, Continued evaluation, Cognitive reorientation, Compensatory technique education, Spoke to nursing, OT  Equipment Recommended:  (TBD with further mobility)       See flowsheet documentation for full assessment, interventions and recommendations.  Note: Prognosis: Fair  Problem List: Decreased strength, Impaired balance, Decreased endurance, Decreased cognition, Decreased coordination, Decreased mobility, Impaired judgement, Decreased safety awareness, Pain  Assessment: Pt is an 84 y.o. female presenting to \[facility]\"" on 6/25/25 for primary medical dx of generalized weakness. Pt  has a past medical history of Glaucoma and Hypertension. Pt presents as a high complexity evaluation due to Ongoing medical management for primary dx, Increased reliance on more restrictive AD compared to baseline, Decreased activity tolerance compared to baseline, Fall risk, Increased assistance needed from caregiver at current time, Ongoing telemetry monitoring, Cog status, Increased O2 via NC from pts baseline, Trending lab values, Continuous pulse oximetry monitoring . Pt currently requires mod A for bed mobility, mod A for transfers with HHA and mod A for ambulating 10'x2 with HHA. Pt is limited by pain and deficits in strength, endurance, balance, mobility and activity tolerance limiting their ability to be independent at this time and safely manage at home. Pt would benefit from continued skilled acute care PT services to address impairments and promote functional independence. Recommend level 2 resources to improve mobility and promote PLOF. The patient's AM-PAC  Basic Mobility Inpatient Short Form Raw Score is 13. A Raw score of less than 16 suggests the patient may benefit from discharge to post-acute rehabilitation services. Please also refer to the recommendation of the Physical Therapist for safe discharge planning.  Pt left upright in chair with chair alarm donned, call bell and personal items within reach and all needs met.  Barriers to Discharge: Inaccessible home environment, Decreased caregiver support     Rehab Resource Intensity Level, PT: II (Moderate Resource Intensity) (pending progress)    See flowsheet documentation for full assessment.

## 2025-06-26 NOTE — QUICK NOTE
Past Medical History:   Diagnosis Date    Abnormal Pap smear of cervix age 15    cryo done, nl since    Anemia     Costochondritis     Mycobacterium avium complex        Past Surgical History:   Procedure Laterality Date    Dsubsp-bzclir-ox N/A 4/4/2019    Performed by [facility] Provider at [facility] OR 2ND FLR    BRONCHOSCOPY      BRONCHOSCOPY N/A 4/6/2015    Per[facility] by [facility] at [facility] C[facility]    CERVIX LESION DESTRUCTION      flexible bronchoscopy with BAL N/A 5/7/2018    Performed by [facility] F[facility]    Flexible bronchoscopy with tissue biopsy with fluoro in room for case N/A 6/12/2019    Performed by Denzel Rooney MD at [facility] FLR    INSERTION, CATHETER, CENTRAL VENOUS, HOFFMAN Right 6/12/2019    Performed by Denzel Rooney MD at [facility] OR [address] [place] [address]       Review of patient's allergies indicates:   Allergen Reactions    Rifamycin analogues Other (See Comments)     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 Do not give any rifamycins.       Family History     Problem Relation (Age of Onset)    Abnormal EKG Sister    Heart disease Maternal Grandmother    Heart failure Father, Brother    Thyroid disease Mother          Tobacco Use    Smoking status: Never Smoker    Smokeless tobacco: Never Used   Substance and Sexual Activity    Alcohol use: Yes     Alcohol/week: 0.6 - 1.2 oz     Types: 1 - 2 Glasses of wine per week     Comment: occasionally    Drug use: No    Sexual activity: Not Currently     Birth control/protection: Injection     Comment: single       Review of Systems   Unable to perform ROS: Intubated       Objective:     Temp:  [97.3 °F (36.3 °C)-100.5 °F (38.1 °C)] 98.8 °F (37.1 °C)  Pulse:  [] 61  Resp:  [19-36] 25  SpO2:  [89 %-99 %] 94 %  BP: (108-135)/(61-85) 113/70  Arterial Line BP: (107-151)/(61-77) 112/68     Body mass index is 24.41 kg/m².    Date 06/30/19 0700 - 07/01/19 0659   Shift  SLIM Post Admit Follow Up-  Patient evaluated eating dinner in bed.  She denies pain at this time.  Reviewed incidental finding on chest CT of right upper lobe lung nodule.  Patient is unsure of her gabapentin dosage.  She reports her pain is controlled at this time.  She has no questions or concerns and declines update to family.  Labs reviewed, TSH, vitamin D, and folate all within normal limits.  Vitamin B12 elevated  PT OT consults pending   [id number] 24 Hour Total   INTAKE   I.V.(mL/kg) 350(5.1) 127(1.9)  477(7)   IV Piggyback 150 721  871   Shift Total(mL/kg) 500(7.3) 848(12.4)  1348(19.6)   OUTPUT   Shift Total(mL/kg)       Weight (kg) 68.6 68.6 68.6 68.6     Bladder Scan Volume (mL): 387 mL (06/30/19 1100)    Drains     Drain                 Trialysis (Dialysis) Catheter 06/28/19 0837 left internal jugular 2 days         Urethral Catheter 06/30/19 1145 Latex 20 Fr. less than 1 day                Physical Exam   Constitutional: No distress.   Intubated, can respond to yes/no questions   HENT:   Head: Normocephalic and atraumatic.   Eyes: Conjunctivae are normal. No scleral icterus.   Neck: Normal range of motion.   Cardiovascular: Normal rate.    Pulmonary/Chest: Effort normal. No respiratory distress.   Abdominal: Soft. She exhibits no distension. There is no tenderness.   Genitourinary:   Genitourinary Comments:   16fr dunn catheter draining bloody urine   Musculoskeletal: Normal range of motion.   Neurological: She is alert.   Skin: Skin is warm and dry. She is not diaphoretic.     Psychiatric: She has a normal mood and affect.       Significant Labs:    BMP:  Recent Labs   Lab 06/29/19  1401 06/30/19  0408 06/30/19  1422   * 135* 138   K 4.8 4.1 4.0    101 103   CO2 18* 22* 21*   BUN 27* 10 18   CREATININE 3.2* 1.5* 2.5*   CALCIUM 7.6* 7.7* 7.6*       CBC:  Recent Labs   Lab 06/29/19  1256 06/29/19  1803 06/30/19  0408   WBC 23.70* 23.65* 22.89*   HGB 7.9* 7.7* 8.1*   HCT 24.0* 23.0* 24.8*   PLT 1* 1* 1*       Urine Culture:   Recent Labs   Lab 06/27/19 2050   LABURIN No growth     Urine Studies:   Recent Labs   Lab 06/27/19 2050   COLORU Red*   APPEARANCEUA Cloudy*   PHUR 6.0   SPECGRAV 1.030   PROTEINUA 2+*   GLUCUA 1+*   KETONESU Trace*   BILIRUBINUA Negative   OCCULTUA 3+*   NITRITE Negative   LEUKOCYTESUR Negative   RBCUA >100*   WBCUA 64*   BACTERIA Occasional   HYALINECASTS 0       Significant  Imaging:  CT: I have reviewed all results within the past 24 hours and my personal findings are:  CT non-con from two days ago shows no hydronephrosis and no obvious bladder abnormalities, there is a small calcification near the area of expected distal left ureter but this is unclear if it is within the ureteral lumen

## 2025-06-26 NOTE — PROGRESS NOTES
Progress Note - Hospitalist   Name: Danita Palmer 84 y.o. female I MRN: [id number]  [facility] Date of Admission: 6/25/2025   Date of Service: 6/26/2025 I Hospital Day: 1    Assessment & Plan  Generalized weakness  [facility], trauma imaging all negative for acute traumatic injuries  During ambulatory trial in ED patient unable to safely ambulate due to marked weakness  Labs with mild hyponatremia otherwise without marked abnormalities  Patient somewhat sleepy during admission evaluation and does not fully recall episode,?  If some degree of cognitive impairment.  CT head negative for acute intracranial pathology.    TSH, B12, vitamin D and folate unremarkable  PT/OT evaluations recommend level 2 rehab intensity  Fall at home, initial encounter  [facility], states she was walking when she could not stop her self and fell forward onto her knees without dizziness/lightheadedness or loss of consciousness  Patient with weakness with inability to safely ambulate, management of this as per primary problem  PT/OT evaluations recommend rehab, case management assistance appreciated  CKD stage G3a/A1, GFR 45-59 and albumin creatinine ratio <30 mg/g ([facility])  Lab Results   Component Value Date    EGFR 44 06/25/2025    EGFR 50 (L) 03/31/2025    CREATININE 1.13 06/25/2025    CREATININE 1.08 03/31/2025    CREATININE 1.06 (H) 12/21/2017   Renal function at baseline, monitor with BMP intermittently.  Avoid/limit nephrotoxins and hypotension  Tobacco abuse  Discussed need for cessation.  Patient declined nicotine patch at this time  Essential hypertension  Continue PTA lisinopril 10 mg daily restrict hold parameters and monitor blood pressure per protocol  Chronic obstructive pulmonary disease (HCC)  Not in acute exacerbation, continue PTA inhalers  Disc degeneration, lumbar  Chronic, patient denying worsening back pain  Continue gabapentin but will  reduce dose to 300 mg 3 times daily as patient somewhat sleepy during admission evaluation  6/26 patient denies increased pain with decreased dosage.  If she can tolerate would recommend continuing with lower dosage on discharge to decrease risk of future falls  Lung nodule  7 mm spiculated nodule to right upper lobe incidentally noted on CT chest  Repeat noncontrast CT chest advised at 6-12 months, patient to follow-up with PCP to arrange this.  Results reviewed with patient and her son  Left rib fracture  Nondisplaced fractures of the anterolateral left 4th and 5th ribs  Incentive spirometry and rib fracture protocol ordered  Pain control including lidocaine patch and scheduled Tylenol ordered  May need ambulating pulse ox prior to discharge  Constipation  Patient reports she is constipated but passing gas  Bowel regimen ordered  Continue to monitor    VTE Pharmacologic Prophylaxis: VTE Score: 3 Moderate Risk (Score 3-4) - Pharmacological DVT Prophylaxis Ordered: heparin.    Mobility:   Basic Mobility Inpatient Raw Score: 13  JH-HLM Goal: 4: Move to chair/commode  JH-HLM Achieved: 6: Walk 10 steps or more  JH-HLM Goal achieved. Continue to encourage appropriate mobility.    Patient Centered Rounds: I evaluated the patient without nursing staff present due to RN unavailable, communicated via epic chat   Discussions with Specialists or Other Care Team Provider: Case management    Education and Discussions with Family / Patient: Updated  (son) at bedside.    Current Length of Stay: 1 day(s)  Current Patient Status: Inpatient   Certification Statement: The patient will continue to require additional inpatient hospital stay due to hypoxia, constipation, and rehab placement  Discharge Plan: Anticipate discharge in 24-48 hrs to rehab facility.    Code Status: Level 1 - Full Code    Subjective   Patient denies pain at this time.  She reports her shoulder has been sore and they are getting a heating pad for  it.  She reports she is feeling constipated but is passing gas.  Son is agreeable to rehab.  All questions and concerns addressed.    Objective :  Temp:  [99 °F (37.2 °C)-99.7 °F (37.6 °C)] 99.7 °F (37.6 °C)  HR:  [79-88] 86  BP: (154-156)/(71-83) 154/72  Resp:  [17-19] 17  SpO2:  [86 %-92 %] 92 %  O2 Device: Nasal cannula  Nasal Cannula O2 Flow Rate (L/min):  [2 L/min] 2 L/min    Body mass index is 20.78 kg/m².     Input and Output Summary (last 24 hours):     Intake/Output Summary (Last 24 hours) at 6/26/2025 1637  Last data filed at 6/26/2025 1300  Gross per 24 hour   Intake 720 ml   Output 315 ml   Net 405 ml       Physical Exam  Vitals and nursing note reviewed.   Constitutional:       General: She is not in acute distress.     Appearance: Normal appearance. She is not ill-appearing.      Interventions: Nasal cannula in place.   HENT:      Head: Normocephalic.      Mouth/Throat:      Mouth: Mucous membranes are moist.      Pharynx: Oropharynx is clear.     Eyes:      Conjunctiva/sclera: Conjunctivae normal.       Cardiovascular:      Rate and Rhythm: Normal rate and regular rhythm.      Pulses: Normal pulses.      Heart sounds: Normal heart sounds. No murmur heard.  Pulmonary:      Effort: Pulmonary effort is normal. No respiratory distress.      Breath sounds: Normal breath sounds. No wheezing or rhonchi.   Abdominal:      General: Bowel sounds are normal. There is no distension.      Palpations: Abdomen is soft.      Tenderness: There is no abdominal tenderness. There is no guarding.     Musculoskeletal:      Right lower leg: No edema.      Left lower leg: No edema.     Skin:     General: Skin is warm and dry.     Neurological:      General: No focal deficit present.      Mental Status: She is alert. Mental status is at baseline.     Psychiatric:         Mood and Affect: Mood normal.         Thought Content: Thought content normal.           Lines/Drains:              Lab Results: I have reviewed the  following results:   Results from last 7 days   Lab Units 06/25/25  0213   WBC Thousand/uL 9.25   HEMOGLOBIN g/dL 12.7   HEMATOCRIT % 38.7   PLATELETS Thousands/uL 282   SEGS PCT % 80*   LYMPHO PCT % 6*   MONO PCT % 14*   EOS PCT % 0     Results from last 7 days   Lab Units 06/25/25  0213   SODIUM mmol/L 134*   POTASSIUM mmol/L 4.2   CHLORIDE mmol/L 100   CO2 mmol/L 27   BUN mg/dL 19   CREATININE mg/dL 1.13   ANION GAP mmol/L 7   CALCIUM mg/dL 10.1   ALBUMIN g/dL 3.7   TOTAL BILIRUBIN mg/dL 0.37   ALK PHOS U/L 62   ALT U/L 19   AST U/L 31   GLUCOSE RANDOM mg/dL 109                       Recent Cultures (last 7 days):         Imaging Results Review: I reviewed radiology reports from this admission including: CT chest.  Other Study Results Review: No additional pertinent studies reviewed.    Last 24 Hours Medication List:     Current Facility-Administered Medications:     acetaminophen (TYLENOL) tablet 975 mg, Q8H JOSE MANUEL    albuterol (PROVENTIL HFA,VENTOLIN HFA) inhaler 1 puff, Q4H PRN    Cholecalciferol (VITAMIN D3) tablet 2,000 Units, Daily    docusate sodium (COLACE) capsule 100 mg, BID    gabapentin (NEURONTIN) capsule 300 mg, TID    heparin (porcine) subcutaneous injection 5,000 Units, Q8H JOSE MANUEL    lidocaine (LIDODERM) 5 % patch 1 patch, Daily    lisinopril (ZESTRIL) tablet 10 mg, Daily    nicotine (NICODERM CQ) 14 mg/24hr TD 24 hr patch 1 patch, Daily    ondansetron (ZOFRAN) injection 4 mg, Q6H PRN    oxyCODONE (ROXICODONE) split tablet 2.5 mg, Q4H PRN **OR** oxyCODONE (ROXICODONE) IR tablet 5 mg, Q4H PRN    polyethylene glycol (MIRALAX) packet 17 g, Daily PRN    senna (SENOKOT) tablet 8.6 mg, HS    umeclidinium-vilanterol 62.5-25 mcg/actuation inhaler 1 puff, Daily    Administrative Statements   Today, Patient Was Seen By: RASHI Elam      **Please Note: This note may have been constructed using a voice recognition system.**

## 2025-06-26 NOTE — ASSESSMENT & PLAN NOTE
Nondisplaced fractures of the anterolateral left 4th and 5th ribs  Incentive spirometry and rib fracture protocol ordered  Pain control including lidocaine patch and scheduled Tylenol ordered  May need ambulating pulse ox prior to discharge

## 2025-06-27 LAB
ANION GAP SERPL CALCULATED.3IONS-SCNC: 6 MMOL/L (ref 4–13)
BUN SERPL-MCNC: 21 MG/DL (ref 5–25)
CALCIUM SERPL-MCNC: 8.6 MG/DL (ref 8.4–10.2)
CHLORIDE SERPL-SCNC: 101 MMOL/L (ref 96–108)
CO2 SERPL-SCNC: 28 MMOL/L (ref 21–32)
CREAT SERPL-MCNC: 0.83 MG/DL (ref 0.6–1.3)
ERYTHROCYTE [DISTWIDTH] IN BLOOD BY AUTOMATED COUNT: 13.8 % (ref 11.6–15.1)
GFR SERPL CREATININE-BSD FRML MDRD: 64 ML/MIN/1.73SQ M
GLUCOSE SERPL-MCNC: 94 MG/DL (ref 65–140)
HCT VFR BLD AUTO: 38.9 % (ref 34.8–46.1)
HGB BLD-MCNC: 13.3 G/DL (ref 11.5–15.4)
MAGNESIUM SERPL-MCNC: 1.9 MG/DL (ref 1.9–2.7)
MCH RBC QN AUTO: 30.4 PG (ref 26.8–34.3)
MCHC RBC AUTO-ENTMCNC: 34.2 G/DL (ref 31.4–37.4)
MCV RBC AUTO: 89 FL (ref 82–98)
PLATELET # BLD AUTO: 269 THOUSANDS/UL (ref 149–390)
PMV BLD AUTO: 10 FL (ref 8.9–12.7)
POTASSIUM SERPL-SCNC: 3.8 MMOL/L (ref 3.5–5.3)
RBC # BLD AUTO: 4.37 MILLION/UL (ref 3.81–5.12)
SODIUM SERPL-SCNC: 135 MMOL/L (ref 135–147)
WBC # BLD AUTO: 15.48 THOUSAND/UL (ref 4.31–10.16)

## 2025-06-27 PROCEDURE — 80048 BASIC METABOLIC PNL TOTAL CA: CPT

## 2025-06-27 PROCEDURE — 85027 COMPLETE CBC AUTOMATED: CPT

## 2025-06-27 PROCEDURE — 99232 SBSQ HOSP IP/OBS MODERATE 35: CPT | Performed by: NURSE PRACTITIONER

## 2025-06-27 PROCEDURE — 83735 ASSAY OF MAGNESIUM: CPT

## 2025-06-27 RX ORDER — POLYETHYLENE GLYCOL 3350 17 G/17G
17 POWDER, FOR SOLUTION ORAL DAILY
Status: DISCONTINUED | OUTPATIENT
Start: 2025-06-27 | End: 2025-06-30

## 2025-06-27 RX ADMIN — HEPARIN SODIUM 5000 UNITS: 5000 INJECTION INTRAVENOUS; SUBCUTANEOUS at 13:24

## 2025-06-27 RX ADMIN — NICOTINE 1 PATCH: 14 PATCH, EXTENDED RELEASE TRANSDERMAL at 09:09

## 2025-06-27 RX ADMIN — LIDOCAINE 1 PATCH: 700 PATCH TOPICAL at 09:09

## 2025-06-27 RX ADMIN — OXYCODONE HYDROCHLORIDE 5 MG: 5 TABLET ORAL at 09:09

## 2025-06-27 RX ADMIN — GABAPENTIN 300 MG: 300 CAPSULE ORAL at 17:48

## 2025-06-27 RX ADMIN — ACETAMINOPHEN 975 MG: 325 TABLET ORAL at 21:36

## 2025-06-27 RX ADMIN — ACETAMINOPHEN 975 MG: 325 TABLET ORAL at 13:24

## 2025-06-27 RX ADMIN — GABAPENTIN 300 MG: 300 CAPSULE ORAL at 09:10

## 2025-06-27 RX ADMIN — HEPARIN SODIUM 5000 UNITS: 5000 INJECTION INTRAVENOUS; SUBCUTANEOUS at 21:36

## 2025-06-27 RX ADMIN — POLYETHYLENE GLYCOL 3350 17 G: 17 POWDER, FOR SOLUTION ORAL at 09:09

## 2025-06-27 RX ADMIN — Medication 2000 UNITS: at 09:10

## 2025-06-27 RX ADMIN — UMECLIDINIUM BROMIDE AND VILANTEROL TRIFENATATE 1 PUFF: 62.5; 25 POWDER RESPIRATORY (INHALATION) at 09:10

## 2025-06-27 RX ADMIN — HEPARIN SODIUM 5000 UNITS: 5000 INJECTION INTRAVENOUS; SUBCUTANEOUS at 05:22

## 2025-06-27 RX ADMIN — DOCUSATE SODIUM 100 MG: 100 CAPSULE, LIQUID FILLED ORAL at 09:10

## 2025-06-27 NOTE — PLAN OF CARE
Problem: Potential for Falls  Goal: Patient will remain free of falls  Description: INTERVENTIONS:  - Educate patient/family on patient safety including physical limitations  - Instruct patient to call for assistance with activity   - Consider consulting OT/PT to assist with strengthening/mobility based on AM PAC & JH-HLM score  - Consult OT/PT to assist with strengthening/mobility   - Keep Call bell within reach  - Keep bed low and locked with side rails adjusted as appropriate  - Keep care items and personal belongings within reach  - Initiate and maintain comfort rounds  - Make Fall Risk Sign visible to staff  - Offer Toileting every 2 Hours, in advance of need  - Initiate/Maintain bed alarm  Problem: PAIN - ADULT  Goal: Verbalizes/displays adequate comfort level or baseline comfort level  Description: Interventions:  - Encourage patient to monitor pain and request assistance  - Assess pain using appropriate pain scale  - Administer analgesics as ordered based on type and severity of pain and evaluate response  - Implement non-pharmacological measures as appropriate and evaluate response  - Consider cultural and social influences on pain and pain management  - Notify physician/advanced practitioner if interventions unsuccessful or patient reports new pain  - Educate patient/family on pain management process including their role and importance of  reporting pain   - Provide non-pharmacologic/complimentary pain relief interventions  Outcome: Progressing     - Apply yellow socks and bracelet for high fall risk patients  - Consider moving patient to room near nurses station  Outcome: Progressing

## 2025-06-27 NOTE — ASSESSMENT & PLAN NOTE
Chronic, patient denying worsening back pain  Continue gabapentin but will reduce dose to 300 mg 3 times daily as patient somewhat sleepy during admission evaluation  6/26 patient denies increased pain with decreased dosage.  If she can tolerate would recommend continuing with lower dosage on discharge to decrease risk of future falls  Monitor

## 2025-06-27 NOTE — ASSESSMENT & PLAN NOTE
Lab Results   Component Value Date    EGFR 64 06/27/2025    EGFR 44 06/25/2025    EGFR 50 (L) 03/31/2025    CREATININE 0.83 06/27/2025    CREATININE 1.13 06/25/2025    CREATININE 1.08 03/31/2025   Renal function at baseline, monitor with BMP intermittently.  Avoid/limit nephrotoxins and hypotension

## 2025-06-27 NOTE — PROGRESS NOTES
Progress Note - Hospitalist   Name: Danita Palmer 84 y.o. female I MRN: [id number]  Unit/Bed#: [facility] 918-01 I Date of Admission: 6/25/2025   Date of Service: 6/27/2025 I Hospital Day: 2    Assessment & Plan  Generalized weakness  Presented after a fall in [facility] [place], trauma imaging all negative for acute traumatic injuries  During ambulatory trial in ED patient unable to safely ambulate due to marked weakness  Labs with mild hyponatremia otherwise without marked abnormalities  CT head negative for acute intracranial pathology.    TSH, B12, vitamin D and folate unremarkable  PT/OT evaluations recommend level 2 rehab intensity  WBC elevated 15.48, Tmax in the last 24 hours 99.7  Recommend patient to utilize incentive spirometry/Acapella valve  UA sent, patient is denying any urinary symptoms  Repeat CBC in a.m. and monitor fever curve  Fall at home, initial encounter  Presented after a fall in [facility] [place], states she was walking when she could not stop her self and fell forward onto her knees without dizziness/lightheadedness or loss of consciousness  Patient with weakness with inability to safely ambulate  PT/OT evaluations recommend rehab, case management assistance appreciated  CKD stage G3a/A1, GFR 45-59 and albumin creatinine ratio <30 mg/g ([facility])  Lab Results   Component Value Date    EGFR 64 06/27/2025    EGFR 44 06/25/2025    EGFR 50 (L) 03/31/2025    CREATININE 0.83 06/27/2025    CREATININE 1.13 06/25/2025    CREATININE 1.08 03/31/2025   Renal function at baseline, monitor with BMP intermittently.  Avoid/limit nephrotoxins and hypotension  Tobacco abuse  Discussed need for cessation.    Nicotine patch ordered  Essential hypertension  Continue PTA lisinopril 10 mg daily restrict hold parameters and monitor blood pressure per protocol  Chronic obstructive pulmonary disease (HCC)  Not in acute exacerbation, continue PTA inhalers  Disc degeneration, lumbar  Chronic, patient denying worsening back  pain  Continue gabapentin but will reduce dose to 300 mg 3 times daily as patient somewhat sleepy during admission evaluation  6/26 patient denies increased pain with decreased dosage.  If she can tolerate would recommend continuing with lower dosage on discharge to decrease risk of future falls  Monitor  Lung nodule  7 mm spiculated nodule to right upper lobe incidentally noted on CT chest  Repeat noncontrast CT chest advised at 6-12 months, patient to follow-up with PCP to arrange this.  Results reviewed with patient and her son  Left rib fracture  Nondisplaced fractures of the anterolateral left 4th and 5th ribs  Incentive spirometry and rib fracture protocol ordered  Pain control including lidocaine patch and scheduled Tylenol ordered  May need ambulating pulse ox prior to discharge  Constipation  Patient reports she is constipated but passing gas  Bowel regimen ordered  Continue to monitor    VTE Pharmacologic Prophylaxis: VTE Score: 3 Moderate Risk (Score 3-4) - Pharmacological DVT Prophylaxis Ordered: heparin.    Mobility:   Basic Mobility Inpatient Raw Score: 16  JH-HLM Goal: 5: Stand one or more mins  JH-HLM Achieved: 7: Walk 25 feet or more  JH-HLM Goal achieved. Continue to encourage appropriate mobility.    Patient Centered Rounds: I performed bedside rounds with nursing staff today.   Discussions with Specialists or Other Care Team Provider: multidisciplinary team     Education and Discussions with Family / Patient: Attempted to update  (daughter) via phone. Unable to contact.    Current Length of Stay: 2 day(s)  Current Patient Status: Inpatient   Certification Statement: The patient will continue to require additional inpatient hospital stay due to pain management, PT/OT evaluation treatment, check UA, repeat CBC in a.m.  Discharge Plan: Anticipate discharge in 24-48 hrs to rehab facility.    Code Status: Level 1 - Full Code    Subjective   Patient seen sitting up in bed resting  comfortably.  Reports that the left rib pain is managed however she did need to take some pain medication.  Fair appetite no nausea or vomiting.  Reports that she is passing gas but still no bowel movement    Objective :  Temp:  [97.8 °F (36.6 °C)-99.7 °F (37.6 °C)] 97.8 °F (36.6 °C)  HR:  [78-89] 89  BP: (126-154)/(64-74) 126/74  Resp:  [17-20] 20  SpO2:  [89 %-94 %] 89 %  O2 Device: Nasal cannula  Nasal Cannula O2 Flow Rate (L/min):  [2 L/min] 2 L/min    Body mass index is 20.78 kg/m².     Input and Output Summary (last 24 hours):     Intake/Output Summary (Last 24 hours) at 6/27/2025 1230  Last data filed at 6/26/2025 1735  Gross per 24 hour   Intake 300 ml   Output --   Net 300 ml       Physical Exam  Vitals and nursing note reviewed.   Constitutional:       Comments: Thin, frail elderly female resting quietly in bed    HENT:      Head: Normocephalic.      Nose: Nose normal.      Mouth/Throat:      Mouth: Mucous membranes are moist.     Eyes:      Extraocular Movements: Extraocular movements intact.      Conjunctiva/sclera: Conjunctivae normal.       Cardiovascular:      Rate and Rhythm: Normal rate.      Pulses: Normal pulses.   Pulmonary:      Comments: Shallow breaths; encouraged incentive spirometry (left rib fractures)  Abdominal:      General: There is no distension.      Palpations: Abdomen is soft.      Tenderness: There is no abdominal tenderness.   Genitourinary:     Comments: Voiding spontaneously     Musculoskeletal:      Cervical back: Normal range of motion.      Comments: Left arm limited ROM by rib pain      Skin:     General: Skin is warm and dry.      Capillary Refill: Capillary refill takes less than 2 seconds.      Coloration: Skin is pale.     Neurological:      General: No focal deficit present.      Mental Status: She is oriented to person, place, and time.     Psychiatric:         Mood and Affect: Mood normal.         Behavior: Behavior normal.         Thought Content: Thought content  normal.         Judgment: Judgment normal.           Lines/Drains:              Lab Results: I have reviewed the following results:   Results from last 7 days   Lab Units 06/27/25  0442 06/25/25  0213   WBC Thousand/uL 15.48* 9.25   HEMOGLOBIN g/dL 13.3 12.7   HEMATOCRIT % 38.9 38.7   PLATELETS Thousands/uL 269 282   SEGS PCT %  --  80*   LYMPHO PCT %  --  6*   MONO PCT %  --  14*   EOS PCT %  --  0     Results from last 7 days   Lab Units 06/27/25  0442 06/25/25  0213   SODIUM mmol/L 135 134*   POTASSIUM mmol/L 3.8 4.2   CHLORIDE mmol/L 101 100   CO2 mmol/L 28 27   BUN mg/dL 21 19   CREATININE mg/dL 0.83 1.13   ANION GAP mmol/L 6 7   CALCIUM mg/dL 8.6 10.1   ALBUMIN g/dL  --  3.7   TOTAL BILIRUBIN mg/dL  --  0.37   ALK PHOS U/L  --  62   ALT U/L  --  19   AST U/L  --  31   GLUCOSE RANDOM mg/dL 94 109                       Recent Cultures (last 7 days):         Imaging Results Review: No pertinent imaging studies reviewed.  Other Study Results Review: No additional pertinent studies reviewed.    Last 24 Hours Medication List:     Current Facility-Administered Medications:     acetaminophen (TYLENOL) tablet 975 mg, Q8H JOSE MANUEL    albuterol (PROVENTIL HFA,VENTOLIN HFA) inhaler 1 puff, Q4H PRN    Cholecalciferol (VITAMIN D3) tablet 2,000 Units, Daily    docusate sodium (COLACE) capsule 100 mg, BID    gabapentin (NEURONTIN) capsule 300 mg, TID    heparin (porcine) subcutaneous injection 5,000 Units, Q8H JOSE MANUEL    lidocaine (LIDODERM) 5 % patch 1 patch, Daily    lisinopril (ZESTRIL) tablet 10 mg, Daily    nicotine (NICODERM CQ) 14 mg/24hr TD 24 hr patch 1 patch, Daily    ondansetron (ZOFRAN) injection 4 mg, Q6H PRN    oxyCODONE (ROXICODONE) split tablet 2.5 mg, Q4H PRN **OR** oxyCODONE (ROXICODONE) IR tablet 5 mg, Q4H PRN    polyethylene glycol (MIRALAX) packet 17 g, Daily    senna (SENOKOT) tablet 8.6 mg, HS    umeclidinium-vilanterol 62.5-25 mcg/actuation inhaler 1 puff, Daily    Administrative Statements   Today, Patient Was  Seen By: RASHI Gray  I have spent a total time of greater than 45 minutes in caring for this patient on the day of the visit/encounter including Diagnostic results, Patient and family education, Impressions, Counseling / Coordination of care, Documenting in the medical record, Reviewing/placing orders in the medical record (including tests, medications, and/or procedures), Obtaining or reviewing history  , and Communicating with other healthcare professionals .    **Please Note: This note may have been constructed using a voice recognition system.**

## 2025-06-27 NOTE — CASE MANAGEMENT
Case Management Assessment & Discharge Planning Note    Patient name Danita Palmer  Location [facility] 918/[facility] 918-01 MRN [id number]  : 1940 Date 2025       Current Admission Date: 2025  Current Admission Diagnosis:Generalized weakness   Patient Active Problem List    Diagnosis Date Noted    Left rib fracture 2025    Constipation 2025    Generalized weakness 2025    Fall at home, initial encounter 2025    Lung nodule 2025    Hypertriglyceridemia 10/23/2024    Neuropathy 2024    Mild protein-calorie malnutrition (HCC) 2023    H/O lumpectomy 2021    Essential hypertension 2020    Tobacco abuse 2019    CKD stage G3a/A1, GFR 45-59 and albumin creatinine ratio <30 mg/g (HCC) 2017    Suspicious nevus 2017    Disc degeneration, lumbar 2017    Post herpetic neuralgia 2016    Liver cyst 2016    Glaucoma 2014    Osteopenia 2013    Chronic obstructive pulmonary disease (HCC) 2012      LOS (days): 2  Geometric Mean LOS (GMLOS) (days): 3  Days to GMLOS:0.7     OBJECTIVE:    Risk of Unplanned Readmission Score: 10.11         Current admission status: Inpatient       Preferred Pharmacy:   [id number] - [place], [place] - [address]  [address]  [place] [address]  Phone: [phone] Fax: [phone]    Primary Care Provider: Duke Barnes MD    Primary Insurance: [employer] [facility]  Secondary Insurance:     ASSESSMENT:  Active Health Care Proxies    There are no active Health Care Proxies on file.                 Readmission Root Cause  30 Day Readmission: No    Patient Information  Admitted from:: Home  Mental Status: Alert  During Assessment patient was accompanied by: Daughter  Assessment information provided by:: Patient, Daughter  Primary Caregiver: Self  Support Systems: Daughter, Son, Family members  Home entry access options. Select all that apply.:  Stairs  Number of steps to enter home.: 8  Do the steps have railings?: Yes  Type of Current Residence: 2 story home  Upon entering residence, is there a bedroom on the main floor (no further steps)?: No  A bedroom is located on the following floor levels of residence (select all that apply):: 2nd Floor  Upon entering residence, is there a bathroom on the main floor (no further steps)?: No  Indicate which floors of current residence have a bathroom (select all the apply):: 2nd Floor  Number of steps to 2nd floor from main floor: One Flight  Living Arrangements: Lives w/ Daughter    Activities of Daily Living Prior to Admission  Functional Status: Independent  Completes ADLs independently?: Yes  Ambulates independently?: Yes  Does patient use assisted devices?: No  Does patient currently own DME?: Yes  What DME does the patient currently own?: Straight Cane  Does patient have a history of Outpatient Therapy (PT/OT)?: No  Does the patient have a history of Short-Term Rehab?: No  Does patient have a history of HHC?: No  Does patient currently have HHC?: No         Patient Information Continued  Income Source: SSI/SSD  Does patient have prescription coverage?: Yes  Does patient have a history of substance abuse?: No  Does patient have a history of Mental Health Diagnosis?: No         Means of Transportation  Means of Transport to Appts:: Drives Self          DISCHARGE DETAILS:    Discharge planning discussed with:: pt & daughter  Freedom of Choice: Yes     CM contacted family/caregiver?: Yes  Were Treatment Team discharge recommendations reviewed with patient/caregiver?: Yes  Did patient/caregiver verbalize understanding of patient care needs?: Yes  Were patient/caregiver advised of the risks associated with not following Treatment Team discharge recommendations?: Yes    Contacts  Patient Contacts: marissa Herrera  Relationship to Patient:: Family  Contact Method: Phone  Phone Number: [phone]  Reason/Outcome: Emergency  Contact              Other Referral/Resources/Interventions Provided:  Interventions: Short Term Rehab     S/w pt & daughter Maureen at bedside & explained CM role. Pt's daughter lives with pt in 2 story home with attic & basement. Bedroom on 2nd. Bath on 2nd & basement. IPTA. DME none at baseline, but has cane available. No oppt, vna or rhb. Denies any MH, D&A.  Retired, Drives.    Discussed STR & agreeable to blanket referrals & asked to send to [facility]. Sent via [employer].           1513 Pt's son here. S/w son & daughter in room & updated on DCP for STR Aware [facility] did not accept. SNF's are reviewing.

## 2025-06-27 NOTE — ASSESSMENT & PLAN NOTE
Presented after a fall in neighbors driveway, trauma imaging all negative for acute traumatic injuries  During ambulatory trial in ED patient unable to safely ambulate due to marked weakness  Labs with mild hyponatremia otherwise without marked abnormalities  CT head negative for acute intracranial pathology.    TSH, B12, vitamin D and folate unremarkable  PT/OT evaluations recommend level 2 rehab intensity  WBC elevated 15.48, Tmax in the last 24 hours 99.7  Recommend patient to utilize incentive spirometry/Acapella valve  UA sent, patient is denying any urinary symptoms  Repeat CBC in a.m. and monitor fever curve

## 2025-06-27 NOTE — ASSESSMENT & PLAN NOTE
Presented after a fall in neighbors driveway, states she was walking when she could not stop her self and fell forward onto her knees without dizziness/lightheadedness or loss of consciousness  Patient with weakness with inability to safely ambulate  PT/OT evaluations recommend rehab, case management assistance appreciated

## 2025-06-28 PROBLEM — R06.89 ACUTE RESPIRATORY INSUFFICIENCY: Status: ACTIVE | Noted: 2025-06-28

## 2025-06-28 LAB
ANION GAP SERPL CALCULATED.3IONS-SCNC: 6 MMOL/L (ref 4–13)
BASOPHILS # BLD AUTO: 0.03 THOUSANDS/ÂΜL (ref 0–0.1)
BASOPHILS NFR BLD AUTO: 0 % (ref 0–1)
BUN SERPL-MCNC: 25 MG/DL (ref 5–25)
CALCIUM SERPL-MCNC: 8.6 MG/DL (ref 8.4–10.2)
CHLORIDE SERPL-SCNC: 100 MMOL/L (ref 96–108)
CO2 SERPL-SCNC: 28 MMOL/L (ref 21–32)
CREAT SERPL-MCNC: 0.78 MG/DL (ref 0.6–1.3)
EOSINOPHIL # BLD AUTO: 0.01 THOUSAND/ÂΜL (ref 0–0.61)
EOSINOPHIL NFR BLD AUTO: 0 % (ref 0–6)
ERYTHROCYTE [DISTWIDTH] IN BLOOD BY AUTOMATED COUNT: 13.7 % (ref 11.6–15.1)
GFR SERPL CREATININE-BSD FRML MDRD: 69 ML/MIN/1.73SQ M
GLUCOSE SERPL-MCNC: 107 MG/DL (ref 65–140)
HCT VFR BLD AUTO: 38.6 % (ref 34.8–46.1)
HGB BLD-MCNC: 13.1 G/DL (ref 11.5–15.4)
IMM GRANULOCYTES # BLD AUTO: 0.09 THOUSAND/UL (ref 0–0.2)
IMM GRANULOCYTES NFR BLD AUTO: 1 % (ref 0–2)
LYMPHOCYTES # BLD AUTO: 0.6 THOUSANDS/ÂΜL (ref 0.6–4.47)
LYMPHOCYTES NFR BLD AUTO: 4 % (ref 14–44)
MCH RBC QN AUTO: 30.2 PG (ref 26.8–34.3)
MCHC RBC AUTO-ENTMCNC: 33.9 G/DL (ref 31.4–37.4)
MCV RBC AUTO: 89 FL (ref 82–98)
MONOCYTES # BLD AUTO: 1.04 THOUSAND/ÂΜL (ref 0.17–1.22)
MONOCYTES NFR BLD AUTO: 7 % (ref 4–12)
NEUTROPHILS # BLD AUTO: 12.63 THOUSANDS/ÂΜL (ref 1.85–7.62)
NEUTS SEG NFR BLD AUTO: 88 % (ref 43–75)
NRBC BLD AUTO-RTO: 0 /100 WBCS
PLATELET # BLD AUTO: 287 THOUSANDS/UL (ref 149–390)
PMV BLD AUTO: 9.5 FL (ref 8.9–12.7)
POTASSIUM SERPL-SCNC: 4 MMOL/L (ref 3.5–5.3)
RBC # BLD AUTO: 4.34 MILLION/UL (ref 3.81–5.12)
SODIUM SERPL-SCNC: 134 MMOL/L (ref 135–147)
WBC # BLD AUTO: 14.4 THOUSAND/UL (ref 4.31–10.16)

## 2025-06-28 PROCEDURE — 99232 SBSQ HOSP IP/OBS MODERATE 35: CPT

## 2025-06-28 PROCEDURE — 85025 COMPLETE CBC W/AUTO DIFF WBC: CPT | Performed by: NURSE PRACTITIONER

## 2025-06-28 PROCEDURE — 80048 BASIC METABOLIC PNL TOTAL CA: CPT | Performed by: NURSE PRACTITIONER

## 2025-06-28 RX ORDER — SENNOSIDES 8.6 MG
2 TABLET ORAL
Status: DISCONTINUED | OUTPATIENT
Start: 2025-06-28 | End: 2025-07-05

## 2025-06-28 RX ORDER — METHOCARBAMOL 500 MG/1
500 TABLET, FILM COATED ORAL EVERY 8 HOURS SCHEDULED
Status: DISCONTINUED | OUTPATIENT
Start: 2025-06-28 | End: 2025-07-12 | Stop reason: HOSPADM

## 2025-06-28 RX ORDER — BISACODYL 10 MG
10 SUPPOSITORY, RECTAL RECTAL DAILY PRN
Status: DISCONTINUED | OUTPATIENT
Start: 2025-06-28 | End: 2025-07-05

## 2025-06-28 RX ADMIN — LISINOPRIL 10 MG: 10 TABLET ORAL at 09:12

## 2025-06-28 RX ADMIN — GABAPENTIN 300 MG: 300 CAPSULE ORAL at 09:12

## 2025-06-28 RX ADMIN — DOCUSATE SODIUM 100 MG: 100 CAPSULE, LIQUID FILLED ORAL at 17:03

## 2025-06-28 RX ADMIN — HEPARIN SODIUM 5000 UNITS: 5000 INJECTION INTRAVENOUS; SUBCUTANEOUS at 20:53

## 2025-06-28 RX ADMIN — METHOCARBAMOL 500 MG: 500 TABLET ORAL at 20:53

## 2025-06-28 RX ADMIN — ACETAMINOPHEN 975 MG: 325 TABLET ORAL at 14:20

## 2025-06-28 RX ADMIN — SENNOSIDES 17.2 MG: 8.6 TABLET, FILM COATED ORAL at 20:53

## 2025-06-28 RX ADMIN — ACETAMINOPHEN 975 MG: 325 TABLET ORAL at 05:27

## 2025-06-28 RX ADMIN — HEPARIN SODIUM 5000 UNITS: 5000 INJECTION INTRAVENOUS; SUBCUTANEOUS at 05:28

## 2025-06-28 RX ADMIN — ACETAMINOPHEN 975 MG: 325 TABLET ORAL at 20:54

## 2025-06-28 RX ADMIN — LIDOCAINE 1 PATCH: 700 PATCH TOPICAL at 09:12

## 2025-06-28 RX ADMIN — Medication 2000 UNITS: at 09:11

## 2025-06-28 RX ADMIN — POLYETHYLENE GLYCOL 3350 17 G: 17 POWDER, FOR SOLUTION ORAL at 09:13

## 2025-06-28 RX ADMIN — HEPARIN SODIUM 5000 UNITS: 5000 INJECTION INTRAVENOUS; SUBCUTANEOUS at 14:20

## 2025-06-28 RX ADMIN — GABAPENTIN 300 MG: 300 CAPSULE ORAL at 17:03

## 2025-06-28 RX ADMIN — NICOTINE 1 PATCH: 14 PATCH, EXTENDED RELEASE TRANSDERMAL at 09:13

## 2025-06-28 RX ADMIN — DOCUSATE SODIUM 100 MG: 100 CAPSULE, LIQUID FILLED ORAL at 09:11

## 2025-06-28 RX ADMIN — GABAPENTIN 300 MG: 300 CAPSULE ORAL at 20:53

## 2025-06-28 RX ADMIN — METHOCARBAMOL 500 MG: 500 TABLET ORAL at 14:20

## 2025-06-28 RX ADMIN — UMECLIDINIUM BROMIDE AND VILANTEROL TRIFENATATE 1 PUFF: 62.5; 25 POWDER RESPIRATORY (INHALATION) at 09:11

## 2025-06-28 NOTE — ASSESSMENT & PLAN NOTE
Presented after a fall in neighbors driveway, trauma imaging all negative for acute traumatic injuries  During ambulatory trial in ED patient unable to safely ambulate due to marked weakness  Labs with mild hyponatremia otherwise without marked abnormalities  CT head negative for acute intracranial pathology.    TSH, B12, vitamin D and folate unremarkable  PT/OT evaluations recommend level 2 rehab intensity  WBC elevated 15.48, Tmax in the last 24 hours 99.7  Recommend patient to utilize incentive spirometry/Acapella valve  UA sent, patient is denying any urinary symptoms  UA pending at this time. WBC downtrending 6/28  Repeat CBC in a.m. and monitor fever curve

## 2025-06-28 NOTE — ASSESSMENT & PLAN NOTE
Patient with o2 saturations of 86 and 89%. Placed on 2L NC. Does not wear any o2 at baseline. States that she is having difficulty with deep breathing because of rib pain  CT chest: Nondisplaced fractures of the anterolateral left fourth and fifth ribs  Continue rib fracture protocol and incentive spirometry  Pending improvement in o2 saturations, may need home o2 eval prior to discharge.

## 2025-06-28 NOTE — ASSESSMENT & PLAN NOTE
Lab Results   Component Value Date    EGFR 69 06/28/2025    EGFR 64 06/27/2025    EGFR 44 06/25/2025    CREATININE 0.78 06/28/2025    CREATININE 0.83 06/27/2025    CREATININE 1.13 06/25/2025     Renal function at baseline, monitor with BMP.  Avoid/limit nephrotoxins and hypotension

## 2025-06-28 NOTE — ASSESSMENT & PLAN NOTE
7 mm spiculated nodule to right upper lobe incidentally noted on CT chest  Repeat noncontrast CT chest advised at 6-12 months, patient to follow-up with PCP to arrange this.  Previous provider reviewed results with patient and her son

## 2025-06-28 NOTE — ASSESSMENT & PLAN NOTE
Patient reports she is constipated but passing gas  Bowel regimen ordered and increased  Continue to monitor

## 2025-06-28 NOTE — PLAN OF CARE
Problem: Potential for Falls  Goal: Patient will remain free of falls  Description: INTERVENTIONS:  - Educate patient/family on patient safety including physical limitations  - Instruct patient to call for assistance with activity   - Consider consulting OT/PT to assist with strengthening/mobility based on AM PAC & -HLM score  - Consult OT/PT to assist with strengthening/mobility   - Keep Call bell within reach  - Keep bed low and locked with side rails adjusted as appropriate  - Keep care items and personal belongings within reach  - Initiate and maintain comfort rounds  - Make Fall Risk Sign visible to staff  - Offer Toileting every  Hours, in advance of need  - Initiate/Maintain alarm  - Obtain necessary fall risk management equipment:   - Apply yellow socks and bracelet for high fall risk patients  - Consider moving patient to room near nurses station  Outcome: Progressing     Problem: Prexisting or High Potential for Compromised Skin Integrity  Goal: Skin integrity is maintained or improved  Description: INTERVENTIONS:  - Identify patients at risk for skin breakdown  - Assess and monitor skin integrity including under and around medical devices   - Assess and monitor nutrition and hydration status  - Monitor labs  - Assess for incontinence   - Turn and reposition patient  - Assist with mobility/ambulation  - Relieve pressure over oneil prominences   - Avoid friction and shearing  - Provide appropriate hygiene as needed including keeping skin clean and dry  - Evaluate need for skin moisturizer/barrier cream  - Collaborate with interdisciplinary team  - Patient/family teaching  - Consider wound care consult    Assess:  - Review Jonn scale daily  - Clean and moisturize skin every   - Inspect skin when repositioning, toileting, and assisting with ADLS  - Assess under medical devices such as  every   - Assess extremities for adequate circulation and sensation     Bed Management:  - Have minimal linens on bed &  keep smooth, unwrinkled  - Change linens as needed when moist or perspiring  - Avoid sitting or lying in one position for more than  hours while in bed?Keep HOB at degrees   - Toileting:  - Offer bedside commode  - Assess for incontinence every   - Use incontinent care products after each incontinent episode such as     Activity:  - Mobilize patient  times a day  - Encourage activity and walks on unit  - Encourage or provide ROM exercises   - Turn and reposition patient every  Hours  - Use appropriate equipment to lift or move patient in bed  - Instruct/ Assist with weight shifting every  when out of bed in chair  - Consider limitation of chair time  hour intervals    Skin Care:  - Avoid use of baby powder, tape, friction and shearing, hot water or constrictive clothing  - Relieve pressure over bony prominences using   - Do not massage red bony areas    Next Steps:  - Teach patient strategies to minimize risks such as   - Consider consults to  interdisciplinary teams such as   Outcome: Progressing     Problem: PAIN - ADULT  Goal: Verbalizes/displays adequate comfort level or baseline comfort level  Description: Interventions:  - Encourage patient to monitor pain and request assistance  - Assess pain using appropriate pain scale  - Administer analgesics as ordered based on type and severity of pain and evaluate response  - Implement non-pharmacological measures as appropriate and evaluate response  - Consider cultural and social influences on pain and pain management  - Notify physician/advanced practitioner if interventions unsuccessful or patient reports new pain  - Educate patient/family on pain management process including their role and importance of  reporting pain   - Provide non-pharmacologic/complimentary pain relief interventions  Outcome: Progressing     Problem: INFECTION - ADULT  Goal: Absence or prevention of progression during hospitalization  Description: INTERVENTIONS:  - Assess and monitor for signs  and symptoms of infection  - Monitor lab/diagnostic results  - Monitor all insertion sites, i.e. indwelling lines, tubes, and drains  - Monitor endotracheal if appropriate and nasal secretions for changes in amount and color  - [place] appropriate cooling/warming therapies per order  - Administer medications as ordered  - Instruct and encourage patient and family to use good hand hygiene technique  - Identify and instruct in appropriate isolation precautions for identified infection/condition  Outcome: Progressing  Goal: Absence of fever/infection during neutropenic period  Description: INTERVENTIONS:  - Monitor WBC  - Perform strict hand hygiene  - Limit to healthy visitors only  - No plants, dried, fresh or silk flowers with beckett in patient room  Outcome: Progressing     Problem: SAFETY ADULT  Goal: Patient will remain free of falls  Description: INTERVENTIONS:  - Educate patient/family on patient safety including physical limitations  - Instruct patient to call for assistance with activity   - Consider consulting OT/PT to assist with strengthening/mobility based on AM PAC & -HLM score  - Consult OT/PT to assist with strengthening/mobility   - Keep Call bell within reach  - Keep bed low and locked with side rails adjusted as appropriate  - Keep care items and personal belongings within reach  - Initiate and maintain comfort rounds  - Make Fall Risk Sign visible to staff  - Offer Toileting every  Hours, in advance of need  - Initiate/Maintain alarm  - Obtain necessary fall risk management equipment:   - Apply yellow socks and bracelet for high fall risk patients  - Consider moving patient to room near nurses station  Outcome: Progressing  Goal: Maintain or return to baseline ADL function  Description: INTERVENTIONS:  -  Assess patient's ability to carry out ADLs; assess patient's baseline for ADL function and identify physical deficits which impact ability to perform ADLs (bathing, care of mouth/teeth,  toileting, grooming, dressing, etc.)  - Assess/evaluate cause of self-care deficits   - Assess range of motion  - Assess patient's mobility; develop plan if impaired  - Assess patient's need for assistive devices and provide as appropriate  - Encourage maximum independence but intervene and supervise when necessary  - Involve family in performance of ADLs  - Assess for home care needs following discharge   - Consider OT consult to assist with ADL evaluation and planning for discharge  - Provide patient education as appropriate  - Monitor functional capacity and physical performance, use of AM PAC & JH-HLM   - Monitor gait, balance and fatigue with ambulation    Outcome: Progressing  Goal: Maintains/Returns to pre admission functional level  Description: INTERVENTIONS:  - Perform AM-PAC 6 Click Basic Mobility/ Daily Activity assessment daily.  - Set and communicate daily mobility goal to care team and patient/family/caregiver.   - Collaborate with rehabilitation services on mobility goals if consulted  - Perform Range of Motion  times a day.  - Reposition patient every  hours.  - Dangle patient  times a day  - Stand patient  times a day  - Ambulate patient  times a day  - Out of bed to chair  times a day   - Out of bed for meals  times a day  - Out of bed for toileting  - Record patient progress and toleration of activity level   Outcome: Progressing     Problem: DISCHARGE PLANNING  Goal: Discharge to home or other facility with appropriate resources  Description: INTERVENTIONS:  - Identify barriers to discharge w/patient and caregiver  - Arrange for needed discharge resources and transportation as appropriate  - Identify discharge learning needs (meds, wound care, etc.)  - Arrange for interpretive services to assist at discharge as needed  - Refer to Case Management Department for coordinating discharge planning if the patient needs post-hospital services based on physician/advanced practitioner order or complex  needs related to functional status, cognitive ability, or social support system  Outcome: Progressing     Problem: Knowledge Deficit  Goal: Patient/family/caregiver demonstrates understanding of disease process, treatment plan, medications, and discharge instructions  Description: Complete learning assessment and assess knowledge base.  Interventions:  - Provide teaching at level of understanding  - Provide teaching via preferred learning methods  Outcome: Progressing

## 2025-06-28 NOTE — PROGRESS NOTES
Progress Note - Hospitalist   Name: Danita Palmer 84 y.o. female I MRN: [id number]  [facility] Date of Admission: 6/25/2025   Date of Service: 6/28/2025 I Hospital Day: 3    Assessment & Plan  Generalized weakness  Presented after a fall in [facility], trauma imaging all negative for acute traumatic injuries  During ambulatory trial in ED patient unable to safely ambulate due to marked weakness  Labs with mild hyponatremia otherwise without marked abnormalities  CT head negative for acute intracranial pathology.    TSH, B12, vitamin D and folate unremarkable  PT/OT evaluations recommend level 2 rehab intensity  WBC elevated 15.48, Tmax in the last 24 hours 99.7  Recommend patient to utilize incentive spirometry/Acapella valve  UA sent, patient is denying any urinary symptoms  UA pending at this time. WBC downtrending 6/28  Repeat CBC in a.m. and monitor fever curve  Left rib fracture  Nondisplaced fractures of the anterolateral left 4th and 5th ribs  Incentive spirometry and rib fracture protocol ordered  Pain control including lidocaine patch and scheduled Tylenol ordered  May need ambulating pulse ox prior to discharge  Fall at home, initial encounter  Presented after a fall in [facility], states she was walking when she could not stop her self and fell forward onto her knees without dizziness/lightheadedness or loss of consciousness  Patient with weakness with inability to safely ambulate  PT/OT evaluations recommend rehab, case management assistance appreciated  Acute respiratory insufficiency  Patient with o2 saturations of 86 and 89%. Placed on 2L NC. Does not wear any o2 at baseline. States that she is having difficulty with deep breathing because of rib pain  CT chest: Nondisplaced fractures of the anterolateral left fourth and fifth ribs  Continue rib fracture protocol and incentive spirometry  Pending improvement in o2 saturations, may need home o2 eval prior to discharge.    Constipation  Patient reports she is constipated but passing gas  Bowel regimen ordered and increased  Continue to monitor  CKD stage G3a/A1, GFR 45-59 and albumin creatinine ratio <30 mg/g ([facility])  Lab Results   Component Value Date    EGFR 69 06/28/2025    EGFR 64 06/27/2025    EGFR 44 06/25/2025    CREATININE 0.78 06/28/2025    CREATININE 0.83 06/27/2025    CREATININE 1.13 06/25/2025     Renal function at baseline, monitor with BMP.  Avoid/limit nephrotoxins and hypotension  Lung nodule  7 mm spiculated nodule to right upper lobe incidentally noted on CT chest  Repeat noncontrast CT chest advised at 6-12 months, patient to follow-up with PCP to arrange this.  Previous provider reviewed results with patient and her son  Disc degeneration, lumbar  Chronic, patient denying worsening back pain  Continue gabapentin but will reduce dose to 300 mg 3 times daily as patient somewhat sleepy during admission evaluation  6/26 patient denies increased pain with decreased dosage.  If she can tolerate would recommend continuing with lower dosage on discharge to decrease risk of future falls  Monitor  Tobacco abuse  Discussed need for cessation.    Nicotine patch ordered  Essential hypertension  Continue PTA lisinopril 10 mg daily restrict hold parameters and monitor blood pressure per protocol  Chronic obstructive pulmonary disease (HCC)  Not in acute exacerbation, continue PTA inhalers    VTE Pharmacologic Prophylaxis: VTE Score: 3 Moderate Risk (Score 3-4) - Pharmacological DVT Prophylaxis Ordered: heparin.    Mobility:   Basic Mobility Inpatient Raw Score: 16  JH-HLM Goal: 5: Stand one or more mins  JH-HLM Achieved: 7: Walk 25 feet or more  JH-HLM Goal achieved. Continue to encourage appropriate mobility.    Patient Centered Rounds: I performed bedside rounds with nursing staff today.   Discussions with Specialists or Other Care Team Provider: nursing, case management    Education and Discussions with Family / Patient:  Attempted to update  (son) via phone. Left voicemail.     Current Length of Stay: 3 day(s)  Current Patient Status: Inpatient   Certification Statement: The patient will continue to require additional inpatient hospital stay due to pending safe discharge planning, pending rehab placement, monitoring leukocytosis.   Discharge Plan: Anticipate discharge in 24-48 hrs to rehab facility.    Code Status: Level 1 - Full Code    Subjective   Seen and examined today, said that she is still having lots of rib pain. No chest pain specifically, just pain on the left side near the ribs. She said that she is having pain with deep breathing. No nausea, vomiting. No fevers or chills. Also states that she is still feeling constipated.     Objective :  Temp:  [98 °F (36.7 °C)-99.7 °F (37.6 °C)] 98 °F (36.7 °C)  HR:  [73-92] 84  BP: (132-152)/(60-80) 139/74  Resp:  [14-15] 14  SpO2:  [91 %-94 %] 93 %  O2 Device: Nasal cannula  Nasal Cannula O2 Flow Rate (L/min):  [2 L/min] 2 L/min    Body mass index is 20.78 kg/m².     Input and Output Summary (last 24 hours):     Intake/Output Summary (Last 24 hours) at 6/28/2025 0827  Last data filed at 6/27/2025 1244  Gross per 24 hour   Intake 120 ml   Output --   Net 120 ml       Physical Exam  Vitals reviewed.   Constitutional:       General: She is not in acute distress.     Appearance: She is ill-appearing (frail). She is not toxic-appearing.   HENT:      Head: Normocephalic and atraumatic.      Mouth/Throat:      Mouth: Mucous membranes are moist.     Cardiovascular:      Rate and Rhythm: Normal rate and regular rhythm.      Heart sounds: No murmur heard.  Pulmonary:      Effort: No respiratory distress.      Breath sounds: No stridor. No wheezing.      Comments: Decreased breath sounds, saturating well on 2L NC  Abdominal:      General: There is no distension.      Palpations: Abdomen is soft. There is no mass.      Tenderness: There is no abdominal tenderness.      Musculoskeletal:         General: Tenderness (left side ribs) present.      Right lower leg: No edema.      Left lower leg: No edema.     Skin:     General: Skin is warm and dry.      Findings: No bruising.     Neurological:      Mental Status: She is alert and oriented to person, place, and time.     Psychiatric:         Mood and Affect: Mood normal.         Behavior: Behavior normal.           Lines/Drains:              Lab Results: I have reviewed the following results:   Results from last 7 days   Lab Units 06/28/25  0637   WBC Thousand/uL 14.40*   HEMOGLOBIN g/dL 13.1   HEMATOCRIT % 38.6   PLATELETS Thousands/uL 287   SEGS PCT % 88*   LYMPHO PCT % 4*   MONO PCT % 7   EOS PCT % 0     Results from last 7 days   Lab Units 06/28/25  0637 06/27/25  0442 06/25/25  0213   SODIUM mmol/L 134*   < > 134*   POTASSIUM mmol/L 4.0   < > 4.2   CHLORIDE mmol/L 100   < > 100   CO2 mmol/L 28   < > 27   BUN mg/dL 25   < > 19   CREATININE mg/dL 0.78   < > 1.13   ANION GAP mmol/L 6   < > 7   CALCIUM mg/dL 8.6   < > 10.1   ALBUMIN g/dL  --   --  3.7   TOTAL BILIRUBIN mg/dL  --   --  0.37   ALK PHOS U/L  --   --  62   ALT U/L  --   --  19   AST U/L  --   --  31   GLUCOSE RANDOM mg/dL 107   < > 109    < > = values in this interval not displayed.                       Recent Cultures (last 7 days):         Imaging Results Review: I reviewed radiology reports from this admission including: CT chest.  Other Study Results Review: No additional pertinent studies reviewed.    Last 24 Hours Medication List:     Current Facility-Administered Medications:     acetaminophen (TYLENOL) tablet 975 mg, Q8H JOSE MANUEL    albuterol (PROVENTIL HFA,VENTOLIN HFA) inhaler 1 puff, Q4H PRN    Cholecalciferol (VITAMIN D3) tablet 2,000 Units, Daily    docusate sodium (COLACE) capsule 100 mg, BID    gabapentin (NEURONTIN) capsule 300 mg, TID    heparin (porcine) subcutaneous injection 5,000 Units, Q8H JOSE MANUEL    lidocaine (LIDODERM) 5 % patch 1 patch, Daily     lisinopril (ZESTRIL) tablet 10 mg, Daily    nicotine (NICODERM CQ) 14 mg/24hr TD 24 hr patch 1 patch, Daily    ondansetron (ZOFRAN) injection 4 mg, Q6H PRN    oxyCODONE (ROXICODONE) split tablet 2.5 mg, Q4H PRN **OR** oxyCODONE (ROXICODONE) IR tablet 5 mg, Q4H PRN    polyethylene glycol (MIRALAX) packet 17 g, Daily    senna (SENOKOT) tablet 8.6 mg, HS    umeclidinium-vilanterol 62.5-25 mcg/actuation inhaler 1 puff, Daily    Administrative Statements   Today, Patient Was Seen By: Conchita Soliman PA-C    **Please Note: This note may have been constructed using a voice recognition system.**

## 2025-06-29 ENCOUNTER — APPOINTMENT (INPATIENT)
Dept: RADIOLOGY | Facility: HOSPITAL | Age: 85
DRG: 183 | End: 2025-06-29
Payer: COMMERCIAL

## 2025-06-29 PROBLEM — R65.10 SIRS (SYSTEMIC INFLAMMATORY RESPONSE SYNDROME) (HCC): Status: ACTIVE | Noted: 2025-06-29

## 2025-06-29 LAB
ANION GAP SERPL CALCULATED.3IONS-SCNC: 8 MMOL/L (ref 4–13)
BUN SERPL-MCNC: 31 MG/DL (ref 5–25)
CALCIUM SERPL-MCNC: 9.2 MG/DL (ref 8.4–10.2)
CHLORIDE SERPL-SCNC: 100 MMOL/L (ref 96–108)
CO2 SERPL-SCNC: 27 MMOL/L (ref 21–32)
CREAT SERPL-MCNC: 0.77 MG/DL (ref 0.6–1.3)
ERYTHROCYTE [DISTWIDTH] IN BLOOD BY AUTOMATED COUNT: 13.8 % (ref 11.6–15.1)
GFR SERPL CREATININE-BSD FRML MDRD: 70 ML/MIN/1.73SQ M
GLUCOSE SERPL-MCNC: 99 MG/DL (ref 65–140)
HCT VFR BLD AUTO: 38.4 % (ref 34.8–46.1)
HGB BLD-MCNC: 12.9 G/DL (ref 11.5–15.4)
MAGNESIUM SERPL-MCNC: 2.1 MG/DL (ref 1.9–2.7)
MCH RBC QN AUTO: 30.2 PG (ref 26.8–34.3)
MCHC RBC AUTO-ENTMCNC: 33.6 G/DL (ref 31.4–37.4)
MCV RBC AUTO: 90 FL (ref 82–98)
PLATELET # BLD AUTO: 352 THOUSANDS/UL (ref 149–390)
PMV BLD AUTO: 10.2 FL (ref 8.9–12.7)
POTASSIUM SERPL-SCNC: 4.2 MMOL/L (ref 3.5–5.3)
PROCALCITONIN SERPL-MCNC: 2.45 NG/ML
RBC # BLD AUTO: 4.27 MILLION/UL (ref 3.81–5.12)
SODIUM SERPL-SCNC: 135 MMOL/L (ref 135–147)
WBC # BLD AUTO: 13.64 THOUSAND/UL (ref 4.31–10.16)

## 2025-06-29 PROCEDURE — 71250 CT THORAX DX C-: CPT

## 2025-06-29 PROCEDURE — 87040 BLOOD CULTURE FOR BACTERIA: CPT

## 2025-06-29 PROCEDURE — 83735 ASSAY OF MAGNESIUM: CPT

## 2025-06-29 PROCEDURE — 84145 PROCALCITONIN (PCT): CPT

## 2025-06-29 PROCEDURE — 74176 CT ABD & PELVIS W/O CONTRAST: CPT

## 2025-06-29 PROCEDURE — 87449 NOS EACH ORGANISM AG IA: CPT

## 2025-06-29 PROCEDURE — 85027 COMPLETE CBC AUTOMATED: CPT

## 2025-06-29 PROCEDURE — 94760 N-INVAS EAR/PLS OXIMETRY 1: CPT

## 2025-06-29 PROCEDURE — 99232 SBSQ HOSP IP/OBS MODERATE 35: CPT

## 2025-06-29 PROCEDURE — 80048 BASIC METABOLIC PNL TOTAL CA: CPT

## 2025-06-29 RX ORDER — GUAIFENESIN 600 MG/1
600 TABLET, EXTENDED RELEASE ORAL 2 TIMES DAILY
Status: DISCONTINUED | OUTPATIENT
Start: 2025-06-29 | End: 2025-07-09

## 2025-06-29 RX ORDER — LEVOFLOXACIN 5 MG/ML
500 INJECTION, SOLUTION INTRAVENOUS EVERY 24 HOURS
Status: DISCONTINUED | OUTPATIENT
Start: 2025-06-29 | End: 2025-06-30

## 2025-06-29 RX ADMIN — Medication 2.5 MG: at 07:47

## 2025-06-29 RX ADMIN — DOCUSATE SODIUM 100 MG: 100 CAPSULE, LIQUID FILLED ORAL at 16:21

## 2025-06-29 RX ADMIN — HEPARIN SODIUM 5000 UNITS: 5000 INJECTION INTRAVENOUS; SUBCUTANEOUS at 13:48

## 2025-06-29 RX ADMIN — Medication 2000 UNITS: at 07:47

## 2025-06-29 RX ADMIN — MAGNESIUM HYDROXIDE 30 ML: 1200 LIQUID ORAL at 15:28

## 2025-06-29 RX ADMIN — POLYETHYLENE GLYCOL 3350 17 G: 17 POWDER, FOR SOLUTION ORAL at 07:46

## 2025-06-29 RX ADMIN — BISACODYL 10 MG: 10 SUPPOSITORY RECTAL at 07:47

## 2025-06-29 RX ADMIN — METHOCARBAMOL 500 MG: 500 TABLET ORAL at 13:48

## 2025-06-29 RX ADMIN — LIDOCAINE 1 PATCH: 700 PATCH TOPICAL at 07:46

## 2025-06-29 RX ADMIN — GABAPENTIN 300 MG: 300 CAPSULE ORAL at 07:47

## 2025-06-29 RX ADMIN — SENNOSIDES 17.2 MG: 8.6 TABLET, FILM COATED ORAL at 21:28

## 2025-06-29 RX ADMIN — GUAIFENESIN 600 MG: 600 TABLET, EXTENDED RELEASE ORAL at 11:04

## 2025-06-29 RX ADMIN — GABAPENTIN 300 MG: 300 CAPSULE ORAL at 21:28

## 2025-06-29 RX ADMIN — METHOCARBAMOL 500 MG: 500 TABLET ORAL at 21:28

## 2025-06-29 RX ADMIN — ACETAMINOPHEN 325 MG: 325 TABLET ORAL at 21:28

## 2025-06-29 RX ADMIN — GUAIFENESIN 600 MG: 600 TABLET, EXTENDED RELEASE ORAL at 16:21

## 2025-06-29 RX ADMIN — GABAPENTIN 300 MG: 300 CAPSULE ORAL at 16:21

## 2025-06-29 RX ADMIN — NICOTINE 1 PATCH: 14 PATCH, EXTENDED RELEASE TRANSDERMAL at 07:46

## 2025-06-29 RX ADMIN — LEVOFLOXACIN 500 MG: 500 INJECTION, SOLUTION INTRAVENOUS at 14:26

## 2025-06-29 RX ADMIN — HEPARIN SODIUM 5000 UNITS: 5000 INJECTION INTRAVENOUS; SUBCUTANEOUS at 21:29

## 2025-06-29 RX ADMIN — DOCUSATE SODIUM 100 MG: 100 CAPSULE, LIQUID FILLED ORAL at 07:47

## 2025-06-29 RX ADMIN — ACETAMINOPHEN 975 MG: 325 TABLET ORAL at 13:48

## 2025-06-29 NOTE — ASSESSMENT & PLAN NOTE
Patient with o2 saturations of 86 and 89%. Placed on 2L NC. Does not wear any o2 at baseline. States that she is having difficulty with deep breathing because of rib pain  CT chest: Nondisplaced fractures of the anterolateral left fourth and fifth ribs  Continue rib fracture protocol and incentive spirometry  Increase in o2 requirements on 6/29, will order repeat ct chest at this time.   Pending improvement in o2 saturations, may need home o2 eval prior to discharge.

## 2025-06-29 NOTE — ASSESSMENT & PLAN NOTE
With tachycardia and leukocytosis. Leukocytosis currently downtrending, possibly reactive in light of rib fracture  Temp on 6/28 in the afternoon 100.6.   Procal of 2.45  UA pending  Order CT CAP at this time to rule out any pneumonia. Also due to constipation  Start on levaquin due to penicillin allergy with hives.   BC ordered  Monitor leukocytosis, fever curve, hemodynamics.

## 2025-06-29 NOTE — PROGRESS NOTES
Progress Note - Hospitalist   Name: Danita Palmer 84 y.o. female I MRN: [id number]  [facility] I Date of Admission: 6/25/2025   Date of Service: 6/29/2025 I Hospital Day: 4    Assessment & Plan  Generalized weakness  Presented after a fall in [facility], trauma imaging all negative for acute traumatic injuries  During ambulatory trial in ED patient unable to safely ambulate due to marked weakness  Labs with mild hyponatremia otherwise without marked abnormalities  CT head negative for acute intracranial pathology.    TSH, B12, vitamin D and folate unremarkable  PT/OT evaluations recommend level 2 rehab intensity  WBC elevated 15.48, Tmax in the last 24 hours 99.7  Recommend patient to utilize incentive spirometry/Acapella valve  UA sent, patient is denying any urinary symptoms  UA pending at this time. WBC downtrending 6/28, procal elevated. See plan under SIRS.   Repeat CBC in a.m. and monitor fever curve  Left rib fracture  Nondisplaced fractures of the anterolateral left 4th and 5th ribs  Incentive spirometry and rib fracture protocol ordered  Pain control including lidocaine patch and scheduled Tylenol ordered  May need ambulating pulse ox prior to discharge  Fall at home, initial encounter  Presented after a fall in [facility], states she was walking when she could not stop her self and fell forward onto her knees without dizziness/lightheadedness or loss of consciousness  Patient with weakness with inability to safely ambulate  PT/OT evaluations recommend rehab, case management assistance appreciated  Acute respiratory insufficiency  Patient with o2 saturations of 86 and 89%. Placed on 2L NC. Does not wear any o2 at baseline. States that she is having difficulty with deep breathing because of rib pain  CT chest: Nondisplaced fractures of the anterolateral left fourth and fifth ribs  Continue rib fracture protocol and incentive spirometry  Increase in o2 requirements on 6/29, will  order repeat ct chest at this time.   Pending improvement in o2 saturations, may need home o2 eval prior to discharge.   SIRS (systemic inflammatory response syndrome) ([facility])  With tachycardia and leukocytosis. Leukocytosis currently downtrending, possibly reactive in light of rib fracture  Temp on 6/28 in the afternoon 100.6.   Procal of 2.45  UA pending  Order CT CAP at this time to rule out any pneumonia. Also due to constipation  Start on levaquin due to penicillin allergy with hives.   BC ordered  Monitor leukocytosis, fever curve, hemodynamics.   Constipation  Patient reports she is constipated but passing gas  Bowel regimen ordered and increased  Continue to monitor  CKD stage G3a/A1, GFR 45-59 and albumin creatinine ratio <30 mg/g ([facility])  Lab Results   Component Value Date    EGFR 70 06/29/2025    EGFR 69 06/28/2025    EGFR 64 06/27/2025    CREATININE 0.77 06/29/2025    CREATININE 0.78 06/28/2025    CREATININE 0.83 06/27/2025     Renal function at baseline, monitor with BMP.  Avoid/limit nephrotoxins and hypotension  Lung nodule  7 mm spiculated nodule to right upper lobe incidentally noted on CT chest  Repeat noncontrast CT chest advised at 6-12 months, patient to follow-up with PCP to arrange this.  Previous provider reviewed results with patient and her son  Disc degeneration, lumbar  Chronic, patient denying worsening back pain  Continue gabapentin but will reduce dose to 300 mg 3 times daily as patient somewhat sleepy during admission evaluation  6/26 patient denies increased pain with decreased dosage.  If she can tolerate would recommend continuing with lower dosage on discharge to decrease risk of future falls  Monitor  Tobacco abuse  Discussed need for cessation.    Nicotine patch ordered  Essential hypertension  Continue PTA lisinopril 10 mg daily restrict hold parameters and monitor blood pressure per protocol  Chronic obstructive pulmonary disease (HCC)  Not in acute exacerbation, continue PTA  inhalers    VTE Pharmacologic Prophylaxis: VTE Score: 3 Moderate Risk (Score 3-4) - Pharmacological DVT Prophylaxis Ordered: heparin.    Mobility:   Basic Mobility Inpatient Raw Score: 16  JH-HLM Goal: 5: Stand one or more mins  JH-HLM Achieved: 5: Stand (1 or more minutes)  JH-HLM Goal achieved. Continue to encourage appropriate mobility.    Patient Centered Rounds: I performed bedside rounds with nursing staff today.   Discussions with Specialists or Other Care Team Provider: nursing, case management    Education and Discussions with Family / Patient: Attempted to update  (son) via phone. Left voicemail.     Current Length of Stay: 4 day(s)  Current Patient Status: Inpatient   Certification Statement: The patient will continue to require additional inpatient hospital stay due to monitoring leukocytosis, pain control, monitoring o2 saturations  Discharge Plan: Anticipate discharge in 48-72 hrs to rehab facility.    Code Status: Level 1 - Full Code    Subjective   Seen and examined today, said that she still is not having bowel movements. No chest pain, feels a little short of breath. Said her rib pain is a little better after getting the muscle relaxer. Said she has a cough intermittently. She had suppository this morning, would like to hold off on enema at this time.     Objective :  Temp:  [97.8 °F (36.6 °C)-100.6 °F (38.1 °C)] 97.8 °F (36.6 °C)  HR:  [] 94  BP: (122-142)/(54-77) 141/77  Resp:  [16-24] 24  SpO2:  [82 %-92 %] 92 %  O2 Device: Nasal cannula  Nasal Cannula O2 Flow Rate (L/min):  [3 L/min-4 L/min] 4 L/min    Body mass index is 20.78 kg/m².     Input and Output Summary (last 24 hours):     Intake/Output Summary (Last 24 hours) at 6/29/2025 1047  Last data filed at 6/29/2025 1034  Gross per 24 hour   Intake 580 ml   Output 1 ml   Net 579 ml       Physical Exam  Vitals reviewed.   Constitutional:       General: She is not in acute distress.     Appearance: She is ill-appearing. She  is not toxic-appearing.   HENT:      Head: Normocephalic and atraumatic.      Mouth/Throat:      Mouth: Mucous membranes are moist.     Cardiovascular:      Rate and Rhythm: Normal rate and regular rhythm.      Heart sounds: No murmur heard.  Pulmonary:      Effort: No respiratory distress.      Breath sounds: No stridor. Rhonchi present. No wheezing or rales.      Comments: Saturating around 91% on 4L NC  Abdominal:      General: There is no distension.      Palpations: Abdomen is soft. There is no mass.      Tenderness: There is no abdominal tenderness.     Musculoskeletal:      Right lower leg: No edema.      Left lower leg: No edema.     Skin:     General: Skin is warm and dry.     Neurological:      Mental Status: She is alert and oriented to person, place, and time.     Psychiatric:         Mood and Affect: Mood normal.         Behavior: Behavior normal.           Lines/Drains:              Lab Results: I have reviewed the following results:   Results from last 7 days   Lab Units 06/29/25  0608 06/28/25  0637   WBC Thousand/uL 13.64* 14.40*   HEMOGLOBIN g/dL 12.9 13.1   HEMATOCRIT % 38.4 38.6   PLATELETS Thousands/uL 352 287   SEGS PCT %  --  88*   LYMPHO PCT %  --  4*   MONO PCT %  --  7   EOS PCT %  --  0     Results from last 7 days   Lab Units 06/29/25  0608 06/27/25  0442 06/25/25  0213   SODIUM mmol/L 135   < > 134*   POTASSIUM mmol/L 4.2   < > 4.2   CHLORIDE mmol/L 100   < > 100   CO2 mmol/L 27   < > 27   BUN mg/dL 31*   < > 19   CREATININE mg/dL 0.77   < > 1.13   ANION GAP mmol/L 8   < > 7   CALCIUM mg/dL 9.2   < > 10.1   ALBUMIN g/dL  --   --  3.7   TOTAL BILIRUBIN mg/dL  --   --  0.37   ALK PHOS U/L  --   --  62   ALT U/L  --   --  19   AST U/L  --   --  31   GLUCOSE RANDOM mg/dL 99   < > 109    < > = values in this interval not displayed.                 Results from last 7 days   Lab Units 06/29/25  0608   PROCALCITONIN ng/ml 2.45*       Recent Cultures (last 7 days):         Imaging Results  Review: No pertinent imaging studies reviewed.  Other Study Results Review: No additional pertinent studies reviewed.    Last 24 Hours Medication List:     Current Facility-Administered Medications:     acetaminophen (TYLENOL) tablet 975 mg, Q8H JOSE MANUEL    albuterol (PROVENTIL HFA,VENTOLIN HFA) inhaler 1 puff, Q4H PRN    bisacodyl (DULCOLAX) rectal suppository 10 mg, Daily PRN    Cholecalciferol (VITAMIN D3) tablet 2,000 Units, Daily    docusate sodium (COLACE) capsule 100 mg, BID    gabapentin (NEURONTIN) capsule 300 mg, TID    guaiFENesin (MUCINEX) 12 hr tablet 600 mg, BID    heparin (porcine) subcutaneous injection 5,000 Units, Q8H JOSE MANUEL    levofloxacin (LEVAQUIN) IVPB (premix in dextrose) 500 mg 100 mL, Q24H    lidocaine (LIDODERM) 5 % patch 1 patch, Daily    lisinopril (ZESTRIL) tablet 10 mg, Daily    methocarbamol (ROBAXIN) tablet 500 mg, Q8H JOSE MANUEL    nicotine (NICODERM CQ) 14 mg/24hr TD 24 hr patch 1 patch, Daily    ondansetron (ZOFRAN) injection 4 mg, Q6H PRN    oxyCODONE (ROXICODONE) split tablet 2.5 mg, Q4H PRN **OR** oxyCODONE (ROXICODONE) IR tablet 5 mg, Q4H PRN    polyethylene glycol (MIRALAX) packet 17 g, Daily    senna (SENOKOT) tablet 17.2 mg, HS    umeclidinium-vilanterol 62.5-25 mcg/actuation inhaler 1 puff, Daily    Administrative Statements   Today, Patient Was Seen By: Conchita Soliman PA-C    **Please Note: This note may have been constructed using a voice recognition system.**

## 2025-06-29 NOTE — ASSESSMENT & PLAN NOTE
Lab Results   Component Value Date    EGFR 70 06/29/2025    EGFR 69 06/28/2025    EGFR 64 06/27/2025    CREATININE 0.77 06/29/2025    CREATININE 0.78 06/28/2025    CREATININE 0.83 06/27/2025     Renal function at baseline, monitor with BMP.  Avoid/limit nephrotoxins and hypotension

## 2025-06-29 NOTE — ASSESSMENT & PLAN NOTE
Presented after a fall [place] [address], trauma imaging all negative for acute traumatic injuries  During ambulatory trial in ED patient unable to safely ambulate due to marked weakness  Labs with mild hyponatremia otherwise without marked abnormalities  CT head negative for acute intracranial pathology.    TSH, B12, vitamin D and folate unremarkable  PT/OT evaluations recommend level 2 rehab intensity  WBC elevated 15.48, Tmax in the last 24 hours 99.7  Recommend patient to utilize incentive spirometry/Acapella valve  UA sent, patient is denying any urinary symptoms  UA pending at this time. WBC downtrending 6/28, procal elevated. See plan under SIRS.   Repeat CBC in a.m. and monitor fever curve

## 2025-06-29 NOTE — RESPIRATORY THERAPY NOTE
"RT Protocol Note  Danita Palmer 84 y.o. female MRN: [id number]  Unit/Bed#: [facility] 918-01 Encounter: [id number]    Assessment    Principal Problem:    Generalized weakness  Active Problems:    CKD stage G3a/A1, GFR 45-59 and albumin creatinine ratio <30 mg/g ([facility])    Chronic obstructive pulmonary disease (HCC)    Disc degeneration, lumbar    Tobacco abuse    Essential hypertension    Fall at home, initial encounter    Lung nodule    Left rib fracture    Constipation    Acute respiratory insufficiency    SIRS (systemic inflammatory response syndrome) ([facility])      Home Pulmonary Medications:  Albuterol and Anoro       Past Medical History[1]  Social History[2]    Subjective         Objective    Physical Exam:   Assessment Type: Assess only  General Appearance: Alert, Awake  Respiratory Pattern: Normal  Chest Assessment: Chest expansion symmetrical  Bilateral Breath Sounds: (P) Clear  Cough: Non-productive, Dry  O2 Device: (P) r/a    Vitals:  Blood pressure 141/77, pulse 94, temperature 97.8 °F (36.6 °C), resp. rate (!) 24, height 5' 1\" (1.549 m), weight 49.9 kg (110 lb), SpO2 (P) 95%.          Imaging and other studies:     O2 Device: (P) r/a     Plan    Respiratory Plan: Discontinue Protocol, Home Bronchodilator Patient pathway  Airway Clearance Plan: Incentive Spirometer, Discontinue Protocol     Resp Comments: (P) pt in the hospital following fall.pt with hx of copd and takes inahler at home.pt with 4th and 5th rib fracture.pt re-educated and instructed on IS.pt able to demonstrate IS.will d/c RCP and ACP at this time. bs clear.pt onr/a,vitals wnl.        [1]   Past Medical History:  Diagnosis Date    Glaucoma     Hypertension    [2]   Social History  Socioeconomic History    Marital status: Single   Occupational History    Occupation: Food Services   Tobacco Use    Smoking status: Every Day    Smokeless tobacco: Never    Tobacco comments:     50 pp Year cutting down slowly now at 1/2 a day   Vaping Use    Vaping " status: Never Used   Substance and Sexual Activity    Alcohol use: Not Currently    Drug use: No   Social History Narrative    Does not exercise    Seeing a dentist     Social Drivers of Health     Financial Resource Strain: Low Risk  (4/20/2023)    Overall Financial Resource Strain (CARDIA)     Difficulty of Paying Living Expenses: Not hard at all   Food Insecurity: No Food Insecurity (6/25/2025)    Nursing - Inadequate Food Risk Classification     Worried About Running Out of Food in the Last Year: Never true     Ran Out of Food in the Last Year: Never true     Ran Out of Food in the Last Year: Never true   Transportation Needs: No Transportation Needs (6/25/2025)    Nursing - Transportation Risk Classification     Lack of Transportation: No   Physical Activity: Inactive (1/16/2020)    Exercise Vital Sign     Days of Exercise per Week: 0 days     Minutes of Exercise per Session: 0 min   Stress: No Stress Concern Present (1/16/2020)    [employer] [place] [employer] - Occupational Stress Questionnaire     Feeling of Stress : Not at all   Social Connections: Unknown (1/16/2020)    Social Connection and Isolation Panel     Frequency of Communication with Friends and Family: Patient declined     Frequency of Social Gatherings with Friends and Family: Patient declined     Attends Jehovah's witness Services: Patient declined     Active Member of Clubs or Organizations: Patient declined     Attends Club or Organization Meetings: Patient declined     Marital Status: Patient declined   Intimate Partner Violence: Unknown (6/25/2025)    Nursing IPS     Physically Hurt by Someone: No     Hurt or Threatened by Someone: No   Housing Stability: Unknown (6/25/2025)    Nursing: Inadequate Housing Risk Classification     Unable to Pay for Housing in the Last Year: No     Has Housing: No

## 2025-06-29 NOTE — QUICK NOTE
Pending official read on CT CAP. Per my review, concerning for pneumonia, continue with levaquin at this time due to patient's allergy history. Monitor o2 saturations and encouraged incentive spirometry use. Follow for final CT read.    Conchita Soliman PA-C

## 2025-06-30 PROBLEM — A41.9 SEPSIS (HCC): Status: ACTIVE | Noted: 2025-06-29

## 2025-06-30 PROBLEM — J18.9 PNEUMONIA: Status: ACTIVE | Noted: 2025-06-30

## 2025-06-30 LAB
ANION GAP SERPL CALCULATED.3IONS-SCNC: 11 MMOL/L (ref 4–13)
BACTERIA UR QL AUTO: ABNORMAL /HPF
BILIRUB UR QL STRIP: NEGATIVE
BUN SERPL-MCNC: 39 MG/DL (ref 5–25)
CALCIUM SERPL-MCNC: 8.9 MG/DL (ref 8.4–10.2)
CHLORIDE SERPL-SCNC: 99 MMOL/L (ref 96–108)
CLARITY UR: CLEAR
CO2 SERPL-SCNC: 25 MMOL/L (ref 21–32)
COLOR UR: YELLOW
CREAT SERPL-MCNC: 0.86 MG/DL (ref 0.6–1.3)
ERYTHROCYTE [DISTWIDTH] IN BLOOD BY AUTOMATED COUNT: 14 % (ref 11.6–15.1)
GFR SERPL CREATININE-BSD FRML MDRD: 61 ML/MIN/1.73SQ M
GLUCOSE SERPL-MCNC: 89 MG/DL (ref 65–140)
GLUCOSE UR STRIP-MCNC: NEGATIVE MG/DL
HCT VFR BLD AUTO: 38.4 % (ref 34.8–46.1)
HGB BLD-MCNC: 12.6 G/DL (ref 11.5–15.4)
HGB UR QL STRIP.AUTO: ABNORMAL
KETONES UR STRIP-MCNC: ABNORMAL MG/DL
L PNEUMO1 AG UR QL IA.RAPID: NEGATIVE
LEUKOCYTE ESTERASE UR QL STRIP: NEGATIVE
MAGNESIUM SERPL-MCNC: 2.2 MG/DL (ref 1.9–2.7)
MCH RBC QN AUTO: 29.6 PG (ref 26.8–34.3)
MCHC RBC AUTO-ENTMCNC: 32.8 G/DL (ref 31.4–37.4)
MCV RBC AUTO: 90 FL (ref 82–98)
MUCOUS THREADS UR QL AUTO: ABNORMAL
NITRITE UR QL STRIP: NEGATIVE
NON-SQ EPI CELLS URNS QL MICRO: ABNORMAL /HPF
PH UR STRIP.AUTO: 6 [PH]
PLATELET # BLD AUTO: 364 THOUSANDS/UL (ref 149–390)
PMV BLD AUTO: 9.4 FL (ref 8.9–12.7)
POTASSIUM SERPL-SCNC: 4.4 MMOL/L (ref 3.5–5.3)
PROCALCITONIN SERPL-MCNC: 1.8 NG/ML
PROT UR STRIP-MCNC: ABNORMAL MG/DL
RBC # BLD AUTO: 4.26 MILLION/UL (ref 3.81–5.12)
RBC #/AREA URNS AUTO: ABNORMAL /HPF
S PNEUM AG UR QL: NEGATIVE
SODIUM SERPL-SCNC: 135 MMOL/L (ref 135–147)
SP GR UR STRIP.AUTO: 1.04 (ref 1–1.03)
UROBILINOGEN UR STRIP-ACNC: <2 MG/DL
WBC # BLD AUTO: 13.13 THOUSAND/UL (ref 4.31–10.16)
WBC #/AREA URNS AUTO: ABNORMAL /HPF

## 2025-06-30 PROCEDURE — 81001 URINALYSIS AUTO W/SCOPE: CPT | Performed by: NURSE PRACTITIONER

## 2025-06-30 PROCEDURE — 80048 BASIC METABOLIC PNL TOTAL CA: CPT

## 2025-06-30 PROCEDURE — 84145 PROCALCITONIN (PCT): CPT

## 2025-06-30 PROCEDURE — 87449 NOS EACH ORGANISM AG IA: CPT

## 2025-06-30 PROCEDURE — 97530 THERAPEUTIC ACTIVITIES: CPT

## 2025-06-30 PROCEDURE — 83735 ASSAY OF MAGNESIUM: CPT

## 2025-06-30 PROCEDURE — 99232 SBSQ HOSP IP/OBS MODERATE 35: CPT | Performed by: INTERNAL MEDICINE

## 2025-06-30 PROCEDURE — 85027 COMPLETE CBC AUTOMATED: CPT

## 2025-06-30 PROCEDURE — 97116 GAIT TRAINING THERAPY: CPT

## 2025-06-30 PROCEDURE — 97110 THERAPEUTIC EXERCISES: CPT

## 2025-06-30 PROCEDURE — 92610 EVALUATE SWALLOWING FUNCTION: CPT

## 2025-06-30 RX ORDER — POLYETHYLENE GLYCOL 3350 17 G/17G
17 POWDER, FOR SOLUTION ORAL 2 TIMES DAILY
Status: DISCONTINUED | OUTPATIENT
Start: 2025-06-30 | End: 2025-07-05

## 2025-06-30 RX ADMIN — HEPARIN SODIUM 5000 UNITS: 5000 INJECTION INTRAVENOUS; SUBCUTANEOUS at 21:06

## 2025-06-30 RX ADMIN — GABAPENTIN 300 MG: 300 CAPSULE ORAL at 16:38

## 2025-06-30 RX ADMIN — CEFTRIAXONE 1000 MG: 1 INJECTION, POWDER, FOR SOLUTION INTRAMUSCULAR; INTRAVENOUS at 09:23

## 2025-06-30 RX ADMIN — HEPARIN SODIUM 5000 UNITS: 5000 INJECTION INTRAVENOUS; SUBCUTANEOUS at 06:20

## 2025-06-30 RX ADMIN — ACETAMINOPHEN 325 MG: 325 TABLET ORAL at 21:06

## 2025-06-30 RX ADMIN — Medication 2000 UNITS: at 09:06

## 2025-06-30 RX ADMIN — METHOCARBAMOL 500 MG: 500 TABLET ORAL at 13:16

## 2025-06-30 RX ADMIN — METHOCARBAMOL 500 MG: 500 TABLET ORAL at 06:19

## 2025-06-30 RX ADMIN — LISINOPRIL 10 MG: 10 TABLET ORAL at 09:06

## 2025-06-30 RX ADMIN — ACETAMINOPHEN 975 MG: 325 TABLET ORAL at 13:16

## 2025-06-30 RX ADMIN — POLYETHYLENE GLYCOL 3350 17 G: 17 POWDER, FOR SOLUTION ORAL at 21:06

## 2025-06-30 RX ADMIN — GABAPENTIN 300 MG: 300 CAPSULE ORAL at 09:06

## 2025-06-30 RX ADMIN — GABAPENTIN 300 MG: 300 CAPSULE ORAL at 21:06

## 2025-06-30 RX ADMIN — DOCUSATE SODIUM 100 MG: 100 CAPSULE, LIQUID FILLED ORAL at 09:05

## 2025-06-30 RX ADMIN — SENNOSIDES 17.2 MG: 8.6 TABLET, FILM COATED ORAL at 21:06

## 2025-06-30 RX ADMIN — GUAIFENESIN 600 MG: 600 TABLET, EXTENDED RELEASE ORAL at 09:05

## 2025-06-30 RX ADMIN — HEPARIN SODIUM 5000 UNITS: 5000 INJECTION INTRAVENOUS; SUBCUTANEOUS at 13:16

## 2025-06-30 RX ADMIN — UMECLIDINIUM BROMIDE AND VILANTEROL TRIFENATATE 1 PUFF: 62.5; 25 POWDER RESPIRATORY (INHALATION) at 09:06

## 2025-06-30 RX ADMIN — GUAIFENESIN 600 MG: 600 TABLET, EXTENDED RELEASE ORAL at 17:46

## 2025-06-30 RX ADMIN — NICOTINE 1 PATCH: 14 PATCH, EXTENDED RELEASE TRANSDERMAL at 09:05

## 2025-06-30 RX ADMIN — POLYETHYLENE GLYCOL 3350 17 G: 17 POWDER, FOR SOLUTION ORAL at 09:05

## 2025-06-30 RX ADMIN — DOCUSATE SODIUM 100 MG: 100 CAPSULE, LIQUID FILLED ORAL at 17:46

## 2025-06-30 RX ADMIN — ACETAMINOPHEN 325 MG: 325 TABLET ORAL at 06:19

## 2025-06-30 RX ADMIN — METHOCARBAMOL 500 MG: 500 TABLET ORAL at 21:06

## 2025-06-30 NOTE — PROGRESS NOTES
Progress Note - Hospitalist   Name: Danita Palmer 84 y.o. female I MRN: [id number]  [facility] I Date of Admission: 6/25/2025   Date of Service: 6/30/2025 I Hospital Day: 5    Assessment & Plan  Generalized weakness  Presented after a fall in [facility], trauma imaging all negative for acute traumatic injuries. During ambulatory trial in ED was unable to safely ambulate due to marked weakness  Labs with mild hyponatremia otherwise without marked abnormalities  CT head negative for acute intracranial pathology.    TSH, B12, vitamin D and folate unremarkable  PT/OT evaluations recommend level 2 rehab intensity  With developing SIRS likely secondary to pneumonia as below  Left rib fracture  Nondisplaced fractures of the anterolateral left 4th and 5th ribs  Incentive spirometry and rib fracture protocol ordered  Pain control including lidocaine patch and scheduled Tylenol ordered  May need ambulating pulse ox prior to discharge if goes home instead of rehab   Fall at home, initial encounter  Presented after a fall in [facility], states she was walking when she could not stop her self and fell forward onto her knees without dizziness/lightheadedness or loss of consciousness  Patient with weakness with inability to safely ambulate  PT/OT evaluations recommend rehab, case management assistance appreciated  Acute respiratory insufficiency  Patient with o2 saturations of 86 and 89%. Placed on 2L NC. Does not wear any o2 at baseline. States that she is having difficulty with deep breathing because of rib pain  CT chest: Nondisplaced fractures of the anterolateral left fourth and fifth ribs  Continue rib fracture protocol and incentive spirometry  Increase in o2 requirements on 6/29, repeat CT as below  Pending improvement in o2 saturations, may need home o2 eval prior to discharge.   SIRS (systemic inflammatory response syndrome) (HCC)  With tachycardia and leukocytosis. Also with low grade  "temperatures. Procal of 2.45  Does not appear UA was collected   CT CAP 6/29 showed \"Interval appearance of innumerable micronodules in the right lower lobe, with new dense left greater than right lower basilar consolidations. No tracheal lesion. Interval appearance of bilateral lower lobe proximal bronchial occlusions\"  Was initially started on Levaquin however low drip score, changed to CTX (has allergy to PCN with hives)  Monitor leukocytosis, fever curve, hemodynamics.   Constipation  Patient reports she is constipated but passing gas  Bowel regimen ordered and increased  Continue to monitor  CKD stage G3a/A1, GFR 45-59 and albumin creatinine ratio <30 mg/g ([facility])  Lab Results   Component Value Date    EGFR 70 06/29/2025    EGFR 69 06/28/2025    EGFR 64 06/27/2025    CREATININE 0.77 06/29/2025    CREATININE 0.78 06/28/2025    CREATININE 0.83 06/27/2025     Renal function at baseline, monitor with BMP.  Avoid/limit nephrotoxins and hypotension  Lung nodule  7 mm spiculated nodule to right upper lobe incidentally noted on CT chest  Repeat noncontrast CT chest advised at 6-12 months, patient to follow-up with PCP to arrange this.  Previous provider reviewed results with patient and her son  Disc degeneration, lumbar  Chronic, patient denies worsening back pain  Continue gabapentin, dose was reduced to 300 mg 3 times daily as patient somewhat sleepy during admission evaluation  Tobacco abuse  Discussed need for cessation.    Nicotine patch ordered  Essential hypertension  Continue PTA lisinopril 10 mg daily restrict hold parameters and monitor blood pressure per protocol  Chronic obstructive pulmonary disease (HCC)  Not in acute exacerbation, continue PTA inhalers  Pneumonia  See above plan under SIRS    VTE Pharmacologic Prophylaxis: VTE Score: 3 Moderate Risk (Score 3-4) - Pharmacological DVT Prophylaxis Ordered: heparin.    Mobility:   Basic Mobility Inpatient Raw Score: 17  -[facility] Goal: 5: Stand one or more " mins  JH-HLM Achieved: 6: Walk 10 steps or more  JH-HLM Goal achieved. Continue to encourage appropriate mobility.    Patient Centered Rounds: I performed bedside rounds with nursing staff today.   Discussions with Specialists or Other Care Team Provider: appreciate specialists inputs, d/w CM     Education and Discussions with Family / Patient: spoke with son over phone.     Current Length of Stay: 5 day(s)  Current Patient Status: Inpatient   Certification Statement: The patient will continue to require additional inpatient hospital stay due to abx as above, monitoring respiratory status   Discharge Plan: Anticipate discharge in 48-72 hrs to rehab facility.    Code Status: Level 1 - Full Code    Subjective   Pt states she is okay today. Maybe feels a little bit better but not much.  States that her breathing is okay, coughing occasionally.  She still has left-sided rib pain which makes it hard to take a deep breath.  Appetite is poor.    Objective :  Temp:  [97.8 °F (36.6 °C)-100 °F (37.8 °C)] 98.2 °F (36.8 °C)  HR:  [83-94] 83  BP: (138-140)/(70-74) 138/70  Resp:  [16-17] 17  SpO2:  [88 %-95 %] 93 %  O2 Device: None (Room air)  Nasal Cannula O2 Flow Rate (L/min):  [4 L/min] 4 L/min    Body mass index is 20.78 kg/m².     Input and Output Summary (last 24 hours):     Intake/Output Summary (Last 24 hours) at 6/30/2025 0831  Last data filed at 6/29/2025 1034  Gross per 24 hour   Intake 0 ml   Output --   Net 0 ml       Physical Exam  Vitals and nursing note reviewed.   Constitutional:       General: She is not in acute distress.     Comments: On nasal cannula, 2L sats 88%     Cardiovascular:      Rate and Rhythm: Normal rate.   Pulmonary:      Effort: No respiratory distress.      Comments: Decreased   Abdominal:      General: There is no distension.     Musculoskeletal:      Right lower leg: No edema.      Left lower leg: No edema.     Skin:     Coloration: Skin is pale.     Neurological:      Mental Status: She is  alert and oriented to person, place, and time.           Lines/Drains:              Lab Results: I have reviewed the following results:   Results from last 7 days   Lab Units 06/30/25  0642 06/29/25  0608 06/28/25  0637   WBC Thousand/uL 13.13*   < > 14.40*   HEMOGLOBIN g/dL 12.6   < > 13.1   HEMATOCRIT % 38.4   < > 38.6   PLATELETS Thousands/uL 364   < > 287   SEGS PCT %  --   --  88*   LYMPHO PCT %  --   --  4*   MONO PCT %  --   --  7   EOS PCT %  --   --  0    < > = values in this interval not displayed.     Results from last 7 days   Lab Units 06/29/25  0608 06/27/25  0442 06/25/25  0213   SODIUM mmol/L 135   < > 134*   POTASSIUM mmol/L 4.2   < > 4.2   CHLORIDE mmol/L 100   < > 100   CO2 mmol/L 27   < > 27   BUN mg/dL 31*   < > 19   CREATININE mg/dL 0.77   < > 1.13   ANION GAP mmol/L 8   < > 7   CALCIUM mg/dL 9.2   < > 10.1   ALBUMIN g/dL  --   --  3.7   TOTAL BILIRUBIN mg/dL  --   --  0.37   ALK PHOS U/L  --   --  62   ALT U/L  --   --  19   AST U/L  --   --  31   GLUCOSE RANDOM mg/dL 99   < > 109    < > = values in this interval not displayed.                 Results from last 7 days   Lab Units 06/30/25  0642 06/29/25  0608   PROCALCITONIN ng/ml 1.80* 2.45*       Recent Cultures (last 7 days):   Results from last 7 days   Lab Units 06/29/25  1110 06/29/25  1103   BLOOD CULTURE  Received in Microbiology Lab. Culture in Progress. Received in Microbiology Lab. Culture in Progress.       Imaging Results Review: I reviewed radiology reports from this admission including: CT abdomen/pelvis.  Other Study Results Review: No additional pertinent studies reviewed.    Last 24 Hours Medication List:     Current Facility-Administered Medications:     acetaminophen (TYLENOL) tablet 975 mg, Q8H JOS EMANUEL    albuterol (PROVENTIL HFA,VENTOLIN HFA) inhaler 1 puff, Q4H PRN    bisacodyl (DULCOLAX) rectal suppository 10 mg, Daily PRN    cefTRIAXone (ROCEPHIN) 1,000 mg in dextrose 5 % 50 mL IVPB, Q24H    Cholecalciferol (VITAMIN D3)  tablet 2,000 Units, Daily    docusate sodium (COLACE) capsule 100 mg, BID    gabapentin (NEURONTIN) capsule 300 mg, TID    guaiFENesin (MUCINEX) 12 hr tablet 600 mg, BID    heparin (porcine) subcutaneous injection 5,000 Units, Q8H JOSE MANUEL    lidocaine (LIDODERM) 5 % patch 1 patch, Daily    lisinopril (ZESTRIL) tablet 10 mg, Daily    magnesium hydroxide (MILK OF MAGNESIA) oral suspension 30 mL, Daily PRN    methocarbamol (ROBAXIN) tablet 500 mg, Q8H JOSE MANUEL    nicotine (NICODERM CQ) 14 mg/24hr TD 24 hr patch 1 patch, Daily    ondansetron (ZOFRAN) injection 4 mg, Q6H PRN    oxyCODONE (ROXICODONE) split tablet 2.5 mg, Q4H PRN **OR** oxyCODONE (ROXICODONE) IR tablet 5 mg, Q4H PRN    polyethylene glycol (MIRALAX) packet 17 g, Daily    senna (SENOKOT) tablet 17.2 mg, HS    umeclidinium-vilanterol 62.5-25 mcg/actuation inhaler 1 puff, Daily    Administrative Statements   Today, Patient Was Seen By: Jamee Licea PA-C      **Please Note: This note may have been constructed using a voice recognition system.**

## 2025-06-30 NOTE — ASSESSMENT & PLAN NOTE
Presented after a fall in neighbors driveway, trauma imaging all negative for acute traumatic injuries. During ambulatory trial in ED was unable to safely ambulate due to marked weakness  Labs with mild hyponatremia otherwise without marked abnormalities  CT head negative for acute intracranial pathology.    TSH, B12, vitamin D and folate unremarkable  PT/OT evaluations recommend level 2 rehab intensity  With developing SIRS likely secondary to pneumonia as below

## 2025-06-30 NOTE — ASSESSMENT & PLAN NOTE
Chronic, patient denies worsening back pain  Continue gabapentin, dose was reduced to 300 mg 3 times daily as patient somewhat sleepy during admission evaluation

## 2025-06-30 NOTE — SPEECH THERAPY NOTE
Speech Language/Pathology  Speech-Language Pathology Bedside Swallow Evaluation      Patient Name: Danita Palmer    Today's Date: 6/30/2025     Problem List  Principal Problem:    Generalized weakness  Active Problems:    CKD stage G3a/A1, GFR 45-59 and albumin creatinine ratio <30 mg/g (HCC)    Chronic obstructive pulmonary disease (HCC)    Disc degeneration, lumbar    Tobacco abuse    Essential hypertension    Fall at home, initial encounter    Lung nodule    Left rib fracture    Constipation    Acute respiratory insufficiency    SIRS (systemic inflammatory response syndrome) (HCC)    Pneumonia      Past Medical History  Past Medical History[1]    Past Surgical History  Past Surgical History[2]    Summary   Pt presented with functional appearing oral and pharyngeal stage swallowing skills with materials administered today.  She states she does not like the food here and does not want to eat.   Risk/s for Aspiration: positioning.  Pt is reclined in the bed 2* discomfort in ribs     Recommended Diet: regular diet and thin liquids   Recommended Form of Meds: as desired   Aspiration precautions and swallowing strategies: upright posture, only feed when fully alert, slow rate of feeding, small bites/sips, and alternating bites and sips  Other Recommendations: Continue frequent oral care, no f/u at this time        Current Medical Status  Pt is a 84 y.o. female who presented to [facility] following an unwitnessed fall.  She reports weakness at home prior to the fall.  The pt now has L rib fx, acute resp insufficiency, SIRS.      Current Precautions:   Fall    Allergies:  No known food allergies    Past medical history:  Please see H&P for details    Special Studies:  CT abd/pelvis IMPRESSION:  Interval appearance of innumerable right lower lobe micronodules with new dense left greater than right basilar consolidations.  Interval appearance of proximal bilateral lower lobe bronchial occlusions.  Findings  consistent aspiration pneumonia/atelectasis.  Stable 7 mm spiculated right upper lobe nodule.  Continued recommendations of recent CT chest of 6-12 month follow-up.  No acute abdominopelvic abnormality.  Large colonic stool burden, no acute inflammation.    Social/Education/Vocational Hx:  Pt lives with family    Swallow Information   Current Risks for Dysphagia & Aspiration: weakness  Current Symptoms/Concerns: change in respiratory status  Current Diet: regular diet and thin liquids   Baseline Diet: regular diet and thin liquids      Baseline Assessment   Behavior/Cognition: alert  Speech/Language Status: able to participate in conversation and able to follow commands  Patient Positioning: reclined  Pain Status/Interventions/Response to Interventions:   No report of or nonverbal indications of pain.       Swallow Mechanism Exam  Facial: symmetrical  Labial: WFL  Lingual: WFL  Velum: symmetrical  Mandible: adequate ROM  Dentition: partial dentures and upper dentures  Vocal quality:clear/adequate   Volitional Cough: strong/productive   Respiratory Status: on RA     Consistencies Assessed and Performance   Consistencies Administered: thin liquids and hard solids-agreed to toast, does not like the current food    Oral Stage: WFL  Mastication was adequate with the materials administered today.  Bolus formation and transfer were functional with no significant oral residue noted.  No overt s/s reduced oral control.    Pharyngeal Stage: WFL  Swallow Mechanics:  Swallowing initiation appeared prompt.  Laryngeal rise was palpated and judged to be within functional limits.  No coughing, throat clearing, change in vocal quality or respiratory status noted today.     Esophageal Concerns: none reported    Strategies and Efficacy: -    Summary and Recommendations (see above)    Results Reviewed with: patient and RN     Treatment Recommended: not at this time          [1]   Past Medical History:  Diagnosis Date    Glaucoma      Hypertension    [2]   Past Surgical History:  Procedure Laterality Date    BREAST LUMPECTOMY      HYSTERECTOMY

## 2025-06-30 NOTE — ASSESSMENT & PLAN NOTE
Nondisplaced fractures of the anterolateral left 4th and 5th ribs  Incentive spirometry and rib fracture protocol ordered  Pain control including lidocaine patch and scheduled Tylenol ordered

## 2025-06-30 NOTE — ASSESSMENT & PLAN NOTE
"With tachycardia and leukocytosis. Also with low grade temperatures. Procal of 2.45 and worsening O2 requirements on 6/29  Does not appear UA was collected but low suspicion for urinary etiology   CT CAP 6/29 showed \"Interval appearance of innumerable micronodules in the right lower lobe, with new dense left greater than right lower basilar consolidations. No tracheal lesion. Interval appearance of bilateral lower lobe proximal bronchial occlusions\"  Was initially started on Levaquin given allergy, however low drip score, changed to CTX on 6/30  Monitor leukocytosis, fever curve, hemodynamics.   "

## 2025-06-30 NOTE — ASSESSMENT & PLAN NOTE
Patient with o2 saturations of 86 and 89%. Placed on 2L NC. Does not wear any o2 at baseline. States that she is having difficulty with deep breathing because of rib pain  CT chest: Nondisplaced fractures of the anterolateral left fourth and fifth ribs  Continue rib fracture protocol and incentive spirometry  Increase in o2 requirements on 6/29, repeat CT with pneumonia as below

## 2025-06-30 NOTE — ASSESSMENT & PLAN NOTE
"With tachycardia and leukocytosis. Also with low grade temperatures. Procal of 2.45  Does not appear UA was collected   CT CAP 6/29 showed \"Interval appearance of innumerable micronodules in the right lower lobe, with new dense left greater than right lower basilar consolidations. No tracheal lesion. Interval appearance of bilateral lower lobe proximal bronchial occlusions\"  Was initially started on Levaquin however low drip score, changed to CTX (has allergy to PCN with hives)  Monitor leukocytosis, fever curve, hemodynamics.   "

## 2025-06-30 NOTE — ASSESSMENT & PLAN NOTE
Nondisplaced fractures of the anterolateral left 4th and 5th ribs  Incentive spirometry and rib fracture protocol ordered  Pain control including lidocaine patch and scheduled Tylenol ordered  May need ambulating pulse ox prior to discharge if goes home instead of rehab

## 2025-06-30 NOTE — ASSESSMENT & PLAN NOTE
Patient with o2 saturations of 86 and 89%. Placed on 2L NC. Does not wear any o2 at baseline. States that she is having difficulty with deep breathing because of rib pain  CT chest: Nondisplaced fractures of the anterolateral left fourth and fifth ribs  Continue rib fracture protocol and incentive spirometry  Increase in o2 requirements on 6/29, repeat CT as below  Pending improvement in o2 saturations, may need home o2 eval prior to discharge.

## 2025-06-30 NOTE — PLAN OF CARE
Problem: PAIN - ADULT  Goal: Verbalizes/displays adequate comfort level or baseline comfort level  Description: Interventions:  - Encourage patient to monitor pain and request assistance  - Assess pain using appropriate pain scale  - Administer analgesics as ordered based on type and severity of pain and evaluate response  - Implement non-pharmacological measures as appropriate and evaluate response  - Consider cultural and social influences on pain and pain management  - Notify physician/advanced practitioner if interventions unsuccessful or patient reports new pain  - Educate patient/family on pain management process including their role and importance of  reporting pain   - Provide non-pharmacologic/complimentary pain relief interventions  Outcome: Progressing     Problem: SAFETY ADULT  Goal: Patient will remain free of falls  Description: INTERVENTIONS:  - Educate patient/family on patient safety including physical limitations  - Instruct patient to call for assistance with activity   - Consider consulting OT/PT to assist with strengthening/mobility based on AM PAC & JH-HLM score  - Consult OT/PT to assist with strengthening/mobility   - Keep Call bell within reach  - Keep bed low and locked with side rails adjusted as appropriate  - Keep care items and personal belongings within reach  - Initiate and maintain comfort rounds  - Make Fall Risk Sign visible to staff  - Initiate/Maintain bed alarm  - Obtain necessary fall risk management equipment: yellow socks  - Apply yellow socks and bracelet for high fall risk patients  - Consider moving patient to room near nurses station  Outcome: Progressing

## 2025-06-30 NOTE — PLAN OF CARE
Problem: PHYSICAL THERAPY ADULT  Goal: Performs mobility at highest level of function for planned discharge setting.  See evaluation for individualized goals.  Description: Treatment/Interventions: Functional transfer training, LE strengthening/ROM, Therapeutic exercise, Elevations, Endurance training, Equipment eval/education, Patient/family training, Bed mobility, Gait training, Continued evaluation, Cognitive reorientation, Compensatory technique education, Spoke to nursing, OT  Equipment Recommended:  (TBD with further mobility)       See flowsheet documentation for full assessment, interventions and recommendations.  Outcome: Progressing  Note: Prognosis: Fair  Problem List: Decreased strength, Decreased endurance, Impaired balance, Decreased mobility, Decreased cognition, Pain  Assessment: Patient is a 84 y.o. female seen by Physical Therapy on 6/30/2025 for treatment. This session focused on bed mobility, functional transfers, ambulation, and therapeutic exercise. During this session, pt required mod(A)x1 for bed mobility supine to sit, min(A)x1 for bed mobility sit to supine, min(A)x1 w/ RW for transfers, and min(A)x1 w/ RW for ambulation. Pt ambulated 2x100' with one sitting rest break for a total of 200' ambulated and 2xSTS performed. Pt was easily distracted w/ an open environment and requried constant redirection. Pt was found supine in bed upon arrival and was left supine in bed with bed alarm on and all needs within reach. Pt SpO2 remained at 88% throughout session despite increasing O2 to 6L and VC for deep breathing through nose. Pt remained asymptomatic and RN was notified. Pt displayed decreased strength, endurance, balance, mobility, and cognition, in addition to pain. Pt tolerated treatment well but was limited primarily by fatigue. Pt would continue to benefit from skilled PT intervention to adress deficits. Pt is progressing towards goals as exhibited by increased distance ambualted and  additional therapeutic exercise. The patient's AM-PAC Basic Mobility Inpatient Short Form Raw Score is 16. A Raw score of greater than or equal to 16 suggests the patient may benefit from discharge to home. PT is recommending Level 2 Rehab Resource Intensity at this time.  Barriers to Discharge: Inaccessible home environment, Decreased caregiver support     Rehab Resource Intensity Level, PT: II (Moderate Resource Intensity)    See flowsheet documentation for full assessment.

## 2025-06-30 NOTE — ASSESSMENT & PLAN NOTE
Presented after a fall in neighbors driveway, trauma imaging all negative for acute traumatic injuries. During ambulatory trial in ED was unable to safely ambulate due to marked weakness  Labs with mild hyponatremia otherwise without marked abnormalities  CT head negative for acute intracranial pathology.    TSH, B12, vitamin D and folate unremarkable  With developing SIRS/sepsis likely secondary to pneumonia as below  PT/OT evaluations recommend level 2 rehab intensity, CM working on plan

## 2025-06-30 NOTE — ASSESSMENT & PLAN NOTE
Lab Results   Component Value Date    EGFR 61 06/30/2025    EGFR 70 06/29/2025    EGFR 69 06/28/2025    CREATININE 0.86 06/30/2025    CREATININE 0.77 06/29/2025    CREATININE 0.78 06/28/2025     Renal function at baseline, monitor with BMP.  Avoid/limit nephrotoxins and hypotension

## 2025-06-30 NOTE — PHYSICAL THERAPY NOTE
Physical Therapy Treatment    Patient Name: Danita Palmer    Today's Date: 6/30/2025     Problem List  Principal Problem:    Generalized weakness  Active Problems:    CKD stage G3a/A1, GFR 45-59 and albumin creatinine ratio <30 mg/g (HCC)    Chronic obstructive pulmonary disease (HCC)    Disc degeneration, lumbar    Tobacco abuse    Essential hypertension    Fall at home, initial encounter    Lung nodule    Left rib fracture    Constipation    Acute respiratory insufficiency    Sepsis (HCC)    Pneumonia       Past Medical History  Past Medical History[1]     Past Surgical History  Past Surgical History[2]        06/30/25 0854   PT Last Visit   PT Visit Date 06/30/25   Note Type   Note Type Treatment   Pain Assessment   Pain Assessment Tool 0-10   Pain Score 5   Pain Location/Orientation Orientation: Left;Location: Rib Cage   Effect of Pain on Daily Activities limits comfort, mobility, and activity tolerance   Patient's Stated Pain Goal No pain   Hospital Pain Intervention(s) Repositioned;Ambulation/increased activity   Restrictions/Precautions   Weight Bearing Precautions Per Order No   Other Precautions Chair Alarm;Bed Alarm;O2;Fall Risk;Pain;Cognitive  (5L O2)   General   Chart Reviewed Yes   Family/Caregiver Present No   Cognition   Overall Cognitive Status Impaired   Arousal/Participation Alert;Responsive;Cooperative   Attention Attends with cues to redirect  (easily distracted)   Orientation Level Oriented X4   Memory Decreased short term memory;Decreased recall of recent events   Following Commands Follows one step commands with increased time or repetition   Comments pt pleasant and cooperative   Subjective   Subjective pt agreeable to mobilize   Bed Mobility   Rolling R Unable to assess   Rolling L Unable to assess   Supine to Sit 3  Moderate assistance   Additional items Assist x 1;HOB elevated;Bedrails;Increased time required;Verbal cues;LE management    Sit to Supine 4  Minimal assistance   Additional items Assist x 1;Bedrails;Increased time required;Verbal cues;LE management   Additional Comments VC for sequencing and positioning   Transfers   Sit to Stand 4  Minimal assistance   Additional items Assist x 1;Armrests;Increased time required   Stand to Sit 4  Minimal assistance   Additional items Assist x 1;Armrests;Increased time required   Additional Comments 2xSTS; w/ RW   Ambulation/Elevation   Gait pattern Improper Weight shift;Excessively slow;Short stride;Foward flexed   Gait Assistance 4  Minimal assist   Additional items Assist x 1;Verbal cues   Assistive Device Rolling walker   Distance 2x100'   Stair Management Assistance Not tested   Ambulation/Elevation Additional Comments VC for RW mgmt; easily distracted w/ open environemnt, required constant redirection; sitting rest break between bouts of ambulation; SpO2 remained stable at 88% despite increasing O2 to 6L and VC for deep breathing through nose   Balance   Static Sitting Fair +   Dynamic Sitting Fair   Static Standing Poor +   Dynamic Standing Poor +   Ambulatory Poor +   Endurance Deficit   Endurance Deficit Yes   Endurance Deficit Description pain, fatigue, REVELES, generalized weakness, decreased activity tolerance   Activity Tolerance   Activity Tolerance Patient limited by fatigue;Patient limited by pain   Medical Staff Made Aware PT Jamee   Nurse Made Aware RN clearance prior to session; RN made aware of SpO2 remaining at 88%   Exercises   Knee AROM Long Arc Quad 10 reps;Sitting;AROM;Bilateral   Ankle Pumps 10 reps;Sitting;AROM;Bilateral   Marching 10 reps;Sitting;AROM;Bilateral   Assessment   Prognosis Fair   Problem List Decreased strength;Decreased endurance;Impaired balance;Decreased mobility;Decreased cognition;Pain   Assessment Patient is a 84 y.o. female seen by Physical Therapy on 6/30/2025 for treatment. This session focused on bed mobility, functional transfers, ambulation, and  therapeutic exercise. During this session, pt required mod(A)x1 for bed mobility supine to sit, min(A)x1 for bed mobility sit to supine, min(A)x1 w/ RW for transfers, and min(A)x1 w/ RW for ambulation. Pt ambulated 2x100' with one sitting rest break for a total of 200' ambulated and 2xSTS performed. Pt was easily distracted w/ an open environment and requried constant redirection. Pt was found supine in bed upon arrival and was left supine in bed with bed alarm on and all needs within reach. Pt SpO2 remained at 88% throughout session despite increasing O2 to 6L and VC for deep breathing through nose. Pt remained asymptomatic and RN was notified. Pt displayed decreased strength, endurance, balance, mobility, and cognition, in addition to pain. Pt tolerated treatment well but was limited primarily by fatigue. Pt would continue to benefit from skilled PT intervention to adress deficits. Pt is progressing towards goals as exhibited by increased distance ambualted and additional therapeutic exercise. The patient's AM-[facility] Basic Mobility Inpatient Short Form Raw Score is 16. A Raw score of greater than or equal to 16 suggests the patient may benefit from discharge to home. PT is recommending Level 2 Rehab Resource Intensity at this time.   Barriers to Discharge Inaccessible home environment;Decreased caregiver support   Goals   Patient Goals none expressed   STG Expiration Date 07/10/25   PT Treatment Day 1   Plan   Treatment/Interventions ADL retraining;Functional transfer training;LE strengthening/ROM;Endurance training;Equipment eval/education;Bed mobility;Gait training;Spoke to nursing   Progress Slow progress, decreased activity tolerance   PT Frequency 3-5x/wk   Discharge Recommendation   Rehab Resource Intensity Level, PT II (Moderate Resource Intensity)   AM-PAC Basic Mobility Inpatient   Turning in Flat Bed Without Bedrails 3   Lying on Back to Sitting on Edge of Flat Bed Without Bedrails 2   Moving Bed to Chair 3    Standing Up From Chair Using Arms 3   Walk in Room 3   Climb 3-5 Stairs With Railing 2   Basic Mobility Inpatient Raw Score 16   Basic Mobility Standardized Score 38.32   [facility] Highest Level Of Mobility   -[facility] Goal 5: Stand one or more mins   -[facility] Achieved 7: Walk 25 feet or more   Education   Education Provided Mobility training;Assistive device   Patient Demonstrates acceptance/verbal understanding   End of Consult   Patient Position at End of Consult Supine;Bed/Chair alarm activated;All needs within reach     Taryn Maya, RUBY         [1]   Past Medical History:  Diagnosis Date    Glaucoma     Hypertension    [2]   Past Surgical History:  Procedure Laterality Date    BREAST LUMPECTOMY      HYSTERECTOMY

## 2025-06-30 NOTE — CASE MANAGEMENT
Case Management Discharge Planning Note    Patient name Danita Palmer  Location [facility] 918/[facility] 918-01 MRN [id number]  : 1940 Date 2025       Current Admission Date: 2025  Current Admission Diagnosis:Generalized weakness   Patient Active Problem List    Diagnosis Date Noted    Pneumonia 2025    SIRS (systemic inflammatory response syndrome) (HCC) 2025    Acute respiratory insufficiency 2025    Left rib fracture 2025    Constipation 2025    Generalized weakness 2025    Fall at home, initial encounter 2025    Lung nodule 2025    Hypertriglyceridemia 10/23/2024    Neuropathy 2024    Mild protein-calorie malnutrition (HCC) 2023    H/O lumpectomy 2021    Essential hypertension 2020    Tobacco abuse 2019    CKD stage G3a/A1, GFR 45-59 and albumin creatinine ratio <30 mg/g (HCC) 2017    Suspicious nevus 2017    Disc degeneration, lumbar 2017    Post herpetic neuralgia 2016    Liver cyst 2016    Glaucoma 2014    Osteopenia 2013    Chronic obstructive pulmonary disease (HCC) 2012      LOS (days): 5  Geometric Mean LOS (GMLOS) (days): 3  Days to GMLOS:-2.4     OBJECTIVE:  Risk of Unplanned Readmission Score: 12.72         Current admission status: Inpatient   Preferred Pharmacy:   [id number] - [place], [place] - [address]  [address]  [place] [address]  Phone: [phone] Fax: [phone]    Primary Care Provider: Duke Barnes MD    Primary Insurance: [employer] [facility]  Secondary Insurance:     DISCHARGE DETAILS:    Other Referral/Resources/Interventions Provided:  Referral Comments: [facility] unable to accept patient.  TC to sonDa, list of accepting SNF's emailed to son for review at [email or web address].  Da states he will call this CM back with a determination/preference as soon as he is done reviewing

## 2025-07-01 LAB
BASOPHILS # BLD AUTO: 0.02 THOUSANDS/ÂΜL (ref 0–0.1)
BASOPHILS NFR BLD AUTO: 0 % (ref 0–1)
EOSINOPHIL # BLD AUTO: 0.02 THOUSAND/ÂΜL (ref 0–0.61)
EOSINOPHIL NFR BLD AUTO: 0 % (ref 0–6)
ERYTHROCYTE [DISTWIDTH] IN BLOOD BY AUTOMATED COUNT: 14.1 % (ref 11.6–15.1)
HCT VFR BLD AUTO: 37 % (ref 34.8–46.1)
HGB BLD-MCNC: 12.2 G/DL (ref 11.5–15.4)
IMM GRANULOCYTES # BLD AUTO: 0.09 THOUSAND/UL (ref 0–0.2)
IMM GRANULOCYTES NFR BLD AUTO: 1 % (ref 0–2)
LYMPHOCYTES # BLD AUTO: 0.73 THOUSANDS/ÂΜL (ref 0.6–4.47)
LYMPHOCYTES NFR BLD AUTO: 7 % (ref 14–44)
MCH RBC QN AUTO: 29.5 PG (ref 26.8–34.3)
MCHC RBC AUTO-ENTMCNC: 33 G/DL (ref 31.4–37.4)
MCV RBC AUTO: 90 FL (ref 82–98)
MONOCYTES # BLD AUTO: 1.02 THOUSAND/ÂΜL (ref 0.17–1.22)
MONOCYTES NFR BLD AUTO: 10 % (ref 4–12)
NEUTROPHILS # BLD AUTO: 8.76 THOUSANDS/ÂΜL (ref 1.85–7.62)
NEUTS SEG NFR BLD AUTO: 82 % (ref 43–75)
NRBC BLD AUTO-RTO: 0 /100 WBCS
PLATELET # BLD AUTO: 412 THOUSANDS/UL (ref 149–390)
PMV BLD AUTO: 9.3 FL (ref 8.9–12.7)
PROCALCITONIN SERPL-MCNC: 1.91 NG/ML
RBC # BLD AUTO: 4.13 MILLION/UL (ref 3.81–5.12)
WBC # BLD AUTO: 10.64 THOUSAND/UL (ref 4.31–10.16)

## 2025-07-01 PROCEDURE — 84145 PROCALCITONIN (PCT): CPT | Performed by: INTERNAL MEDICINE

## 2025-07-01 PROCEDURE — 99232 SBSQ HOSP IP/OBS MODERATE 35: CPT | Performed by: INTERNAL MEDICINE

## 2025-07-01 PROCEDURE — 94760 N-INVAS EAR/PLS OXIMETRY 1: CPT

## 2025-07-01 PROCEDURE — 97530 THERAPEUTIC ACTIVITIES: CPT

## 2025-07-01 PROCEDURE — 97116 GAIT TRAINING THERAPY: CPT

## 2025-07-01 PROCEDURE — 85025 COMPLETE CBC W/AUTO DIFF WBC: CPT | Performed by: INTERNAL MEDICINE

## 2025-07-01 PROCEDURE — 97535 SELF CARE MNGMENT TRAINING: CPT

## 2025-07-01 RX ADMIN — UMECLIDINIUM BROMIDE AND VILANTEROL TRIFENATATE 1 PUFF: 62.5; 25 POWDER RESPIRATORY (INHALATION) at 10:12

## 2025-07-01 RX ADMIN — ACETAMINOPHEN 325 MG: 325 TABLET ORAL at 05:32

## 2025-07-01 RX ADMIN — GABAPENTIN 300 MG: 300 CAPSULE ORAL at 18:44

## 2025-07-01 RX ADMIN — GABAPENTIN 300 MG: 300 CAPSULE ORAL at 20:33

## 2025-07-01 RX ADMIN — HEPARIN SODIUM 5000 UNITS: 5000 INJECTION INTRAVENOUS; SUBCUTANEOUS at 05:32

## 2025-07-01 RX ADMIN — CEFTRIAXONE 1000 MG: 1 INJECTION, POWDER, FOR SOLUTION INTRAMUSCULAR; INTRAVENOUS at 11:02

## 2025-07-01 RX ADMIN — NICOTINE 1 PATCH: 14 PATCH, EXTENDED RELEASE TRANSDERMAL at 10:11

## 2025-07-01 RX ADMIN — HEPARIN SODIUM 5000 UNITS: 5000 INJECTION INTRAVENOUS; SUBCUTANEOUS at 14:06

## 2025-07-01 RX ADMIN — Medication 2000 UNITS: at 10:08

## 2025-07-01 RX ADMIN — METHOCARBAMOL 500 MG: 500 TABLET ORAL at 05:32

## 2025-07-01 RX ADMIN — GABAPENTIN 300 MG: 300 CAPSULE ORAL at 10:07

## 2025-07-01 RX ADMIN — METHOCARBAMOL 500 MG: 500 TABLET ORAL at 20:34

## 2025-07-01 RX ADMIN — GUAIFENESIN 600 MG: 600 TABLET, EXTENDED RELEASE ORAL at 10:07

## 2025-07-01 RX ADMIN — LISINOPRIL 10 MG: 10 TABLET ORAL at 10:07

## 2025-07-01 RX ADMIN — LIDOCAINE 1 PATCH: 700 PATCH TOPICAL at 10:07

## 2025-07-01 RX ADMIN — HEPARIN SODIUM 5000 UNITS: 5000 INJECTION INTRAVENOUS; SUBCUTANEOUS at 20:34

## 2025-07-01 RX ADMIN — GUAIFENESIN 600 MG: 600 TABLET, EXTENDED RELEASE ORAL at 18:44

## 2025-07-01 NOTE — CASE MANAGEMENT
Case Management Discharge Planning Note    Patient name Danita Palmer  Location [facility] 918/[facility] 918-01 MRN [id number]  : 1940 Date 2025       Current Admission Date: 2025  Current Admission Diagnosis:Generalized weakness   Patient Active Problem List    Diagnosis Date Noted    Pneumonia 2025    Sepsis (HCC) 2025    Acute respiratory insufficiency 2025    Left rib fracture 2025    Constipation 2025    Generalized weakness 2025    Fall at home, initial encounter 2025    Lung nodule 2025    Hypertriglyceridemia 10/23/2024    Neuropathy 2024    Mild protein-calorie malnutrition (HCC) 2023    H/O lumpectomy 2021    Essential hypertension 2020    Tobacco abuse 2019    CKD stage G3a/A1, GFR 45-59 and albumin creatinine ratio <30 mg/g (HCC) 2017    Suspicious nevus 2017    Disc degeneration, lumbar 2017    Post herpetic neuralgia 2016    Liver cyst 2016    Glaucoma 2014    Osteopenia 2013    Chronic obstructive pulmonary disease (HCC) 2012      LOS (days): 6  Geometric Mean LOS (GMLOS) (days): 3  Days to GMLOS:-3.2     OBJECTIVE:  Risk of Unplanned Readmission Score: 12.88         Current admission status: Inpatient   Preferred Pharmacy:   [id number] - [place], [place] - [address]  [address]  [place] [address]  Phone: [phone] Fax: [phone]    Primary Care Provider: Duke Barnes MD    Primary Insurance: [employer]  Secondary Insurance:     DISCHARGE DETAILS:    Other Referral/Resources/Interventions Provided:  Referral Comments: TC from sonAron, who states that family choice for SNF if Nicolette Hahn, reserved in [facility], will need auth

## 2025-07-01 NOTE — PLAN OF CARE
Problem: PHYSICAL THERAPY ADULT  Goal: Performs mobility at highest level of function for planned discharge setting.  See evaluation for individualized goals.  Description: Treatment/Interventions: Functional transfer training, LE strengthening/ROM, Therapeutic exercise, Elevations, Endurance training, Equipment eval/education, Patient/family training, Bed mobility, Gait training, Continued evaluation, Cognitive reorientation, Compensatory technique education, Spoke to nursing, OT  Equipment Recommended:  (TBD with further mobility)       See flowsheet documentation for full assessment, interventions and recommendations.  Outcome: Progressing  Note: Prognosis: Fair  Problem List: Decreased strength, Decreased endurance, Impaired balance, Decreased mobility, Impaired judgement, Decreased safety awareness, Pain  Assessment: pt seenf or PT session as above. Pt continues to require close S> Anahy for mobility w/ cues required for pacing; breathing techniques and taking appropriate rest breaks. Pt was on 8L midflow at rest and requrie inc to 15 L w/ ambulation today. Pt was fatigued post session. tolerated fair. PT will continue to follow and progress.  Barriers to Discharge: Inaccessible home environment, Decreased caregiver support     Rehab Resource Intensity Level, PT: II (Moderate Resource Intensity)    See flowsheet documentation for full assessment.

## 2025-07-01 NOTE — OCCUPATIONAL THERAPY NOTE
Occupational Therapy Progress Note     Patient Name: Danita Palmer  Today's Date: 7/1/2025  Problem List  Principal Problem:    Generalized weakness  Active Problems:    CKD stage G3a/A1, GFR 45-59 and albumin creatinine ratio <30 mg/g (HCC)    Chronic obstructive pulmonary disease (HCC)    Disc degeneration, lumbar    Tobacco abuse    Essential hypertension    Fall at home, initial encounter    Lung nodule    Left rib fracture    Constipation    Acute respiratory insufficiency    Sepsis (HCC)    Pneumonia       07/01/25 0914   OT Last Visit   OT Visit Date 07/01/25   Note Type   Note Type Treatment   Pain Assessment   Pain Assessment Tool 0-10   Pain Score 6   Pain Location/Orientation Orientation: Right;Location: Hip   Pain Onset/Description Frequency: Intermittent  (only when moving)   Effect of Pain on Daily Activities negatively impacts Pt's activity tolerance and ADL participation.   Patient's Stated Pain Goal No pain   Hospital Pain Intervention(s) Ambulation/increased activity;Emotional support;Rest   Restrictions/Precautions   Weight Bearing Precautions Per Order No   Other Precautions Chair Alarm;Bed Alarm;O2;Fall Risk;Pain   Lifestyle   Autonomy I w/ ADLs, receives assistance w/ IADLs, I w/ functional mobility and transfers   Reciprocal Relationships Daughter   Service to Others Retired   Intrinsic Gratification Going to the casino   ADL   Where Assessed Edge of bed   Grooming Assistance 5  Supervision/Setup   Grooming Deficit Verbal cueing;Increased time to complete;Wash/dry face;Teeth care   Grooming Comments Pt able to wash and dry face and brush teeth with S   UB Bathing Assistance 5  Supervision/Setup   UB Bathing Deficit Verbal cueing;Increased time to complete;Chest;Right arm;Left arm;Abdomen   UB Bathing Comments Pt able to wash chest, abdomen, and BUE with S   LB Bathing Assistance 4  Minimal Assistance   LB Bathing Deficit Verbal cueing;Increased time to complete;Perineal area;Buttocks;Right  "upper leg;Left upper leg;Right lower leg including foot;Left lower leg including foot   LB Bathing Comments Pt able to wash BLEs, perineal area with S but required A to wipe buttocks.   UB Dressing Assistance 5  Supervision/Setup   UB Dressing Deficit Thread RUE;Thread LUE;Pull around back;Increased time to complete;Verbal cueing   UB Dressing Comments Pt able to don gown with S   LB Dressing Assistance 4  Minimal Assistance   LB Dressing Deficit Verbal cueing;Increased time to complete;Thread RLE into underwear;Thread LLE into underwear;Pull up over hips;Don/doff R sock;Don/doff L sock;Don/doff R shoe;Don/doff L shoe   LB Dressing Comments Pt able to don underwear, socks, and slippers with Min A.   Bed Mobility   Supine to Sit 4  Minimal assistance   Additional items Assist x 1;HOB elevated;Increased time required;Verbal cues   Sit to Supine Unable to assess   Additional Comments Pt lying supine in bed upon therapist's arrival and left seated OOB in chair at end of session with alarm activated and all needs within reach.   Transfers   Sit to Stand 5  Supervision   Additional items Increased time required;Verbal cues   Stand to Sit 5  Supervision   Additional items Armrests;Increased time required;Verbal cues   Additional Comments x1 STS; with RW   Functional Mobility   Functional Mobility   (CCG)   Additional Comments Pt able to take a few steps from EOB > chair with RW for support.   Additional items Rolling walker   Subjective   Subjective \"might as well brush my teeth while we're at it.\"   Cognition   Overall Cognitive Status WFL   Arousal/Participation Alert;Responsive;Cooperative   Attention Within functional limits   Orientation Level Oriented X4   Memory Within functional limits   Following Commands Follows one step commands with increased time or repetition   Comments Pt pleasant and cooperative   Activity Tolerance   Activity Tolerance Patient tolerated treatment well   Medical Staff Made Aware RN " clearance prior to sesion.   Assessment   Assessment Pt seen for skilled OT treatment session from 8:31 to 9:14 w/ interventions focusing on ADL participation, activity tolerance, sitting tolerance, sitting balance, standing tolerance, standing balance, bed mobility , transfer skills, and fxnl mobility. Pt was agreeable and willing to participate in session. Pt engaged in the following tasks: S for grooming, UB bathing, UB dressing, Min A for LB bathing and LB dressing; Min A for supine to sit bed mobility and S for sit to stand transfer and stand to sit transfer and CCG for fxnl mobility using RW for support. In comparison to previous session, pt demonstrated improvements in supine to sit bed mobility, sit to stand transfer, stand to sit transfer and fxnl mobility by requiring less physical assistance on this date. Pt required verbal cues for correct technique and one step directives. Pt continues to be functioning below baseline level as occupational performance remains limited by decreased ADL status, decreased activity tolerance, decreased endurance, decreased sitting tolerance, decreased sitting balance, decreased standing tolerance, decreased standing balance, decreased transfer skills, and decreased fxnl mobility. From OT standpoint, recommend Level II (Moderate Resource Intensity) at time of d/c. Pt will benefit from continued OT treatment while in acute care to address deficits as defined above and maximize level of functional independence with ADLs and functional mobility. Pt seated OOB in chair w/ alarm activated and all needs met at end of session.   Plan   Treatment Interventions ADL retraining;Functional transfer training;Endurance training;Patient/family training;Equipment evaluation/education;Compensatory technique education;Continued evaluation;Energy conservation;Activityengagement   Goal Expiration Date 07/10/25   OT Treatment Day 1   OT Frequency 2-3x/wk   Discharge Recommendation   Rehab  Resource Intensity Level, OT II (Moderate Resource Intensity)   AM-PAC Daily Activity Inpatient   Lower Body Dressing 3   Bathing 3   Toileting 3   Upper Body Dressing 3   Grooming 3   Eating 3   Daily Activity Raw Score 18   Daily Activity Standardized Score (Calc for Raw Score >=11) 38.66   -PAC Applied Cognition Inpatient   Following a Speech/Presentation 3   Understanding Ordinary Conversation 4   Taking Medications 4   Remembering Where Things Are Placed or Put Away 4   Remembering List of 4-5 Errands 3   Taking Care of Complicated Tasks 3   Applied Cognition Raw Score 21   Applied Cognition Standardized Score 44.3     The patient's raw score on the AM-PAC Daily Activity Inpatient Short Form is 18. A raw score of less than 19 suggests the patient may benefit from discharge to post-acute rehabilitation services. Please refer to the recommendation of the Occupational Therapist for safe discharge planning.    Natanael Stone, OTS

## 2025-07-01 NOTE — PLAN OF CARE
Problem: OCCUPATIONAL THERAPY ADULT  Goal: Performs self-care activities at highest level of function for planned discharge setting.  See evaluation for individualized goals.  Description: Treatment Interventions: ADL retraining, Functional transfer training, UE strengthening/ROM, Endurance training, Cognitive reorientation, Patient/family training, Equipment evaluation/education, Compensatory technique education, Continued evaluation, Energy conservation, Activityengagement          See flowsheet documentation for full assessment, interventions and recommendations.   Outcome: Progressing  Note: Limitation: Decreased ADL status, Decreased UE strength, Decreased Safe judgement during ADL, Decreased cognition, Decreased endurance, Decreased self-care trans, Decreased high-level ADLs  Prognosis: Fair  Assessment: Pt seen for skilled OT treatment session from 8:31 to 9:14 w/ interventions focusing on ADL participation, activity tolerance, sitting tolerance, sitting balance, standing tolerance, standing balance, bed mobility , transfer skills, and fxnl mobility. Pt was agreeable and willing to participate in session. Pt engaged in the following tasks: S for grooming, UB bathing, UB dressing, Min A for LB bathing and LB dressing; Min A for supine to sit bed mobility and S for sit to stand transfer and stand to sit transfer and CCG for fxnl mobility using RW for support. In comparison to previous session, pt demonstrated improvements in supine to sit bed mobility, sit to stand transfer, stand to sit transfer and fxnl mobility by requiring less physical assistance on this date. Pt required verbal cues for correct technique and one step directives. Pt continues to be functioning below baseline level as occupational performance remains limited by decreased ADL status, decreased activity tolerance, decreased endurance, decreased sitting tolerance, decreased sitting balance, decreased standing tolerance, decreased standing  balance, decreased transfer skills, and decreased fxnl mobility. From OT standpoint, recommend Level II (Moderate Resource Intensity) at time of d/c. Pt will benefit from continued OT treatment while in acute care to address deficits as defined above and maximize level of functional independence with ADLs and functional mobility. Pt seated OOB in chair w/ alarm activated and all needs met at end of session.     Rehab Resource Intensity Level, OT: II (Moderate Resource Intensity)

## 2025-07-01 NOTE — PLAN OF CARE
Problem: Potential for Falls  Goal: Patient will remain free of falls  Description: INTERVENTIONS:  - Educate patient/family on patient safety including physical limitations  - Instruct patient to call for assistance with activity   - Consider consulting OT/PT to assist with strengthening/mobility based on AM PAC & JH-HLM score  - Consult OT/PT to assist with strengthening/mobility   - Keep Call bell within reach  - Keep bed low and locked with side rails adjusted as appropriate  - Keep care items and personal belongings within reach  - Initiate and maintain comfort rounds  - Make Fall Risk Sign visible to staff  - Initiate/Maintain bed alarm  - Obtain necessary fall risk management equipment: yellow socks  - Apply yellow socks and bracelet for high fall risk patients  - Consider moving patient to room near nurses station  Outcome: Progressing     Problem: PAIN - ADULT  Goal: Verbalizes/displays adequate comfort level or baseline comfort level  Description: Interventions:  - Encourage patient to monitor pain and request assistance  - Assess pain using appropriate pain scale  - Administer analgesics as ordered based on type and severity of pain and evaluate response  - Implement non-pharmacological measures as appropriate and evaluate response  - Consider cultural and social influences on pain and pain management  - Notify physician/advanced practitioner if interventions unsuccessful or patient reports new pain  - Educate patient/family on pain management process including their role and importance of  reporting pain   - Provide non-pharmacologic/complimentary pain relief interventions  Outcome: Progressing

## 2025-07-01 NOTE — PHYSICAL THERAPY NOTE
PHYSICAL THERAPY TREATMENT  NAME:  Danita Palmer  DATE: 07/01/25    AGE:   84 y.o.  Mrn:   [id number]  ADMIT DX:  Pulmonary nodules [R91.8]  Fall, initial encounter [W19.XXXA]  Fall from standing, initial encounter [W19.XXXA]  Ambulatory dysfunction [R26.2]  Unspecified multiple injuries, initial encounter [T07.XXXA]    Past Medical History[1]  Past Surgical History[2]    Length Of Stay: 6     07/01/25 1200   PT Last Visit   PT Visit Date 07/01/25   Note Type   Note Type Treatment for insurance authorization   Pain Assessment   Pain Assessment Tool 0-10   Pain Score 5   Pain Location/Orientation Orientation: Left;Location: Rib Cage   Pain Onset/Description Onset: Ongoing;Onset: Gradual   Effect of Pain on Daily Activities LIMITS COMFORT   Patient's Stated Pain Goal No pain   Restrictions/Precautions   Weight Bearing Precautions Per Order No   Other Precautions Chair Alarm;Bed Alarm;O2;Fall Risk;Pain   General   Chart Reviewed Yes   Cognition   Overall Cognitive Status WFL   Arousal/Participation Alert;Responsive;Cooperative   Attention Within functional limits   Orientation Level Oriented X4   Memory Within functional limits   Following Commands Follows one step commands with increased time or repetition   Comments cooperative   Subjective   Subjective agreeable to work w/ PT   Bed Mobility   Supine to Sit 4  Minimal assistance   Additional items Assist x 1;Increased time required;Verbal cues;LE management   Sit to Supine 4  Minimal assistance   Additional items Assist x 1;Increased time required;Verbal cues;LE management   Transfers   Sit to Stand 5  Supervision   Additional items Assist x 1;Increased time required;Verbal cues   Stand to Sit 5  Supervision   Additional items Assist x 1;Increased time required;Verbal cues   Ambulation/Elevation   Gait pattern Excessively slow;Short stride;Foward flexed;Decreased foot clearance   Gait Assistance 4  Minimal assist   Assistive Device Rolling walker   Distance  30+50+45+30 multiple standing rest breaks for SOB - Spo2 on 8L dropping to mid 80's - inc to 15L w/ ambualtion and remained > 90% throughout.   Stair Management Assistance Not tested   Ambulation/Elevation Additional Comments V/C for breathing' pacing and proximity to RW   Balance   Static Sitting Fair +   Dynamic Sitting Fair   Static Standing Fair -   Dynamic Standing Poor +   Ambulatory Poor   Endurance Deficit   Endurance Deficit Yes   Endurance Deficit Description pain fatiuge hypoxia- O2 requirement s   Activity Tolerance   Activity Tolerance Patient limited by fatigue;Patient limited by pain   Medical Staff Made Aware OT RN and CM for d/c planning   Nurse Made Aware yes   Exercises   Marching Standing;15 reps   Assessment   Prognosis Fair   Problem List Decreased strength;Decreased endurance;Impaired balance;Decreased mobility;Impaired judgement;Decreased safety awareness;Pain   Assessment pt seenf or PT session as above. Pt continues to require close S> Anahy for mobility w/ cues required for pacing; breathing techniques and taking appropriate rest breaks. Pt was on 8L midflow at rest and requrie inc to 15 L w/ ambulation today. Pt was fatigued post session. tolerated fair. PT will continue to follow and progress.   Goals   Patient Goals to rest and get some good sleep   STG Expiration Date 07/10/25   PT Treatment Day 2   Plan   Treatment/Interventions ADL retraining;Functional transfer training;LE strengthening/ROM;Elevations;Therapeutic exercise;Endurance training;Cognitive reorientation;Patient/family training;Equipment eval/education;Bed mobility;Gait training;Compensatory technique education;Spoke to nursing;Spoke to case management;Spoke to advanced practitioner   Progress Slow progress, decreased activity tolerance   PT Frequency 3-5x/wk   Discharge Recommendation   Rehab Resource Intensity Level, PT II (Moderate Resource Intensity)   AM-PAC Basic Mobility Inpatient   Turning in Flat Bed Without  Bedrails 3   Lying on Back to Sitting on Edge of Flat Bed Without Bedrails 3   Moving Bed to Chair 3   Standing Up From Chair Using Arms 3   Walk in Room 3   Climb 3-5 Stairs With Railing 2   Basic Mobility Inpatient Raw Score 17   Basic Mobility Standardized Score 39.67   [facility] Highest Level Of Mobility   -HL Goal 5: Stand one or more mins   -HLM Achieved 7: Walk 25 feet or more   End of Consult   Patient Position at End of Consult Supine;Bed/Chair alarm activated;All needs within reach     The patient's AM-PAC Basic Mobility Inpatient Short Form Raw Score is 17. A Raw score of greater than 16 suggests the patient may benefit from discharge to home. Please also refer to the recommendation of the Physical Therapist for safe discharge planning.            Jannet Lauren, PT        [1]   Past Medical History:  Diagnosis Date    Glaucoma     Hypertension    [2]   Past Surgical History:  Procedure Laterality Date    BREAST LUMPECTOMY      HYSTERECTOMY

## 2025-07-01 NOTE — PROGRESS NOTES
Patient:    MRN:  [id number]    [place] Request ID:  [id number]    Level of care reserved:  Skilled Nursing Facility    Partner Reserved:  [facility], [place] [address] [phone]    Clinical needs requested:    Geography searched:  10 miles around [id number]    Start of Service:    Request sent:  2:02pm EDT on 6/27/2025 by Emily Bruno    Partner reserved:  11:35am EDT on 7/1/2025 by Genny Reyez    Choice list shared:  1:07pm EDT on 6/30/2025 by Genny Reyez

## 2025-07-01 NOTE — DISCHARGE SUPPORT
Case Management Assessment & Discharge Planning Note    Patient name Danita Palmer  Location [facility] 918/[facility] 918-01 MRN [id number]  : 1940 Date 2025       Current Admission Date: 2025  Current Admission Diagnosis:Generalized weakness   Patient Active Problem List    Diagnosis Date Noted    Pneumonia 2025    Sepsis (HCC) 2025    Acute respiratory insufficiency 2025    Left rib fracture 2025    Constipation 2025    Generalized weakness 2025    Fall at home, initial encounter 2025    Lung nodule 2025    Hypertriglyceridemia 10/23/2024    Neuropathy 2024    Mild protein-calorie malnutrition (HCC) 2023    H/O lumpectomy 2021    Essential hypertension 2020    Tobacco abuse 2019    CKD stage G3a/A1, GFR 45-59 and albumin creatinine ratio <30 mg/g (HCC) 2017    Suspicious nevus 2017    Disc degeneration, lumbar 2017    Post herpetic neuralgia 2016    Liver cyst 2016    Glaucoma 2014    Osteopenia 2013    Chronic obstructive pulmonary disease (HCC) 2012      LOS (days): 6  Geometric Mean LOS (GMLOS) (days): 3  Days to GMLOS:-3.4   Facility Authorization Initiated  [facility] received request for auth from : Genny Reyez  Authorization Request Submitted for: SNF  Requested Start of Care Date: 25  [facility] Name: [facility]  Facility NPI: [id number]  Facility MD: Aldair Zamarripa  Facility MD NPI: [id number]  Authorization initiated by contacting insurance: [employer]  Via: H&CC Portal  Clinicals submitted via: Portal Attachment  Pending reference #: [id number]   notified: Genny Reyez     Updates to authorization status will be noted in chart.    Please reach out to CM for updates on any clinical information.

## 2025-07-01 NOTE — PROGRESS NOTES
Progress Note - Hospitalist   Name: Danita Palmer 84 y.o. female I MRN: [id number]  [facility] I Date of Admission: 6/25/2025   Date of Service: 7/1/2025 I Hospital Day: 6    Assessment & Plan  Generalized weakness  Presented after a fall in [facility], trauma imaging all negative for acute traumatic injuries. During ambulatory trial in ED was unable to safely ambulate due to marked weakness  Labs with mild hyponatremia otherwise without marked abnormalities  CT head negative for acute intracranial pathology.    TSH, B12, vitamin D and folate unremarkable  With developing SIRS/sepsis likely secondary to pneumonia as below  PT/OT evaluations recommend level 2 rehab intensity, CM working on plan   Left rib fracture  Nondisplaced fractures of the anterolateral left 4th and 5th ribs  Incentive spirometry and rib fracture protocol ordered  Pain control including lidocaine patch and scheduled Tylenol ordered    Fall at home, initial encounter  Presented after a fall in [facility] driveway, states she was walking when she could not stop her self and fell forward onto her knees without dizziness/lightheadedness or loss of consciousness  Patient with weakness with inability to safely ambulate  PT/OT evaluations recommend rehab, case management assistance appreciated  Acute respiratory insufficiency  Patient with o2 saturations of 86 and 89%. Placed on 2L NC. Does not wear any o2 at baseline. States that she is having difficulty with deep breathing because of rib pain  CT chest: Nondisplaced fractures of the anterolateral left fourth and fifth ribs  Continue rib fracture protocol and incentive spirometry  Increase in o2 requirements on 6/29, repeat CT with pneumonia as below   Sepsis (HCC)  With tachycardia and leukocytosis. Also with low grade temperatures. Procal of 2.45 and worsening O2 requirements on 6/29  Does not appear UA was collected but low suspicion for urinary etiology   CT CAP 6/29 showed  "\"Interval appearance of innumerable micronodules in the right lower lobe, with new dense left greater than right lower basilar consolidations. No tracheal lesion. Interval appearance of bilateral lower lobe proximal bronchial occlusions\"  Was initially started on Levaquin given allergy, however low drip score, changed to CTX on 6/30  Monitor leukocytosis, fever curve, hemodynamics.   Constipation  Resolved as of 6/30, had BM  Continue bowel regimen   CKD stage G3a/A1, GFR 45-59 and albumin creatinine ratio <30 mg/g ([facility])  Lab Results   Component Value Date    EGFR 61 06/30/2025    EGFR 70 06/29/2025    EGFR 69 06/28/2025    CREATININE 0.86 06/30/2025    CREATININE 0.77 06/29/2025    CREATININE 0.78 06/28/2025     Renal function at baseline, monitor with BMP.  Avoid/limit nephrotoxins and hypotension  Lung nodule  7 mm spiculated nodule to right upper lobe incidentally noted on CT chest  Repeat noncontrast CT chest advised at 6-12 months, patient to follow-up with PCP to arrange this.  Previous provider reviewed results with patient and her son  Disc degeneration, lumbar  Chronic, patient denies worsening back pain  Continue gabapentin, dose was reduced to 300 mg 3 times daily as patient somewhat sleepy during admission evaluation  Tobacco abuse  Discussed need for cessation.    Nicotine patch ordered  Essential hypertension  Continue PTA lisinopril 10 mg daily restrict hold parameters and monitor blood pressure per protocol  Chronic obstructive pulmonary disease (HCC)  Not in acute exacerbation, continue PTA inhalers  Pneumonia  See above plan under SIRS    VTE Pharmacologic Prophylaxis: VTE Score: 3 Moderate Risk (Score 3-4) - Pharmacological DVT Prophylaxis Ordered: heparin.    Mobility:   Basic Mobility Inpatient Raw Score: 16  JH-HLM Goal: 5: Stand one or more mins  JH-HLM Achieved: 8: Walk 250 feet ot more  JH-HLM Goal achieved. Continue to encourage appropriate mobility.    Patient Centered Rounds: I performed " bedside rounds with nursing staff today.   Discussions with Specialists or Other Care Team Provider: ra RAND     Education and Discussions with Family / Patient: Updated  (son) via phone.    Current Length of Stay: 6 day(s)  Current Patient Status: Inpatient   Certification Statement: The patient will continue to require additional inpatient hospital stay due to improvement in respiratory   Discharge Plan: Anticipate discharge in 24-48 hrs to rehab facility.    Code Status: Level 1 - Full Code    Subjective   Doing better today. Feels her L sided rib pain improved. Coughing improving as well as SOB. Had BM last night and again this morning.     Objective :  Temp:  [98.4 °F (36.9 °C)-99.7 °F (37.6 °C)] 99.7 °F (37.6 °C)  HR:  [78-83] 83  BP: (120-131)/(53-60) 130/60  Resp:  [13-18] 18  SpO2:  [92 %-95 %] 95 %  O2 Device: Nasal cannula  Nasal Cannula O2 Flow Rate (L/min):  [4 L/min] 4 L/min    Body mass index is 20.78 kg/m².     Input and Output Summary (last 24 hours):     Intake/Output Summary (Last 24 hours) at 7/1/2025 0859  Last data filed at 7/1/2025 0309  Gross per 24 hour   Intake 120 ml   Output 75 ml   Net 45 ml       Physical Exam  Vitals and nursing note reviewed.   Constitutional:       General: She is not in acute distress.     Appearance: She is cachectic.      Comments: On 2L     Cardiovascular:      Rate and Rhythm: Normal rate.   Pulmonary:      Comments: Decreased   Abdominal:      General: There is no distension.     Musculoskeletal:      Right lower leg: No edema.      Left lower leg: No edema.     Neurological:      Mental Status: She is alert and oriented to person, place, and time.           Lines/Drains:              Lab Results: I have reviewed the following results:   Results from last 7 days   Lab Units 07/01/25  0446   WBC Thousand/uL 10.64*   HEMOGLOBIN g/dL 12.2   HEMATOCRIT % 37.0   PLATELETS Thousands/uL 412*   SEGS PCT % 82*   LYMPHO PCT % 7*   MONO PCT % 10   EOS PCT % 0      Results from last 7 days   Lab Units 06/30/25  0642 06/27/25  0442 06/25/25  0213   SODIUM mmol/L 135   < > 134*   POTASSIUM mmol/L 4.4   < > 4.2   CHLORIDE mmol/L 99   < > 100   CO2 mmol/L 25   < > 27   BUN mg/dL 39*   < > 19   CREATININE mg/dL 0.86   < > 1.13   ANION GAP mmol/L 11   < > 7   CALCIUM mg/dL 8.9   < > 10.1   ALBUMIN g/dL  --   --  3.7   TOTAL BILIRUBIN mg/dL  --   --  0.37   ALK PHOS U/L  --   --  62   ALT U/L  --   --  19   AST U/L  --   --  31   GLUCOSE RANDOM mg/dL 89   < > 109    < > = values in this interval not displayed.                 Results from last 7 days   Lab Units 07/01/25  0446 06/30/25  0642 06/29/25  0608   PROCALCITONIN ng/ml 1.91* 1.80* 2.45*       Recent Cultures (last 7 days):   Results from last 7 days   Lab Units 06/29/25  1110 06/29/25  1103 06/29/25  1050   BLOOD CULTURE  No Growth at 24 hrs. No Growth at 24 hrs.  --    LEGIONELLA URINARY ANTIGEN   --   --  Negative       Imaging Results Review: No pertinent imaging studies reviewed.  Other Study Results Review: No additional pertinent studies reviewed.    Last 24 Hours Medication List:     Current Facility-Administered Medications:     acetaminophen (TYLENOL) tablet 975 mg, Q8H JOSE MANUEL    albuterol (PROVENTIL HFA,VENTOLIN HFA) inhaler 1 puff, Q4H PRN    bisacodyl (DULCOLAX) rectal suppository 10 mg, Daily PRN    cefTRIAXone (ROCEPHIN) 1,000 mg in dextrose 5 % 50 mL IVPB, Q24H, Last Rate: 1,000 mg (06/30/25 0923)    Cholecalciferol (VITAMIN D3) tablet 2,000 Units, Daily    docusate sodium (COLACE) capsule 100 mg, BID    gabapentin (NEURONTIN) capsule 300 mg, TID    guaiFENesin (MUCINEX) 12 hr tablet 600 mg, BID    heparin (porcine) subcutaneous injection 5,000 Units, Q8H JOSE MANUEL    lidocaine (LIDODERM) 5 % patch 1 patch, Daily    lisinopril (ZESTRIL) tablet 10 mg, Daily    magnesium hydroxide (MILK OF MAGNESIA) oral suspension 30 mL, Daily PRN    methocarbamol (ROBAXIN) tablet 500 mg, Q8H JOSE MANUEL    nicotine (NICODERM CQ) 14 mg/24hr  TD 24 hr patch 1 patch, Daily    ondansetron (ZOFRAN) injection 4 mg, Q6H PRN    oxyCODONE (ROXICODONE) split tablet 2.5 mg, Q4H PRN **OR** oxyCODONE (ROXICODONE) IR tablet 5 mg, Q4H PRN    polyethylene glycol (MIRALAX) packet 17 g, BID    senna (SENOKOT) tablet 17.2 mg, HS    umeclidinium-vilanterol 62.5-25 mcg/actuation inhaler 1 puff, Daily    Administrative Statements   Today, Patient Was Seen By: Jamee Licea PA-C      **Please Note: This note may have been constructed using a voice recognition system.**

## 2025-07-01 NOTE — CASE MANAGEMENT
Case Management Discharge Planning Note    Patient name Danita Palmer  Location [facility] 918/[facility] 918-01 MRN [id number]  : 1940 Date 2025       Current Admission Date: 2025  Current Admission Diagnosis:Generalized weakness   Patient Active Problem List    Diagnosis Date Noted    Pneumonia 2025    Sepsis (HCC) 2025    Acute respiratory insufficiency 2025    Left rib fracture 2025    Constipation 2025    Generalized weakness 2025    Fall at home, initial encounter 2025    Lung nodule 2025    Hypertriglyceridemia 10/23/2024    Neuropathy 2024    Mild protein-calorie malnutrition (HCC) 2023    H/O lumpectomy 2021    Essential hypertension 2020    Tobacco abuse 2019    CKD stage G3a/A1, GFR 45-59 and albumin creatinine ratio <30 mg/g (HCC) 2017    Suspicious nevus 2017    Disc degeneration, lumbar 2017    Post herpetic neuralgia 2016    Liver cyst 2016    Glaucoma 2014    Osteopenia 2013    Chronic obstructive pulmonary disease (HCC) 2012      LOS (days): 6  Geometric Mean LOS (GMLOS) (days): 3  Days to GMLOS:-3.3     OBJECTIVE:  Risk of Unplanned Readmission Score: 12.91         Current admission status: Inpatient   Preferred Pharmacy:   [id number] - [place], [place] - [address]  [address]  [place] [address]  Phone: [phone] Fax: [phone]    Primary Care Provider: Duke Barnes MD    Primary Insurance: [employer]  Secondary Insurance:     DISCHARGE DETAILS:    Other Referral/Resources/Interventions Provided:  Referral Comments: Post acute authorization for [facility] tasked to CM DC support

## 2025-07-02 PROBLEM — J96.01 ACUTE HYPOXIC RESPIRATORY FAILURE (HCC): Status: ACTIVE | Noted: 2025-06-28

## 2025-07-02 LAB
ANION GAP SERPL CALCULATED.3IONS-SCNC: 6 MMOL/L (ref 4–13)
BASOPHILS # BLD AUTO: 0.03 THOUSANDS/ÂΜL (ref 0–0.1)
BASOPHILS NFR BLD AUTO: 0 % (ref 0–1)
BUN SERPL-MCNC: 36 MG/DL (ref 5–25)
CALCIUM SERPL-MCNC: 8.8 MG/DL (ref 8.4–10.2)
CHLORIDE SERPL-SCNC: 104 MMOL/L (ref 96–108)
CO2 SERPL-SCNC: 29 MMOL/L (ref 21–32)
CREAT SERPL-MCNC: 0.63 MG/DL (ref 0.6–1.3)
EOSINOPHIL # BLD AUTO: 0.04 THOUSAND/ÂΜL (ref 0–0.61)
EOSINOPHIL NFR BLD AUTO: 1 % (ref 0–6)
ERYTHROCYTE [DISTWIDTH] IN BLOOD BY AUTOMATED COUNT: 14 % (ref 11.6–15.1)
GFR SERPL CREATININE-BSD FRML MDRD: 82 ML/MIN/1.73SQ M
GLUCOSE SERPL-MCNC: 96 MG/DL (ref 65–140)
HCT VFR BLD AUTO: 37.2 % (ref 34.8–46.1)
HGB BLD-MCNC: 12.3 G/DL (ref 11.5–15.4)
IMM GRANULOCYTES # BLD AUTO: 0.09 THOUSAND/UL (ref 0–0.2)
IMM GRANULOCYTES NFR BLD AUTO: 1 % (ref 0–2)
LYMPHOCYTES # BLD AUTO: 0.91 THOUSANDS/ÂΜL (ref 0.6–4.47)
LYMPHOCYTES NFR BLD AUTO: 11 % (ref 14–44)
MCH RBC QN AUTO: 29.8 PG (ref 26.8–34.3)
MCHC RBC AUTO-ENTMCNC: 33.1 G/DL (ref 31.4–37.4)
MCV RBC AUTO: 90 FL (ref 82–98)
MONOCYTES # BLD AUTO: 0.89 THOUSAND/ÂΜL (ref 0.17–1.22)
MONOCYTES NFR BLD AUTO: 11 % (ref 4–12)
NEUTROPHILS # BLD AUTO: 6.51 THOUSANDS/ÂΜL (ref 1.85–7.62)
NEUTS SEG NFR BLD AUTO: 76 % (ref 43–75)
NRBC BLD AUTO-RTO: 0 /100 WBCS
PLATELET # BLD AUTO: 449 THOUSANDS/UL (ref 149–390)
PMV BLD AUTO: 8.8 FL (ref 8.9–12.7)
POTASSIUM SERPL-SCNC: 4.1 MMOL/L (ref 3.5–5.3)
PROCALCITONIN SERPL-MCNC: 0.48 NG/ML
RBC # BLD AUTO: 4.13 MILLION/UL (ref 3.81–5.12)
SODIUM SERPL-SCNC: 139 MMOL/L (ref 135–147)
WBC # BLD AUTO: 8.47 THOUSAND/UL (ref 4.31–10.16)

## 2025-07-02 PROCEDURE — 85025 COMPLETE CBC W/AUTO DIFF WBC: CPT | Performed by: INTERNAL MEDICINE

## 2025-07-02 PROCEDURE — 84145 PROCALCITONIN (PCT): CPT | Performed by: INTERNAL MEDICINE

## 2025-07-02 PROCEDURE — 94760 N-INVAS EAR/PLS OXIMETRY 1: CPT

## 2025-07-02 PROCEDURE — 99232 SBSQ HOSP IP/OBS MODERATE 35: CPT | Performed by: INTERNAL MEDICINE

## 2025-07-02 PROCEDURE — 80048 BASIC METABOLIC PNL TOTAL CA: CPT | Performed by: INTERNAL MEDICINE

## 2025-07-02 RX ORDER — UMECLIDINIUM BROMIDE AND VILANTEROL TRIFENATATE 62.5; 25 UG/1; UG/1
1 POWDER RESPIRATORY (INHALATION) DAILY
Status: DISCONTINUED | OUTPATIENT
Start: 2025-07-03 | End: 2025-07-12 | Stop reason: HOSPADM

## 2025-07-02 RX ORDER — LATANOPROST 50 UG/ML
1 SOLUTION/ DROPS OPHTHALMIC
Status: DISCONTINUED | OUTPATIENT
Start: 2025-07-02 | End: 2025-07-12 | Stop reason: HOSPADM

## 2025-07-02 RX ADMIN — GABAPENTIN 300 MG: 300 CAPSULE ORAL at 08:56

## 2025-07-02 RX ADMIN — HEPARIN SODIUM 5000 UNITS: 5000 INJECTION INTRAVENOUS; SUBCUTANEOUS at 06:46

## 2025-07-02 RX ADMIN — GABAPENTIN 300 MG: 300 CAPSULE ORAL at 16:53

## 2025-07-02 RX ADMIN — Medication 2000 UNITS: at 08:56

## 2025-07-02 RX ADMIN — LISINOPRIL 10 MG: 10 TABLET ORAL at 08:56

## 2025-07-02 RX ADMIN — SENNOSIDES 17.2 MG: 8.6 TABLET, FILM COATED ORAL at 21:18

## 2025-07-02 RX ADMIN — GUAIFENESIN 600 MG: 600 TABLET, EXTENDED RELEASE ORAL at 08:56

## 2025-07-02 RX ADMIN — METHOCARBAMOL 500 MG: 500 TABLET ORAL at 06:46

## 2025-07-02 RX ADMIN — GABAPENTIN 300 MG: 300 CAPSULE ORAL at 21:18

## 2025-07-02 RX ADMIN — ACETAMINOPHEN 975 MG: 325 TABLET ORAL at 06:45

## 2025-07-02 RX ADMIN — LIDOCAINE 1 PATCH: 700 PATCH TOPICAL at 08:56

## 2025-07-02 RX ADMIN — GUAIFENESIN 600 MG: 600 TABLET, EXTENDED RELEASE ORAL at 16:53

## 2025-07-02 RX ADMIN — CEFTRIAXONE 1000 MG: 1 INJECTION, POWDER, FOR SOLUTION INTRAMUSCULAR; INTRAVENOUS at 10:21

## 2025-07-02 RX ADMIN — ACETAMINOPHEN 325 MG: 325 TABLET ORAL at 13:52

## 2025-07-02 RX ADMIN — HEPARIN SODIUM 5000 UNITS: 5000 INJECTION INTRAVENOUS; SUBCUTANEOUS at 13:52

## 2025-07-02 RX ADMIN — NICOTINE 1 PATCH: 14 PATCH, EXTENDED RELEASE TRANSDERMAL at 08:55

## 2025-07-02 RX ADMIN — METHOCARBAMOL 500 MG: 500 TABLET ORAL at 13:52

## 2025-07-02 RX ADMIN — METHOCARBAMOL 500 MG: 500 TABLET ORAL at 21:18

## 2025-07-02 RX ADMIN — UMECLIDINIUM BROMIDE AND VILANTEROL TRIFENATATE 1 PUFF: 62.5; 25 POWDER RESPIRATORY (INHALATION) at 08:59

## 2025-07-02 NOTE — ASSESSMENT & PLAN NOTE
Patient with o2 saturations of 86 and 89%. Placed on 2L NC. Does not wear any o2 at baseline. States that she is having difficulty with deep breathing because of rib pain  CT chest: Nondisplaced fractures of the anterolateral left fourth and fifth ribs  Continue rib fracture protocol and incentive spirometry  Increase in o2 requirements on 6/29, repeat CT with pneumonia as below   Currently on 8 L mid flow  WBC count currently within normal limits  Continue IV ceftriaxone  Wean oxygen as able

## 2025-07-02 NOTE — CASE MANAGEMENT
Case Management Discharge Planning Note    Patient name Danita Palmer  Location [facility] 918/[facility] 918-01 MRN [id number]  : 1940 Date 2025       Current Admission Date: 2025  Current Admission Diagnosis:Generalized weakness   Patient Active Problem List    Diagnosis Date Noted    Pneumonia 2025    Sepsis (HCC) 2025    Acute hypoxic respiratory failure (HCC) 2025    Left rib fracture 2025    Constipation 2025    Generalized weakness 2025    Fall at home, initial encounter 2025    Lung nodule 2025    Hypertriglyceridemia 10/23/2024    Neuropathy 2024    Mild protein-calorie malnutrition (HCC) 2023    H/O lumpectomy 2021    Essential hypertension 2020    Tobacco abuse 2019    CKD stage G3a/A1, GFR 45-59 and albumin creatinine ratio <30 mg/g (HCC) 2017    Suspicious nevus 2017    Disc degeneration, lumbar 2017    Post herpetic neuralgia 2016    Liver cyst 2016    Glaucoma 2014    Osteopenia 2013    Chronic obstructive pulmonary disease (HCC) 2012      LOS (days): 7  Geometric Mean LOS (GMLOS) (days): 3  Days to GMLOS:-4.3     OBJECTIVE:  Risk of Unplanned Readmission Score: 13.11         Current admission status: Inpatient   Preferred Pharmacy:   [id number] - [place], [place] - [address]  [address]  [place] [address]  Phone: [phone] Fax: [phone]    Primary Care Provider: Duke Barnes MD    Primary Insurance: [employer] [facility]  Secondary Insurance:     DISCHARGE DETAILS:  Discharge planning discussed with:: facility via Aidin      Additional Comments: CM notified pt is approved by insurance for STR. CM sent insurance approval information to facility via Aidin. Per provider, pt is not clear for DC today. Tentative tomorrow. Facility updated - they can accept patient tomorrow if cleared. CM following

## 2025-07-02 NOTE — ASSESSMENT & PLAN NOTE
Lab Results   Component Value Date    EGFR 82 07/02/2025    EGFR 61 06/30/2025    EGFR 70 06/29/2025    CREATININE 0.63 07/02/2025    CREATININE 0.86 06/30/2025    CREATININE 0.77 06/29/2025     Renal function at baseline, monitor with BMP.  Avoid/limit nephrotoxins and hypotension

## 2025-07-02 NOTE — PROGRESS NOTES
Progress Note - Hospitalist   Name: Danita Palmer 84 y.o. female I MRN: [id number]  [facility] I Date of Admission: 6/25/2025   Date of Service: 7/2/2025 I Hospital Day: 7    Assessment & Plan  Generalized weakness  Presented after a fall in [facility] [place] [employer], trauma imaging all negative for acute traumatic injuries. During ambulatory trial in ED was unable to safely ambulate due to marked weakness  Labs with mild hyponatremia otherwise without marked abnormalities  CT head negative for acute intracranial pathology.    With developing SIRS/sepsis likely secondary to pneumonia as below  PT/OT evaluations recommend level 2 rehab intensity, CM working on plan   Left rib fracture  Nondisplaced fractures of the anterolateral left 4th and 5th ribs  Incentive spirometry and rib fracture protocol ordered  Pain control including lidocaine patch and scheduled Tylenol ordered    Fall at home, initial encounter  Presented after a fall in [facility] [employer] [place] [employer], states she was walking when she could not stop her self and fell forward onto her knees without dizziness/lightheadedness or loss of consciousness  Patient with weakness with inability to safely ambulate  PT/OT evaluations recommend rehab, case management assistance appreciated  Acute hypoxic respiratory failure (HCC)  Patient with o2 saturations of 86 and 89%. Placed on 2L NC. Does not wear any o2 at baseline. States that she is having difficulty with deep breathing because of rib pain  CT chest: Nondisplaced fractures of the anterolateral left fourth and fifth ribs  Continue rib fracture protocol and incentive spirometry  Increase in o2 requirements on 6/29, repeat CT with pneumonia as below   Currently on 8 L mid flow  WBC count currently within normal limits  Continue IV ceftriaxone  Wean oxygen as able  Sepsis (HCC)  With tachycardia and leukocytosis. Also with low grade temperatures. Procal of 2.45 and worsening O2 requirements on 6/29  Does not appear UA  "was collected but low suspicion for urinary etiology   CT CAP 6/29 showed \"Interval appearance of innumerable micronodules in the right lower lobe, with new dense left greater than right lower basilar consolidations. No tracheal lesion. Interval appearance of bilateral lower lobe proximal bronchial occlusions\"  Was initially started on Levaquin given allergy, however low drip score, changed to CTX on 6/30  WBC count is since normalized  Blood cultures negative  Continue IV ceftriaxone  Constipation  Resolved as of 6/30, had BM  Continue bowel regimen   CKD stage G3a/A1, GFR 45-59 and albumin creatinine ratio <30 mg/g ([facility])  Lab Results   Component Value Date    EGFR 82 07/02/2025    EGFR 61 06/30/2025    EGFR 70 06/29/2025    CREATININE 0.63 07/02/2025    CREATININE 0.86 06/30/2025    CREATININE 0.77 06/29/2025     Renal function at baseline, monitor with BMP.  Avoid/limit nephrotoxins and hypotension  Lung nodule  7 mm spiculated nodule to right upper lobe incidentally noted on CT chest  Repeat noncontrast CT chest advised at 6-12 months, patient to follow-up with PCP to arrange this.  Previous provider reviewed results with patient and her son  Disc degeneration, lumbar  Chronic, patient denies worsening back pain  Continue gabapentin, dose was reduced to 300 mg 3 times daily as patient somewhat sleepy during admission evaluation  Tobacco abuse  Discussed need for cessation.    Nicotine patch ordered  Essential hypertension  Continue PTA lisinopril 10 mg daily restrict hold parameters and monitor blood pressure per protocol  Chronic obstructive pulmonary disease (HCC)  Not in acute exacerbation, continue PTA inhalers  Pneumonia  See above plan under SIRS    VTE Pharmacologic Prophylaxis: VTE Score: 3 Moderate Risk (Score 3-4) - Pharmacological DVT Prophylaxis Ordered: heparin.    Mobility:   Basic Mobility Inpatient Raw Score: 16  JH-HLM Goal: 5: Stand one or more mins  JH-HLM Achieved: 2: Bed activities/Dependent " transfer  -[facility] Goal achieved. Continue to encourage appropriate mobility.    Patient Centered Rounds: I performed bedside rounds with nursing staff today.   Discussions with Specialists or Other Care Team Provider: cm, nursing    Education and Discussions with Family / Patient: pt, daughter.     Current Length of Stay: 7 day(s)  Current Patient Status: Inpatient   Certification Statement: The patient will continue to require additional inpatient hospital stay due to see below  Discharge Plan: Pending further treatment of acute hypoxic respiratory failure.  Anticipate medically clear in 24 to 48 hours planning for rehab once medically cleared    Code Status: Level 1 - Full Code    Subjective   Reports breathing improved.  Denies fevers, chills, abdominal pain    Objective :  Temp:  [98 °F (36.7 °C)-98.6 °F (37 °C)] 98 °F (36.7 °C)  HR:  [79] 79  BP: (130-137)/(63-71) 130/63  Resp:  [16-22] 22  SpO2:  [93 %-96 %] 96 %  O2 Device: Mid flow nasal cannula  Nasal Cannula O2 Flow Rate (L/min):  [8 L/min] 8 L/min    Body mass index is 20.78 kg/m².     Input and Output Summary (last 24 hours):     Intake/Output Summary (Last 24 hours) at 7/2/2025 1019  Last data filed at 7/2/2025 0900  Gross per 24 hour   Intake 360 ml   Output --   Net 360 ml       Physical Exam  Constitutional:       General: She is not in acute distress.     Appearance: She is well-developed. She is not diaphoretic.   HENT:      Head: Normocephalic and atraumatic.      Nose: Nose normal.      Mouth/Throat:      Pharynx: No oropharyngeal exudate.     Eyes:      General: No scleral icterus.        Right eye: No discharge.         Left eye: No discharge.      Conjunctiva/sclera: Conjunctivae normal.     Neck:      Thyroid: No thyromegaly.      Vascular: No JVD.     Cardiovascular:      Rate and Rhythm: Normal rate and regular rhythm.      Heart sounds: Normal heart sounds. No murmur heard.     No friction rub. No gallop.   Pulmonary:      Effort: Pulmonary  effort is normal. No respiratory distress.      Breath sounds: Normal breath sounds. No wheezing or rales.   Chest:      Chest wall: No tenderness.   Abdominal:      General: Bowel sounds are normal. There is no distension.      Palpations: Abdomen is soft.      Tenderness: There is no abdominal tenderness. There is no guarding or rebound.     Musculoskeletal:         General: No tenderness or deformity. Normal range of motion.      Cervical back: Normal range of motion and neck supple.     Skin:     General: Skin is warm and dry.      Findings: No erythema or rash.     Neurological:      Mental Status: She is alert. Mental status is at baseline.      Cranial Nerves: No cranial nerve deficit.      Sensory: No sensory deficit.      Motor: No abnormal muscle tone.      Coordination: Coordination normal.           Lines/Drains:              Lab Results: I have reviewed the following results:   Results from last 7 days   Lab Units 07/02/25  0613   WBC Thousand/uL 8.47   HEMOGLOBIN g/dL 12.3   HEMATOCRIT % 37.2   PLATELETS Thousands/uL 449*   SEGS PCT % 76*   LYMPHO PCT % 11*   MONO PCT % 11   EOS PCT % 1     Results from last 7 days   Lab Units 07/02/25  0613   SODIUM mmol/L 139   POTASSIUM mmol/L 4.1   CHLORIDE mmol/L 104   CO2 mmol/L 29   BUN mg/dL 36*   CREATININE mg/dL 0.63   ANION GAP mmol/L 6   CALCIUM mg/dL 8.8   GLUCOSE RANDOM mg/dL 96                 Results from last 7 days   Lab Units 07/02/25  0613 07/01/25  0446 06/30/25  0642 06/29/25  0608   PROCALCITONIN ng/ml 0.48* 1.91* 1.80* 2.45*       Recent Cultures (last 7 days):   Results from last 7 days   Lab Units 06/29/25  1110 06/29/25  1103 06/29/25  1050   BLOOD CULTURE  No Growth at 48 hrs. No Growth at 48 hrs.  --    LEGIONELLA URINARY ANTIGEN   --   --  Negative       Imaging Results Review: I personally reviewed the following image studies/reports in PACS and discussed pertinent findings with Radiology: chest xray. My interpretation of the radiology  images/reports is:  .  Other Study Results Review: EKG was reviewed.     Last 24 Hours Medication List:     Current Facility-Administered Medications:     acetaminophen (TYLENOL) tablet 975 mg, Q8H JOSE MANUEL    albuterol (PROVENTIL HFA,VENTOLIN HFA) inhaler 1 puff, Q4H PRN    bisacodyl (DULCOLAX) rectal suppository 10 mg, Daily PRN    cefTRIAXone (ROCEPHIN) 1,000 mg in dextrose 5 % 50 mL IVPB, Q24H, Last Rate: 1,000 mg (07/01/25 1102)    Cholecalciferol (VITAMIN D3) tablet 2,000 Units, Daily    docusate sodium (COLACE) capsule 100 mg, BID    gabapentin (NEURONTIN) capsule 300 mg, TID    guaiFENesin (MUCINEX) 12 hr tablet 600 mg, BID    heparin (porcine) subcutaneous injection 5,000 Units, Q8H JOSE MANUEL    lidocaine (LIDODERM) 5 % patch 1 patch, Daily    lisinopril (ZESTRIL) tablet 10 mg, Daily    magnesium hydroxide (MILK OF MAGNESIA) oral suspension 30 mL, Daily PRN    methocarbamol (ROBAXIN) tablet 500 mg, Q8H JOSE MANUEL    nicotine (NICODERM CQ) 14 mg/24hr TD 24 hr patch 1 patch, Daily    ondansetron (ZOFRAN) injection 4 mg, Q6H PRN    oxyCODONE (ROXICODONE) split tablet 2.5 mg, Q4H PRN **OR** oxyCODONE (ROXICODONE) IR tablet 5 mg, Q4H PRN    polyethylene glycol (MIRALAX) packet 17 g, BID    senna (SENOKOT) tablet 17.2 mg, HS    [START ON 7/3/2025] umeclidinium-vilanterol 62.5-25 mcg/actuation inhaler 1 puff, Daily    Administrative Statements   Today, Patient Was Seen By: Jeevan Calles MD  I have spent a total time of 30 minutes in caring for this patient on the day of the visit/encounter including Diagnostic results.    **Please Note: This note may have been constructed using a voice recognition system.**

## 2025-07-02 NOTE — RESTORATIVE TECHNICIAN NOTE
Restorative Technician Note      Patient Name: Danita Palmer     Restorative Tech Visit Date: 07/02/25  Note Type: Mobility  Patient Position Upon Consult: Supine  Activity Performed: Ambulated  Assistive Device: Roller walker; Other (Comment) (3L O2)  Patient Position at End of Consult: Supine; All needs within reach    Lenin Rice, Restorative Technician

## 2025-07-02 NOTE — PLAN OF CARE
Pt resting in bed, pt's oxygen saturation above 90 when resting on room air, however, pt's oxygen drops quickly with any exertion to the mid 80s, including when pt talking.    Pt oob with walker to bathroom as needed.   Problem: Potential for Falls  Goal: Patient will remain free of falls  Description: INTERVENTIONS:  - Educate patient/family on patient safety including physical limitations  - Instruct patient to call for assistance with activity   - Consider consulting OT/PT to assist with strengthening/mobility based on AM PAC & JH-HLM score  - Consult OT/PT to assist with strengthening/mobility   - Keep Call bell within reach  - Keep bed low and locked with side rails adjusted as appropriate  - Keep care items and personal belongings within reach  - Initiate and maintain comfort rounds  - Make Fall Risk Sign visible to staff  - Offer Toileting every  Hours, in advance of need  - Initiate/Maintain bed/chair alarm  - Obtain necessary fall risk management equipment:   - Apply yellow socks and bracelet for high fall risk patients  - Consider moving patient to room near nurses station  Outcome: Progressing     Problem: PAIN - ADULT  Goal: Verbalizes/displays adequate comfort level or baseline comfort level  Description: Interventions:  - Encourage patient to monitor pain and request assistance  - Assess pain using appropriate pain scale  - Administer analgesics as ordered based on type and severity of pain and evaluate response  - Implement non-pharmacological measures as appropriate and evaluate response  - Consider cultural and social influences on pain and pain management  - Notify physician/advanced practitioner if interventions unsuccessful or patient reports new pain  - Educate patient/family on pain management process including their role and importance of  reporting pain   - Provide non-pharmacologic/complimentary pain relief interventions  Outcome: Progressing

## 2025-07-02 NOTE — DISCHARGE SUPPORT
Case Management Assessment & Discharge Planning Note    Patient name Danita Palmer  Location [facility] 918/[facility] 918-01 MRN [id number]  : 1940 Date 2025       Current Admission Date: 2025  Current Admission Diagnosis:Generalized weakness   Patient Active Problem List    Diagnosis Date Noted    Pneumonia 2025    Sepsis (HCC) 2025    Acute respiratory insufficiency 2025    Left rib fracture 2025    Constipation 2025    Generalized weakness 2025    Fall at home, initial encounter 2025    Lung nodule 2025    Hypertriglyceridemia 10/23/2024    Neuropathy 2024    Mild protein-calorie malnutrition (HCC) 2023    H/O lumpectomy 2021    Essential hypertension 2020    Tobacco abuse 2019    CKD stage G3a/A1, GFR 45-59 and albumin creatinine ratio <30 mg/g (HCC) 2017    Suspicious nevus 2017    Disc degeneration, lumbar 2017    Post herpetic neuralgia 2016    Liver cyst 2016    Glaucoma 2014    Osteopenia 2013    Chronic obstructive pulmonary disease (HCC) 2012      LOS (days): 7  Geometric Mean LOS (GMLOS) (days): 3  Days to GMLOS:-4.2   Facility Authorization Approved  [facility] received approved auth for: SNF  Insurance: [employer]  Determination made after peer to peer was completed?: Not applicable  Authorization Obtained Via: Portal  [facility]  Approved Authorization Number: [id number]  Start of Care Date: 25  Next Review Date: 25  Submit Next Review to: F#: [phone] / H&CC Portal   notified: Chata Ngo     Updates to authorization status will be noted in chart.    Please reach out to CM for updates on any clinical information.

## 2025-07-02 NOTE — ASSESSMENT & PLAN NOTE
Presented after a fall in neighbors driveway, trauma imaging all negative for acute traumatic injuries. During ambulatory trial in ED was unable to safely ambulate due to marked weakness  Labs with mild hyponatremia otherwise without marked abnormalities  CT head negative for acute intracranial pathology.    With developing SIRS/sepsis likely secondary to pneumonia as below  PT/OT evaluations recommend level 2 rehab intensity, CM working on plan

## 2025-07-02 NOTE — ASSESSMENT & PLAN NOTE
"With tachycardia and leukocytosis. Also with low grade temperatures. Procal of 2.45 and worsening O2 requirements on 6/29  Does not appear UA was collected but low suspicion for urinary etiology   CT CAP 6/29 showed \"Interval appearance of innumerable micronodules in the right lower lobe, with new dense left greater than right lower basilar consolidations. No tracheal lesion. 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

## 2025-07-03 PROCEDURE — 94760 N-INVAS EAR/PLS OXIMETRY 1: CPT

## 2025-07-03 PROCEDURE — 99232 SBSQ HOSP IP/OBS MODERATE 35: CPT | Performed by: INTERNAL MEDICINE

## 2025-07-03 PROCEDURE — 97530 THERAPEUTIC ACTIVITIES: CPT

## 2025-07-03 PROCEDURE — 97116 GAIT TRAINING THERAPY: CPT

## 2025-07-03 PROCEDURE — 97535 SELF CARE MNGMENT TRAINING: CPT

## 2025-07-03 RX ADMIN — GUAIFENESIN 600 MG: 600 TABLET, EXTENDED RELEASE ORAL at 08:53

## 2025-07-03 RX ADMIN — METHOCARBAMOL 500 MG: 500 TABLET ORAL at 06:01

## 2025-07-03 RX ADMIN — GABAPENTIN 300 MG: 300 CAPSULE ORAL at 21:11

## 2025-07-03 RX ADMIN — METHOCARBAMOL 500 MG: 500 TABLET ORAL at 14:36

## 2025-07-03 RX ADMIN — LIDOCAINE 1 PATCH: 700 PATCH TOPICAL at 08:52

## 2025-07-03 RX ADMIN — HEPARIN SODIUM 5000 UNITS: 5000 INJECTION INTRAVENOUS; SUBCUTANEOUS at 06:05

## 2025-07-03 RX ADMIN — METHOCARBAMOL 500 MG: 500 TABLET ORAL at 21:11

## 2025-07-03 RX ADMIN — HEPARIN SODIUM 5000 UNITS: 5000 INJECTION INTRAVENOUS; SUBCUTANEOUS at 14:36

## 2025-07-03 RX ADMIN — HEPARIN SODIUM 5000 UNITS: 5000 INJECTION INTRAVENOUS; SUBCUTANEOUS at 21:11

## 2025-07-03 RX ADMIN — GABAPENTIN 300 MG: 300 CAPSULE ORAL at 08:53

## 2025-07-03 RX ADMIN — ACETAMINOPHEN 975 MG: 325 TABLET ORAL at 06:01

## 2025-07-03 RX ADMIN — NICOTINE 1 PATCH: 14 PATCH, EXTENDED RELEASE TRANSDERMAL at 08:55

## 2025-07-03 RX ADMIN — UMECLIDINIUM BROMIDE AND VILANTEROL TRIFENATATE 1 PUFF: 62.5; 25 POWDER RESPIRATORY (INHALATION) at 16:50

## 2025-07-03 RX ADMIN — ACETAMINOPHEN 325 MG: 325 TABLET ORAL at 14:36

## 2025-07-03 RX ADMIN — GUAIFENESIN 600 MG: 600 TABLET, EXTENDED RELEASE ORAL at 16:51

## 2025-07-03 RX ADMIN — CEFTRIAXONE 1000 MG: 1 INJECTION, POWDER, FOR SOLUTION INTRAMUSCULAR; INTRAVENOUS at 11:04

## 2025-07-03 RX ADMIN — LATANOPROST 1 DROP: 50 SOLUTION OPHTHALMIC at 21:17

## 2025-07-03 RX ADMIN — GABAPENTIN 300 MG: 300 CAPSULE ORAL at 16:50

## 2025-07-03 RX ADMIN — Medication 2000 UNITS: at 08:52

## 2025-07-03 NOTE — PLAN OF CARE
Problem: OCCUPATIONAL THERAPY ADULT  Goal: Performs self-care activities at highest level of function for planned discharge setting.  See evaluation for individualized goals.  Description: Treatment Interventions: ADL retraining, Functional transfer training, UE strengthening/ROM, Endurance training, Cognitive reorientation, Patient/family training, Equipment evaluation/education, Compensatory technique education, Continued evaluation, Energy conservation, Activityengagement          See flowsheet documentation for full assessment, interventions and recommendations.   Note: Limitation: Decreased ADL status, Decreased UE strength, Decreased Safe judgement during ADL, Decreased cognition, Decreased endurance, Decreased self-care trans, Decreased high-level ADLs  Prognosis: Fair  Assessment: Pt seen for skilled OT treatment session from 1038 to 1104 w/ interventions focusing on ADL participation, activity tolerance, sitting tolerance, sitting balance, standing tolerance, standing balance, bed mobility , transfer skills, and fxnl mobility. Pt was agreeable and willing to participate in session. Pt engaged in the following tasks: S for grooming tasks seated at the sink and min A for UB dressing. Pt also required S for bed mobility and transfers and min Ax1 for fxnl mobility w/ RW. In comparison to previous session, pt continues to require consistent levels of physical assistance for ADLs and mobility tasks at this time. Pt continues to be functioning below baseline level as occupational performance remains limited by decreased ADL status, decreased activity tolerance, decreased endurance, decreased sitting tolerance, decreased sitting balance, decreased standing tolerance, decreased standing balance, decreased transfer skills, and decreased fxnl mobility. From OT standpoint, recommend Level II (Moderate Resource Intensity) at time of d/c. Pt will benefit from continued OT treatment while in acute care to address  deficits as defined above and maximize level of functional independence with ADLs and functional mobility. Pt seated OOB in chair w/ alarm activated and all needs met at end of session.     Rehab Resource Intensity Level, OT: II (Moderate Resource Intensity)

## 2025-07-03 NOTE — PLAN OF CARE
Problem: Potential for Falls  Goal: Patient will remain free of falls  Description: INTERVENTIONS:  - Educate patient/family on patient safety including physical limitations  - Instruct patient to call for assistance with activity   - Consider consulting OT/PT to assist with strengthening/mobility based on AM PAC & JH-HLM score  - Consult OT/PT to assist with strengthening/mobility   - Keep Call bell within reach  - Keep bed low and locked with side rails adjusted as appropriate  - Keep care items and personal belongings within reach  - Initiate and maintain comfort rounds  - Make Fall Risk Sign visible to staff  - Offer Toileting every 2 Hours, in advance of need  - Initiate/Maintain   Bed/chair alarm  - Obtain necessary fall risk management equipment:   - Apply yellow socks and bracelet for high fall risk patients  - Consider moving patient to room near nurses station  Outcome: Progressing     Problem: INFECTION - ADULT  Goal: Absence or prevention of progression during hospitalization  Description: INTERVENTIONS:  - Assess and monitor for signs and symptoms of infection  - Monitor lab/diagnostic results  - Monitor all insertion sites, i.e. indwelling lines, tubes, and drains  - Monitor endotracheal if appropriate and nasal secretions for changes in amount and color  - [place] appropriate cooling/warming therapies per order  - Administer medications as ordered  - Instruct and encourage patient and family to use good hand hygiene technique  - Identify and instruct in appropriate isolation precautions for identified infection/condition  Outcome: Progressing

## 2025-07-03 NOTE — PLAN OF CARE
Problem: PHYSICAL THERAPY ADULT  Goal: Performs mobility at highest level of function for planned discharge setting.  See evaluation for individualized goals.  Description: Treatment/Interventions: Functional transfer training, LE strengthening/ROM, Therapeutic exercise, Elevations, Endurance training, Equipment eval/education, Patient/family training, Bed mobility, Gait training, Continued evaluation, Cognitive reorientation, Compensatory technique education, Spoke to nursing, OT  Equipment Recommended:  (TBD with further mobility)       See flowsheet documentation for full assessment, interventions and recommendations.  Outcome: Progressing  Note: Prognosis: Fair  Problem List: Decreased strength, Decreased endurance, Impaired balance, Decreased mobility, Impaired judgement, Decreased safety awareness  Assessment: Patient is a 84 y.o. female seen by Physical Therapy on 7/3/2025 for treatment. This session focused on bed mobility, functional transfers, and ambulation. During this session, pt required min(A)x1 for bed mobility, supervision w/ RW for transfers, and min(A) for CGA w/ RW for ambulation. Pt ambulated 70'x3, for a total of 210'. Pt was found supine upon arrival and was left supine in bed with bed alarm on and all needs within reach. 8L O2 via nasal cannula were worn throughout entire session and O2 sats remained stable. Pt displayed REVELES, generalized fatigue, imapired balance, decreased endurance, decreased safety awareness, and decreased activity tolerance. Pt tolerated treatment well but was limited primarily by fatigue. Pt would continue to benefit from skilled PT intervention due to these deficits. Pt is progressing towards goals as highlighted by increase in distance ambulated. The patient's AM-PAC Basic Mobility Inpatient Short Form Raw Score is 17. A Raw score of greater than or equal to 16 suggests the patient may benefit from discharge to home. Please also refer to the recommendation of the  Physical Therapist for safe discharge planning. PT is recommending Level 2 Rehab Resource Intensity at this time.  Barriers to Discharge: Inaccessible home environment, Decreased caregiver support     Rehab Resource Intensity Level, PT: II (Moderate Resource Intensity)    See flowsheet documentation for full assessment.

## 2025-07-03 NOTE — ASSESSMENT & PLAN NOTE
Patient with o2 saturations of 86 and 89%. Placed on 2L NC. Does not wear any o2 at baseline. States that she is having difficulty with deep breathing because of rib pain  CT chest: Nondisplaced fractures of the anterolateral left fourth and fifth ribs  Continue rib fracture protocol and incentive spirometry  Increase in o2 requirements on 6/29, repeat CT with pneumonia as below   Oxygen increased from 8 to 10 L mid flow overnight  WBC count currently within normal limits  Continue IV ceftriaxone  WBC count within normal limits, afebrile  If oxygen not improved tomorrow would recommend repeating chest x-ray  Wean oxygen as able

## 2025-07-03 NOTE — PHYSICAL THERAPY NOTE
Physical Therapy Treatment    Patient Name: Danita Palmer    Today's Date: 7/3/2025     Problem List  Principal Problem:    Generalized weakness  Active Problems:    CKD stage G3a/A1, GFR 45-59 and albumin creatinine ratio <30 mg/g (HCC)    Chronic obstructive pulmonary disease (HCC)    Disc degeneration, lumbar    Tobacco abuse    Essential hypertension    Fall at home, initial encounter    Lung nodule    Left rib fracture    Constipation    Acute hypoxic respiratory failure (HCC)    Sepsis (HCC)    Pneumonia       Past Medical History  Past Medical History[1]     Past Surgical History  Past Surgical History[2]        07/03/25 1509   PT Last Visit   PT Visit Date 07/03/25   Note Type   Note Type Treatment   End of Consult   Patient Position at End of Consult Supine;All needs within reach;Bed/Chair alarm activated   Pain Assessment   Pain Assessment Tool 0-10   Pain Score No Pain   Restrictions/Precautions   Weight Bearing Precautions Per Order No   Other Precautions Chair Alarm;Bed Alarm;O2;Fall Risk;Pain  (8L O2)   General   Chart Reviewed Yes   Response to Previous Treatment Patient with no complaints from previous session.   Family/Caregiver Present No   Cognition   Overall Cognitive Status WFL   Arousal/Participation Alert;Responsive;Cooperative   Attention Attends with cues to redirect   Following Commands Follows one step commands with increased time or repetition   Comments pt was pleasant and cooperative; easily distracted, requried frequent redirection   Subjective   Subjective agreeable to mobilize   Bed Mobility   Rolling R Unable to assess   Rolling L Unable to assess   Supine to Sit 4  Minimal assistance   Additional items Assist x 1;HOB elevated;Bedrails;Increased time required;LE management;Verbal cues   Sit to Supine 4  Minimal assistance   Additional items Assist x 1;HOB elevated;Bedrails;Increased time required;Verbal cues;LE management    Additional Comments VC for sequencing and repositioning, Pt found supine in bed upon arrival and left supine in bed at end of session with bed alarm on and all needs within reach   Transfers   Sit to Stand 5  Supervision   Additional items Increased time required   Stand to Sit 5  Supervision   Additional items Increased time required   Additional Comments w/ RW, 1xSTS   Ambulation/Elevation   Gait pattern Excessively slow;Foward flexed;Decreased foot clearance;Short stride   Gait Assistance 4  Minimal assist  (CGA)   Additional items Assist x 1;Verbal cues   Assistive Device Rolling walker   Distance 70'x 3   Stair Management Assistance Not tested   Ambulation/Elevation Additional Comments VC for direction, RW mgmt, and breathing; multiple standing rest breaks   Balance   Static Sitting Fair +   Dynamic Sitting Fair   Static Standing Fair -   Dynamic Standing Fair -   Ambulatory Poor +   Endurance Deficit   Endurance Deficit Yes   Endurance Deficit Description fatigue, REVELES, generalized weakness, decreased activity tolerance   Activity Tolerance   Activity Tolerance Patient limited by fatigue   Medical Staff Made Aware PT Jamee   Nurse Made Aware RN clearance prior to session   Assessment   Prognosis Fair   Problem List Decreased strength;Decreased endurance;Impaired balance;Decreased mobility;Impaired judgement;Decreased safety awareness   Assessment Patient is a 84 y.o. female seen by Physical Therapy on 7/3/2025 for treatment. This session focused on bed mobility, functional transfers, and ambulation. During this session, pt required min(A)x1 for bed mobility, supervision w/ RW for transfers, and min(A) for CGA w/ RW for ambulation. Pt ambulated 70'x3, for a total of 210'. Pt was found supine upon arrival and was left supine in bed with bed alarm on and all needs within reach. 8L O2 via nasal cannula were worn throughout entire session and O2 sats remained stable. Pt displayed REVELES, generalized fatigue,  imapired balance, decreased endurance, decreased safety awareness, and decreased activity tolerance. Pt tolerated treatment well but was limited primarily by fatigue. Pt would continue to benefit from skilled PT intervention due to these deficits. Pt is progressing towards goals as highlighted by increase in distance ambulated. The patient's AM-[facility] Basic Mobility Inpatient Short Form Raw Score is 17. A Raw score of greater than or equal to 16 suggests the patient may benefit from discharge to home. Please also refer to the recommendation of the Physical Therapist for safe discharge planning. PT is recommending Level 2 Rehab Resource Intensity at this time.   Barriers to Discharge Inaccessible home environment;Decreased caregiver support   Goals   Patient Goals none expressed   STG Expiration Date 07/10/25   PT Treatment Day 3   Plan   Treatment/Interventions ADL retraining;Functional transfer training;LE strengthening/ROM;Endurance training;Equipment eval/education;Bed mobility;Gait training;Spoke to nursing   Progress Slow progress, decreased activity tolerance   PT Frequency 3-5x/wk   Discharge Recommendation   Rehab Resource Intensity Level, PT II (Moderate Resource Intensity)   AM-PAC Basic Mobility Inpatient   Turning in Flat Bed Without Bedrails 3   Lying on Back to Sitting on Edge of Flat Bed Without Bedrails 3   Moving Bed to Chair 3   Standing Up From Chair Using Arms 3   Walk in Room 3   Climb 3-5 Stairs With Railing 2   Basic Mobility Inpatient Raw Score 17   Basic Mobility Standardized Score 39.67   [facility] Highest Level Of Mobility   -HLM Goal 5: Stand one or more mins   -HLM Achieved 7: Walk 25 feet or more   Education   Education Provided Mobility training;Assistive device   Patient Demonstrates acceptance/verbal understanding   End of Consult   Patient Position at End of Consult Supine;Bed/Chair alarm activated;All needs within reach     RUBY [place]         [1]   Past Medical  History:  Diagnosis Date    Glaucoma     Hypertension    [2]   Past Surgical History:  Procedure Laterality Date    BREAST LUMPECTOMY      HYSTERECTOMY

## 2025-07-03 NOTE — PROGRESS NOTES
Progress Note - Hospitalist   Name: Danita Palmer 84 y.o. female I MRN: [id number]  Unit/Bed#: [facility] 918-01 I Date of Admission: 6/25/2025   Date of Service: 7/3/2025 I Hospital Day: 8    Assessment & Plan  Generalized weakness  Presented after a fall in [facility] [employer], trauma imaging all negative for acute traumatic injuries. During ambulatory trial in ED was unable to safely ambulate due to marked weakness  Labs with mild hyponatremia otherwise without marked abnormalities  CT head negative for acute intracranial pathology.    With developing SIRS/sepsis likely secondary to pneumonia as below  PT/OT evaluations recommend level 2 rehab intensity, CM working on plan   Left rib fracture  Nondisplaced fractures of the anterolateral left 4th and 5th ribs  Incentive spirometry and rib fracture protocol ordered  Pain control including lidocaine patch and scheduled Tylenol ordered    Fall at home, initial encounter  Presented after a fall in [facility] [employer], states she was walking when she could not stop her self and fell forward onto her knees without dizziness/lightheadedness or loss of consciousness  Patient with weakness with inability to safely ambulate  PT/OT evaluations recommend rehab, case management assistance appreciated  Acute hypoxic respiratory failure (HCC)  Patient with o2 saturations of 86 and 89%. Placed on 2L NC. Does not wear any o2 at baseline. States that she is having difficulty with deep breathing because of rib pain  CT chest: Nondisplaced fractures of the anterolateral left fourth and fifth ribs  Continue rib fracture protocol and incentive spirometry  Increase in o2 requirements on 6/29, repeat CT with pneumonia as below   Oxygen increased from 8 to 10 L mid flow overnight  WBC count currently within normal limits  Continue IV ceftriaxone  WBC count within normal limits, afebrile  If oxygen not improved tomorrow would recommend repeating chest x-ray  Wean oxygen as able  Sepsis (HCC)  With  "tachycardia and leukocytosis. Also with low grade temperatures. Procal of 2.45 and worsening O2 requirements on 6/29  Does not appear UA was collected but low suspicion for urinary etiology   CT CAP 6/29 showed \"Interval appearance of innumerable micronodules in the right lower lobe, with new dense left greater than right lower basilar consolidations. No tracheal lesion. Interval appearance of bilateral lower lobe proximal bronchial occlusions\"  Was initially started on Levaquin given allergy, however low drip score, changed to CTX on 6/30  WBC count is since normalized  Blood cultures negative  Continue IV ceftriaxone  Constipation  Resolved as of 6/30, had BM  Continue bowel regimen   CKD stage G3a/A1, GFR 45-59 and albumin creatinine ratio <30 mg/g ([facility])  Lab Results   Component Value Date    EGFR 82 07/02/2025    EGFR 61 06/30/2025    EGFR 70 06/29/2025    CREATININE 0.63 07/02/2025    CREATININE 0.86 06/30/2025    CREATININE 0.77 06/29/2025     Renal function at baseline, monitor with BMP.  Avoid/limit nephrotoxins and hypotension  Lung nodule  7 mm spiculated nodule to right upper lobe incidentally noted on CT chest  Repeat noncontrast CT chest advised at 6-12 months, patient to follow-up with PCP to arrange this.  Previous provider reviewed results with patient and her son  Disc degeneration, lumbar  Chronic, patient denies worsening back pain  Continue gabapentin, dose was reduced to 300 mg 3 times daily as patient somewhat sleepy during admission evaluation  Tobacco abuse  Discussed need for cessation.    Nicotine patch ordered  Essential hypertension  Continue PTA lisinopril 10 mg daily restrict hold parameters and monitor blood pressure per protocol  Chronic obstructive pulmonary disease (HCC)  Not in acute exacerbation, continue PTA inhalers  Pneumonia  See above plan under SIRS    VTE Pharmacologic Prophylaxis: VTE Score: 3 Moderate Risk (Score 3-4) - Pharmacological DVT Prophylaxis Ordered: " heparin.    Mobility:   Basic Mobility Inpatient Raw Score: 17  JH-HLM Goal: 5: Stand one or more mins  JH-HLM Achieved: 6: Walk 10 steps or more  JH-HLM Goal achieved. Continue to encourage appropriate mobility.    Patient Centered Rounds: I performed bedside rounds with nursing staff today.   Discussions with Specialists or Other Care Team Provider: cm, nursing    Education and Discussions with Family / Patient: pt, called son w/o response.     Current Length of Stay: 8 day(s)  Current Patient Status: Inpatient   Certification Statement: The patient will continue to require additional inpatient hospital stay due to see below  Discharge Plan: Pending further treatmentof acute hypoxic respiratory failure.  Anticipate greater than 48 hours    Code Status: Level 1 - Full Code    Subjective   Denies chest pain, shortness of breath, cough, fevers, chills    Objective :  Temp:  [97.4 °F (36.3 °C)-98.1 °F (36.7 °C)] 97.4 °F (36.3 °C)  HR:  [71-84] 84  BP: (129-134)/(60-67) 129/60  Resp:  [14-18] 18  SpO2:  [87 %-98 %] 87 %  O2 Device: Mid flow nasal cannula  Nasal Cannula O2 Flow Rate (L/min):  [10 L/min] 10 L/min    Body mass index is 20.78 kg/m².     Input and Output Summary (last 24 hours):     Intake/Output Summary (Last 24 hours) at 7/3/2025 1022  Last data filed at 7/3/2025 0854  Gross per 24 hour   Intake 480 ml   Output --   Net 480 ml       Physical Exam  Constitutional:       General: She is not in acute distress.     Appearance: She is well-developed. She is not diaphoretic.   HENT:      Head: Normocephalic and atraumatic.      Nose: Nose normal.      Mouth/Throat:      Pharynx: No oropharyngeal exudate.     Eyes:      General: No scleral icterus.        Right eye: No discharge.         Left eye: No discharge.      Conjunctiva/sclera: Conjunctivae normal.     Neck:      Thyroid: No thyromegaly.      Vascular: No JVD.     Cardiovascular:      Rate and Rhythm: Normal rate and regular rhythm.      Heart sounds:  Normal heart sounds. No murmur heard.     No friction rub. No gallop.   Pulmonary:      Effort: Pulmonary effort is normal. No respiratory distress.      Breath sounds: Normal breath sounds. No wheezing or rales.   Chest:      Chest wall: No tenderness.   Abdominal:      General: Bowel sounds are normal. There is no distension.      Palpations: Abdomen is soft.      Tenderness: There is no abdominal tenderness. There is no guarding or rebound.     Musculoskeletal:         General: No tenderness or deformity. Normal range of motion.      Cervical back: Normal range of motion and neck supple.     Skin:     General: Skin is warm and dry.      Findings: No erythema or rash.     Neurological:      Mental Status: She is alert. Mental status is at baseline.      Cranial Nerves: No cranial nerve deficit.      Sensory: No sensory deficit.      Motor: No abnormal muscle tone.      Coordination: Coordination normal.           Lines/Drains:              Lab Results: I have reviewed the following results:   Results from last 7 days   Lab Units 07/02/25  0613   WBC Thousand/uL 8.47   HEMOGLOBIN g/dL 12.3   HEMATOCRIT % 37.2   PLATELETS Thousands/uL 449*   SEGS PCT % 76*   LYMPHO PCT % 11*   MONO PCT % 11   EOS PCT % 1     Results from last 7 days   Lab Units 07/02/25  0613   SODIUM mmol/L 139   POTASSIUM mmol/L 4.1   CHLORIDE mmol/L 104   CO2 mmol/L 29   BUN mg/dL 36*   CREATININE mg/dL 0.63   ANION GAP mmol/L 6   CALCIUM mg/dL 8.8   GLUCOSE RANDOM mg/dL 96                 Results from last 7 days   Lab Units 07/02/25  0613 07/01/25  0446 06/30/25  0642 06/29/25  0608   PROCALCITONIN ng/ml 0.48* 1.91* 1.80* 2.45*       Recent Cultures (last 7 days):   Results from last 7 days   Lab Units 06/29/25  1110 06/29/25  1103 06/29/25  1050   BLOOD CULTURE  No Growth at 72 hrs. No Growth at 72 hrs.  --    LEGIONELLA URINARY ANTIGEN   --   --  Negative       Imaging Results Review: I personally reviewed the following image studies/reports  in PACS and discussed pertinent findings with Radiology: chest xray. My interpretation of the radiology images/reports is:  .  Other Study Results Review: EKG was reviewed.     Last 24 Hours Medication List:     Current Facility-Administered Medications:     acetaminophen (TYLENOL) tablet 975 mg, Q8H JOSE MANUEL    albuterol (PROVENTIL HFA,VENTOLIN HFA) inhaler 1 puff, Q4H PRN    bisacodyl (DULCOLAX) rectal suppository 10 mg, Daily PRN    cefTRIAXone (ROCEPHIN) 1,000 mg in dextrose 5 % 50 mL IVPB, Q24H, Last Rate: 1,000 mg (07/02/25 1021)    Cholecalciferol (VITAMIN D3) tablet 2,000 Units, Daily    docusate sodium (COLACE) capsule 100 mg, BID    gabapentin (NEURONTIN) capsule 300 mg, TID    guaiFENesin (MUCINEX) 12 hr tablet 600 mg, BID    heparin (porcine) subcutaneous injection 5,000 Units, Q8H JOSE MANUEL    latanoprost (XALATAN) 0.005 % ophthalmic solution 1 drop, HS    lidocaine (LIDODERM) 5 % patch 1 patch, Daily    lisinopril (ZESTRIL) tablet 10 mg, Daily    magnesium hydroxide (MILK OF MAGNESIA) oral suspension 30 mL, Daily PRN    methocarbamol (ROBAXIN) tablet 500 mg, Q8H JOSE MANUEL    nicotine (NICODERM CQ) 14 mg/24hr TD 24 hr patch 1 patch, Daily    ondansetron (ZOFRAN) injection 4 mg, Q6H PRN    oxyCODONE (ROXICODONE) split tablet 2.5 mg, Q4H PRN **OR** oxyCODONE (ROXICODONE) IR tablet 5 mg, Q4H PRN    polyethylene glycol (MIRALAX) packet 17 g, BID    senna (SENOKOT) tablet 17.2 mg, HS    umeclidinium-vilanterol 62.5-25 mcg/actuation inhaler 1 puff, Daily    Administrative Statements   Today, Patient Was Seen By: Jeevan Calles MD  I have spent a total time of 30 minutes in caring for this patient on the day of the visit/encounter including Diagnostic results.    **Please Note: This note may have been constructed using a voice recognition system.**

## 2025-07-03 NOTE — OCCUPATIONAL THERAPY NOTE
Occupational Therapy Progress Note     Patient Name: Danita Palmer  Today's Date: 7/3/2025  Problem List  Principal Problem:    Generalized weakness  Active Problems:    CKD stage G3a/A1, GFR 45-59 and albumin creatinine ratio <30 mg/g (HCC)    Chronic obstructive pulmonary disease (HCC)    Disc degeneration, lumbar    Tobacco abuse    Essential hypertension    Fall at home, initial encounter    Lung nodule    Left rib fracture    Constipation    Acute hypoxic respiratory failure (HCC)    Sepsis (HCC)    Pneumonia       07/03/25 1104   OT Last Visit   OT Visit Date 07/03/25   Note Type   Note Type Treatment   Pain Assessment   Pain Assessment Tool 0-10   Pain Score No Pain   Restrictions/Precautions   Weight Bearing Precautions Per Order No   Other Precautions Chair Alarm;Bed Alarm;Multiple lines;O2;Fall Risk;Hard of hearing  (8LO2)   Lifestyle   Autonomy I w/ ADLs, receives assistance w/ IADLs, I w/ functional mobility and transfers   Reciprocal Relationships Daughter   Service to Others Retired   Intrinsic Gratification Going to the casino   ADL   Where Assessed Edge of bed  (+ sitting at sink)   Grooming Assistance 5  Supervision/Setup   Grooming Deficit Setup;Wash/dry face;Teeth care;Brushing hair   Grooming Comments Pt able to complete grooming tasks seated at the sink.   UB Dressing Assistance 4  Minimal Assistance   UB Dressing Deficit Setup;Increased time to complete;Thread RUE;Thread LUE;Pull around back   UB Dressing Comments Pt donned robe seated EOB.   Bed Mobility   Supine to Sit 5  Supervision   Additional items HOB elevated;Bedrails;Increased time required   Sit to Supine Unable to assess   Additional Comments Pt seated OOB in chair at end of OT tx session w/ alarm activated and all needs within reach.   Transfers   Sit to Stand 5  Supervision   Additional items Increased time required;Verbal cues   Stand to Sit 5  Supervision   Additional items Armrests;Increased time required;Verbal cues    Additional Comments w/ RW for support   Functional Mobility   Functional Mobility 4  Minimal assistance   Additional Comments Pt ambulated long household distance w/ min Ax1 using RW for support.  (SpO2 decreased to 88% while ambulating; >90% after few seconds of pursed lip breathing and standing rest break.)   Additional items Rolling walker   Cognition   Overall Cognitive Status WFL   Arousal/Participation Alert;Responsive;Cooperative   Attention Within functional limits   Following Commands Follows one step commands with increased time or repetition   Comments Pt was pleasant, cooperative, and willing to participate in OT tx session. Pt required repetition of directions at times; possibly due to [place].   Activity Tolerance   Activity Tolerance Patient tolerated treatment well   Medical Staff Made Aware ALEJANDRO, Mackenzie FAN clearance prior to session   Assessment   Assessment Pt seen for skilled OT treatment session from 1038 to 1104 w/ interventions focusing on ADL participation, activity tolerance, sitting tolerance, sitting balance, standing tolerance, standing balance, bed mobility , transfer skills, and fxnl mobility. Pt was agreeable and willing to participate in session. Pt engaged in the following tasks: S for grooming tasks seated at the sink and min A for UB dressing. Pt also required S for bed mobility and transfers and min Ax1 for fxnl mobility w/ RW. In comparison to previous session, pt continues to require consistent levels of physical assistance for ADLs and mobility tasks at this time. Pt continues to be functioning below baseline level as occupational performance remains limited by decreased ADL status, decreased activity tolerance, decreased endurance, decreased sitting tolerance, decreased sitting balance, decreased standing tolerance, decreased standing balance, decreased transfer skills, and decreased fxnl mobility. From OT standpoint, recommend Level II (Moderate Resource Intensity) at time of  d/c. Pt will benefit from continued OT treatment while in acute care to address deficits as defined above and maximize level of functional independence with ADLs and functional mobility. Pt seated OOB in chair w/ alarm activated and all needs met at end of session.   Plan   Treatment Interventions ADL retraining;Functional transfer training;Endurance training;Patient/family training;Equipment evaluation/education;Compensatory technique education;Continued evaluation;Energy conservation;Activityengagement   Goal Expiration Date 07/10/25   OT Treatment Day 2   OT Frequency 2-3x/wk   Discharge Recommendation   Rehab Resource Intensity Level, OT II (Moderate Resource Intensity)   AM-PAC Daily Activity Inpatient   Lower Body Dressing 3   Bathing 3   Toileting 3   Upper Body Dressing 3   Grooming 3   Eating 4   Daily Activity Raw Score 19   Daily Activity Standardized Score (Calc for Raw Score >=11) 40.22   AM-PAC Applied Cognition Inpatient   Following a Speech/Presentation 4   Understanding Ordinary Conversation 4   Taking Medications 4   Remembering Where Things Are Placed or Put Away 4   Remembering List of 4-5 Errands 3   Taking Care of Complicated Tasks 3   Applied Cognition Raw Score 22   Applied Cognition Standardized Score 47.83       The patient's raw score on the AM-PAC Daily Activity Inpatient Short Form is 19. A raw score of greater than or equal to 19 suggests the patient may benefit from discharge to home. Please refer to the recommendation of the Occupational Therapist for safe discharge planning.    HE Turner, OTR/L

## 2025-07-04 ENCOUNTER — APPOINTMENT (INPATIENT)
Dept: RADIOLOGY | Facility: HOSPITAL | Age: 85
DRG: 183 | End: 2025-07-04
Payer: COMMERCIAL

## 2025-07-04 PROBLEM — F17.210 CIGARETTE NICOTINE DEPENDENCE WITHOUT COMPLICATION: Status: ACTIVE | Noted: 2019-04-24

## 2025-07-04 LAB
ANION GAP SERPL CALCULATED.3IONS-SCNC: 6 MMOL/L (ref 4–13)
BACTERIA BLD CULT: NORMAL
BACTERIA BLD CULT: NORMAL
BASOPHILS # BLD AUTO: 0.05 THOUSANDS/ÂΜL (ref 0–0.1)
BASOPHILS NFR BLD AUTO: 1 % (ref 0–1)
BUN SERPL-MCNC: 32 MG/DL (ref 5–25)
CALCIUM SERPL-MCNC: 8.7 MG/DL (ref 8.4–10.2)
CHLORIDE SERPL-SCNC: 105 MMOL/L (ref 96–108)
CO2 SERPL-SCNC: 29 MMOL/L (ref 21–32)
CREAT SERPL-MCNC: 0.7 MG/DL (ref 0.6–1.3)
EOSINOPHIL # BLD AUTO: 0.18 THOUSAND/ÂΜL (ref 0–0.61)
EOSINOPHIL NFR BLD AUTO: 2 % (ref 0–6)
ERYTHROCYTE [DISTWIDTH] IN BLOOD BY AUTOMATED COUNT: 13.8 % (ref 11.6–15.1)
GFR SERPL CREATININE-BSD FRML MDRD: 79 ML/MIN/1.73SQ M
GLUCOSE SERPL-MCNC: 92 MG/DL (ref 65–140)
HCT VFR BLD AUTO: 36.7 % (ref 34.8–46.1)
HGB BLD-MCNC: 11.9 G/DL (ref 11.5–15.4)
IMM GRANULOCYTES # BLD AUTO: 0.25 THOUSAND/UL (ref 0–0.2)
IMM GRANULOCYTES NFR BLD AUTO: 2 % (ref 0–2)
LYMPHOCYTES # BLD AUTO: 1.01 THOUSANDS/ÂΜL (ref 0.6–4.47)
LYMPHOCYTES NFR BLD AUTO: 10 % (ref 14–44)
MCH RBC QN AUTO: 29.7 PG (ref 26.8–34.3)
MCHC RBC AUTO-ENTMCNC: 32.4 G/DL (ref 31.4–37.4)
MCV RBC AUTO: 92 FL (ref 82–98)
MONOCYTES # BLD AUTO: 1.15 THOUSAND/ÂΜL (ref 0.17–1.22)
MONOCYTES NFR BLD AUTO: 11 % (ref 4–12)
NEUTROPHILS # BLD AUTO: 7.61 THOUSANDS/ÂΜL (ref 1.85–7.62)
NEUTS SEG NFR BLD AUTO: 74 % (ref 43–75)
NRBC BLD AUTO-RTO: 0 /100 WBCS
PLATELET # BLD AUTO: 549 THOUSANDS/UL (ref 149–390)
PMV BLD AUTO: 8.8 FL (ref 8.9–12.7)
POTASSIUM SERPL-SCNC: 3.8 MMOL/L (ref 3.5–5.3)
RBC # BLD AUTO: 4.01 MILLION/UL (ref 3.81–5.12)
SODIUM SERPL-SCNC: 140 MMOL/L (ref 135–147)
WBC # BLD AUTO: 10.25 THOUSAND/UL (ref 4.31–10.16)

## 2025-07-04 PROCEDURE — 94640 AIRWAY INHALATION TREATMENT: CPT

## 2025-07-04 PROCEDURE — 99223 1ST HOSP IP/OBS HIGH 75: CPT | Performed by: INTERNAL MEDICINE

## 2025-07-04 PROCEDURE — 80048 BASIC METABOLIC PNL TOTAL CA: CPT | Performed by: INTERNAL MEDICINE

## 2025-07-04 PROCEDURE — 85025 COMPLETE CBC W/AUTO DIFF WBC: CPT | Performed by: INTERNAL MEDICINE

## 2025-07-04 PROCEDURE — 94760 N-INVAS EAR/PLS OXIMETRY 1: CPT

## 2025-07-04 PROCEDURE — 99232 SBSQ HOSP IP/OBS MODERATE 35: CPT | Performed by: INTERNAL MEDICINE

## 2025-07-04 PROCEDURE — 71045 X-RAY EXAM CHEST 1 VIEW: CPT

## 2025-07-04 PROCEDURE — 94668 MNPJ CHEST WALL SBSQ: CPT

## 2025-07-04 RX ORDER — LEVALBUTEROL INHALATION SOLUTION 1.25 MG/3ML
1.25 SOLUTION RESPIRATORY (INHALATION)
Status: DISCONTINUED | OUTPATIENT
Start: 2025-07-04 | End: 2025-07-05

## 2025-07-04 RX ADMIN — METHOCARBAMOL 500 MG: 500 TABLET ORAL at 05:38

## 2025-07-04 RX ADMIN — HEPARIN SODIUM 5000 UNITS: 5000 INJECTION INTRAVENOUS; SUBCUTANEOUS at 13:26

## 2025-07-04 RX ADMIN — HEPARIN SODIUM 5000 UNITS: 5000 INJECTION INTRAVENOUS; SUBCUTANEOUS at 05:38

## 2025-07-04 RX ADMIN — HEPARIN SODIUM 5000 UNITS: 5000 INJECTION INTRAVENOUS; SUBCUTANEOUS at 22:15

## 2025-07-04 RX ADMIN — CEFTRIAXONE 1000 MG: 1 INJECTION, POWDER, FOR SOLUTION INTRAMUSCULAR; INTRAVENOUS at 11:17

## 2025-07-04 RX ADMIN — NICOTINE 1 PATCH: 14 PATCH, EXTENDED RELEASE TRANSDERMAL at 09:16

## 2025-07-04 RX ADMIN — LATANOPROST 1 DROP: 50 SOLUTION OPHTHALMIC at 22:15

## 2025-07-04 RX ADMIN — METHOCARBAMOL 500 MG: 500 TABLET ORAL at 13:26

## 2025-07-04 RX ADMIN — LIDOCAINE 1 PATCH: 700 PATCH TOPICAL at 09:16

## 2025-07-04 RX ADMIN — GABAPENTIN 300 MG: 300 CAPSULE ORAL at 09:16

## 2025-07-04 RX ADMIN — LEVALBUTEROL HYDROCHLORIDE 1.25 MG: 1.25 SOLUTION RESPIRATORY (INHALATION) at 19:13

## 2025-07-04 RX ADMIN — LISINOPRIL 10 MG: 10 TABLET ORAL at 09:16

## 2025-07-04 RX ADMIN — GUAIFENESIN 600 MG: 600 TABLET, EXTENDED RELEASE ORAL at 09:16

## 2025-07-04 RX ADMIN — Medication 2000 UNITS: at 09:16

## 2025-07-04 RX ADMIN — UMECLIDINIUM BROMIDE AND VILANTEROL TRIFENATATE 1 PUFF: 62.5; 25 POWDER RESPIRATORY (INHALATION) at 17:58

## 2025-07-04 RX ADMIN — GUAIFENESIN 600 MG: 600 TABLET, EXTENDED RELEASE ORAL at 17:58

## 2025-07-04 RX ADMIN — METHOCARBAMOL 500 MG: 500 TABLET ORAL at 22:14

## 2025-07-04 RX ADMIN — GABAPENTIN 300 MG: 300 CAPSULE ORAL at 17:58

## 2025-07-04 NOTE — ASSESSMENT & PLAN NOTE
Multifactorial in etiology including atelectasis, pneumonia, underlying COPD    Plan:  Wean supplemental oxygen for sats greater than 88%  See additional plans as per below

## 2025-07-04 NOTE — ASSESSMENT & PLAN NOTE
Plan:  Pain control per primary team  Rib fracture protocol  I encouraged her to use incentive spirometer on at least an hourly basis  Needs to be getting out of bed, ambulating

## 2025-07-04 NOTE — CONSULTS
Consultation - Pulmonology   Name: Danita Palmer 84 y.o. female I MRN: [id number]  [facility] I Date of Admission: 6/25/2025   Date of Service: 7/4/2025 I Hospital Day: 9   Inpatient consult to Pulmonology  Consult performed by: Jamee Ward DO  Consult ordered by: Jordyn Kang MD        Physician Requesting Evaluation: Jordyn Kang MD   Reason for Evaluation / Principal Problem: Hypoxia    Assessment & Plan  Acute hypoxic respiratory failure (HCC)  Multifactorial in etiology including atelectasis, pneumonia, underlying COPD    Plan:  Wean supplemental oxygen for sats greater than 88%  See additional plans as per below  Pneumonia  Likely secondary to atelectasis in the setting of rib fractures    Plan:  Continue empiric antibiotics -day #5  Would complete a 7-day course of antibiotics  Mucinex twice daily  Flutter valve  Avoid chest PT/vest in the setting of rib fracture  Left rib fracture  Fall at home, initial encounter  Plan:  Pain control per primary team  Rib fracture protocol  I encouraged her to use incentive spirometer on at least an hourly basis  Needs to be getting out of bed, ambulating  Chronic obstructive pulmonary disease (HCC)  PFTs with moderate obstruction and CT scan shows emphysema  Home regimen: Anoro once daily, albuterol as needed  Does not require supplemental oxygen at baseline  Does not appear to be in acute exacerbation    Plan:  Continue home Anoro once daily  Add Xopenex nebs 3 times daily to help with mucus clearance  Would benefit from smoking cessation  Would also benefit from outpatient pulmonary rehab  Cigarette nicotine dependence without complication  Recommend smoking cessation  Incidental lung nodule,RUL, > 3mm and < 8mm  In the setting of extensive smoking history, will need to rule out malignancy    Plan:  Repeat CT chest in 3 months with navigation protocol  If persistent/enlarging, will refer to Dr. Martínez for navigational lung  biopsy      History of Present Illness   Danita Palmer is a 84 y.o. female who presents after a fall in her driveway.  She suffered left-sided rib fractures.  She has been hospitalized with progressive oxygen requirements and findings on chest x-ray of pneumonia.  She is currently requiring 9 L mid flow oxygen.    She tells me that she continues to have pain at site of rib fractures.  It is difficult to take a deep breath.  She is coughing but not expectorating any mucus.  She denies any fevers or chills.    At baseline she has COPD and uses Anoro 1 puff daily.  She rarely uses her albuterol rescue inhaler.  She does not require supplemental oxygen.  She continues to smoke 1 pack of cigarettes daily and has done so for at least the past 60 years.  She worked as a  in a non-smoking restaurant.  She has no other inhalation exposures.  She lives at home.    Review of Systems   Constitutional:  Positive for activity change. Negative for fever.   HENT:  Negative for postnasal drip.    Respiratory:  Positive for cough and shortness of breath.    Cardiovascular:  Positive for chest pain.   Gastrointestinal:  Negative for nausea and vomiting.   Musculoskeletal:  Positive for arthralgias.   Skin:  Negative for rash.   Psychiatric/Behavioral:  Positive for dysphoric mood. Negative for sleep disturbance.    All other systems reviewed and are negative.      Historical Information   Historical Information   Past Medical History[1]  Past Surgical History[2]  Social History[3]  E-Cigarette/Vaping    E-Cigarette Use Never User      E-Cigarette/Vaping Substances    Nicotine No     THC No     CBD No     Flavoring No     Other No     Unknown No      Family History[4]      Objective :  Temp:  [97.8 °F (36.6 °C)-98 °F (36.7 °C)] 98 °F (36.7 °C)  HR:  [73-75] 73  BP: (109-151)/(68-85) 109/68  Resp:  [16-18] 18  SpO2:  [93 %-96 %] 95 %  O2 Device: Mid flow nasal cannula    Physical Exam    General:  Patient is awake, alert,  non-toxic and in no acute respiratory distress  Eyes: no scleral icterus  CV:  Regular, +S1 and S2, No murmurs, gallops or rubs appreciated  Lungs: Diminished breath sounds bilateral, worst at left lung base.  No wheezes appreciated  Abdomen: Soft, +BS, Non-tender, non-distended  Extremities: No clubbing, cyanosis or edema  Neuro: No focal motor/sensory deficits  Skin: Warm, No rashes       Lab Results: I have reviewed the following results:  .     07/04/25  0446   WBC 10.25*   HGB 11.9   HCT 36.7   *   SODIUM 140   K 3.8      CO2 29   BUN 32*   CREATININE 0.70   GLUC 92     ABG: No new results in last 24 hours.    Imaging Results Review: I personally reviewed the following image studies in PACS and associated radiology reports: CT chest. My interpretation of the radiology images/reports is: Emphysema, bilateral lower lobe opacities/atelectasis, right upper lobe spiculated lung nodule.    PFT Results Reviewed: reviewed and interpreted  5/29/2024  Results: Moderate obstruction without bronchodilator responsiveness.  Moderate diffusion impairment  FEV1/FVC Ratio: 56%  Forced Vital Capacity: 1.71 L           78% predicted  FEV1: 0.96 L57% predicted     After administration of bronchodilator   FEV1/FVC Ratio: 56%  FVC: 1.75 L, 80% predicted, 2% change  FEV1: 0.98 L, 59% predicted, 2% change     Lung volumes by body plethysmography:   Total Lung Capacity 96% predicted   Residual volume 114% predicted     DLCO corrected for patients hemoglobin level: 62%  VTE Prophylaxis: Heparin           [1]   Past Medical History:  Diagnosis Date    COPD (chronic obstructive pulmonary disease) (HCC)     Glaucoma     Hypertension    [2]   Past Surgical History:  Procedure Laterality Date    BREAST LUMPECTOMY      HYSTERECTOMY     [3]   Social History  Tobacco Use    Smoking status: Every Day     Current packs/day: 1.00     Average packs/day: 1 pack/day for 66.5 years (66.5 ttl pk-yrs)     Types: Cigarettes     Start date:  1959    Smokeless tobacco: Never   Vaping Use    Vaping status: Never Used   Substance and Sexual Activity    Alcohol use: Not Currently    Drug use: No   [4]   Family History  Problem Relation Name Age of Onset    Breast cancer Mother      Parkinsonism Mother      Stroke Mother      Heart disease Father

## 2025-07-04 NOTE — ASSESSMENT & PLAN NOTE
PFTs with moderate obstruction and CT scan shows emphysema  Home regimen: Anoro once daily, albuterol as needed  Does not require supplemental oxygen at baseline  Does not appear to be in acute exacerbation    Plan:  Continue home Anoro once daily  Add Xopenex nebs 3 times daily to help with mucus clearance  Would benefit from smoking cessation  Would also benefit from outpatient pulmonary rehab

## 2025-07-04 NOTE — ASSESSMENT & PLAN NOTE
In the setting of extensive smoking history, will need to rule out malignancy    Plan:  Repeat CT chest in 3 months with navigation protocol  If persistent/enlarging, will refer to Dr. Martínez for navigational lung biopsy

## 2025-07-04 NOTE — PLAN OF CARE
Problem: Potential for Falls  Goal: Patient will remain free of falls  Description: INTERVENTIONS:  - Educate patient/family on patient safety including physical limitations  - Instruct patient to call for assistance with activity   - Consider consulting OT/PT to assist with strengthening/mobility based on AM PAC & -HLM score  - Consult OT/PT to assist with strengthening/mobility   - Keep Call bell within reach  - Keep bed low and locked with side rails adjusted as appropriate  - Keep care items and personal belongings within reach  - Initiate and maintain comfort rounds  - Make Fall Risk Sign visible to staff  - Offer Toileting every  Hours, in advance of need  - Initiate/Maintain alarm  - Obtain necessary fall risk management equipment:   - Apply yellow socks and bracelet for high fall risk patients  - Consider moving patient to room near nurses station  Outcome: Progressing     Problem: Prexisting or High Potential for Compromised Skin Integrity  Goal: Skin integrity is maintained or improved  Description: INTERVENTIONS:  - Identify patients at risk for skin breakdown  - Assess and monitor skin integrity including under and around medical devices   - Assess and monitor nutrition and hydration status  - Monitor labs  - Assess for incontinence   - Turn and reposition patient  - Assist with mobility/ambulation  - Relieve pressure over oneil prominences   - Avoid friction and shearing  - Provide appropriate hygiene as needed including keeping skin clean and dry  - Evaluate need for skin moisturizer/barrier cream  - Collaborate with interdisciplinary team  - Patient/family teaching  - Consider wound care consult    Assess:  - Review Jonn scale daily  - Clean and moisturize skin every   - Inspect skin when repositioning, toileting, and assisting with ADLS  - Assess under medical devices such as mossimo  - Assess extremities for adequate circulation and sensation     Bed Management:  - Have minimal linens on bed &  keep smooth, unwrinkled  - Change linens as needed when moist or perspiring  - Avoid sitting or lying in one position for more than hours while in bed?Keep HOB at degrees   - Toileting:  - Offer bedside commode  - Assess for incontinence every   - Use incontinent care products after each incontinent episode such as     Activity:  - Mobilize patient  times a day  - Encourage activity and walks on unit  - Encourage or provide ROM exercises   - Turn and reposition patient every  Hours  - Use appropriate equipment to lift or move patient in bed  - Instruct/ Assist with weight shifting every  when out of bed in chair  - Consider limitation of chair time  hour intervals    Skin Care:  - Avoid use of baby powder, tape, friction and shearing, hot water or constrictive clothing  - Relieve pressure over bony prominences using   - Do not massage red bony areas    Next Steps:  - Teach patient strategies to minimize risks such as - Consider consults to  interdisciplinary teams such as   Outcome: Progressing     Problem: PAIN - ADULT  Goal: Verbalizes/displays adequate comfort level or baseline comfort level  Description: Interventions:  - Encourage patient to monitor pain and request assistance  - Assess pain using appropriate pain scale  - Administer analgesics as ordered based on type and severity of pain and evaluate response  - Implement non-pharmacological measures as appropriate and evaluate response  - Consider cultural and social influences on pain and pain management  - Notify physician/advanced practitioner if interventions unsuccessful or patient reports new pain  - Educate patient/family on pain management process including their role and importance of  reporting pain   - Provide non-pharmacologic/complimentary pain relief interventions  Outcome: Progressing     Problem: INFECTION - ADULT  Goal: Absence or prevention of progression during hospitalization  Description: INTERVENTIONS:  - Assess and monitor for signs  and symptoms of infection  - Monitor lab/diagnostic results  - Monitor all insertion sites, i.e. indwelling lines, tubes, and drains  - Monitor endotracheal if appropriate and nasal secretions for changes in amount and color  - [place] appropriate cooling/warming therapies per order  - Administer medications as ordered  - Instruct and encourage patient and family to use good hand hygiene technique  - Identify and instruct in appropriate isolation precautions for identified infection/condition  Outcome: Progressing  Goal: Absence of fever/infection during neutropenic period  Description: INTERVENTIONS:  - Monitor WBC  - Perform strict hand hygiene  - Limit to healthy visitors only  - No plants, dried, fresh or silk flowers with beckett in patient room  Outcome: Progressing     Problem: SAFETY ADULT  Goal: Patient will remain free of falls  Description: INTERVENTIONS:  - Educate patient/family on patient safety including physical limitations  - Instruct patient to call for assistance with activity   - Consider consulting OT/PT to assist with strengthening/mobility based on AM PAC & -HLM score  - Consult OT/PT to assist with strengthening/mobility   - Keep Call bell within reach  - Keep bed low and locked with side rails adjusted as appropriate  - Keep care items and personal belongings within reach  - Initiate and maintain comfort rounds  - Make Fall Risk Sign visible to staff  - Offer Toileting every  Hours, in advance of need  - Initiate/Maintain alarm  - Obtain necessary fall risk management equipment:   - Apply yellow socks and bracelet for high fall risk patients  - Consider moving patient to room near nurses station  Outcome: Progressing  Goal: Maintain or return to baseline ADL function  Description: INTERVENTIONS:  -  Assess patient's ability to carry out ADLs; assess patient's baseline for ADL function and identify physical deficits which impact ability to perform ADLs (bathing, care of mouth/teeth,  toileting, grooming, dressing, etc.)  - Assess/evaluate cause of self-care deficits   - Assess range of motion  - Assess patient's mobility; develop plan if impaired  - Assess patient's need for assistive devices and provide as appropriate  - Encourage maximum independence but intervene and supervise when necessary  - Involve family in performance of ADLs  - Assess for home care needs following discharge   - Consider OT consult to assist with ADL evaluation and planning for discharge  - Provide patient education as appropriate  - Monitor functional capacity and physical performance, use of AM PAC & JH-HLM   - Monitor gait, balance and fatigue with ambulation    Outcome: Progressing  Goal: Maintains/Returns to pre admission functional level  Description: INTERVENTIONS:  - Perform AM-PAC 6 Click Basic Mobility/ Daily Activity assessment daily.  - Set and communicate daily mobility goal to care team and patient/family/caregiver.   - Collaborate with rehabilitation services on mobility goals if consulted  - Perform Range of Motion  times a day.  - Reposition patient every  hours.  - Dangle patient  times a day  - Stand patient  times a day  - Ambulate patient  times a day  - Out of bed to chair 3 times a day   - Out of bed for meals  times a day  - Out of bed for toileting  - Record patient progress and toleration of activity level   Outcome: Progressing     Problem: DISCHARGE PLANNING  Goal: Discharge to home or other facility with appropriate resources  Description: INTERVENTIONS:  - Identify barriers to discharge w/patient and caregiver  - Arrange for needed discharge resources and transportation as appropriate  - Identify discharge learning needs (meds, wound care, etc.)  - Arrange for interpretive services to assist at discharge as needed  - Refer to Case Management Department for coordinating discharge planning if the patient needs post-hospital services based on physician/advanced practitioner order or complex  needs related to functional status, cognitive ability, or social support system  Outcome: Progressing     Problem: Knowledge Deficit  Goal: Patient/family/caregiver demonstrates understanding of disease process, treatment plan, medications, and discharge instructions  Description: Complete learning assessment and assess knowledge base.  Interventions:  - Provide teaching at level of understanding  - Provide teaching via preferred learning methods  Outcome: Progressing     Problem: Nutrition/Hydration-ADULT  Goal: Nutrient/Hydration intake appropriate for improving, restoring or maintaining nutritional needs  Description: Monitor and assess patient's nutrition/hydration status for malnutrition. Collaborate with interdisciplinary team and initiate plan and interventions as ordered.  Monitor patient's weight and dietary intake as ordered or per policy. Utilize nutrition screening tool and intervene as necessary. Determine patient's food preferences and provide high-protein, high-caloric foods as appropriate.     INTERVENTIONS:  - Monitor oral intake, urinary output, labs, and treatment plans  - Assess nutrition and hydration status and recommend course of action  - Evaluate amount of meals eaten  - Assist patient with eating if necessary   - Allow adequate time for meals  - Recommend/ encourage appropriate diets, oral nutritional supplements, and vitamin/mineral supplements  - Order, calculate, and assess calorie counts as needed  - Recommend, monitor, and adjust tube feedings and TPN/PPN based on assessed needs  - Assess need for intravenous fluids  - Provide specific nutrition/hydration education as appropriate  - Include patient/family/caregiver in decisions related to nutrition  Outcome: Progressing

## 2025-07-04 NOTE — ASSESSMENT & PLAN NOTE
Likely secondary to atelectasis in the setting of rib fractures    Plan:  Continue empiric antibiotics -day #5  Would complete a 7-day course of antibiotics  Mucinex twice daily  Flutter valve  Avoid chest PT/vest in the setting of rib fracture

## 2025-07-05 PROBLEM — R19.7 DIARRHEA: Status: ACTIVE | Noted: 2025-07-05

## 2025-07-05 LAB
ANION GAP SERPL CALCULATED.3IONS-SCNC: 5 MMOL/L (ref 4–13)
BASOPHILS # BLD MANUAL: 0 THOUSAND/UL (ref 0–0.1)
BASOPHILS NFR MAR MANUAL: 0 % (ref 0–1)
BUN SERPL-MCNC: 32 MG/DL (ref 5–25)
CALCIUM SERPL-MCNC: 9 MG/DL (ref 8.4–10.2)
CHLORIDE SERPL-SCNC: 108 MMOL/L (ref 96–108)
CO2 SERPL-SCNC: 28 MMOL/L (ref 21–32)
CREAT SERPL-MCNC: 0.72 MG/DL (ref 0.6–1.3)
EOSINOPHIL # BLD MANUAL: 0 THOUSAND/UL (ref 0–0.4)
EOSINOPHIL NFR BLD MANUAL: 0 % (ref 0–6)
ERYTHROCYTE [DISTWIDTH] IN BLOOD BY AUTOMATED COUNT: 13.8 % (ref 11.6–15.1)
GFR SERPL CREATININE-BSD FRML MDRD: 76 ML/MIN/1.73SQ M
GLUCOSE SERPL-MCNC: 93 MG/DL (ref 65–140)
HCT VFR BLD AUTO: 37.2 % (ref 34.8–46.1)
HGB BLD-MCNC: 11.7 G/DL (ref 11.5–15.4)
LYMPHOCYTES # BLD AUTO: 1.17 THOUSAND/UL (ref 0.6–4.47)
LYMPHOCYTES # BLD AUTO: 9 % (ref 14–44)
MAGNESIUM SERPL-MCNC: 1.9 MG/DL (ref 1.9–2.7)
MCH RBC QN AUTO: 30.1 PG (ref 26.8–34.3)
MCHC RBC AUTO-ENTMCNC: 31.5 G/DL (ref 31.4–37.4)
MCV RBC AUTO: 96 FL (ref 82–98)
MONOCYTES # BLD AUTO: 0.58 THOUSAND/UL (ref 0–1.22)
MONOCYTES NFR BLD: 6 % (ref 4–12)
MYELOCYTE ABSOLUTE CT: 0.1 THOUSAND/UL (ref 0–0.1)
MYELOCYTES NFR BLD MANUAL: 1 % (ref 0–1)
NEUTROPHILS # BLD MANUAL: 7.87 THOUSAND/UL (ref 1.85–7.62)
NEUTS BAND NFR BLD MANUAL: 3 % (ref 0–8)
NEUTS SEG NFR BLD AUTO: 78 % (ref 43–75)
PLATELET # BLD AUTO: 513 THOUSANDS/UL (ref 149–390)
PLATELET BLD QL SMEAR: ABNORMAL
PMV BLD AUTO: 8.6 FL (ref 8.9–12.7)
POTASSIUM SERPL-SCNC: 3.8 MMOL/L (ref 3.5–5.3)
RBC # BLD AUTO: 3.89 MILLION/UL (ref 3.81–5.12)
RBC MORPH BLD: NORMAL
SODIUM SERPL-SCNC: 141 MMOL/L (ref 135–147)
VARIANT LYMPHS # BLD AUTO: 3 %
WBC # BLD AUTO: 9.71 THOUSAND/UL (ref 4.31–10.16)

## 2025-07-05 PROCEDURE — 94760 N-INVAS EAR/PLS OXIMETRY 1: CPT

## 2025-07-05 PROCEDURE — 80048 BASIC METABOLIC PNL TOTAL CA: CPT | Performed by: INTERNAL MEDICINE

## 2025-07-05 PROCEDURE — 94664 DEMO&/EVAL PT USE INHALER: CPT

## 2025-07-05 PROCEDURE — 99232 SBSQ HOSP IP/OBS MODERATE 35: CPT | Performed by: INTERNAL MEDICINE

## 2025-07-05 PROCEDURE — 83735 ASSAY OF MAGNESIUM: CPT | Performed by: INTERNAL MEDICINE

## 2025-07-05 PROCEDURE — 94640 AIRWAY INHALATION TREATMENT: CPT

## 2025-07-05 PROCEDURE — 85027 COMPLETE CBC AUTOMATED: CPT | Performed by: INTERNAL MEDICINE

## 2025-07-05 PROCEDURE — 85007 BL SMEAR W/DIFF WBC COUNT: CPT | Performed by: INTERNAL MEDICINE

## 2025-07-05 RX ORDER — ALBUTEROL SULFATE 0.83 MG/ML
2.5 SOLUTION RESPIRATORY (INHALATION) EVERY 4 HOURS PRN
Status: DISCONTINUED | OUTPATIENT
Start: 2025-07-05 | End: 2025-07-07

## 2025-07-05 RX ADMIN — GABAPENTIN 300 MG: 300 CAPSULE ORAL at 08:37

## 2025-07-05 RX ADMIN — HEPARIN SODIUM 5000 UNITS: 5000 INJECTION INTRAVENOUS; SUBCUTANEOUS at 08:13

## 2025-07-05 RX ADMIN — METHOCARBAMOL 500 MG: 500 TABLET ORAL at 14:11

## 2025-07-05 RX ADMIN — CEFTRIAXONE 1000 MG: 1 INJECTION, POWDER, FOR SOLUTION INTRAMUSCULAR; INTRAVENOUS at 12:21

## 2025-07-05 RX ADMIN — GUAIFENESIN 600 MG: 600 TABLET, EXTENDED RELEASE ORAL at 08:37

## 2025-07-05 RX ADMIN — HEPARIN SODIUM 5000 UNITS: 5000 INJECTION INTRAVENOUS; SUBCUTANEOUS at 21:04

## 2025-07-05 RX ADMIN — METHOCARBAMOL 500 MG: 500 TABLET ORAL at 08:13

## 2025-07-05 RX ADMIN — GUAIFENESIN 600 MG: 600 TABLET, EXTENDED RELEASE ORAL at 17:27

## 2025-07-05 RX ADMIN — Medication 2000 UNITS: at 08:38

## 2025-07-05 RX ADMIN — LATANOPROST 1 DROP: 50 SOLUTION OPHTHALMIC at 21:04

## 2025-07-05 RX ADMIN — HEPARIN SODIUM 5000 UNITS: 5000 INJECTION INTRAVENOUS; SUBCUTANEOUS at 14:11

## 2025-07-05 RX ADMIN — UMECLIDINIUM BROMIDE AND VILANTEROL TRIFENATATE 1 PUFF: 62.5; 25 POWDER RESPIRATORY (INHALATION) at 17:28

## 2025-07-05 RX ADMIN — LISINOPRIL 10 MG: 10 TABLET ORAL at 08:37

## 2025-07-05 RX ADMIN — GABAPENTIN 300 MG: 300 CAPSULE ORAL at 21:03

## 2025-07-05 RX ADMIN — LIDOCAINE 1 PATCH: 700 PATCH TOPICAL at 08:39

## 2025-07-05 RX ADMIN — LEVALBUTEROL HYDROCHLORIDE 1.25 MG: 1.25 SOLUTION RESPIRATORY (INHALATION) at 13:12

## 2025-07-05 RX ADMIN — NICOTINE 1 PATCH: 14 PATCH, EXTENDED RELEASE TRANSDERMAL at 08:39

## 2025-07-05 RX ADMIN — LEVALBUTEROL HYDROCHLORIDE 1.25 MG: 1.25 SOLUTION RESPIRATORY (INHALATION) at 07:49

## 2025-07-05 RX ADMIN — GABAPENTIN 300 MG: 300 CAPSULE ORAL at 17:27

## 2025-07-05 NOTE — ASSESSMENT & PLAN NOTE
Not in acute exacerbation, continue PTA inhalers  Xopenex nebs TID added per pulm to assist with mucus clearance

## 2025-07-05 NOTE — PLAN OF CARE
Problem: PAIN - ADULT  Goal: Verbalizes/displays adequate comfort level or baseline comfort level  Description: Interventions:  - Encourage patient to monitor pain and request assistance  - Assess pain using appropriate pain scale  - Administer analgesics as ordered based on type and severity of pain and evaluate response  - Implement non-pharmacological measures as appropriate and evaluate response  - Consider cultural and social influences on pain and pain management  - Notify physician/advanced practitioner if interventions unsuccessful or patient reports new pain  - Educate patient/family on pain management process including their role and importance of  reporting pain   - Provide non-pharmacologic/complimentary pain relief interventions  Outcome: Progressing     Problem: INFECTION - ADULT  Goal: Absence or prevention of progression during hospitalization  Description: INTERVENTIONS:  - Assess and monitor for signs and symptoms of infection  - Monitor lab/diagnostic results  - Monitor all insertion sites, i.e. indwelling lines, tubes, and drains  - Monitor endotracheal if appropriate and nasal secretions for changes in amount and color  - [place] appropriate cooling/warming therapies per order  - Administer medications as ordered  - Instruct and encourage patient and family to use good hand hygiene technique  - Identify and instruct in appropriate isolation precautions for identified infection/condition  Outcome: Progressing  Goal: Absence of fever/infection during neutropenic period  Description: INTERVENTIONS:  - Monitor WBC  - Perform strict hand hygiene  - Limit to healthy visitors only  - No plants, dried, fresh or silk flowers with beckett in patient room  Outcome: Progressing     Problem: SAFETY ADULT  Goal: Patient will remain free of falls  Description: INTERVENTIONS:  - Educate patient/family on patient safety including physical limitations  - Instruct patient to call for assistance with activity    - Consider consulting OT/PT to assist with strengthening/mobility based on AM PAC & JH-HLM score  - Consult OT/PT to assist with strengthening/mobility   - Keep Call bell within reach  - Keep bed low and locked with side rails adjusted as appropriate  - Keep care items and personal belongings within reach  - Initiate and maintain comfort rounds  - Make Fall Risk Sign visible to staff    - Apply yellow socks and bracelet for high fall risk patients  - Consider moving patient to room near nurses station  Outcome: Progressing  Goal: Maintain or return to baseline ADL function  Description: INTERVENTIONS:  -  Assess patient's ability to carry out ADLs; assess patient's baseline for ADL function and identify physical deficits which impact ability to perform ADLs (bathing, care of mouth/teeth, toileting, grooming, dressing, etc.)  - Assess/evaluate cause of self-care deficits   - Assess range of motion  - Assess patient's mobility; develop plan if impaired  - Assess patient's need for assistive devices and provide as appropriate  - Encourage maximum independence but intervene and supervise when necessary  - Involve family in performance of ADLs  - Assess for home care needs following discharge   - Consider OT consult to assist with ADL evaluation and planning for discharge  - Provide patient education as appropriate  - Monitor functional capacity and physical performance, use of AM PAC & JH-HLM   - Monitor gait, balance and fatigue with ambulation    Outcome: Progressing  Goal: Maintains/Returns to pre admission functional level  Description: INTERVENTIONS:  - Perform AM-PAC 6 Click Basic Mobility/ Daily Activity assessment daily.  - Set and communicate daily mobility goal to care team and patient/family/caregiver.   - Collaborate with rehabilitation services on mobility goals if consulted    - Record patient progress and toleration of activity level   Outcome: Progressing     Problem: DISCHARGE PLANNING  Goal:  Discharge to home or other facility with appropriate resources  Description: INTERVENTIONS:  - Identify barriers to discharge w/patient and caregiver  - Arrange for needed discharge resources and transportation as appropriate  - Identify discharge learning needs (meds, wound care, etc.)  - Arrange for interpretive services to assist at discharge as needed  - Refer to Case Management Department for coordinating discharge planning if the patient needs post-hospital services based on physician/advanced practitioner order or complex needs related to functional status, cognitive ability, or social support system  Outcome: Progressing     Problem: Knowledge Deficit  Goal: Patient/family/caregiver demonstrates understanding of disease process, treatment plan, medications, and discharge instructions  Description: Complete learning assessment and assess knowledge base.  Interventions:  - Provide teaching at level of understanding  - Provide teaching via preferred learning methods  Outcome: Progressing     Problem: Nutrition/Hydration-ADULT  Goal: Nutrient/Hydration intake appropriate for improving, restoring or maintaining nutritional needs  Description: Monitor and assess patient's nutrition/hydration status for malnutrition. Collaborate with interdisciplinary team and initiate plan and interventions as ordered.  Monitor patient's weight and dietary intake as ordered or per policy. Utilize nutrition screening tool and intervene as necessary. Determine patient's food preferences and provide high-protein, high-caloric foods as appropriate.     INTERVENTIONS:  - Monitor oral intake, urinary output, labs, and treatment plans  - Assess nutrition and hydration status and recommend course of action  - Evaluate amount of meals eaten  - Assist patient with eating if necessary   - Allow adequate time for meals  - Recommend/ encourage appropriate diets, oral nutritional supplements, and vitamin/mineral supplements  - Order, calculate,  and assess calorie counts as needed  - Recommend, monitor, and adjust tube feedings and TPN/PPN based on assessed needs  - Assess need for intravenous fluids  - Provide specific nutrition/hydration education as appropriate  - Include patient/family/caregiver in decisions related to nutrition  Outcome: Progressing     Problem: Potential for Falls  Goal: Patient will remain free of falls  Description: INTERVENTIONS:  - Educate patient/family on patient safety including physical limitations  - Instruct patient to call for assistance with activity   - Consider consulting OT/PT to assist with strengthening/mobility based on AM PAC & -HLM score  - Consult OT/PT to assist with strengthening/mobility   - Keep Call bell within reach  - Keep bed low and locked with side rails adjusted as appropriate  - Keep care items and personal belongings within reach  - Initiate and maintain comfort rounds  - Make Fall Risk Sign visible to staff    - Apply yellow socks and bracelet for high fall risk patients  - Consider moving patient to room near nurses station  Outcome: Progressing     Problem: Prexisting or High Potential for Compromised Skin Integrity  Goal: Skin integrity is maintained or improved  Description: INTERVENTIONS:  - Identify patients at risk for skin breakdown  - Assess and monitor skin integrity including under and around medical devices   - Assess and monitor nutrition and hydration status  - Monitor labs  - Assess for incontinence   - Turn and reposition patient  - Assist with mobility/ambulation  - Relieve pressure over oneil prominences   - Avoid friction and shearing  - Provide appropriate hygiene as needed including keeping skin clean and dry  - Evaluate need for skin moisturizer/barrier cream  - Collaborate with interdisciplinary team  - Patient/family teaching  - Consider wound care consult    Assess:  - Review Jonn scale daily    Outcome: Progressing     Problem: PAIN - ADULT  Goal: Verbalizes/displays  adequate comfort level or baseline comfort level  Description: Interventions:  - Encourage patient to monitor pain and request assistance  - Assess pain using appropriate pain scale  - Administer analgesics as ordered based on type and severity of pain and evaluate response  - Implement non-pharmacological measures as appropriate and evaluate response  - Consider cultural and social influences on pain and pain management  - Notify physician/advanced practitioner if interventions unsuccessful or patient reports new pain  - Educate patient/family on pain management process including their role and importance of  reporting pain   - Provide non-pharmacologic/complimentary pain relief interventions  Outcome: Progressing     Problem: INFECTION - ADULT  Goal: Absence or prevention of progression during hospitalization  Description: INTERVENTIONS:  - Assess and monitor for signs and symptoms of infection  - Monitor lab/diagnostic results  - Monitor all insertion sites, i.e. indwelling lines, tubes, and drains  - Monitor endotracheal if appropriate and nasal secretions for changes in amount and color  - [place] appropriate cooling/warming therapies per order  - Administer medications as ordered  - Instruct and encourage patient and family to use good hand hygiene technique  - Identify and instruct in appropriate isolation precautions for identified infection/condition  Outcome: Progressing  Goal: Absence of fever/infection during neutropenic period  Description: INTERVENTIONS:  - Monitor WBC  - Perform strict hand hygiene  - Limit to healthy visitors only  - No plants, dried, fresh or silk flowers with beckett in patient room  Outcome: Progressing     Problem: SAFETY ADULT  Goal: Patient will remain free of falls  Description: INTERVENTIONS:  - Educate patient/family on patient safety including physical limitations  - Instruct patient to call for assistance with activity   - Consider consulting OT/PT to assist with  strengthening/mobility based on AM PAC & JH-HLM score  - Consult OT/PT to assist with strengthening/mobility   - Keep Call bell within reach  - Keep bed low and locked with side rails adjusted as appropriate  - Keep care items and personal belongings within reach  - Initiate and maintain comfort rounds  - Make Fall Risk Sign visible to staff    - Apply yellow socks and bracelet for high fall risk patients  - Consider moving patient to room near nurses station  Outcome: Progressing  Goal: Maintain or return to baseline ADL function  Description: INTERVENTIONS:  -  Assess patient's ability to carry out ADLs; assess patient's baseline for ADL function and identify physical deficits which impact ability to perform ADLs (bathing, care of mouth/teeth, toileting, grooming, dressing, etc.)  - Assess/evaluate cause of self-care deficits   - Assess range of motion  - Assess patient's mobility; develop plan if impaired  - Assess patient's need for assistive devices and provide as appropriate  - Encourage maximum independence but intervene and supervise when necessary  - Involve family in performance of ADLs  - Assess for home care needs following discharge   - Consider OT consult to assist with ADL evaluation and planning for discharge  - Provide patient education as appropriate  - Monitor functional capacity and physical performance, use of AM PAC & JH-HLM   - Monitor gait, balance and fatigue with ambulation    Outcome: Progressing  Goal: Maintains/Returns to pre admission functional level  Description: INTERVENTIONS:  - Perform AM-PAC 6 Click Basic Mobility/ Daily Activity assessment daily.  - Set and communicate daily mobility goal to care team and patient/family/caregiver.   - Collaborate with rehabilitation services on mobility goals if consulted  - Out of bed for toileting  - Record patient progress and toleration of activity level   Outcome: Progressing     Problem: DISCHARGE PLANNING  Goal: Discharge to home or other  facility with appropriate resources  Description: INTERVENTIONS:  - Identify barriers to discharge w/patient and caregiver  - Arrange for needed discharge resources and transportation as appropriate  - Identify discharge learning needs (meds, wound care, etc.)  - Arrange for interpretive services to assist at discharge as needed  - Refer to Case Management Department for coordinating discharge planning if the patient needs post-hospital services based on physician/advanced practitioner order or complex needs related to functional status, cognitive ability, or social support system  Outcome: Progressing     Problem: Knowledge Deficit  Goal: Patient/family/caregiver demonstrates understanding of disease process, treatment plan, medications, and discharge instructions  Description: Complete learning assessment and assess knowledge base.  Interventions:  - Provide teaching at level of understanding  - Provide teaching via preferred learning methods  Outcome: Progressing     Problem: Nutrition/Hydration-ADULT  Goal: Nutrient/Hydration intake appropriate for improving, restoring or maintaining nutritional needs  Description: Monitor and assess patient's nutrition/hydration status for malnutrition. Collaborate with interdisciplinary team and initiate plan and interventions as ordered.  Monitor patient's weight and dietary intake as ordered or per policy. Utilize nutrition screening tool and intervene as necessary. Determine patient's food preferences and provide high-protein, high-caloric foods as appropriate.     INTERVENTIONS:  - Monitor oral intake, urinary output, labs, and treatment plans  - Assess nutrition and hydration status and recommend course of action  - Evaluate amount of meals eaten  - Assist patient with eating if necessary   - Allow adequate time for meals  - Recommend/ encourage appropriate diets, oral nutritional supplements, and vitamin/mineral supplements  - Order, calculate, and assess calorie counts  as needed  - Recommend, monitor, and adjust tube feedings and TPN/PPN based on assessed needs  - Assess need for intravenous fluids  - Provide specific nutrition/hydration education as appropriate  - Include patient/family/caregiver in decisions related to nutrition  Outcome: Progressing

## 2025-07-05 NOTE — PLAN OF CARE
Problem: Prexisting or High Potential for Compromised Skin Integrity  Goal: Skin integrity is maintained or improved  Description: INTERVENTIONS:  - Identify patients at risk for skin breakdown  - Assess and monitor skin integrity including under and around medical devices   - Assess and monitor nutrition and hydration status  - Monitor labs  - Assess for incontinence   - Turn and reposition patient  - Assist with mobility/ambulation  - Relieve pressure over oneil prominences   - Avoid friction and shearing  - Provide appropriate hygiene as needed including keeping skin clean and dry  - Evaluate need for skin moisturizer/barrier cream  - Collaborate with interdisciplinary team  - Patient/family teaching  - Consider wound care consult         Problem: Prexisting or High Potential for Compromised Skin Integrity  Goal: Skin integrity is maintained or improved  Description: INTERVENTIONS:  - Identify patients at risk for skin breakdown  - Assess and monitor skin integrity including under and around medical devices   - Assess and monitor nutrition and hydration status  - Monitor labs  - Assess for incontinence   - Turn and reposition patient  - Assist with mobility/ambulation  - Relieve pressure over oneil prominences   - Avoid friction and shearing  - Provide appropriate hygiene as needed including keeping skin clean and dry  - Evaluate need for skin moisturizer/barrier cream  - Collaborate with interdisciplinary team  - Patient/family teaching  - Consider wound care consult    Problem: DISCHARGE PLANNING  Goal: Discharge to home or other facility with appropriate resources  Description: INTERVENTIONS:  - Identify barriers to discharge w/patient and caregiver  - Arrange for needed discharge resources and transportation as appropriate  - Identify discharge learning needs (meds, wound care, etc.)  - Arrange for interpretive services to assist at discharge as needed  - Refer to Case Management Department for coordinating  discharge planning if the patient needs post-hospital services based on physician/advanced practitioner order or complex needs related to functional status, cognitive ability, or social support system  Outcome: Progressing     Problem: Knowledge Deficit  Goal: Patient/family/caregiver demonstrates understanding of disease process, treatment plan, medications, and discharge instructions  Description: Complete learning assessment and assess knowledge base.  Interventions:  - Provide teaching at level of understanding  - Provide teaching via preferred learning methods  Outcome: Progressing

## 2025-07-05 NOTE — ASSESSMENT & PLAN NOTE
With tachycardia and leukocytosis. Also with low grade temperatures.   Due to pneumonia  On Ceftriaxone (D#5/7)

## 2025-07-05 NOTE — PROGRESS NOTES
Progress Note - Hospitalist   Name: Danita Palmer 84 y.o. female I MRN: [id number]  Unit/Bed#: [facility] 918-01 I Date of Admission: 6/25/2025   Date of Service: 7/4/2025 I Hospital Day: 9    Assessment & Plan  Generalized weakness  Presented on 6/25/25 after a fall in [facility] [employer], trauma imaging all negative for acute traumatic injuries. During ambulatory trial in ED was unable to safely ambulate due to marked weakness  Labs with mild hyponatremia otherwise without marked abnormalities  CT head negative for acute intracranial pathology.    With developing SIRS/sepsis likely secondary to pneumonia as below  PT/OT evaluations recommend level 2 rehab intensity, CM working on plan   Left rib fracture  Nondisplaced fractures of the anterolateral left 4th and 5th ribs  Incentive spirometry and rib fracture protocol ordered  Pain control including lidocaine patch and scheduled Tylenol ordered    Fall at home, initial encounter  Presented after a fall in [facility] [place] [employer], states she was walking when she could not stop her self and fell forward onto her knees without dizziness/lightheadedness or loss of consciousness  Patient with weakness with inability to safely ambulate  PT/OT evaluations recommend rehab, case management assistance appreciated  Acute hypoxic respiratory failure (HCC)  Persistent requirement for midflow O2 at 9-10L  CT C/A/P - Interval appearance of innumerable right lower lobe micronodules with new dense left greater than right basilar consolidations.  Interval appearance of proximal bilateral lower lobe bronchial occlusions. Findings consistent aspiration pneumonia/atelectasis.  On Ceftriaxone  Pulm input appreciated     Sepsis (HCC)  With tachycardia and leukocytosis. Also with low grade temperatures.   Due to pneumonia  On Ceftriaxone (D#5/7)  Constipation  Resolved as of 6/30, had BM  Continue bowel regimen   CKD stage G3a/A1, GFR 45-59 and albumin creatinine ratio <30 mg/g (HCC)  Lab Results    Component Value Date    EGFR 79 07/04/2025    EGFR 82 07/02/2025    EGFR 61 06/30/2025    CREATININE 0.70 07/04/2025    CREATININE 0.63 07/02/2025    CREATININE 0.86 06/30/2025     Renal function at baseline, monitor with BMP.  Avoid/limit nephrotoxins and hypotension  Incidental lung nodule,RUL, > 3mm and < 8mm  7 mm spiculated nodule to right upper lobe incidentally noted on CT chest  Repeat noncontrast CT chest advised at 6-12 months, patient to follow-up with PCP to arrange this.  Previous provider reviewed results with patient and her son  Disc degeneration, lumbar  Chronic, patient denies worsening back pain  Continue gabapentin, dose was reduced to 300 mg 3 times daily as patient somewhat sleepy during admission evaluation  Cigarette nicotine dependence without complication  Discussed need for cessation.    Nicotine patch ordered  Essential hypertension  Continue PTA lisinopril 10 mg daily restrict hold parameters and monitor blood pressure per protocol  Chronic obstructive pulmonary disease (HCC)  Not in acute exacerbation, continue PTA inhalers  Xopenex nebs TID added per pulm to assist with mucus clearance  Pneumonia  See above plan under sepsis    VTE Pharmacologic Prophylaxis: VTE Score: 3 Moderate Risk (Score 3-4) - Pharmacological DVT Prophylaxis Ordered: heparin.    Mobility:   Basic Mobility Inpatient Raw Score: 17  JH-HLM Goal: 5: Stand one or more mins  JH-HLM Achieved: 7: Walk 25 feet or more  JH-HLM Goal achieved. Continue to encourage appropriate mobility.    Patient Centered Rounds: Discussed with RN    Education and Discussions with Family / Patient: Updated  (son) via phone.    Current Length of Stay: 9 day(s)  Current Patient Status: Inpatient   Certification Statement: The patient will continue to require additional inpatient hospital stay due to pneumonia, sepsis, respiratory failure with hypoxia    Code Status: Level 1 - Full Code    Subjective   Intermittent cough.  Has  pain at site of rib fractures.  No fever    Objective :  Temp:  [97.8 °F (36.6 °C)-98 °F (36.7 °C)] 98 °F (36.7 °C)  HR:  [73-75] 73  BP: (109-151)/(68-85) 109/68  Resp:  [16-18] 18  SpO2:  [93 %-96 %] 95 %  O2 Device: Mid flow nasal cannula    Body mass index is 20.78 kg/m².     Physical Exam  Vitals reviewed.   HENT:      Mouth/Throat:      Mouth: Mucous membranes are moist.     Eyes:      Extraocular Movements: Extraocular movements intact.       Cardiovascular:      Rate and Rhythm: Normal rate and regular rhythm.   Pulmonary:      Effort: Pulmonary effort is normal.      Comments: Decreased breath sounds bilaterally  Abdominal:      General: There is no distension.      Palpations: Abdomen is soft.      Tenderness: There is no abdominal tenderness.     Musculoskeletal:         General: No swelling.      Cervical back: Neck supple.     Skin:     General: Skin is warm and dry.     Neurological:      General: No focal deficit present.      Mental Status: She is alert and oriented to person, place, and time.     Psychiatric:         Mood and Affect: Mood normal.         Behavior: Behavior normal.           Lines/Drains:              Lab Results: I have reviewed the following results:   Results from last 7 days   Lab Units 07/04/25  0446   WBC Thousand/uL 10.25*   HEMOGLOBIN g/dL 11.9   HEMATOCRIT % 36.7   PLATELETS Thousands/uL 549*   SEGS PCT % 74   LYMPHO PCT % 10*   MONO PCT % 11   EOS PCT % 2     Results from last 7 days   Lab Units 07/04/25  0446   SODIUM mmol/L 140   POTASSIUM mmol/L 3.8   CHLORIDE mmol/L 105   CO2 mmol/L 29   BUN mg/dL 32*   CREATININE mg/dL 0.70   ANION GAP mmol/L 6   CALCIUM mg/dL 8.7   GLUCOSE RANDOM mg/dL 92                 Results from last 7 days   Lab Units 07/02/25  0613 07/01/25  0446 06/30/25  0642 06/29/25  0608   PROCALCITONIN ng/ml 0.48* 1.91* 1.80* 2.45*       Recent Cultures (last 7 days):   Results from last 7 days   Lab Units 06/29/25  1110 06/29/25  1103 06/29/25  1050    BLOOD CULTURE  No Growth After 5 Days. No Growth After 5 Days.  --    LEGIONELLA URINARY ANTIGEN   --   --  Negative             Last 24 Hours Medication List:     Current Facility-Administered Medications:     acetaminophen (TYLENOL) tablet 975 mg, Q8H JOSE MANUEL    albuterol (PROVENTIL HFA,VENTOLIN HFA) inhaler 1 puff, Q4H PRN    bisacodyl (DULCOLAX) rectal suppository 10 mg, Daily PRN    cefTRIAXone (ROCEPHIN) 1,000 mg in dextrose 5 % 50 mL IVPB, Q24H, Last Rate: 1,000 mg (07/04/25 1117)    Cholecalciferol (VITAMIN D3) tablet 2,000 Units, Daily    docusate sodium (COLACE) capsule 100 mg, BID    gabapentin (NEURONTIN) capsule 300 mg, TID    guaiFENesin (MUCINEX) 12 hr tablet 600 mg, BID    heparin (porcine) subcutaneous injection 5,000 Units, Q8H JOSE MANUEL    latanoprost (XALATAN) 0.005 % ophthalmic solution 1 drop, HS    levalbuterol (XOPENEX) inhalation solution 1.25 mg, TID    lidocaine (LIDODERM) 5 % patch 1 patch, Daily    lisinopril (ZESTRIL) tablet 10 mg, Daily    magnesium hydroxide (MILK OF MAGNESIA) oral suspension 30 mL, Daily PRN    methocarbamol (ROBAXIN) tablet 500 mg, Q8H JOSE MANUEL    nicotine (NICODERM CQ) 14 mg/24hr TD 24 hr patch 1 patch, Daily    ondansetron (ZOFRAN) injection 4 mg, Q6H PRN    oxyCODONE (ROXICODONE) split tablet 2.5 mg, Q4H PRN **OR** oxyCODONE (ROXICODONE) IR tablet 5 mg, Q4H PRN    polyethylene glycol (MIRALAX) packet 17 g, BID    senna (SENOKOT) tablet 17.2 mg, HS    umeclidinium-vilanterol 62.5-25 mcg/actuation inhaler 1 puff, Daily    Administrative Statements   Today, Patient Was Seen By: Jordyn Kang MD      **Please Note: This note may have been constructed using a voice recognition system.**

## 2025-07-05 NOTE — ASSESSMENT & PLAN NOTE
Lab Results   Component Value Date    EGFR 79 07/04/2025    EGFR 82 07/02/2025    EGFR 61 06/30/2025    CREATININE 0.70 07/04/2025    CREATININE 0.63 07/02/2025    CREATININE 0.86 06/30/2025     Renal function at baseline, monitor with BMP.  Avoid/limit nephrotoxins and hypotension

## 2025-07-05 NOTE — ASSESSMENT & PLAN NOTE
Persistent requirement for midflow O2 at 9-10L  CT C/A/P - Interval appearance of innumerable right lower lobe micronodules with new dense left greater than right basilar consolidations.  Interval appearance of proximal bilateral lower lobe bronchial occlusions. Findings consistent aspiration pneumonia/atelectasis.  On Ceftriaxone  Pulm input appreciated

## 2025-07-05 NOTE — ASSESSMENT & PLAN NOTE
Likely secondary to atelectasis in the setting of rib fractures    Plan:  Continue empiric antibiotics for 7 day course  Mucinex twice daily  Flutter valve  Avoid chest PT/vest in the setting of rib fracture

## 2025-07-05 NOTE — ASSESSMENT & PLAN NOTE
Presented on 6/25/25 after a fall [place], trauma imaging all negative for acute traumatic injuries. During ambulatory trial in ED was unable to safely ambulate due to marked weakness  Labs with mild hyponatremia otherwise without marked abnormalities  CT head negative for acute intracranial pathology.    With developing SIRS/sepsis likely secondary to pneumonia as below  PT/OT evaluations recommend level 2 rehab intensity, CM working on plan

## 2025-07-05 NOTE — ASSESSMENT & PLAN NOTE
PFTs with moderate obstruction and CT scan shows emphysema  Home regimen: Anoro once daily, albuterol as needed  Does not require supplemental oxygen at baseline  Does not appear to be in acute exacerbation    Plan:  Continue home Anoro once daily  Xopenex TID PRN - tried scheduled but patient did not report benefit and did not like the nebulizers  Would benefit from smoking cessation  Would also benefit from outpatient pulmonary rehab

## 2025-07-05 NOTE — PROGRESS NOTES
Progress Note - Pulmonology   Name: Danita Palmer 84 y.o. female I MRN: [id number]  Unit/Bed#: [facility] 918-01 I Date of Admission: 6/25/2025   Date of Service: 7/5/2025 I Hospital Day: 10    Assessment & Plan  Acute hypoxic respiratory failure (HCC)  Multifactorial in etiology including atelectasis, pneumonia, underlying COPD    Plan:  Wean supplemental oxygen for sats greater than 88%  See additional plans as per below  Pneumonia  Likely secondary to atelectasis in the setting of rib fractures    Plan:  Continue empiric antibiotics for 7 day course  Mucinex twice daily  Flutter valve  Avoid chest PT/vest in the setting of rib fracture  Left rib fracture  Fall at home, initial encounter  Plan:  Pain control per primary team  Rib fracture protocol  I encouraged her to use incentive spirometer on at least an hourly basis  Needs to be getting out of bed, ambulating  Chronic obstructive pulmonary disease (HCC)  PFTs with moderate obstruction and CT scan shows emphysema  Home regimen: Anoro once daily, albuterol as needed  Does not require supplemental oxygen at baseline  Does not appear to be in acute exacerbation    Plan:  Continue home Anoro once daily  Xopenex TID PRN - tried scheduled but patient did not report benefit and did not like the nebulizers  Would benefit from smoking cessation  Would also benefit from outpatient pulmonary rehab  Cigarette nicotine dependence without complication  Recommend smoking cessation  Incidental lung nodule,RUL, > 3mm and < 8mm  In the setting of extensive smoking history, will need to rule out malignancy    Plan:  Repeat CT chest in 3 months with navigation protocol  If persistent/enlarging, will refer to Dr. Martínze for navigational lung biopsy    24 Hour Events : No acute events overnight. She is slowly improving.   Subjective : She is doing okay today. She denies any worsening shortness of breath. She is not having any new chest pain. Discussed plan to continue with airway  hygiene and encouraged deep breathing. Also discussed lung nodule that was seen on CT chest and plan for repeat CT in a few months. She was agreeable to that plan and understanding of the rationale.     Objective :  Temp:  [97.9 °F (36.6 °C)-98.2 °F (36.8 °C)] 97.9 °F (36.6 °C)  HR:  [74-84] 74  BP: (109-151)/(58-78) 151/78  Resp:  [17-18] 18  SpO2:  [89 %-90 %] 90 %  O2 Device: Mid flow nasal cannula  Nasal Cannula O2 Flow Rate (L/min):  [7 L/min-8 L/min] 7 L/min    Physical Exam  Vitals reviewed.   Constitutional:       General: She is not in acute distress.     Comments: Underweight appearing     Cardiovascular:      Rate and Rhythm: Normal rate and regular rhythm.   Pulmonary:      Effort: Pulmonary effort is normal. No respiratory distress.      Breath sounds: No wheezing or rales.      Comments: Diminished breath sounds in bases L>R, no wheezing    Musculoskeletal:      Right lower leg: No edema.      Left lower leg: No edema.     Neurological:      General: No focal deficit present.      Mental Status: She is alert and oriented to person, place, and time.         Lab Results: I have reviewed the following results:   .     07/05/25  0538   WBC 9.71   HGB 11.7   HCT 37.2   *   BANDSPCT 3   SODIUM 141   K 3.8      CO2 28   BUN 32*   CREATININE 0.72   GLUC 93   MG 1.9     ABG: No new results in last 24 hours.    Imaging Results Review: I personally reviewed the following image studies in PACS and associated radiology reports: CT chest. My interpretation of the radiology images/reports is: left lower lobe consolidation and atelectasis, RUL spiculated nodule not seen on previous CT chest in 2017.  Other Study Results Review: EKG was reviewed.

## 2025-07-05 NOTE — RESPIRATORY THERAPY NOTE
07/05/25 1316   Inhalation Therapy Tx   $ Inhalation Therapy Performed Yes   $ Pulse Oximetry Spot Check Charge Completed   Pre-Treatment Pulse 77   Breath Sounds Pre-Treatment Bilateral Diminished   Breath Sounds Post-Treatment Bilateral Diminished   Post-Treatment Pulse 78   Delivery Source UDN   Position Semi Nguyen's   Treatment Tolerance Tolerated well   Resp Comments Pulmonary fellow at bedside. 97% on 7 lpm mfnc. Per fellow, txs may be changed to PRN from scheduled.

## 2025-07-06 PROBLEM — K59.00 CONSTIPATION: Status: RESOLVED | Noted: 2025-06-26 | Resolved: 2025-07-06

## 2025-07-06 LAB
ANION GAP SERPL CALCULATED.3IONS-SCNC: 7 MMOL/L (ref 4–13)
BASOPHILS # BLD AUTO: 0.05 THOUSANDS/ÂΜL (ref 0–0.1)
BASOPHILS NFR BLD AUTO: 0 % (ref 0–1)
BUN SERPL-MCNC: 28 MG/DL (ref 5–25)
CALCIUM SERPL-MCNC: 9.1 MG/DL (ref 8.4–10.2)
CHLORIDE SERPL-SCNC: 107 MMOL/L (ref 96–108)
CO2 SERPL-SCNC: 29 MMOL/L (ref 21–32)
CREAT SERPL-MCNC: 0.75 MG/DL (ref 0.6–1.3)
EOSINOPHIL # BLD AUTO: 0.22 THOUSAND/ÂΜL (ref 0–0.61)
EOSINOPHIL NFR BLD AUTO: 2 % (ref 0–6)
ERYTHROCYTE [DISTWIDTH] IN BLOOD BY AUTOMATED COUNT: 13.8 % (ref 11.6–15.1)
GFR SERPL CREATININE-BSD FRML MDRD: 72 ML/MIN/1.73SQ M
GLUCOSE SERPL-MCNC: 93 MG/DL (ref 65–140)
HCT VFR BLD AUTO: 35.3 % (ref 34.8–46.1)
HGB BLD-MCNC: 11.3 G/DL (ref 11.5–15.4)
IMM GRANULOCYTES # BLD AUTO: 0.27 THOUSAND/UL (ref 0–0.2)
IMM GRANULOCYTES NFR BLD AUTO: 2 % (ref 0–2)
LYMPHOCYTES # BLD AUTO: 1.21 THOUSANDS/ÂΜL (ref 0.6–4.47)
LYMPHOCYTES NFR BLD AUTO: 10 % (ref 14–44)
MAGNESIUM SERPL-MCNC: 2 MG/DL (ref 1.9–2.7)
MCH RBC QN AUTO: 29.5 PG (ref 26.8–34.3)
MCHC RBC AUTO-ENTMCNC: 32 G/DL (ref 31.4–37.4)
MCV RBC AUTO: 92 FL (ref 82–98)
MONOCYTES # BLD AUTO: 0.95 THOUSAND/ÂΜL (ref 0.17–1.22)
MONOCYTES NFR BLD AUTO: 8 % (ref 4–12)
NEUTROPHILS # BLD AUTO: 9.74 THOUSANDS/ÂΜL (ref 1.85–7.62)
NEUTS SEG NFR BLD AUTO: 78 % (ref 43–75)
NRBC BLD AUTO-RTO: 0 /100 WBCS
PLATELET # BLD AUTO: 558 THOUSANDS/UL (ref 149–390)
PMV BLD AUTO: 8.5 FL (ref 8.9–12.7)
POTASSIUM SERPL-SCNC: 3.7 MMOL/L (ref 3.5–5.3)
RBC # BLD AUTO: 3.83 MILLION/UL (ref 3.81–5.12)
SODIUM SERPL-SCNC: 143 MMOL/L (ref 135–147)
WBC # BLD AUTO: 12.44 THOUSAND/UL (ref 4.31–10.16)

## 2025-07-06 PROCEDURE — 94760 N-INVAS EAR/PLS OXIMETRY 1: CPT

## 2025-07-06 PROCEDURE — 87505 NFCT AGENT DETECTION GI: CPT | Performed by: FAMILY MEDICINE

## 2025-07-06 PROCEDURE — 80048 BASIC METABOLIC PNL TOTAL CA: CPT | Performed by: INTERNAL MEDICINE

## 2025-07-06 PROCEDURE — 99232 SBSQ HOSP IP/OBS MODERATE 35: CPT | Performed by: INTERNAL MEDICINE

## 2025-07-06 PROCEDURE — 85025 COMPLETE CBC W/AUTO DIFF WBC: CPT | Performed by: INTERNAL MEDICINE

## 2025-07-06 PROCEDURE — 83735 ASSAY OF MAGNESIUM: CPT | Performed by: INTERNAL MEDICINE

## 2025-07-06 PROCEDURE — 99233 SBSQ HOSP IP/OBS HIGH 50: CPT | Performed by: FAMILY MEDICINE

## 2025-07-06 RX ORDER — PANTOPRAZOLE SODIUM 40 MG/1
40 TABLET, DELAYED RELEASE ORAL
Status: DISCONTINUED | OUTPATIENT
Start: 2025-07-06 | End: 2025-07-12 | Stop reason: HOSPADM

## 2025-07-06 RX ADMIN — CEFTRIAXONE 1000 MG: 1 INJECTION, POWDER, FOR SOLUTION INTRAMUSCULAR; INTRAVENOUS at 10:31

## 2025-07-06 RX ADMIN — GUAIFENESIN 600 MG: 600 TABLET, EXTENDED RELEASE ORAL at 09:25

## 2025-07-06 RX ADMIN — LISINOPRIL 10 MG: 10 TABLET ORAL at 09:24

## 2025-07-06 RX ADMIN — NICOTINE 1 PATCH: 14 PATCH, EXTENDED RELEASE TRANSDERMAL at 09:25

## 2025-07-06 RX ADMIN — GABAPENTIN 300 MG: 300 CAPSULE ORAL at 09:25

## 2025-07-06 RX ADMIN — HEPARIN SODIUM 5000 UNITS: 5000 INJECTION INTRAVENOUS; SUBCUTANEOUS at 05:20

## 2025-07-06 RX ADMIN — HEPARIN SODIUM 5000 UNITS: 5000 INJECTION INTRAVENOUS; SUBCUTANEOUS at 16:29

## 2025-07-06 RX ADMIN — LATANOPROST 1 DROP: 50 SOLUTION OPHTHALMIC at 21:35

## 2025-07-06 RX ADMIN — METHOCARBAMOL 500 MG: 500 TABLET ORAL at 21:39

## 2025-07-06 RX ADMIN — METHOCARBAMOL 500 MG: 500 TABLET ORAL at 16:29

## 2025-07-06 RX ADMIN — GABAPENTIN 300 MG: 300 CAPSULE ORAL at 21:39

## 2025-07-06 RX ADMIN — GABAPENTIN 300 MG: 300 CAPSULE ORAL at 16:29

## 2025-07-06 RX ADMIN — Medication 2000 UNITS: at 09:25

## 2025-07-06 RX ADMIN — PANTOPRAZOLE SODIUM 40 MG: 40 TABLET, DELAYED RELEASE ORAL at 10:31

## 2025-07-06 RX ADMIN — UMECLIDINIUM BROMIDE AND VILANTEROL TRIFENATATE 1 PUFF: 62.5; 25 POWDER RESPIRATORY (INHALATION) at 17:55

## 2025-07-06 RX ADMIN — GUAIFENESIN 600 MG: 600 TABLET, EXTENDED RELEASE ORAL at 17:54

## 2025-07-06 RX ADMIN — HEPARIN SODIUM 5000 UNITS: 5000 INJECTION INTRAVENOUS; SUBCUTANEOUS at 21:39

## 2025-07-06 RX ADMIN — ACETAMINOPHEN 975 MG: 325 TABLET ORAL at 16:29

## 2025-07-06 NOTE — ASSESSMENT & PLAN NOTE
Lab Results   Component Value Date    EGFR 72 07/06/2025    EGFR 76 07/05/2025    EGFR 79 07/04/2025    CREATININE 0.75 07/06/2025    CREATININE 0.72 07/05/2025    CREATININE 0.70 07/04/2025     Renal function at baseline, monitor with BMP.  Avoid/limit nephrotoxins and hypotension

## 2025-07-06 NOTE — RESPIRATORY THERAPY NOTE
07/06/25 0731   Respiratory Assessment   Assessment Type Assess only   General Appearance Alert   Respiratory Pattern Normal   Chest Assessment Chest expansion symmetrical   Bilateral Breath Sounds Diminished;Clear   Cough Strong;Non-productive   Resp Comments Pt assessed, no distress, no wheeze, no tx indicated. No weaning of MFNC at this time. Water level confirmed.   O2 Device mfnc   Oxygen Therapy/Pulse Ox   O2 Device Mid flow nasal cannula   O2 Therapy Oxygen humidified   Nasal Cannula O2 Flow Rate (L/min) 8 L/min   Calculated FIO2 (%) - Nasal Cannula 52   O2 Flow Rate (L/min) 8 L/min   SpO2 Activity At Rest

## 2025-07-06 NOTE — ASSESSMENT & PLAN NOTE
Lab Results   Component Value Date    EGFR 76 07/05/2025    EGFR 79 07/04/2025    EGFR 82 07/02/2025    CREATININE 0.72 07/05/2025    CREATININE 0.70 07/04/2025    CREATININE 0.63 07/02/2025     Renal function at baseline, monitor with BMP.  Avoid/limit nephrotoxins and hypotension

## 2025-07-06 NOTE — ASSESSMENT & PLAN NOTE
Several episodes of diarrhea started 7/5/25 associated with generalized abdominal pain  On exam abdomen soft, no distention, no guarding, no rigidity.  Does have diffuse tenderness  Did not receive any stool softener or laxative since 7/2/2025  Due to being on IV antibiotic, will obtain C. difficile and stool PCR

## 2025-07-06 NOTE — PROGRESS NOTES
Progress Note - Hospitalist   Name: Danita Palmer 84 y.o. female I MRN: [id number]  Unit/Bed#: [facility] 918-01 I Date of Admission: 6/25/2025   Date of Service: 7/6/2025 I Hospital Day: 11    Assessment & Plan  Generalized weakness  Presented on 6/25/25 after a fall in her neighbors driveway, trauma imaging all negative for acute traumatic injuries. During ambulatory trial in ED was unable to safely ambulate due to marked weakness  Labs with mild hyponatremia otherwise without marked abnormalities  CT head negative for acute intracranial pathology.    With sepsis secondary to pneumonia as below  PT/OT evaluations recommend level 2 rehab intensity  Left rib fracture  Nondisplaced fractures of the anterolateral left 4th and 5th ribs  Incentive spirometry and rib fracture protocol ordered  Pain control including lidocaine patch and scheduled Tylenol ordered    Fall at home, initial encounter  Presented after a fall in neighbors driveway, states she was walking when she could not stop her self and fell forward onto her knees without dizziness/lightheadedness or loss of consciousness  Patient with weakness with inability to safely ambulate  PT/OT evaluations recommend rehab, case management assistance appreciated  Acute hypoxic respiratory failure (HCC)  Multifactorial due to atelectasis in the setting of rib fracture, pneumonia, underlying COPD   Persistent requirement for midflow O2   Currently requiring 8 L   CT C/A/P - Interval appearance of innumerable right lower lobe micronodules with new dense left greater than right basilar consolidations.  Interval appearance of proximal bilateral lower lobe bronchial occlusions. Findings consistent aspiration pneumonia/atelectasis.  On Ceftriaxone (D#7/7), to complete today, Mucinex, flutter valve  Chest PT/vest being avoided in the setting of rib fracture  Incentive spirometry  Pulm input appreciated     Sepsis (HCC)  With tachycardia and leukocytosis. Also with low grade  temperatures.   Due to pneumonia  Tachycardia and leukocytosis resolved   Antibiotics complete today  CKD stage G3a/A1, GFR 45-59 and albumin creatinine ratio <30 mg/g ([facility])  Lab Results   Component Value Date    EGFR 72 07/06/2025    EGFR 76 07/05/2025    EGFR 79 07/04/2025    CREATININE 0.75 07/06/2025    CREATININE 0.72 07/05/2025    CREATININE 0.70 07/04/2025     Renal function at baseline, monitor with BMP.  Avoid/limit nephrotoxins and hypotension  Incidental lung nodule,RUL, > 3mm and < 8mm  7 mm spiculated nodule to right upper lobe incidentally noted on CT chest  Repeat noncontrast CT chest advised at 6-12 months, patient to follow-up with PCP to arrange this.  Previous provider reviewed results with patient and her son  Disc degeneration, lumbar  Chronic, patient denies worsening back pain  Continue gabapentin, dose was reduced to 300 mg 3 times daily as patient somewhat sleepy during admission evaluation  Cigarette nicotine dependence without complication  Discussed need for cessation.    Nicotine patch ordered  Essential hypertension  BP acceptable   Continue PTA lisinopril 10 mg daily   Monitor BP  Pneumonia  See above plan under sepsis/respiratory failure  Chronic obstructive pulmonary disease (HCC)  Not in acute exacerbation, continue PTA inhalers    Diarrhea  Several episodes of diarrhea started 7/5/25 associated with generalized abdominal pain  On exam abdomen soft, no distention, no guarding, no rigidity.  Does have diffuse tenderness  Did not receive any stool softener or laxative since 7/2/2025  Due to being on IV antibiotic, will obtain C. difficile and stool PCR    VTE Pharmacologic Prophylaxis: VTE Score: 3 Moderate Risk (Score 3-4) - Pharmacological DVT Prophylaxis Ordered: heparin.    Mobility:   Basic Mobility Inpatient Raw Score: 17  JH-HLM Goal: 5: Stand one or more mins  JH-HLM Achieved: 6: Walk 10 steps or more  JH-HLM Goal achieved. Continue to encourage appropriate  mobility.    Patient Centered Rounds: I performed bedside rounds with nursing staff today.      Current Length of Stay: 11 day(s)  Current Patient Status: Inpatient   Certification Statement: The patient will continue to require additional inpatient hospital stay due to pending improvement in diarrhea, and oxygen requirement  Discharge Plan: Anticipate discharge in 48 hrs to rehab facility.    Code Status: Level 1 - Full Code    Subjective   Patient examined at bedside.  Patient reporting generalized abdominal pain, and multiple episodes of diarrhea today.  Reported started last night however did not get improvement overnight.  Nonbloody.  Patient is afebrile.  Patient also remains on 8 L oxygen via nasal cannula, antibiotic to complete today.    Objective :  Temp:  [97.5 °F (36.4 °C)-98.3 °F (36.8 °C)] 97.9 °F (36.6 °C)  HR:  [70-84] 70  BP: (134-138)/(62-63) 138/62  Resp:  [16-18] 18  SpO2:  [89 %-95 %] 92 %  O2 Device: Mid flow nasal cannula  Nasal Cannula O2 Flow Rate (L/min):  [6 L/min-8 L/min] 8 L/min    Body mass index is 20.78 kg/m².     Input and Output Summary (last 24 hours):     Intake/Output Summary (Last 24 hours) at 7/6/2025 1000  Last data filed at 7/5/2025 2101  Gross per 24 hour   Intake 360 ml   Output --   Net 360 ml       Physical Exam  Constitutional:       Appearance: She is well-developed.   HENT:      Head: Normocephalic and atraumatic.   Pulmonary:      Effort: Pulmonary effort is normal. No respiratory distress.      Breath sounds: Normal breath sounds.   Abdominal:      General: There is no distension.      Palpations: Abdomen is soft.      Tenderness: There is abdominal tenderness. There is no guarding or rebound.     Musculoskeletal:      Cervical back: Normal range of motion.     Skin:     General: Skin is warm and dry.           Lines/Drains:              Lab Results: I have reviewed the following results:   Results from last 7 days   Lab Units 07/06/25  0534 07/05/25  0538   WBC  Thousand/uL 12.44* 9.71   HEMOGLOBIN g/dL 11.3* 11.7   HEMATOCRIT % 35.3 37.2   PLATELETS Thousands/uL 558* 513*   BANDS PCT %  --  3   SEGS PCT % 78*  --    LYMPHO PCT % 10* 9*   MONO PCT % 8 6   EOS PCT % 2 0     Results from last 7 days   Lab Units 07/06/25  0534   SODIUM mmol/L 143   POTASSIUM mmol/L 3.7   CHLORIDE mmol/L 107   CO2 mmol/L 29   BUN mg/dL 28*   CREATININE mg/dL 0.75   ANION GAP mmol/L 7   CALCIUM mg/dL 9.1   GLUCOSE RANDOM mg/dL 93                 Results from last 7 days   Lab Units 07/02/25  0613 07/01/25  0446 06/30/25  0642   PROCALCITONIN ng/ml 0.48* 1.91* 1.80*       Recent Cultures (last 7 days):   Results from last 7 days   Lab Units 06/29/25  1110 06/29/25  1103 06/29/25  1050   BLOOD CULTURE  No Growth After 5 Days. No Growth After 5 Days.  --    LEGIONELLA URINARY ANTIGEN   --   --  Negative        Last 24 Hours Medication List:     Current Facility-Administered Medications:     acetaminophen (TYLENOL) tablet 975 mg, Q8H JOSE MANUEL    albuterol (PROVENTIL HFA,VENTOLIN HFA) inhaler 1 puff, Q4H PRN    albuterol inhalation solution 2.5 mg, Q4H PRN    cefTRIAXone (ROCEPHIN) 1,000 mg in dextrose 5 % 50 mL IVPB, Q24H, Last Rate: Stopped (07/05/25 1901)    Cholecalciferol (VITAMIN D3) tablet 2,000 Units, Daily    gabapentin (NEURONTIN) capsule 300 mg, TID    guaiFENesin (MUCINEX) 12 hr tablet 600 mg, BID    heparin (porcine) subcutaneous injection 5,000 Units, Q8H JOSE MANUEL    latanoprost (XALATAN) 0.005 % ophthalmic solution 1 drop, HS    lidocaine (LIDODERM) 5 % patch 1 patch, Daily    lisinopril (ZESTRIL) tablet 10 mg, Daily    methocarbamol (ROBAXIN) tablet 500 mg, Q8H JOSE MANUEL    nicotine (NICODERM CQ) 14 mg/24hr TD 24 hr patch 1 patch, Daily    ondansetron (ZOFRAN) injection 4 mg, Q6H PRN    oxyCODONE (ROXICODONE) split tablet 2.5 mg, Q4H PRN **OR** oxyCODONE (ROXICODONE) IR tablet 5 mg, Q4H PRN    umeclidinium-vilanterol 62.5-25 mcg/actuation inhaler 1 puff, Daily    Administrative Statements   Today,  Patient Was Seen By: Fredy Ingram MD      **Please Note: This note may have been constructed using a voice recognition system.**

## 2025-07-06 NOTE — ASSESSMENT & PLAN NOTE
Multifactorial due to atelectasis in the setting of rib fracture, pneumonia, underlying COPD   Persistent requirement for midflow O2   Currently requiring 8 L   CT C/A/P - Interval appearance of innumerable right lower lobe micronodules with new dense left greater than right basilar consolidations.  Interval appearance of proximal bilateral lower lobe bronchial occlusions. Findings consistent aspiration pneumonia/atelectasis.  On Ceftriaxone (D#6/7), Mucinex, flutter valve  Chest PT/vest being avoided in the setting of rib fracture  Incentive spirometry  Pulm input appreciated

## 2025-07-06 NOTE — PROGRESS NOTES
Progress Note - Hospitalist   Name: Danita Palmer 84 y.o. female I MRN: [id number]  [facility] I Date of Admission: 6/25/2025   Date of Service: 7/5/2025 I Hospital Day: 10    Assessment & Plan  Generalized weakness  Presented on 6/25/25 after a fall in her neighbors driveway, trauma imaging all negative for acute traumatic injuries. During ambulatory trial in ED was unable to safely ambulate due to marked weakness  Labs with mild hyponatremia otherwise without marked abnormalities  CT head negative for acute intracranial pathology.    With sepsis secondary to pneumonia as below  PT/OT evaluations recommend level 2 rehab intensity  Left rib fracture  Nondisplaced fractures of the anterolateral left 4th and 5th ribs  Incentive spirometry and rib fracture protocol ordered  Pain control including lidocaine patch and scheduled Tylenol ordered    Fall at home, initial encounter  Presented after a fall in neighbors driveway, states she was walking when she could not stop her self and fell forward onto her knees without dizziness/lightheadedness or loss of consciousness  Patient with weakness with inability to safely ambulate  PT/OT evaluations recommend rehab, case management assistance appreciated  Acute hypoxic respiratory failure (HCC)  Multifactorial due to atelectasis in the setting of rib fracture, pneumonia, underlying COPD   Persistent requirement for midflow O2   Currently requiring 8 L   CT C/A/P - Interval appearance of innumerable right lower lobe micronodules with new dense left greater than right basilar consolidations.  Interval appearance of proximal bilateral lower lobe bronchial occlusions. Findings consistent aspiration pneumonia/atelectasis.  On Ceftriaxone (D#6/7), Mucinex, flutter valve  Chest PT/vest being avoided in the setting of rib fracture  Incentive spirometry  Pulm input appreciated     Sepsis (HCC)  With tachycardia and leukocytosis. Also with low grade temperatures.   Due to  pneumonia  Tachycardia and leukocytosis resolved   Antibiotics as above  Constipation  Resolved as of 6/30, had BM  Continue bowel regimen   CKD stage G3a/A1, GFR 45-59 and albumin creatinine ratio <30 mg/g ([facility])  Lab Results   Component Value Date    EGFR 76 07/05/2025    EGFR 79 07/04/2025    EGFR 82 07/02/2025    CREATININE 0.72 07/05/2025    CREATININE 0.70 07/04/2025    CREATININE 0.63 07/02/2025     Renal function at baseline, monitor with BMP.  Avoid/limit nephrotoxins and hypotension  Incidental lung nodule,RUL, > 3mm and < 8mm  7 mm spiculated nodule to right upper lobe incidentally noted on CT chest  Repeat noncontrast CT chest advised at 6-12 months, patient to follow-up with PCP to arrange this.  Previous provider reviewed results with patient and her son  Disc degeneration, lumbar  Chronic, patient denies worsening back pain  Continue gabapentin, dose was reduced to 300 mg 3 times daily as patient somewhat sleepy during admission evaluation  Cigarette nicotine dependence without complication  Discussed need for cessation.    Nicotine patch ordered  Essential hypertension  BP acceptable   Continue PTA lisinopril 10 mg daily   Monitor BP  Pneumonia  See above plan under sepsis/respiratory failure  Chronic obstructive pulmonary disease (HCC)  Not in acute exacerbation, continue PTA inhalers    Diarrhea  Few episodes of diarrhea this morning  Laxatives discontinued  Monitor    VTE Pharmacologic Prophylaxis: VTE Score: 3 Moderate Risk (Score 3-4) - Pharmacological DVT Prophylaxis Ordered: heparin.    Mobility:   Basic Mobility Inpatient Raw Score: 17  JH-HLM Goal: 5: Stand one or more mins  JH-HLM Achieved: 4: Move to chair/commode  JH-HLM Goal NOT achieved. Continue with multidisciplinary rounding and encourage appropriate mobility to improve upon JH-HLM goals.    Patient Centered Rounds: Discussed with RN    Education and Discussions with Family / Patient: Updated  (son) via phone.    Current  Length of Stay: 10 day(s)  Current Patient Status: Inpatient   Certification Statement: The patient will continue to require additional inpatient hospital stay due to hypoxia requiring mid flow oxygen    Code Status: Level 1 - Full Code    Subjective   Shortness of breath better.  No cough.  No fever    Objective :  Temp:  [97.5 °F (36.4 °C)-98.2 °F (36.8 °C)] 97.5 °F (36.4 °C)  HR:  [74-84] 78  BP: (117-151)/(58-78) 138/63  Resp:  [16-18] 16  SpO2:  [89 %-93 %] 92 %  O2 Device: Mid flow nasal cannula  Nasal Cannula O2 Flow Rate (L/min):  [6 L/min-8 L/min] 8 L/min    Body mass index is 20.78 kg/m².     Physical Exam  Vitals reviewed.   HENT:      Mouth/Throat:      Mouth: Mucous membranes are moist.     Eyes:      Extraocular Movements: Extraocular movements intact.       Cardiovascular:      Rate and Rhythm: Normal rate and regular rhythm.   Pulmonary:      Effort: Pulmonary effort is normal.      Comments: Decreased breath sounds at the bases  Abdominal:      General: There is no distension.      Palpations: Abdomen is soft.      Tenderness: There is no abdominal tenderness.     Musculoskeletal:         General: No swelling.      Cervical back: Neck supple.     Skin:     General: Skin is warm and dry.     Neurological:      General: No focal deficit present.      Mental Status: She is alert and oriented to person, place, and time.     Psychiatric:         Mood and Affect: Mood normal.         Behavior: Behavior normal.           Lines/Drains:              Lab Results: I have reviewed the following results:   Results from last 7 days   Lab Units 07/05/25  0538 07/04/25  0446   WBC Thousand/uL 9.71 10.25*   HEMOGLOBIN g/dL 11.7 11.9   HEMATOCRIT % 37.2 36.7   PLATELETS Thousands/uL 513* 549*   BANDS PCT % 3  --    SEGS PCT %  --  74   LYMPHO PCT % 9* 10*   MONO PCT % 6 11   EOS PCT % 0 2     Results from last 7 days   Lab Units 07/05/25  0538   SODIUM mmol/L 141   POTASSIUM mmol/L 3.8   CHLORIDE mmol/L 108   CO2  mmol/L 28   BUN mg/dL 32*   CREATININE mg/dL 0.72   ANION GAP mmol/L 5   CALCIUM mg/dL 9.0   GLUCOSE RANDOM mg/dL 93                 Results from last 7 days   Lab Units 07/02/25  0613 07/01/25  0446 06/30/25  0642 06/29/25  0608   PROCALCITONIN ng/ml 0.48* 1.91* 1.80* 2.45*       Recent Cultures (last 7 days):   Results from last 7 days   Lab Units 06/29/25  1110 06/29/25  1103 06/29/25  1050   BLOOD CULTURE  No Growth After 5 Days. No Growth After 5 Days.  --    LEGIONELLA URINARY ANTIGEN   --   --  Negative             Last 24 Hours Medication List:     Current Facility-Administered Medications:     acetaminophen (TYLENOL) tablet 975 mg, Q8H JOSE MANUEL    albuterol (PROVENTIL HFA,VENTOLIN HFA) inhaler 1 puff, Q4H PRN    albuterol inhalation solution 2.5 mg, Q4H PRN    cefTRIAXone (ROCEPHIN) 1,000 mg in dextrose 5 % 50 mL IVPB, Q24H, Last Rate: 1,000 mg (07/05/25 1221)    Cholecalciferol (VITAMIN D3) tablet 2,000 Units, Daily    gabapentin (NEURONTIN) capsule 300 mg, TID    guaiFENesin (MUCINEX) 12 hr tablet 600 mg, BID    heparin (porcine) subcutaneous injection 5,000 Units, Q8H JOSE MANUEL    latanoprost (XALATAN) 0.005 % ophthalmic solution 1 drop, HS    lidocaine (LIDODERM) 5 % patch 1 patch, Daily    lisinopril (ZESTRIL) tablet 10 mg, Daily    methocarbamol (ROBAXIN) tablet 500 mg, Q8H JOSE MANUEL    nicotine (NICODERM CQ) 14 mg/24hr TD 24 hr patch 1 patch, Daily    ondansetron (ZOFRAN) injection 4 mg, Q6H PRN    oxyCODONE (ROXICODONE) split tablet 2.5 mg, Q4H PRN **OR** oxyCODONE (ROXICODONE) IR tablet 5 mg, Q4H PRN    umeclidinium-vilanterol 62.5-25 mcg/actuation inhaler 1 puff, Daily    Administrative Statements   Today, Patient Was Seen By: Jordyn Kang MD      **Please Note: This note may have been constructed using a voice recognition system.**

## 2025-07-06 NOTE — ASSESSMENT & PLAN NOTE
With tachycardia and leukocytosis. Also with low grade temperatures.   Due to pneumonia  Tachycardia and leukocytosis resolved   Antibiotics complete today

## 2025-07-06 NOTE — ASSESSMENT & PLAN NOTE
With tachycardia and leukocytosis. Also with low grade temperatures.   Due to pneumonia  Tachycardia and leukocytosis resolved   Antibiotics as above

## 2025-07-06 NOTE — ASSESSMENT & PLAN NOTE
Presented on 6/25/25 after a fall in her neighbors driveway, trauma imaging all negative for acute traumatic injuries. During ambulatory trial in ED was unable to safely ambulate due to marked weakness  Labs with mild hyponatremia otherwise without marked abnormalities  CT head negative for acute intracranial pathology.    With sepsis secondary to pneumonia as below  PT/OT evaluations recommend level 2 rehab intensity

## 2025-07-06 NOTE — ASSESSMENT & PLAN NOTE
Likely secondary to atelectasis in the setting of rib fractures    Plan:  Continue empiric antibiotics for 7 day course, last dose 7/6/25  Mucinex twice daily  Flutter valve  Avoid chest PT/vest in the setting of rib fracture

## 2025-07-06 NOTE — PROGRESS NOTES
Progress Note - Pulmonology   Name: Danita Palmer 84 y.o. female I MRN: [id number]  Unit/Bed#: [facility] 918-01 I Date of Admission: 6/25/2025   Date of Service: 7/6/2025 I Hospital Day: 11    Assessment & Plan  Acute hypoxic respiratory failure (HCC)  Multifactorial in etiology including atelectasis, pneumonia, underlying COPD    Plan:  Wean supplemental oxygen for sats greater than 88%  See additional plans as per below  Pneumonia  Likely secondary to atelectasis in the setting of rib fractures    Plan:  Continue empiric antibiotics for 7 day course, last dose 7/6/25  Mucinex twice daily  Flutter valve  Avoid chest PT/vest in the setting of rib fracture  Left rib fracture  Fall at home, initial encounter  Plan:  Pain control per primary team  Rib fracture protocol  I encouraged her to use incentive spirometer on at least an hourly basis  Needs to be getting out of bed, ambulating  Chronic obstructive pulmonary disease (HCC)  PFTs with moderate obstruction and CT scan shows emphysema  Home regimen: Anoro once daily, albuterol as needed  Does not require supplemental oxygen at baseline  Does not appear to be in acute exacerbation    Plan:  Continue home Anoro once daily  Xopenex TID PRN - tried scheduled but patient did not report benefit and did not like the nebulizers  Would benefit from smoking cessation  Would also benefit from outpatient pulmonary rehab  Cigarette nicotine dependence without complication  Recommend smoking cessation  Incidental lung nodule,RUL, > 3mm and < 8mm  In the setting of extensive smoking history, will need to rule out malignancy    Plan:  Repeat CT chest in 3 months with navigation protocol  If persistent/enlarging, will refer to Dr. Martínez for navigational lung biopsy  Diarrhea      24 Hour Events : Started having diarrhea this morning. Otherwise no worsening respiratory symptoms.   Subjective : She is doing okay today but started having diarrhea. She denies any changes to her  breathing. She is not having any chest pain or tightness. Encouraged her to continue working with the IS and flutter valve to help clear secretions.     Will continue to wean oxygen as able.    Objective :  Temp:  [97.5 °F (36.4 °C)-98.3 °F (36.8 °C)] 97.9 °F (36.6 °C)  HR:  [70-84] 70  BP: (134-138)/(62-63) 138/62  Resp:  [16-18] 18  SpO2:  [89 %-95 %] 92 %  O2 Device: Mid flow nasal cannula  Nasal Cannula O2 Flow Rate (L/min):  [6 L/min-8 L/min] 8 L/min    Physical Exam  Vitals reviewed.   Constitutional:       General: She is not in acute distress.     Appearance: She is not toxic-appearing.      Comments: Frail appearing     Cardiovascular:      Rate and Rhythm: Normal rate and regular rhythm.   Pulmonary:      Effort: Pulmonary effort is normal. No respiratory distress.      Breath sounds: No wheezing or rales.      Comments: Decreased breath sounds at bases bilaterally     Skin:     General: Skin is warm and dry.     Neurological:      General: No focal deficit present.      Mental Status: She is alert and oriented to person, place, and time.           Lab Results: I have reviewed the following results:   .     07/06/25  0534   WBC 12.44*   HGB 11.3*   HCT 35.3   *   SODIUM 143   K 3.7      CO2 29   BUN 28*   CREATININE 0.75   GLUC 93   MG 2.0     ABG: No new results in last 24 hours.    Imaging Results Review: No pertinent imaging studies reviewed.  Other Study Results Review: No additional pertinent studies reviewed.  PFT Results Reviewed: reviewed

## 2025-07-06 NOTE — ASSESSMENT & PLAN NOTE
Multifactorial due to atelectasis in the setting of rib fracture, pneumonia, underlying COPD   Persistent requirement for midflow O2   Currently requiring 8 L   CT C/A/P - Interval appearance of innumerable right lower lobe micronodules with new dense left greater than right basilar consolidations.  Interval appearance of proximal bilateral lower lobe bronchial occlusions. Findings consistent aspiration pneumonia/atelectasis.  On Ceftriaxone (D#7/7), to complete today, Mucinex, flutter valve  Chest PT/vest being avoided in the setting of rib fracture  Incentive spirometry  Pulm input appreciated

## 2025-07-07 LAB
ANION GAP SERPL CALCULATED.3IONS-SCNC: 3 MMOL/L (ref 4–13)
BUN SERPL-MCNC: 25 MG/DL (ref 5–25)
C COLI+JEJUNI TUF STL QL NAA+PROBE: NEGATIVE
C DIFF TOX GENS STL QL NAA+PROBE: NEGATIVE
CALCIUM SERPL-MCNC: 9 MG/DL (ref 8.4–10.2)
CHLORIDE SERPL-SCNC: 106 MMOL/L (ref 96–108)
CO2 SERPL-SCNC: 30 MMOL/L (ref 21–32)
CREAT SERPL-MCNC: 0.8 MG/DL (ref 0.6–1.3)
EC STX1+STX2 GENES STL QL NAA+PROBE: NEGATIVE
ERYTHROCYTE [DISTWIDTH] IN BLOOD BY AUTOMATED COUNT: 13.5 % (ref 11.6–15.1)
GFR SERPL CREATININE-BSD FRML MDRD: 67 ML/MIN/1.73SQ M
GLUCOSE SERPL-MCNC: 83 MG/DL (ref 65–140)
HCT VFR BLD AUTO: 34.9 % (ref 34.8–46.1)
HGB BLD-MCNC: 11.4 G/DL (ref 11.5–15.4)
MCH RBC QN AUTO: 29.9 PG (ref 26.8–34.3)
MCHC RBC AUTO-ENTMCNC: 32.7 G/DL (ref 31.4–37.4)
MCV RBC AUTO: 92 FL (ref 82–98)
PLATELET # BLD AUTO: 547 THOUSANDS/UL (ref 149–390)
PMV BLD AUTO: 8.7 FL (ref 8.9–12.7)
POTASSIUM SERPL-SCNC: 3.6 MMOL/L (ref 3.5–5.3)
PROCALCITONIN SERPL-MCNC: 0.13 NG/ML
RBC # BLD AUTO: 3.81 MILLION/UL (ref 3.81–5.12)
SALMONELLA SP SPAO STL QL NAA+PROBE: NEGATIVE
SHIGELLA SP+EIEC IPAH STL QL NAA+PROBE: NEGATIVE
SODIUM SERPL-SCNC: 139 MMOL/L (ref 135–147)
WBC # BLD AUTO: 10.79 THOUSAND/UL (ref 4.31–10.16)

## 2025-07-07 PROCEDURE — 94760 N-INVAS EAR/PLS OXIMETRY 1: CPT

## 2025-07-07 PROCEDURE — 80048 BASIC METABOLIC PNL TOTAL CA: CPT | Performed by: FAMILY MEDICINE

## 2025-07-07 PROCEDURE — 85027 COMPLETE CBC AUTOMATED: CPT | Performed by: FAMILY MEDICINE

## 2025-07-07 PROCEDURE — 84145 PROCALCITONIN (PCT): CPT | Performed by: INTERNAL MEDICINE

## 2025-07-07 PROCEDURE — 99232 SBSQ HOSP IP/OBS MODERATE 35: CPT | Performed by: STUDENT IN AN ORGANIZED HEALTH CARE EDUCATION/TRAINING PROGRAM

## 2025-07-07 PROCEDURE — 97530 THERAPEUTIC ACTIVITIES: CPT

## 2025-07-07 PROCEDURE — 99232 SBSQ HOSP IP/OBS MODERATE 35: CPT | Performed by: INTERNAL MEDICINE

## 2025-07-07 PROCEDURE — 94640 AIRWAY INHALATION TREATMENT: CPT

## 2025-07-07 PROCEDURE — 97535 SELF CARE MNGMENT TRAINING: CPT

## 2025-07-07 RX ORDER — LEVALBUTEROL INHALATION SOLUTION 1.25 MG/3ML
1.25 SOLUTION RESPIRATORY (INHALATION)
Status: DISCONTINUED | OUTPATIENT
Start: 2025-07-07 | End: 2025-07-07

## 2025-07-07 RX ORDER — SODIUM CHLORIDE FOR INHALATION 3 %
4 VIAL, NEBULIZER (ML) INHALATION
Status: DISCONTINUED | OUTPATIENT
Start: 2025-07-07 | End: 2025-07-07

## 2025-07-07 RX ORDER — SODIUM CHLORIDE FOR INHALATION 3 %
4 VIAL, NEBULIZER (ML) INHALATION EVERY 6 HOURS PRN
Status: DISPENSED | OUTPATIENT
Start: 2025-07-07 | End: 2025-07-08

## 2025-07-07 RX ORDER — LEVALBUTEROL INHALATION SOLUTION 1.25 MG/3ML
1.25 SOLUTION RESPIRATORY (INHALATION) EVERY 6 HOURS PRN
Status: DISCONTINUED | OUTPATIENT
Start: 2025-07-07 | End: 2025-07-09

## 2025-07-07 RX ADMIN — LATANOPROST 1 DROP: 50 SOLUTION OPHTHALMIC at 21:02

## 2025-07-07 RX ADMIN — PANTOPRAZOLE SODIUM 40 MG: 40 TABLET, DELAYED RELEASE ORAL at 06:22

## 2025-07-07 RX ADMIN — LEVALBUTEROL HYDROCHLORIDE 1.25 MG: 1.25 SOLUTION RESPIRATORY (INHALATION) at 13:53

## 2025-07-07 RX ADMIN — GABAPENTIN 300 MG: 300 CAPSULE ORAL at 21:02

## 2025-07-07 RX ADMIN — METHOCARBAMOL 500 MG: 500 TABLET ORAL at 06:22

## 2025-07-07 RX ADMIN — Medication 2000 UNITS: at 08:42

## 2025-07-07 RX ADMIN — HEPARIN SODIUM 5000 UNITS: 5000 INJECTION INTRAVENOUS; SUBCUTANEOUS at 13:34

## 2025-07-07 RX ADMIN — HEPARIN SODIUM 5000 UNITS: 5000 INJECTION INTRAVENOUS; SUBCUTANEOUS at 21:02

## 2025-07-07 RX ADMIN — GABAPENTIN 300 MG: 300 CAPSULE ORAL at 16:11

## 2025-07-07 RX ADMIN — LISINOPRIL 10 MG: 10 TABLET ORAL at 08:40

## 2025-07-07 RX ADMIN — GUAIFENESIN 600 MG: 600 TABLET, EXTENDED RELEASE ORAL at 08:42

## 2025-07-07 RX ADMIN — SODIUM CHLORIDE SOLN NEBU 3% 4 ML: 3 NEBU SOLN at 19:17

## 2025-07-07 RX ADMIN — SODIUM CHLORIDE SOLN NEBU 3% 4 ML: 3 NEBU SOLN at 13:53

## 2025-07-07 RX ADMIN — LEVALBUTEROL HYDROCHLORIDE 1.25 MG: 1.25 SOLUTION RESPIRATORY (INHALATION) at 19:17

## 2025-07-07 RX ADMIN — HEPARIN SODIUM 5000 UNITS: 5000 INJECTION INTRAVENOUS; SUBCUTANEOUS at 06:26

## 2025-07-07 RX ADMIN — METHOCARBAMOL 500 MG: 500 TABLET ORAL at 13:33

## 2025-07-07 RX ADMIN — GUAIFENESIN 600 MG: 600 TABLET, EXTENDED RELEASE ORAL at 17:09

## 2025-07-07 RX ADMIN — GABAPENTIN 300 MG: 300 CAPSULE ORAL at 08:42

## 2025-07-07 RX ADMIN — NICOTINE 1 PATCH: 14 PATCH, EXTENDED RELEASE TRANSDERMAL at 08:48

## 2025-07-07 RX ADMIN — LIDOCAINE 1 PATCH: 700 PATCH TOPICAL at 08:41

## 2025-07-07 NOTE — ASSESSMENT & PLAN NOTE
Several episodes of diarrhea started 7/5/25 associated with generalized abdominal pain  On exam abdomen soft, no distention, no guarding, no rigidity.  Does have diffuse tenderness  Did not receive any stool softener or laxative since 7/2/2025  C. difficile negative  Diarrhea has resolved

## 2025-07-07 NOTE — ASSESSMENT & PLAN NOTE
Likely secondary to atelectasis in the setting of rib fractures  Plan:  Antibiotics given for 7 days  No episodes of fever and WBC counts on decreasing trend  Mucinex twice daily  Hypertonic 3% saline nebulization  Levalbuterol inhalation 1.25 mg 6 hourly  Flutter valve  Avoid chest PT/vest in the setting of rib fracture

## 2025-07-07 NOTE — PROGRESS NOTES
Progress Note - Hospitalist   Name: Danita Palmer 84 y.o. female I MRN: [id number]  [facility] I Date of Admission: 6/25/2025   Date of Service: 7/7/2025 I Hospital Day: 12    Assessment & Plan  Generalized weakness  Presented on 6/25/25 after a fall in her neighbors driveway, trauma imaging all negative for acute traumatic injuries. During ambulatory trial in ED was unable to safely ambulate due to marked weakness  Labs with mild hyponatremia otherwise without marked abnormalities  CT head negative for acute intracranial pathology.    With sepsis secondary to pneumonia as below  PT/OT evaluations recommend level 2 rehab intensity  Left rib fracture  Nondisplaced fractures of the anterolateral left 4th and 5th ribs  Incentive spirometry and rib fracture protocol ordered  Pain control including lidocaine patch and scheduled Tylenol ordered    Fall at home, initial encounter  Presented after a fall in neighbors driveway, states she was walking when she could not stop her self and fell forward onto her knees without dizziness/lightheadedness or loss of consciousness  Patient with weakness with inability to safely ambulate  PT/OT evaluations recommend rehab, case management assistance appreciated  Acute hypoxic respiratory failure (HCC)  Multifactorial due to atelectasis in the setting of rib fracture, pneumonia, underlying COPD   Persistent requirement for midflow O2   Currently requiring 8 L   CT C/A/P - Interval appearance of innumerable right lower lobe micronodules with new dense left greater than right basilar consolidations.  Interval appearance of proximal bilateral lower lobe bronchial occlusions. Findings consistent aspiration pneumonia/atelectasis.  Completed 7 days of antibiotics  Continue supportive care with Mucinex, flutter valve  Chest PT/vest being avoided in the setting of rib fracture  Incentive spirometry  Pulm input appreciated  Continue to wean oxygen as tolerated, currently on 6 L.   Plan for discharge to facility and they will be able to accept once O2 is at 5 L  Sepsis ([facility])  With tachycardia and leukocytosis. Also with low grade temperatures.   Due to pneumonia  Tachycardia and leukocytosis resolved   Completed 7 days of IV ceftriaxone on 7/7  CKD stage G3a/A1, GFR 45-59 and albumin creatinine ratio <30 mg/g ([facility])  Lab Results   Component Value Date    EGFR 67 07/07/2025    EGFR 72 07/06/2025    EGFR 76 07/05/2025    CREATININE 0.80 07/07/2025    CREATININE 0.75 07/06/2025    CREATININE 0.72 07/05/2025     Renal function at baseline, monitor with BMP.  Avoid/limit nephrotoxins and hypotension  Incidental lung nodule,RUL, > 3mm and < 8mm  7 mm spiculated nodule to right upper lobe incidentally noted on CT chest  Repeat noncontrast CT chest advised at 6-12 months, patient to follow-up with PCP to arrange this.  Previous provider reviewed results with patient and her son  Disc degeneration, lumbar  Chronic, patient denies worsening back pain  Continue gabapentin, dose was reduced to 300 mg 3 times daily as patient somewhat sleepy during admission evaluation  Cigarette nicotine dependence without complication  Discussed need for cessation.    Nicotine patch   Essential hypertension  BP acceptable   Continue PTA lisinopril 10 mg daily   Monitor BP  Pneumonia  See above plan under sepsis/respiratory failure  Chronic obstructive pulmonary disease (HCC)  Not in acute exacerbation, continue PTA inhalers    Diarrhea  Several episodes of diarrhea started 7/5/25 associated with generalized abdominal pain  On exam abdomen soft, no distention, no guarding, no rigidity.  Does have diffuse tenderness  Did not receive any stool softener or laxative since 7/2/2025  C. difficile negative  Diarrhea has resolved    VTE Pharmacologic Prophylaxis: VTE Score: 3 Moderate Risk (Score 3-4) - Pharmacological DVT Prophylaxis Ordered: heparin.    Mobility:   Basic Mobility Inpatient Raw Score: 17  -[facility] Goal: 5: Stand  one or more mins  JH-HLM Achieved: 6: Walk 10 steps or more  JH-HLM Goal achieved. Continue to encourage appropriate mobility.    Patient Centered Rounds: I performed bedside rounds with nursing staff today.   Discussions with Specialists or Other Care Team Provider:     Education and Discussions with Family / Patient: Attempted to update  (son) via phone. Unable to contact.    Current Length of Stay: 12 day(s)  Current Patient Status: Inpatient   Certification Statement: The patient will continue to require additional inpatient hospital stay due to weaning of oxygen  Discharge Plan: Anticipate discharge in 24-48 hrs to rehab facility.    Code Status: Level 1 - Full Code    Subjective   No events overnight.  Patient reports feeling better this morning.  Denies shortness of breath.  Diarrhea has resolved.  Has been tolerating oral intake with no nausea or vomiting.    Objective :  Temp:  [98 °F (36.7 °C)-98.1 °F (36.7 °C)] 98 °F (36.7 °C)  HR:  [57-66] 66  BP: (124-149)/(56-71) 130/63  Resp:  [17-19] 19  SpO2:  [91 %-98 %] 91 %  O2 Device: Nasal cannula  Nasal Cannula O2 Flow Rate (L/min):  [5.5 L/min-7 L/min] 5.5 L/min    Body mass index is 20.78 kg/m².     Input and Output Summary (last 24 hours):     Intake/Output Summary (Last 24 hours) at 7/7/2025 1507  Last data filed at 7/7/2025 1256  Gross per 24 hour   Intake 360 ml   Output 100 ml   Net 260 ml       Physical Exam  Vitals and nursing note reviewed.   Constitutional:       General: She is not in acute distress.     Appearance: She is well-developed.   HENT:      Head: Normocephalic and atraumatic.     Eyes:      Conjunctiva/sclera: Conjunctivae normal.       Cardiovascular:      Rate and Rhythm: Normal rate and regular rhythm.      Heart sounds: No murmur heard.  Pulmonary:      Effort: No respiratory distress.      Breath sounds: Normal breath sounds. No wheezing or rhonchi.      Comments: On 6 L nasal cannula    Musculoskeletal:       Cervical back: Neck supple.      Right lower leg: No edema.      Left lower leg: No edema.     Skin:     General: Skin is warm and dry.      Capillary Refill: Capillary refill takes less than 2 seconds.     Neurological:      Mental Status: She is alert.     Psychiatric:         Mood and Affect: Mood normal.         Lines/Drains:              Lab Results: I have reviewed the following results:   Results from last 7 days   Lab Units 07/07/25  0628 07/06/25  0534 07/05/25  0538   WBC Thousand/uL 10.79* 12.44* 9.71   HEMOGLOBIN g/dL 11.4* 11.3* 11.7   HEMATOCRIT % 34.9 35.3 37.2   PLATELETS Thousands/uL 547* 558* 513*   BANDS PCT %  --   --  3   SEGS PCT %  --  78*  --    LYMPHO PCT %  --  10* 9*   MONO PCT %  --  8 6   EOS PCT %  --  2 0     Results from last 7 days   Lab Units 07/07/25  0628   SODIUM mmol/L 139   POTASSIUM mmol/L 3.6   CHLORIDE mmol/L 106   CO2 mmol/L 30   BUN mg/dL 25   CREATININE mg/dL 0.80   ANION GAP mmol/L 3*   CALCIUM mg/dL 9.0   GLUCOSE RANDOM mg/dL 83                 Results from last 7 days   Lab Units 07/07/25  0628 07/02/25  0613 07/01/25  0446   PROCALCITONIN ng/ml 0.13 0.48* 1.91*       Recent Cultures (last 7 days):   Results from last 7 days   Lab Units 07/06/25  0849   C DIFF TOXIN B BY PCR  Negative       Imaging Results Review: No pertinent imaging studies reviewed.  Other Study Results Review: No additional pertinent studies reviewed.    Last 24 Hours Medication List:     Current Facility-Administered Medications:     acetaminophen (TYLENOL) tablet 975 mg, Q8H JOSE MANUEL    albuterol (PROVENTIL HFA,VENTOLIN HFA) inhaler 1 puff, Q4H PRN    albuterol inhalation solution 2.5 mg, Q4H PRN    Cholecalciferol (VITAMIN D3) tablet 2,000 Units, Daily    gabapentin (NEURONTIN) capsule 300 mg, TID    guaiFENesin (MUCINEX) 12 hr tablet 600 mg, BID    heparin (porcine) subcutaneous injection 5,000 Units, Q8H JOSE MANUEL    latanoprost (XALATAN) 0.005 % ophthalmic solution 1 drop, HS    levalbuterol (XOPENEX)  inhalation solution 1.25 mg, Q6H    lidocaine (LIDODERM) 5 % patch 1 patch, Daily    lisinopril (ZESTRIL) tablet 10 mg, Daily    methocarbamol (ROBAXIN) tablet 500 mg, Q8H JOSE MANUEL    nicotine (NICODERM CQ) 14 mg/24hr TD 24 hr patch 1 patch, Daily    ondansetron (ZOFRAN) injection 4 mg, Q6H PRN    oxyCODONE (ROXICODONE) split tablet 2.5 mg, Q4H PRN **OR** oxyCODONE (ROXICODONE) IR tablet 5 mg, Q4H PRN    pantoprazole (PROTONIX) EC tablet 40 mg, Early Morning    sodium chloride 3 % inhalation solution 4 mL, Q6H    [Held by provider] umeclidinium-vilanterol 62.5-25 mcg/actuation inhaler 1 puff, Daily    Administrative Statements   Today, Patient Was Seen By: Rosemarie Ford MD      **Please Note: This note may have been constructed using a voice recognition system.**

## 2025-07-07 NOTE — PROGRESS NOTES
Pt walked to kitchen without oxygen to ask for some ice water and returned to her room to call nursing to assist pt putting nasal canula back on. Oxygen on mosso stated 87% once in bed. Currently on 5.5 L N/C. Mildly dyspneic which resolved with rest. Moist cough. No complaints. Will continue to titrate and monitor oxygen needs.

## 2025-07-07 NOTE — ASSESSMENT & PLAN NOTE
With tachycardia and leukocytosis. Also with low grade temperatures.   Due to pneumonia  Tachycardia and leukocytosis resolved   Completed 7 days of IV ceftriaxone on 7/7

## 2025-07-07 NOTE — ASSESSMENT & PLAN NOTE
Reduced frequency from yesterday, patient reports single episode today with semi-solid consistency. C.difficile toxin PCR negative. Further management by the primary team.

## 2025-07-07 NOTE — PLAN OF CARE
Problem: PHYSICAL THERAPY ADULT  Goal: Performs mobility at highest level of function for planned discharge setting.  See evaluation for individualized goals.  Description: Treatment/Interventions: Functional transfer training, LE strengthening/ROM, Therapeutic exercise, Elevations, Endurance training, Equipment eval/education, Patient/family training, Bed mobility, Gait training, Continued evaluation, Cognitive reorientation, Compensatory technique education, Spoke to nursing, OT  Equipment Recommended:  (TBD with further mobility)       See flowsheet documentation for full assessment, interventions and recommendations.  Outcome: Progressing  Note: Prognosis: Fair  Problem List: Decreased strength, Decreased endurance, Impaired balance, Decreased mobility, Impaired judgement, Decreased safety awareness  Assessment: Pt seen for PT treatment session this date. Therapy session focused on bed mobility, functional transfers, gait training and endurance training in order to improve overall mobility and independence. Pt requires assist of 1 for all mobility performed this date. Pt making slow but steady progress toward goals. Continues to require 6LO2 with exertion, O2 saturation dipping to 87-88% with mobility, recovers with standing rest break. Continues to require increased cuing for RW management, would like to trial no AD vs rollator in upcoming therapy sessions. Pt was left supine in bed with alarm on at the end of PT session with all needs in reach. Pt would benefit from continued PT services while in hospital to address remaining limitations. PT to continue to follow pt and recommends level 2. The patient's AM-PAC Basic Mobility Inpatient Short Form Raw Score is 17. A Raw score of greater than 16 suggests the patient may benefit from discharge to home. Please also refer to the recommendation of the Physical Therapist for safe discharge planning.  Barriers to Discharge: Inaccessible home environment, Decreased  caregiver support     Rehab Resource Intensity Level, PT: II (Moderate Resource Intensity)    See flowsheet documentation for full assessment.

## 2025-07-07 NOTE — ASSESSMENT & PLAN NOTE
Multifactorial due to atelectasis in the setting of rib fracture, pneumonia, underlying COPD   Persistent requirement for midflow O2   Currently requiring 8 L   CT C/A/P - Interval appearance of innumerable right lower lobe micronodules with new dense left greater than right basilar consolidations.  Interval appearance of proximal bilateral lower lobe bronchial occlusions. Findings consistent aspiration pneumonia/atelectasis.  Completed 7 days of antibiotics  Continue supportive care with Mucinex, flutter valve  Chest PT/vest being avoided in the setting of rib fracture  Incentive spirometry  Pulm input appreciated  Continue to wean oxygen as tolerated, currently on 6 L.  Plan for discharge to facility and they will be able to accept once O2 is at 5 L

## 2025-07-07 NOTE — PLAN OF CARE
Problem: Potential for Falls  Goal: Patient will remain free of falls  Description: INTERVENTIONS:  - Educate patient/family on patient safety including physical limitations  - Instruct patient to call for assistance with activity   - Consider consulting OT/PT to assist with strengthening/mobility based on AM PAC & JH-HLM score  - Consult OT/PT to assist with strengthening/mobility   - Keep Call bell within reach  - Keep bed low and locked with side rails adjusted as appropriate  - Keep care items and personal belongings within reach  - Initiate and maintain comfort rounds  - Make Fall Risk Sign visible to staff  - Offer Toileting every 2 Hours, in advance of need  - Initiate/Maintain bed alarm  - Obtain necessary fall risk management equipment:   - Apply yellow socks and bracelet for high fall risk patients  - Consider moving patient to room near nurses station  Outcome: Progressing     Problem: Prexisting or High Potential for Compromised Skin Integrity  Goal: Skin integrity is maintained or improved  Description: INTERVENTIONS:  - Identify patients at risk for skin breakdown  - Assess and monitor skin integrity including under and around medical devices   - Assess and monitor nutrition and hydration status  - Monitor labs  - Assess for incontinence   - Turn and reposition patient  - Assist with mobility/ambulation  - Relieve pressure over oneil prominences   - Avoid friction and shearing  - Provide appropriate hygiene as needed including keeping skin clean and dry  - Evaluate need for skin moisturizer/barrier cream  - Collaborate with interdisciplinary team  - Patient/family teaching  - Consider wound care consult    Assess:  - Review Jonn scale daily  - Clean and moisturize skin every shift  - Inspect skin when repositioning, toileting, and assisting with ADLS  - Assess under medical devices such as mossimo every 2  - Assess extremities for adequate circulation and sensation     Bed Management:  - Have  minimal linens on bed & keep smooth, unwrinkled  - Change linens as needed when moist or perspiring  - Avoid sitting or lying in one position for more than 4 hours while in bed?Keep HOB at 30degrees   - Toileting:  - Offer bedside commode    Activity:  - Mobilize patient 4 times a day  - Encourage activity and walks on unit  - Encourage or provide ROM exercises   - Turn and reposition patient every 2 Hours  - Use appropriate equipment to lift or move patient in bed  - Instruct/ Assist with weight shifting every 2 when out of bed in chair  - Consider limitation of chair time 4 hour intervals    Skin Care:  - Avoid use of baby powder, tape, friction and shearing, hot water or constrictive clothing  - Relieve pressure over bony prominences using   - Do not massage red bony areas    Next Steps:  - Teach patient strategies to minimize risks such as   - Consider consults to  interdisciplinary teams such as   Outcome: Progressing     Problem: PAIN - ADULT  Goal: Verbalizes/displays adequate comfort level or baseline comfort level  Description: Interventions:  - Encourage patient to monitor pain and request assistance  - Assess pain using appropriate pain scale  - Administer analgesics as ordered based on type and severity of pain and evaluate response  - Implement non-pharmacological measures as appropriate and evaluate response  - Consider cultural and social influences on pain and pain management  - Notify physician/advanced practitioner if interventions unsuccessful or patient reports new pain  - Educate patient/family on pain management process including their role and importance of  reporting pain   - Provide non-pharmacologic/complimentary pain relief interventions  Outcome: Progressing     Problem: INFECTION - ADULT  Goal: Absence or prevention of progression during hospitalization  Description: INTERVENTIONS:  - Assess and monitor for signs and symptoms of infection  - Monitor lab/diagnostic results  - Monitor all  insertion sites, i.e. indwelling lines, tubes, and drains  - Monitor endotracheal if appropriate and nasal secretions for changes in amount and color  - [place] appropriate cooling/warming therapies per order  - Administer medications as ordered  - Instruct and encourage patient and family to use good hand hygiene technique  - Identify and instruct in appropriate isolation precautions for identified infection/condition  Outcome: Progressing     Problem: SAFETY ADULT  Goal: Patient will remain free of falls  Description: INTERVENTIONS:  - Educate patient/family on patient safety including physical limitations  - Instruct patient to call for assistance with activity   - Consider consulting OT/PT to assist with strengthening/mobility based on AM PAC & -HLM score  - Consult OT/PT to assist with strengthening/mobility   - Keep Call bell within reach  - Keep bed low and locked with side rails adjusted as appropriate  - Keep care items and personal belongings within reach  - Initiate and maintain comfort rounds  - Make Fall Risk Sign visible to staff  - Offer Toileting every 2 Hours, in advance of need  - Initiate/Maintain bed alarm  - Obtain necessary fall risk management equipment:   - Apply yellow socks and bracelet for high fall risk patients  - Consider moving patient to room near nurses station  Outcome: Progressing     Problem: Nutrition/Hydration-ADULT  Goal: Nutrient/Hydration intake appropriate for improving, restoring or maintaining nutritional needs  Description: Monitor and assess patient's nutrition/hydration status for malnutrition. Collaborate with interdisciplinary team and initiate plan and interventions as ordered.  Monitor patient's weight and dietary intake as ordered or per policy. Utilize nutrition screening tool and intervene as necessary. Determine patient's food preferences and provide high-protein, high-caloric foods as appropriate.     INTERVENTIONS:  - Monitor oral intake, urinary output,  labs, and treatment plans  - Assess nutrition and hydration status and recommend course of action  - Evaluate amount of meals eaten  - Assist patient with eating if necessary   - Allow adequate time for meals  - Recommend/ encourage appropriate diets, oral nutritional supplements, and vitamin/mineral supplements  - Order, calculate, and assess calorie counts as needed  - Recommend, monitor, and adjust tube feedings and TPN/PPN based on assessed needs  - Assess need for intravenous fluids  - Provide specific nutrition/hydration education as appropriate  - Include patient/family/caregiver in decisions related to nutrition  Outcome: Progressing

## 2025-07-07 NOTE — CASE MANAGEMENT
Case Management Discharge Planning Note    Patient name Danita Palmer  [facility] [id number]  : 1940 Date 2025       Current Admission Date: 2025  Current Admission Diagnosis:Generalized weakness   Patient Active Problem List    Diagnosis Date Noted    Diarrhea 2025    Pneumonia 2025    Sepsis (HCC) 2025    Acute hypoxic respiratory failure (HCC) 2025    Left rib fracture 2025    Generalized weakness 2025    Fall at home, initial encounter 2025    Incidental lung nodule,RUL, > 3mm and < 8mm 2025    Hypertriglyceridemia 10/23/2024    Neuropathy 2024    Mild protein-calorie malnutrition (HCC) 2023    H/O lumpectomy 2021    Essential hypertension 2020    Cigarette nicotine dependence without complication 2019    CKD stage G3a/A1, GFR 45-59 and albumin creatinine ratio <30 mg/g (HCC) 2017    Suspicious nevus 2017    Disc degeneration, lumbar 2017    Post herpetic neuralgia 2016    Liver cyst 2016    Glaucoma 2014    Osteopenia 2013    Chronic obstructive pulmonary disease (HCC) 2012      LOS (days): 12  Geometric Mean LOS (GMLOS) (days): 3  Days to GMLOS:-9.3     OBJECTIVE:  Risk of Unplanned Readmission Score: 15.36         Current admission status: Inpatient   Preferred Pharmacy:   [id number] - [place], [place] - [address]  [address]  [place] [address]  Phone: [phone] Fax: [phone]    Primary Care Provider: Duke Barnes MD    Primary Insurance: [employer] [facility]  Secondary Insurance:     DISCHARGE DETAILS:    Other Referral/Resources/Interventions Provided:  Referral Comments: Message sent to Nicolette Hahn inquiring as to what liter flow patient can admit to them on.

## 2025-07-07 NOTE — PLAN OF CARE
Problem: SAFETY ADULT  Goal: Maintain or return to baseline ADL function  Description: INTERVENTIONS:  -  Assess patient's ability to carry out ADLs; assess patient's baseline for ADL function and identify physical deficits which impact ability to perform ADLs (bathing, care of mouth/teeth, toileting, grooming, dressing, etc.)  - Assess/evaluate cause of self-care deficits   - Assess range of motion  - Assess patient's mobility; develop plan if impaired  - Assess patient's need for assistive devices and provide as appropriate  - Encourage maximum independence but intervene and supervise when necessary  - Involve family in performance of ADLs  - Assess for home care needs following discharge   - Consider OT consult to assist with ADL evaluation and planning for discharge  - Provide patient education as appropriate  - Monitor functional capacity and physical performance, use of AM PAC & JH-HLM   - Monitor gait, balance and fatigue with ambulation    Outcome: Progressing    Problem: DISCHARGE PLANNING  Goal: Discharge to home or other facility with appropriate resources  Description: INTERVENTIONS:  - Identify barriers to discharge w/patient and caregiver  - Arrange for needed discharge resources and transportation as appropriate  - Identify discharge learning needs (meds, wound care, etc.)  - Arrange for interpretive services to assist at discharge as needed  - Refer to Case Management Department for coordinating discharge planning if the patient needs post-hospital services based on physician/advanced practitioner order or complex needs related to functional status, cognitive ability, or social support system  Outcome: Progressing

## 2025-07-07 NOTE — RESTORATIVE TECHNICIAN NOTE
Restorative Technician Note      Patient Name: Danita Palmer     Restorative Tech Visit Date: 07/07/25  Note Type: Mobility  Patient Position Upon Consult: Supine  Activity Performed: Ambulated (To and from BR)  Assistive Device: Roller walker  Patient Position at End of Consult: Supine; All needs within reach; Bed/Chair alarm activated    Lenin Rice, Restorative Technician

## 2025-07-07 NOTE — PHYSICAL THERAPY NOTE
PHYSICAL THERAPY NOTE          Patient Name: Danita Palmer  Today's Date: 7/7/2025 07/07/25 1443   PT Last Visit   PT Visit Date 07/07/25   Note Type   Note Type Treatment for insurance authorization    Pain Assessment   Pain Assessment Tool 0-10   Pain Score No Pain   Restrictions/Precautions   Weight Bearing Precautions Per Order No   Other Precautions Chair Alarm;Bed Alarm;Multiple lines;Fall Risk;O2  (6LO2)   General   Chart Reviewed Yes   Response to Previous Treatment Patient with no complaints from previous session.   Family/Caregiver Present No   Cognition   Arousal/Participation Alert;Responsive;Cooperative   Attention Attends with cues to redirect   Following Commands Follows one step commands with increased time or repetition   Comments pleasant and cooperative   Bed Mobility   Supine to Sit 4  Minimal assistance   Additional items Assist x 1;HOB elevated;Bedrails;Increased time required;Verbal cues   Sit to Supine 4  Minimal assistance   Additional items Assist x 1;HOB elevated;Bedrails;Increased time required;Verbal cues;LE management   Additional Comments pt found/ left supine in bed with all needs within reach - alarm on post session   Transfers   Sit to Stand 5  Supervision   Additional items Armrests;Increased time required;Verbal cues   Stand to Sit 5  Supervision   Additional items Armrests;Increased time required;Verbal cues   Additional Comments transfers with RW   Ambulation/Elevation   Gait pattern Excessively slow;Short stride;Foward flexed;Inconsistent roxanne;Decreased foot clearance   Gait Assistance 4  Minimal assist  (CGA)   Additional items Assist x 1;Verbal cues   Assistive Device Rolling walker   Distance 110' x 2 trials   Stair Management Assistance Not tested   Ambulation/Elevation Additional Comments VC for direction, RW mgmt, and breathing; multiple standing rest breaks   Balance   Static  Sitting Fair +   Dynamic Sitting Fair   Static Standing Fair   Dynamic Standing Fair -   Ambulatory Poor +   Endurance Deficit   Endurance Deficit Yes   Endurance Deficit Description fatigue, REVELES, generalized weakness, fatigue, decreased activity tolerance   Activity Tolerance   Activity Tolerance Patient tolerated treatment well   Nurse Made Aware RN cleared   Assessment   Prognosis Fair   Problem List Decreased strength;Decreased endurance;Impaired balance;Decreased mobility;Impaired judgement;Decreased safety awareness   Assessment Pt seen for PT treatment session this date. Therapy session focused on bed mobility, functional transfers, gait training and endurance training in order to improve overall mobility and independence. Pt requires assist of 1 for all mobility performed this date. Pt making slow but steady progress toward goals. Continues to require 6LO2 with exertion, O2 saturation dipping to 87-88% with mobility, recovers with standing rest break. Continues to require increased cuing for RW management, would like to trial no AD vs rollator in upcoming therapy sessions. Pt was left supine in bed with alarm on at the end of PT session with all needs in reach. Pt would benefit from continued PT services while in hospital to address remaining limitations. PT to continue to follow pt and recommends level 2. The patient's AM-PAC Basic Mobility Inpatient Short Form Raw Score is 17. A Raw score of greater than 16 suggests the patient may benefit from discharge to home. Please also refer to the recommendation of the Physical Therapist for safe discharge planning.   Barriers to Discharge Inaccessible home environment;Decreased caregiver support   Goals   Patient Goals to go for a walk   STG Expiration Date 07/10/25   PT Treatment Day 4   Plan   Treatment/Interventions ADL retraining;LE strengthening/ROM;Functional transfer training;Elevations;Therapeutic exercise;Endurance training;Equipment  eval/education;Patient/family training;Bed mobility;Gait training;Spoke to nursing;Spoke to case management;OT   Progress Progressing toward goals   PT Frequency 3-5x/wk   Discharge Recommendation   Rehab Resource Intensity Level, PT II (Moderate Resource Intensity)   AM-PAC Basic Mobility Inpatient   Turning in Flat Bed Without Bedrails 3   Lying on Back to Sitting on Edge of Flat Bed Without Bedrails 3   Moving Bed to Chair 3   Standing Up From Chair Using Arms 3   Walk in Room 3   Climb 3-5 Stairs With Railing 2   Basic Mobility Inpatient Raw Score 17   Basic Mobility Standardized Score 39.67   [facility] Highest Level Of Mobility   -HL Goal 5: Stand one or more mins   -HLM Achieved 7: Walk 25 feet or more   Education   Education Provided Mobility training;Assistive device   Patient Reinforcement needed   End of Consult   Patient Position at End of Consult Supine;Bed/Chair alarm activated;All needs within reach     Jamee Rhoades, PT, DPT

## 2025-07-07 NOTE — OCCUPATIONAL THERAPY NOTE
Occupational Therapy Progress Note     Patient Name: Danita Palmer  Today's Date: 7/7/2025  Problem List  Principal Problem:    Generalized weakness  Active Problems:    CKD stage G3a/A1, GFR 45-59 and albumin creatinine ratio <30 mg/g (HCC)    Chronic obstructive pulmonary disease (HCC)    Disc degeneration, lumbar    Cigarette nicotine dependence without complication    Essential hypertension    Fall at home, initial encounter    Incidental lung nodule,RUL, > 3mm and < 8mm    Left rib fracture    Acute hypoxic respiratory failure (HCC)    Sepsis (HCC)    Pneumonia    Diarrhea            07/07/25 1037   OT Last Visit   OT Visit Date 07/07/25   Note Type   Note Type Treatment for insurance authorization   Pain Assessment   Pain Assessment Tool 0-10   Pain Score No Pain   Restrictions/Precautions   Weight Bearing Precautions Per Order No   Other Precautions Chair Alarm;Bed Alarm;Multiple lines;Telemetry;O2;Fall Risk  (8LO2)   Lifestyle   Autonomy I w/ ADLs, receives assistance w/ IADLs, I w/ functional mobility and transfers   Reciprocal Relationships Daughter   Service to Others Retired   Intrinsic Gratification Going to the [place], passionate about watching TV   ADL   Where Assessed Chair   Grooming Assistance 5  Supervision/Setup   Grooming Deficit Setup;Supervision/safety;Increased time to complete;Wash/dry hands;Wash/dry face;Teeth care   Grooming Comments Pt completes grooming seated in bedside chair.   UB Bathing Assistance 5  Supervision/Setup   UB Bathing Deficit Increased time to complete;Right arm;Left arm;Chest;Abdomen   UB Bathing Comments Pt performs UB bathing seated in bedside chair.   LB Bathing Assistance 4  Minimal Assistance   LB Bathing Deficit Setup;Supervision/safety;Increased time to complete;Perineal area;Buttocks;Right upper leg;Left upper leg;Right lower leg including foot;Left lower leg including foot   LB Bathing Comments Pt performs LB bathing seated in bedside chair.   UB Dressing  "Assistance 5  Supervision/Setup   UB Dressing Deficit Thread RUE;Thread LUE   UB Dressing Comments Pt dons and doffs hospital gown seated in bedside chair.   LB Dressing Assistance 4  Minimal Assistance   LB Dressing Deficit Thread LLE into pants;Thread RLE into underwear;Thread LLE into underwear;Don/doff R shoe;Don/doff L shoe;Pull up over hips   LB Dressing Comments Pt performs LB dressing seated in bedside chair. Able to don underwear w/ min a and requires assistance to don and doff socks.   Functional Standing Tolerance   Time ~15 minutes   Activity Pt peforms functional mobility around room and unit hallway.   Comments w/ RW and 8L02   Bed Mobility   Supine to Sit 4  Minimal assistance   Additional items Increased time required;LE management;Assist x 1   Additional items Assist x 1;Increased time required;Verbal cues;LE management   Additional Comments Pt found and left supine in bed w/ all needs within reach.   Transfers   Sit to Stand 5  Supervision   Additional items Increased time required;Verbal cues   Stand to Sit 5  Supervision   Additional items Increased time required;Verbal cues   Additional Comments w/ RW   Functional Mobility   Functional Mobility 4  Minimal assistance   Additional Comments Pt ambulates community distance w/ RW and CGA. Takes several standing rest breaks. Able to self correct poor stance within walker ~50% of the time, otherwise requires cues.   Additional items Rolling walker   Subjective   Subjective \"My son, he says stay in the middle\"   Cognition   Overall Cognitive Status WFL   Arousal/Participation Alert;Responsive;Cooperative   Attention Attends with cues to redirect   Orientation Level Oriented X4   Memory Within functional limits   Following Commands Follows one step commands with increased time or repetition   Comments Pt pleasant and cooperative,motivated to participate in therapy.   Activity Tolerance   Activity Tolerance Patient tolerated treatment well;Patient limited " by fatigue;Treatment limited secondary to agitation  (Secondary to O2 limitations)   Assessment   Assessment Pt seen for OT treatment session on this date focused on ADL retraining, functional transfers and mobility, energy conservation, functional endurance, recall of safety precautions, and patient/caregiver education. Pt was greeted supine in bed upon arrival and cooperative throughout session. Pt performs BM w/ min a requiring assistance w/ LE management. Performs STS w/ supervision and RW. Able to ambulate community distances w/ RW and CGA on 8L02, stats remaining stable throughout. Pt required several standing rest breaks throughout walk this date. Performs grooming and UB bathing/dressing w/ supervision seated in bedside chair this date. Requires min- mod for LB dressing. Pt requires assistance to thread LE's into undergarments and stand to pull up over hips w/ and assistance to don and doff socks. Pt reports she is nervous to bend over when doing LB tasks for fear of falling.  Following session, pt was left in supine with alarm on and all needs within reach. Pt continues to be limited by functional status related to ADLs and transfers, although demonstrates improvements in functional mobility.   The patient's raw score on the AM-PAC Daily Activity Inpatient Short Form is 18 A raw score of less than 19 suggests the patient may benefit from discharge to post-acute rehabilitation services. Please refer to the recommendation of the Occupational Therapist for safe discharge planning. Pt would benefit from continued acute OT intervention with plan to continue OT treatment sessions 2-3x per week. Recommend d/c to level two services.   Plan   Treatment Interventions ADL retraining;Functional transfer training;Endurance training;Patient/family training;Equipment evaluation/education;Compensatory technique education;Continued evaluation;Energy conservation;Activityengagement   Goal Expiration Date 07/20/25   OT  Treatment Day 3   OT Frequency 2-3x/wk   Discharge Recommendation   Rehab Resource Intensity Level, OT II (Moderate Resource Intensity)   AM-PAC Daily Activity Inpatient   Lower Body Dressing 2   Bathing 3   Toileting 3   Upper Body Dressing 3   Grooming 3   Eating 4   Daily Activity Raw Score 18   Daily Activity Standardized Score (Calc for Raw Score >=11) 38.66   AM-PAC Applied Cognition Inpatient   Following a Speech/Presentation 4   Understanding Ordinary Conversation 4   Taking Medications 4   Remembering Where Things Are Placed or Put Away 4   Remembering List of 4-5 Errands 4   Taking Care of Complicated Tasks 3   Applied Cognition Raw Score 23   Applied Cognition Standardized Score 53.08   End of Consult   Education Provided Yes   Patient Position at End of Consult Bed/Chair alarm activated;All needs within reach;Supine   Nurse Communication Nurse aware of consult   End of Consult Comments Pt left supine in bed w/ all needs within reach.         Angi Boothe, FERNANDO, OTR/L

## 2025-07-07 NOTE — RESPIRATORY THERAPY NOTE
07/07/25 1918   Respiratory Assessment   Assessment Type During-treatment   General Appearance Alert;Awake   Respiratory Pattern Spontaneous   Chest Assessment Chest expansion symmetrical   Bilateral Breath Sounds Diminished;Clear   Resp Comments patient voiced her complaints about using the nebulizer treatments and how she does not want to continue taking them. Patient stated she will take one more treatment tonight and then refuse the rest. Per pulmonary note on 7/6, MD wrote to change UDNs to prn. Will change UDNs to prn and continue to monitor patient

## 2025-07-07 NOTE — PLAN OF CARE
Problem: PAIN - ADULT  Goal: Verbalizes/displays adequate comfort level or baseline comfort level  Description: Interventions:  - Encourage patient to monitor pain and request assistance  - Assess pain using appropriate pain scale  - Administer analgesics as ordered based on type and severity of pain and evaluate response  - Implement non-pharmacological measures as appropriate and evaluate response  - Consider cultural and social influences on pain and pain management  - Notify physician/advanced practitioner if interventions unsuccessful or patient reports new pain  - Educate patient/family on pain management process including their role and importance of  reporting pain   - Provide non-pharmacologic/complimentary pain relief interventions  Outcome: Progressing     Problem: SAFETY ADULT  Goal: Patient will remain free of falls  Description: INTERVENTIONS:  - Educate patient/family on patient safety including physical limitations  - Instruct patient to call for assistance with activity   - Consider consulting OT/PT to assist with strengthening/mobility based on AM PAC & JH-HLM score  - Consult OT/PT to assist with strengthening/mobility   - Keep Call bell within reach  - Keep bed low and locked with side rails adjusted as appropriate  - Keep care items and personal belongings within reach  - Initiate and maintain comfort rounds  - Make Fall Risk Sign visible to staff  - Initiate/Maintain bed alarm  - Obtain necessary fall risk management equipment: yellow socks, bed alarms  - Apply yellow socks and bracelet for high fall risk patients  - Patient in bed near nurses station  Outcome: Progressing

## 2025-07-07 NOTE — ASSESSMENT & PLAN NOTE
Lab Results   Component Value Date    EGFR 67 07/07/2025    EGFR 72 07/06/2025    EGFR 76 07/05/2025    CREATININE 0.80 07/07/2025    CREATININE 0.75 07/06/2025    CREATININE 0.72 07/05/2025     Renal function at baseline, monitor with BMP.  Avoid/limit nephrotoxins and hypotension

## 2025-07-07 NOTE — PLAN OF CARE
Problem: OCCUPATIONAL THERAPY ADULT  Goal: Performs self-care activities at highest level of function for planned discharge setting.  See evaluation for individualized goals.  Description: Treatment Interventions: ADL retraining, Functional transfer training, UE strengthening/ROM, Endurance training, Cognitive reorientation, Patient/family training, Equipment evaluation/education, Compensatory technique education, Continued evaluation, Energy conservation, Activityengagement          See flowsheet documentation for full assessment, interventions and recommendations.   Outcome: Progressing  Note: Limitation: Decreased ADL status, Decreased UE strength, Decreased Safe judgement during ADL, Decreased cognition, Decreased endurance, Decreased self-care trans, Decreased high-level ADLs  Prognosis: Fair  Assessment: Pt seen for OT treatment session on this date focused on ADL retraining, functional transfers and mobility, energy conservation, functional endurance, recall of safety precautions, and patient/caregiver education. Pt was greeted supine in bed upon arrival and cooperative throughout session. Pt performs BM w/ min a requiring assistance w/ LE management. Performs STS w/ supervision and RW. Able to ambulate community distances w/ RW and CGA on 8L02, stats remaining stable throughout. Pt required several standing rest breaks throughout walk this date. Performs grooming and UB bathing/dressing w/ supervision seated in bedside chair this date. Requires min- mod for LB dressing. Pt requires assistance to thread LE's into undergarments and stand to pull up over hips w/ and assistance to don and doff socks. Pt reports she is nervous to bend over when doing LB tasks for fear of falling.  Following session, pt was left in supine with alarm on and all needs within reach. Pt continues to be limited by functional status related to ADLs and transfers, although demonstrates improvements in functional mobility.   The  patient's raw score on the AM-PAC Daily Activity Inpatient Short Form is 18 A raw score of less than 19 suggests the patient may benefit from discharge to post-acute rehabilitation services. Please refer to the recommendation of the Occupational Therapist for safe discharge planning. Pt would benefit from continued acute OT intervention with plan to continue OT treatment sessions 2-3x per week. Recommend d/c to level two services.     Rehab Resource Intensity Level, OT: II (Moderate Resource Intensity)

## 2025-07-07 NOTE — ASSESSMENT & PLAN NOTE
Multifactorial etiology including atelectasis, pneumonia, rib fracture and underlying COPD  Plan:  Wean supplemental oxygen to maintain saturation greater than 88%  Adequate pain management  See additional plans as per below

## 2025-07-07 NOTE — PROGRESS NOTES
Progress Note - Pulmonology   Name: Danita Palmer 84 y.o. female I MRN: [id number]  Unit/Bed#: [facility] 918-01 I Date of Admission: 6/25/2025   Date of Service: 7/7/2025 I Hospital Day: 12     Assessment & Plan  Acute hypoxic respiratory failure (HCC)  Multifactorial etiology including atelectasis, pneumonia, rib fracture and underlying COPD  Plan:  Wean supplemental oxygen to maintain saturation greater than 88%  Adequate pain management  See additional plans as per below  Pneumonia  Likely secondary to atelectasis in the setting of rib fractures  Plan:  Antibiotics given for 7 days  No episodes of fever and WBC counts on decreasing trend  Mucinex twice daily  Hypertonic 3% saline nebulization  Levalbuterol inhalation 1.25 mg 6 hourly  Flutter valve  Avoid chest PT/vest in the setting of rib fracture  Left rib fracture  Fall at home, initial encounter  Plan:  Pain control per primary team  Rib fracture protocol  I encouraged her to use incentive spirometer on at least an hourly basis  Needs to be getting out of bed, ambulating  Chronic obstructive pulmonary disease (HCC)  PFTs with moderate obstruction and CT scan shows emphysema  Home regimen: Anoro once daily, albuterol as needed  Does not require supplemental oxygen at baseline  Does not appear to be in acute exacerbation    Plan:  Continue home Anoro once daily  Xopenex TID PRN - tried scheduled but patient did not report benefit and did not like the nebulizers  Would benefit from smoking cessation  Would also benefit from outpatient pulmonary rehab  Cigarette nicotine dependence without complication  Recommend smoking cessation  Incidental lung nodule,RUL, > 3mm and < 8mm  In the setting of extensive smoking history, will need to rule out malignancy  Plan:  Repeat CT chest in 3 months with navigation protocol  If persistent/enlarging, will refer to Dr. Martínez for navigational lung biopsy  Diarrhea  Reduced frequency from yesterday, patient reports single  episode today with semi-solid consistency. C.difficile toxin PCR negative. Further management by the primary team.      24 Hour Events : None  Subjective : When I saw the patient in the morning, patient reported significant improvement in breathlessness compared from yesterday. She also mentioned improvement in the pain and diarrhea,reporting single episode of semi-solid stools today.    Objective :  Temp:  [98 °F (36.7 °C)-98.1 °F (36.7 °C)] 98 °F (36.7 °C)  HR:  [57-66] 66  BP: (124-149)/(56-71) 130/63  Resp:  [17-19] 19  SpO2:  [93 %-98 %] 94 %  O2 Device: Nasal cannula  Nasal Cannula O2 Flow Rate (L/min):  [6 L/min-7 L/min] 6 L/min    Physical Exam  Constitutional:       Appearance: Normal appearance.   HENT:      Head: Normocephalic.      Nose: Nose normal.     Eyes:      Pupils: Pupils are equal, round, and reactive to light.       Cardiovascular:      Rate and Rhythm: Normal rate and regular rhythm.      Pulses: Normal pulses.      Heart sounds: Normal heart sounds.   Pulmonary:      Effort: Pulmonary effort is normal.      Comments: Crackles appreciated in the lung base (Left side > Right side)     Musculoskeletal:      Right lower leg: No edema.      Left lower leg: No edema.     Skin:     General: Skin is warm.     Neurological:      Mental Status: She is alert.         Lab Results: I have reviewed the following results:   .     07/07/25  0628   WBC 10.79*   HGB 11.4*   HCT 34.9   *   SODIUM 139   K 3.6      CO2 30   BUN 25   CREATININE 0.80   GLUC 83     ABG: No new results in last 24 hours.    Imaging Results Review: I reviewed radiology reports from this admission including: chest xray.  Other Study Results Review: EKG was reviewed.   PFT Results Reviewed: reviewed

## 2025-07-08 PROCEDURE — 87070 CULTURE OTHR SPECIMN AEROBIC: CPT

## 2025-07-08 PROCEDURE — 99232 SBSQ HOSP IP/OBS MODERATE 35: CPT | Performed by: STUDENT IN AN ORGANIZED HEALTH CARE EDUCATION/TRAINING PROGRAM

## 2025-07-08 PROCEDURE — 94760 N-INVAS EAR/PLS OXIMETRY 1: CPT

## 2025-07-08 PROCEDURE — 87205 SMEAR GRAM STAIN: CPT

## 2025-07-08 PROCEDURE — 99232 SBSQ HOSP IP/OBS MODERATE 35: CPT | Performed by: INTERNAL MEDICINE

## 2025-07-08 PROCEDURE — 87106 FUNGI IDENTIFICATION YEAST: CPT

## 2025-07-08 RX ADMIN — HEPARIN SODIUM 5000 UNITS: 5000 INJECTION INTRAVENOUS; SUBCUTANEOUS at 05:29

## 2025-07-08 RX ADMIN — LISINOPRIL 10 MG: 10 TABLET ORAL at 09:05

## 2025-07-08 RX ADMIN — PANTOPRAZOLE SODIUM 40 MG: 40 TABLET, DELAYED RELEASE ORAL at 05:29

## 2025-07-08 RX ADMIN — METHOCARBAMOL 500 MG: 500 TABLET ORAL at 15:01

## 2025-07-08 RX ADMIN — GABAPENTIN 300 MG: 300 CAPSULE ORAL at 20:47

## 2025-07-08 RX ADMIN — GUAIFENESIN 600 MG: 600 TABLET, EXTENDED RELEASE ORAL at 17:44

## 2025-07-08 RX ADMIN — GABAPENTIN 300 MG: 300 CAPSULE ORAL at 15:01

## 2025-07-08 RX ADMIN — HEPARIN SODIUM 5000 UNITS: 5000 INJECTION INTRAVENOUS; SUBCUTANEOUS at 15:01

## 2025-07-08 RX ADMIN — LIDOCAINE 1 PATCH: 700 PATCH TOPICAL at 09:07

## 2025-07-08 RX ADMIN — NICOTINE 1 PATCH: 14 PATCH, EXTENDED RELEASE TRANSDERMAL at 09:07

## 2025-07-08 RX ADMIN — LATANOPROST 1 DROP: 50 SOLUTION OPHTHALMIC at 20:47

## 2025-07-08 RX ADMIN — UMECLIDINIUM BROMIDE AND VILANTEROL TRIFENATATE 1 PUFF: 62.5; 25 POWDER RESPIRATORY (INHALATION) at 17:43

## 2025-07-08 RX ADMIN — METHOCARBAMOL 500 MG: 500 TABLET ORAL at 05:29

## 2025-07-08 NOTE — PLAN OF CARE
Problem: Potential for Falls  Goal: Patient will remain free of falls  Description: INTERVENTIONS:  - Educate patient/family on patient safety including physical limitations  - Instruct patient to call for assistance with activity   - Consider consulting OT/PT to assist with strengthening/mobility based on AM PAC & JH-HLM score  - Consult OT/PT to assist with strengthening/mobility   - Keep Call bell within reach  - Keep bed low and locked with side rails adjusted as appropriate  - Keep care items and personal belongings within reach  - Initiate and maintain comfort rounds  - Make Fall Risk Sign visible to staff  - Offer Toileting every 2 Hours, in advance of need  - Initiate/Maintain bed alarm  - Obtain necessary fall risk management equipment:   - Apply yellow socks and bracelet for high fall risk patients  - Consider moving patient to room near nurses station  Outcome: Progressing     Problem: Prexisting or High Potential for Compromised Skin Integrity  Goal: Skin integrity is maintained or improved  Description: INTERVENTIONS:  - Identify patients at risk for skin breakdown  - Assess and monitor skin integrity including under and around medical devices   - Assess and monitor nutrition and hydration status  - Monitor labs  - Assess for incontinence   - Turn and reposition patient  - Assist with mobility/ambulation  - Relieve pressure over oneil prominences   - Avoid friction and shearing  - Provide appropriate hygiene as needed including keeping skin clean and dry  - Evaluate need for skin moisturizer/barrier cream  - Collaborate with interdisciplinary team  - Patient/family teaching  - Consider wound care consult    Assess:  - Review Jonn scale daily  - Clean and moisturize skin every shift  - Inspect skin when repositioning, toileting, and assisting with ADLS  - Assess under medical devices such as mossimo every 2  - Assess extremities for adequate circulation and sensation     Bed Management:  - Have  minimal linens on bed & keep smooth, unwrinkled  - Change linens as needed when moist or perspiring  - Avoid sitting or lying in one position for more than 2 hours while in bed?Keep HOB at 30degrees   - Toileting:  - Offer bedside commode  - Assess for incontinence every 2  - Use incontinent care products after each incontinent episode such as     Activity:  - Mobilize patient 3 times a day  - Encourage activity and walks on unit  - Encourage or provide ROM exercises   - Turn and reposition patient every 2 Hours  - Use appropriate equipment to lift or move patient in bed  - Instruct/ Assist with weight shifting every 2 when out of bed in chair  - Consider limitation of chair time 4 hour intervals    Skin Care:  - Avoid use of baby powder, tape, friction and shearing, hot water or constrictive clothing  - Relieve pressure over bony prominences using   - Do not massage red bony areas    Next Steps:  - Teach patient strategies to minimize risks such as   - Consider consults to  interdisciplinary teams such as   Outcome: Progressing     Problem: SAFETY ADULT  Goal: Patient will remain free of falls  Description: INTERVENTIONS:  - Educate patient/family on patient safety including physical limitations  - Instruct patient to call for assistance with activity   - Consider consulting OT/PT to assist with strengthening/mobility based on AM PAC & JH-HLM score  - Consult OT/PT to assist with strengthening/mobility   - Keep Call bell within reach  - Keep bed low and locked with side rails adjusted as appropriate  - Keep care items and personal belongings within reach  - Initiate and maintain comfort rounds  - Make Fall Risk Sign visible to staff  - Offer Toileting every 2 Hours, in advance of need  - Initiate/Maintain bed alarm  - Obtain necessary fall risk management equipment:   - Apply yellow socks and bracelet for high fall risk patients  - Consider moving patient to room near nurses station  Outcome: Progressing      Problem: Knowledge Deficit  Goal: Patient/family/caregiver demonstrates understanding of disease process, treatment plan, medications, and discharge instructions  Description: Complete learning assessment and assess knowledge base.  Interventions:  - Provide teaching at level of understanding  - Provide teaching via preferred learning methods  Outcome: Progressing

## 2025-07-08 NOTE — PROGRESS NOTES
Progress Note - Hospitalist   Name: Danita Palmer 84 y.o. female I MRN: [id number]  [facility] I Date of Admission: 6/25/2025   Date of Service: 7/8/2025 I Hospital Day: 13    Assessment & Plan  Generalized weakness  Presented on 6/25/25 after a fall in her neighbors driveway, trauma imaging all negative for acute traumatic injuries. During ambulatory trial in ED was unable to safely ambulate due to marked weakness  Labs with mild hyponatremia otherwise without marked abnormalities  CT head negative for acute intracranial pathology.    With sepsis secondary to pneumonia as below  PT/OT evaluations recommend level 2 rehab intensity  Left rib fracture  Nondisplaced fractures of the anterolateral left 4th and 5th ribs  Incentive spirometry and rib fracture protocol ordered  Pain control including lidocaine patch and scheduled Tylenol ordered  Fall at home, initial encounter  Presented after a fall in neighbors driveway, states she was walking when she could not stop her self and fell forward onto her knees without dizziness/lightheadedness or loss of consciousness  Patient with weakness with inability to safely ambulate  PT/OT evaluations recommend rehab, case management assistance appreciated  Acute hypoxic respiratory failure (HCC)  Multifactorial due to atelectasis in the setting of rib fracture, pneumonia, underlying COPD   Persistent requirement for midflow O2   Currently requiring 8 L   CT C/A/P - Interval appearance of innumerable right lower lobe micronodules with new dense left greater than right basilar consolidations.  Interval appearance of proximal bilateral lower lobe bronchial occlusions. Findings consistent aspiration pneumonia/atelectasis.  Completed 7 days of antibiotics  Continue supportive care with Mucinex, flutter valve  Chest PT/vest being avoided in the setting of rib fracture  Incentive spirometry  Pulm input appreciated  Was placed on 10 L overnight however was able to be weaned to  4-5L.  Sepsis (HCC)  With tachycardia and leukocytosis. Also with low grade temperatures.   Due to pneumonia  Tachycardia and leukocytosis resolved   Completed 7 days of IV ceftriaxone on 7/7  CKD stage G3a/A1, GFR 45-59 and albumin creatinine ratio <30 mg/g (HCC)  Lab Results   Component Value Date    EGFR 67 07/07/2025    EGFR 72 07/06/2025    EGFR 76 07/05/2025    CREATININE 0.80 07/07/2025    CREATININE 0.75 07/06/2025    CREATININE 0.72 07/05/2025     Renal function at baseline, monitor with BMP.  Avoid/limit nephrotoxins and hypotension  Incidental lung nodule,RUL, > 3mm and < 8mm  7 mm spiculated nodule to right upper lobe incidentally noted on CT chest  Repeat noncontrast CT chest advised at 6-12 months, patient to follow-up with PCP to arrange this.  Previous provider reviewed results with patient and her son  Disc degeneration, lumbar  Chronic, patient denies worsening back pain  Continue gabapentin, dose was reduced to 300 mg 3 times daily as patient somewhat sleepy during admission evaluation  Cigarette nicotine dependence without complication  Discussed need for cessation.    Nicotine patch   Essential hypertension  BP acceptable   Continue PTA lisinopril 10 mg daily   Monitor BP  Pneumonia  See above plan under sepsis/respiratory failure  Chronic obstructive pulmonary disease (HCC)  Not in acute exacerbation, continue PTA inhalers    Diarrhea  Several episodes of diarrhea started 7/5/25 associated with generalized abdominal pain  On exam abdomen soft, no distention, no guarding, no rigidity.  Does have diffuse tenderness  Did not receive any stool softener or laxative since 7/2/2025  C. difficile negative  Diarrhea has resolved    VTE Pharmacologic Prophylaxis: VTE Score: 3 Moderate Risk (Score 3-4) - Pharmacological DVT Prophylaxis Ordered: heparin.    Mobility:   Basic Mobility Inpatient Raw Score: 17  JH-HLM Goal: 5: Stand one or more mins  JH-HLM Achieved: 7: Walk 25 feet or more  JH-HLM Goal  achieved. Continue to encourage appropriate mobility.    Patient Centered Rounds: I performed bedside rounds with nursing staff today.   Discussions with Specialists or Other Care Team Provider: RN    Education and Discussions with Family / Patient: Updated  (son) at bedside.    Current Length of Stay: 13 day(s)  Current Patient Status: Inpatient   Certification Statement: The patient will continue to require additional inpatient hospital stay due to awaiting placement to rehab for safe discharge  Discharge Plan: Medically stable, awaiting placement to rehab for safe discharge, insurance authorization pending    Code Status: Level 1 - Full Code    Subjective   Patient assessed at bedside.  No acute complaints.    Objective :  Temp:  [97.9 °F (36.6 °C)-98 °F (36.7 °C)] 98 °F (36.7 °C)  HR:  [69-83] 69  BP: (119-131)/(61-62) 131/61  Resp:  [16-19] 16  SpO2:  [89 %-94 %] 92 %  O2 Device: Mid flow nasal cannula  Nasal Cannula O2 Flow Rate (L/min):  [5 L/min-11 L/min] 6 L/min    Body mass index is 20.78 kg/m².     Input and Output Summary (last 24 hours):     Intake/Output Summary (Last 24 hours) at 7/8/2025 1729  Last data filed at 7/8/2025 1353  Gross per 24 hour   Intake 360 ml   Output --   Net 360 ml       Physical Exam  Vitals reviewed.   Constitutional:       General: She is not in acute distress.     Appearance: Normal appearance. She is not ill-appearing.   HENT:      Head: Normocephalic and atraumatic.     Cardiovascular:      Rate and Rhythm: Normal rate and regular rhythm.      Heart sounds: Normal heart sounds.   Pulmonary:      Effort: Pulmonary effort is normal. No respiratory distress.      Comments: Mild basal crackles  Abdominal:      Palpations: Abdomen is soft.      Tenderness: There is no abdominal tenderness.     Musculoskeletal:      Right lower leg: No edema.      Left lower leg: No edema.     Neurological:      Mental Status: She is alert and oriented to person, place, and time.            Lines/Drains:              Lab Results: I have reviewed the following results:   Results from last 7 days   Lab Units 07/07/25  0628 07/06/25  0534 07/05/25  0538   WBC Thousand/uL 10.79* 12.44* 9.71   HEMOGLOBIN g/dL 11.4* 11.3* 11.7   HEMATOCRIT % 34.9 35.3 37.2   PLATELETS Thousands/uL 547* 558* 513*   BANDS PCT %  --   --  3   SEGS PCT %  --  78*  --    LYMPHO PCT %  --  10* 9*   MONO PCT %  --  8 6   EOS PCT %  --  2 0     Results from last 7 days   Lab Units 07/07/25  0628   SODIUM mmol/L 139   POTASSIUM mmol/L 3.6   CHLORIDE mmol/L 106   CO2 mmol/L 30   BUN mg/dL 25   CREATININE mg/dL 0.80   ANION GAP mmol/L 3*   CALCIUM mg/dL 9.0   GLUCOSE RANDOM mg/dL 83                 Results from last 7 days   Lab Units 07/07/25  0628 07/02/25  0613   PROCALCITONIN ng/ml 0.13 0.48*       Recent Cultures (last 7 days):   Results from last 7 days   Lab Units 07/06/25  0849   C DIFF TOXIN B BY PCR  Negative       Imaging Results Review: I reviewed radiology reports from this admission including: chest xray, CT chest, and CT abdomen/pelvis.  Other Study Results Review: No additional pertinent studies reviewed.    Last 24 Hours Medication List:     Current Facility-Administered Medications:     acetaminophen (TYLENOL) tablet 975 mg, Q8H JOSE MANUEL    albuterol (PROVENTIL HFA,VENTOLIN HFA) inhaler 1 puff, Q4H PRN    Cholecalciferol (VITAMIN D3) tablet 2,000 Units, Daily    gabapentin (NEURONTIN) capsule 300 mg, TID    guaiFENesin (MUCINEX) 12 hr tablet 600 mg, BID    heparin (porcine) subcutaneous injection 5,000 Units, Q8H JOSE MANUEL    latanoprost (XALATAN) 0.005 % ophthalmic solution 1 drop, HS    levalbuterol (XOPENEX) inhalation solution 1.25 mg, Q6H PRN    lidocaine (LIDODERM) 5 % patch 1 patch, Daily    lisinopril (ZESTRIL) tablet 10 mg, Daily    methocarbamol (ROBAXIN) tablet 500 mg, Q8H JOSE MANUEL    nicotine (NICODERM CQ) 14 mg/24hr TD 24 hr patch 1 patch, Daily    ondansetron (ZOFRAN) injection 4 mg, Q6H PRN    oxyCODONE  (ROXICODONE) split tablet 2.5 mg, Q4H PRN **OR** oxyCODONE (ROXICODONE) IR tablet 5 mg, Q4H PRN    pantoprazole (PROTONIX) EC tablet 40 mg, Early Morning    sodium chloride 3 % inhalation solution 4 mL, Q6H PRN    umeclidinium-vilanterol 62.5-25 mcg/actuation inhaler 1 puff, Daily    Administrative Statements   Today, Patient Was Seen By: Omar Casiano, DO  I have spent a total time of 35 minutes in caring for this patient on the day of the visit/encounter including Impressions, Counseling / Coordination of care, Documenting in the medical record, Reviewing/placing orders in the medical record (including tests, medications, and/or procedures), Obtaining or reviewing history  , and Communicating with other healthcare professionals .    **Please Note: This note may have been constructed using a voice recognition system.**

## 2025-07-08 NOTE — ASSESSMENT & PLAN NOTE
Likely secondary to atelectasis in the setting of rib fractures  Plan:  Antibiotics given for 7 days  No episodes of fever and WBC counts on decreasing trend  Mucinex twice daily  Hypertonic 3% saline nebulization  Levalbuterol inhalation 1.25 mg 6 hourly  Flutter valve  Continue PT OT and out of bed for most of the day

## 2025-07-08 NOTE — CASE MANAGEMENT
Case Management Discharge Planning Note    Patient name Dantia Palmer  Location [facility] 918/[facility] 918-01 MRN [id number]  : 1940 Date 2025       Current Admission Date: 2025  Current Admission Diagnosis:Generalized weakness   Patient Active Problem List    Diagnosis Date Noted    Diarrhea 2025    Pneumonia 2025    Sepsis (HCC) 2025    Acute hypoxic respiratory failure (HCC) 2025    Left rib fracture 2025    Generalized weakness 2025    Fall at home, initial encounter 2025    Incidental lung nodule,RUL, > 3mm and < 8mm 2025    Hypertriglyceridemia 10/23/2024    Neuropathy 2024    Mild protein-calorie malnutrition (HCC) 2023    H/O lumpectomy 2021    Essential hypertension 2020    Cigarette nicotine dependence without complication 2019    CKD stage G3a/A1, GFR 45-59 and albumin creatinine ratio <30 mg/g (HCC) 2017    Suspicious nevus 2017    Disc degeneration, lumbar 2017    Post herpetic neuralgia 2016    Liver cyst 2016    Glaucoma 2014    Osteopenia 2013    Chronic obstructive pulmonary disease (HCC) 2012      LOS (days): 13  Geometric Mean LOS (GMLOS) (days): 3  Days to GMLOS:-10.4     OBJECTIVE:  Risk of Unplanned Readmission Score: 13.83         Current admission status: Inpatient   Preferred Pharmacy:   [id number] - [place], [place] - [address]  [address]  [place] [address]  Phone: [phone] Fax: [phone]    Primary Care Provider: Duke Barnes MD    Primary Insurance: [facility]  Secondary Insurance:     Discharge details:    Other Referral/Resources/Interventions Provided:  Referral Comments: Post acute authorization [facility], patient and son aware and in agreement    IMM Given (Date):: 25  IMM Given to:: Family

## 2025-07-08 NOTE — RESTORATIVE TECHNICIAN NOTE
Restorative Technician Note      Patient Name: Danita Palmer     Restorative Tech Visit Date: 07/08/25  Note Type: Mobility  Patient Position Upon Consult: Supine  Activity Performed: Ambulated  Assistive Device: Roller walker  Patient Position at End of Consult: Supine; Bed/Chair alarm activated; All needs within reach    Lenin Rice, Restorative Technician

## 2025-07-08 NOTE — ASSESSMENT & PLAN NOTE
Nondisplaced fractures of the anterolateral left 4th and 5th ribs   Plan:  Pain control per primary team  Rib fracture protocol  I encouraged her to use incentive spirometer on at least an hourly basis  Needs to be getting out of bed, ambulating

## 2025-07-08 NOTE — DISCHARGE SUPPORT
Case Management Assessment & Discharge Planning Note    Patient name Danita Palmer  Location [facility] 918/[facility] 918-01 MRN [id number]  : 1940 Date 2025       Current Admission Date: 2025  Current Admission Diagnosis:Generalized weakness   Patient Active Problem List    Diagnosis Date Noted    Diarrhea 2025    Pneumonia 2025    Sepsis (HCC) 2025    Acute hypoxic respiratory failure (HCC) 2025    Left rib fracture 2025    Generalized weakness 2025    Fall at home, initial encounter 2025    Incidental lung nodule,RUL, > 3mm and < 8mm 2025    Hypertriglyceridemia 10/23/2024    Neuropathy 2024    Mild protein-calorie malnutrition (HCC) 2023    H/O lumpectomy 2021    Essential hypertension 2020    Cigarette nicotine dependence without complication 2019    CKD stage G3a/A1, GFR 45-59 and albumin creatinine ratio <30 mg/g (HCC) 2017    Suspicious nevus 2017    Disc degeneration, lumbar 2017    Post herpetic neuralgia 2016    Liver cyst 2016    Glaucoma 2014    Osteopenia 2013    Chronic obstructive pulmonary disease (HCC) 2012      LOS (days): 13  Geometric Mean LOS (GMLOS) (days): 3  Days to GMLOS:-10.4   Facility Authorization Initiated  DC Support Center received request for auth from : Genny NAIR  Authorization Request Submitted for: SNF  Requested Start of Care Date: 25  [facility]: [facility]  Facility NPI: [id number]  Facility MD: Aldair Zamarripa  Facility MD NPI: [id number]  Authorization initiated by contacting insurance: [employer]  Via: H&CC Portal  Clinicals submitted via: Portal Attachment  Pending reference #: [id number]   notified: Genny NAIR     Updates to authorization status will be noted in chart.    Please reach out to CM for updates on any clinical information.

## 2025-07-08 NOTE — ASSESSMENT & PLAN NOTE
Multifactorial etiology including atelectasis, pneumonia, rib fracture and underlying COPD  Plan:  Wean supplemental oxygen to maintain saturation greater than 88%  Adequate pain management

## 2025-07-08 NOTE — ASSESSMENT & PLAN NOTE
Multifactorial due to atelectasis in the setting of rib fracture, pneumonia, underlying COPD   Persistent requirement for midflow O2   Currently requiring 8 L   CT C/A/P - Interval appearance of innumerable right lower lobe micronodules with new dense left greater than right basilar consolidations.  Interval appearance of proximal bilateral lower lobe bronchial occlusions. Findings consistent aspiration pneumonia/atelectasis.  Completed 7 days of antibiotics  Continue supportive care with Mucinex, flutter valve  Chest PT/vest being avoided in the setting of rib fracture  Incentive spirometry  Pulm input appreciated  Was placed on 10 L overnight however was able to be weaned to 4-5L.

## 2025-07-08 NOTE — ASSESSMENT & PLAN NOTE
Patient had 2 episodes of diarrhea today. C.difficile toxin PCR negative. Continue monitoring and maintain adequate hydration. Further management by the primary team.

## 2025-07-08 NOTE — PROGRESS NOTES
Progress Note - Pulmonology   Name: Danita Palmer 84 y.o. female I MRN: [id number]  Unit/Bed#: [facility] 918-01 I Date of Admission: 6/25/2025   Date of Service: 7/8/2025 I Hospital Day: 13     Assessment & Plan  Acute hypoxic respiratory failure (HCC)  Multifactorial etiology including atelectasis, pneumonia, rib fracture and underlying COPD  Plan:  Wean supplemental oxygen to maintain saturation greater than 88%  Adequate pain management  Pneumonia  Likely secondary to atelectasis in the setting of rib fractures  Plan:  Antibiotics given for 7 days  No episodes of fever and WBC counts on decreasing trend  Mucinex twice daily  Hypertonic 3% saline nebulization  Levalbuterol inhalation 1.25 mg 6 hourly  Flutter valve  Continue PT OT and out of bed for most of the day   Left rib fracture  Fall at home, initial encounter  Nondisplaced fractures of the anterolateral left 4th and 5th ribs   Plan:  Pain control per primary team  Rib fracture protocol  I encouraged her to use incentive spirometer on at least an hourly basis  Needs to be getting out of bed, ambulating  Chronic obstructive pulmonary disease (HCC)  PFTs with moderate obstruction and CT scan shows emphysema  Home regimen: Anoro once daily, albuterol as needed  Does not require supplemental oxygen at baseline  Does not appear to be in acute exacerbation    Plan:  Continue home Anoro once daily  Xopenex TID PRN - tried scheduled but patient did not report benefit and did not like the nebulizers  Would benefit from smoking cessation  Would also benefit from outpatient pulmonary rehab  Cigarette nicotine dependence without complication  Recommend smoking cessation  Incidental lung nodule,RUL, > 3mm and < 8mm  In the setting of extensive smoking history, will need to rule out malignancy  Plan:  Repeat CT chest in 3 months with navigation protocol  If persistent/enlarging, will refer to Dr. Martínez for navigational lung biopsy  Diarrhea  Patient had 2 episodes of  diarrhea today. C.difficile toxin PCR negative. Continue monitoring and maintain adequate hydration. Further management by the primary team.    24 Hour Events : None  Subjective : Patient when seen during the rounds mentioned reduced breathlessness compared from yesterday. Reduced pain at the site of rib fracture. She has had two episodes of loose stools.     Objective :  Temp:  [97.9 °F (36.6 °C)-98.4 °F (36.9 °C)] 98 °F (36.7 °C)  HR:  [71-83] 71  BP: (116-130)/(61-62) 130/62  Resp:  [17-19] 19  SpO2:  [88 %-95 %] 94 %  O2 Device: Mid flow nasal cannula  Nasal Cannula O2 Flow Rate (L/min):  [5 L/min-11 L/min] 6 L/min    Physical Exam  Constitutional:       Appearance: Normal appearance.   HENT:      Head: Normocephalic.      Nose: Nose normal.      Mouth/Throat:      Mouth: Mucous membranes are moist.     Cardiovascular:      Rate and Rhythm: Normal rate and regular rhythm.      Pulses: Normal pulses.   Pulmonary:      Comments: Crackles present on left basal area more than right  SOB improved    Musculoskeletal:      Right lower leg: No edema.      Left lower leg: No edema.     Neurological:      Mental Status: She is alert and oriented to person, place, and time.           Lab Results: I have reviewed the following results:   No new results in last 24 hours.  ABG: No new results in last 24 hours.    Imaging Results Review: I reviewed radiology reports from this admission including: chest xray and CT chest.  Other Study Results Review: EKG was reviewed.   PFT Results Reviewed: reviewed    Leodan Pena MD

## 2025-07-08 NOTE — ASSESSMENT & PLAN NOTE
"· Abdominal tenderness, "colitis", and occult blood raise concern for mesenteric emboli and bowl infarction.  · If present, would be a relative indication for operative management of her valvular disease.  · Observation for now  " Presented on 6/25/25 after a fall in her neighbors driveway, trauma imaging all negative for acute traumatic injuries. During ambulatory trial in ED was unable to safely ambulate due to marked weakness  Labs with mild hyponatremia otherwise without marked abnormalities  CT head negative for acute intracranial pathology.    With sepsis secondary to pneumonia as below  PT/OT evaluations recommend level 2 rehab intensity

## 2025-07-09 ENCOUNTER — APPOINTMENT (INPATIENT)
Dept: NON INVASIVE DIAGNOSTICS | Facility: HOSPITAL | Age: 85
DRG: 183 | End: 2025-07-09
Payer: COMMERCIAL

## 2025-07-09 ENCOUNTER — APPOINTMENT (INPATIENT)
Dept: RADIOLOGY | Facility: HOSPITAL | Age: 85
DRG: 183 | End: 2025-07-09
Payer: COMMERCIAL

## 2025-07-09 PROBLEM — E43 SEVERE PROTEIN-CALORIE MALNUTRITION (HCC): Status: ACTIVE | Noted: 2025-07-09

## 2025-07-09 LAB
ANION GAP SERPL CALCULATED.3IONS-SCNC: 4 MMOL/L (ref 4–13)
AORTIC ROOT: 2.7 CM
ASCENDING AORTA: 2.5 CM
BASOPHILS # BLD AUTO: 0.06 THOUSANDS/ÂΜL (ref 0–0.1)
BASOPHILS NFR BLD AUTO: 1 % (ref 0–1)
BNP SERPL-MCNC: 96 PG/ML (ref 0–100)
BSA FOR ECHO PROCEDURE: 1.47 M2
BUN SERPL-MCNC: 20 MG/DL (ref 5–25)
CALCIUM SERPL-MCNC: 9.1 MG/DL (ref 8.4–10.2)
CHLORIDE SERPL-SCNC: 107 MMOL/L (ref 96–108)
CO2 SERPL-SCNC: 29 MMOL/L (ref 21–32)
CREAT SERPL-MCNC: 0.83 MG/DL (ref 0.6–1.3)
E WAVE DECELERATION TIME: 244 MS
E/A RATIO: 0.57
EOSINOPHIL # BLD AUTO: 0.15 THOUSAND/ÂΜL (ref 0–0.61)
EOSINOPHIL NFR BLD AUTO: 1 % (ref 0–6)
ERYTHROCYTE [DISTWIDTH] IN BLOOD BY AUTOMATED COUNT: 13.4 % (ref 11.6–15.1)
FRACTIONAL SHORTENING: 31 (ref 28–44)
GFR SERPL CREATININE-BSD FRML MDRD: 64 ML/MIN/1.73SQ M
GLUCOSE SERPL-MCNC: 86 MG/DL (ref 65–140)
HCT VFR BLD AUTO: 35.2 % (ref 34.8–46.1)
HGB BLD-MCNC: 11.6 G/DL (ref 11.5–15.4)
IMM GRANULOCYTES # BLD AUTO: 0.13 THOUSAND/UL (ref 0–0.2)
IMM GRANULOCYTES NFR BLD AUTO: 1 % (ref 0–2)
INTERVENTRICULAR SEPTUM IN DIASTOLE (PARASTERNAL SHORT AXIS VIEW): 0.9 CM
INTERVENTRICULAR SEPTUM: 0.9 CM (ref 0.6–1.1)
LAAS-AP2: 10.3 CM2
LAAS-AP4: 13 CM2
LEFT ATRIUM SIZE: 3.5 CM
LEFT ATRIUM VOLUME (MOD BIPLANE): 25 ML
LEFT ATRIUM VOLUME INDEX (MOD BIPLANE): 17 ML/M2
LEFT INTERNAL DIMENSION IN SYSTOLE: 2.2 CM (ref 2.1–4)
LEFT VENTRICULAR INTERNAL DIMENSION IN DIASTOLE: 3.2 CM (ref 3.5–6)
LEFT VENTRICULAR POSTERIOR WALL IN END DIASTOLE: 1.3 CM
LEFT VENTRICULAR STROKE VOLUME: 24 ML
LV EF US.2D.A4C+ESTIMATED: 64 %
LVSV (TEICH): 24 ML
LYMPHOCYTES # BLD AUTO: 1.28 THOUSANDS/ÂΜL (ref 0.6–4.47)
LYMPHOCYTES NFR BLD AUTO: 12 % (ref 14–44)
MCH RBC QN AUTO: 29.5 PG (ref 26.8–34.3)
MCHC RBC AUTO-ENTMCNC: 33 G/DL (ref 31.4–37.4)
MCV RBC AUTO: 90 FL (ref 82–98)
MONOCYTES # BLD AUTO: 0.91 THOUSAND/ÂΜL (ref 0.17–1.22)
MONOCYTES NFR BLD AUTO: 8 % (ref 4–12)
MV E'TISSUE VEL-SEP: 5 CM/S
MV PEAK A VEL: 0.97 M/S
MV PEAK E VEL: 55 CM/S
MV STENOSIS PRESSURE HALF TIME: 71 MS
MV VALVE AREA P 1/2 METHOD: 3.1
NEUTROPHILS # BLD AUTO: 8.36 THOUSANDS/ÂΜL (ref 1.85–7.62)
NEUTS SEG NFR BLD AUTO: 77 % (ref 43–75)
NRBC BLD AUTO-RTO: 0 /100 WBCS
PLATELET # BLD AUTO: 536 THOUSANDS/UL (ref 149–390)
PMV BLD AUTO: 9 FL (ref 8.9–12.7)
POTASSIUM SERPL-SCNC: 3.6 MMOL/L (ref 3.5–5.3)
RBC # BLD AUTO: 3.93 MILLION/UL (ref 3.81–5.12)
RIGHT ATRIUM AREA SYSTOLE A4C: 11.3 CM2
RIGHT VENTRICLE ID DIMENSION: 2.5 CM
SL CV LEFT ATRIUM LENGTH A2C: 4.4 CM
SL CV LV EF: 60
SL CV PED ECHO LEFT VENTRICLE DIASTOLIC VOLUME (MOD BIPLANE) 2D: 41 ML
SL CV PED ECHO LEFT VENTRICLE SYSTOLIC VOLUME (MOD BIPLANE) 2D: 17 ML
SODIUM SERPL-SCNC: 140 MMOL/L (ref 135–147)
TR MAX PG: 32 MMHG
TR PEAK VELOCITY: 2.8 M/S
TRICUSPID ANNULAR PLANE SYSTOLIC EXCURSION: 1.6 CM
TRICUSPID VALVE PEAK REGURGITATION VELOCITY: 2.84 M/S
WBC # BLD AUTO: 10.89 THOUSAND/UL (ref 4.31–10.16)

## 2025-07-09 PROCEDURE — 94760 N-INVAS EAR/PLS OXIMETRY 1: CPT

## 2025-07-09 PROCEDURE — 93306 TTE W/DOPPLER COMPLETE: CPT | Performed by: INTERNAL MEDICINE

## 2025-07-09 PROCEDURE — 99232 SBSQ HOSP IP/OBS MODERATE 35: CPT | Performed by: INTERNAL MEDICINE

## 2025-07-09 PROCEDURE — 85025 COMPLETE CBC W/AUTO DIFF WBC: CPT | Performed by: STUDENT IN AN ORGANIZED HEALTH CARE EDUCATION/TRAINING PROGRAM

## 2025-07-09 PROCEDURE — 83880 ASSAY OF NATRIURETIC PEPTIDE: CPT

## 2025-07-09 PROCEDURE — 93306 TTE W/DOPPLER COMPLETE: CPT

## 2025-07-09 PROCEDURE — 97530 THERAPEUTIC ACTIVITIES: CPT

## 2025-07-09 PROCEDURE — 99232 SBSQ HOSP IP/OBS MODERATE 35: CPT | Performed by: STUDENT IN AN ORGANIZED HEALTH CARE EDUCATION/TRAINING PROGRAM

## 2025-07-09 PROCEDURE — 71045 X-RAY EXAM CHEST 1 VIEW: CPT

## 2025-07-09 PROCEDURE — 97116 GAIT TRAINING THERAPY: CPT

## 2025-07-09 PROCEDURE — 80048 BASIC METABOLIC PNL TOTAL CA: CPT | Performed by: STUDENT IN AN ORGANIZED HEALTH CARE EDUCATION/TRAINING PROGRAM

## 2025-07-09 RX ORDER — LOPERAMIDE HYDROCHLORIDE 2 MG/1
2 CAPSULE ORAL 4 TIMES DAILY PRN
Status: DISCONTINUED | OUTPATIENT
Start: 2025-07-09 | End: 2025-07-12 | Stop reason: HOSPADM

## 2025-07-09 RX ORDER — LEVALBUTEROL INHALATION SOLUTION 1.25 MG/3ML
1.25 SOLUTION RESPIRATORY (INHALATION)
Status: DISCONTINUED | OUTPATIENT
Start: 2025-07-09 | End: 2025-07-10

## 2025-07-09 RX ORDER — SODIUM CHLORIDE FOR INHALATION 3 %
4 VIAL, NEBULIZER (ML) INHALATION
Status: DISPENSED | OUTPATIENT
Start: 2025-07-09 | End: 2025-07-10

## 2025-07-09 RX ORDER — FUROSEMIDE 10 MG/ML
20 INJECTION INTRAMUSCULAR; INTRAVENOUS ONCE
Status: COMPLETED | OUTPATIENT
Start: 2025-07-09 | End: 2025-07-09

## 2025-07-09 RX ADMIN — UMECLIDINIUM BROMIDE AND VILANTEROL TRIFENATATE 1 PUFF: 62.5; 25 POWDER RESPIRATORY (INHALATION) at 20:17

## 2025-07-09 RX ADMIN — LATANOPROST 1 DROP: 50 SOLUTION OPHTHALMIC at 20:21

## 2025-07-09 RX ADMIN — NICOTINE 1 PATCH: 14 PATCH, EXTENDED RELEASE TRANSDERMAL at 08:55

## 2025-07-09 RX ADMIN — METHOCARBAMOL 500 MG: 500 TABLET ORAL at 13:59

## 2025-07-09 RX ADMIN — GUAIFENESIN 600 MG: 600 TABLET, EXTENDED RELEASE ORAL at 08:54

## 2025-07-09 RX ADMIN — FUROSEMIDE 20 MG: 10 INJECTION, SOLUTION INTRAMUSCULAR; INTRAVENOUS at 10:21

## 2025-07-09 RX ADMIN — GABAPENTIN 300 MG: 300 CAPSULE ORAL at 08:54

## 2025-07-09 RX ADMIN — GABAPENTIN 300 MG: 300 CAPSULE ORAL at 20:23

## 2025-07-09 RX ADMIN — HEPARIN SODIUM 5000 UNITS: 5000 INJECTION INTRAVENOUS; SUBCUTANEOUS at 13:59

## 2025-07-09 RX ADMIN — HEPARIN SODIUM 5000 UNITS: 5000 INJECTION INTRAVENOUS; SUBCUTANEOUS at 20:23

## 2025-07-09 RX ADMIN — PANTOPRAZOLE SODIUM 40 MG: 40 TABLET, DELAYED RELEASE ORAL at 06:25

## 2025-07-09 RX ADMIN — LISINOPRIL 10 MG: 10 TABLET ORAL at 08:55

## 2025-07-09 RX ADMIN — GABAPENTIN 300 MG: 300 CAPSULE ORAL at 15:52

## 2025-07-09 RX ADMIN — Medication 2000 UNITS: at 08:55

## 2025-07-09 NOTE — ASSESSMENT & PLAN NOTE
Patient complains of diarrhea. C.difficile toxin PCR negative. Stool enteric bacterial panel PCR negative. Continue monitoring. Further management by the primary team.

## 2025-07-09 NOTE — PHYSICAL THERAPY NOTE
Physical Therapy Treatment Note    Patient's Name: Danita Palmer  : 1940     07/09/25 1147   PT Last Visit   PT Visit Date 25   Note Type   Note Type Treatment   Pain Assessment   Pain Assessment Tool 0-10   Pain Score No Pain   Restrictions/Precautions   Weight Bearing Precautions Per Order No   Other Precautions Chair Alarm;Bed Alarm;O2;Fall Risk;Limb alert  (RUE limb alert, 5-6L O2)   General   Chart Reviewed Yes   Response to Previous Treatment Patient with no complaints from previous session.   Family/Caregiver Present No   Subjective   Subjective Agreeable to mobilize.   Bed Mobility   Supine to Sit 4  Minimal assistance  (CGA)   Additional items Bedrails;Increased time required;Verbal cues  (cues to brace BLE against side of bed)   Additional Comments Pt greeted in supine.   Transfers   Sit to Stand 5  Supervision   Additional items Increased time required;Verbal cues   Stand to Sit 5  Supervision   Additional items Increased time required;Verbal cues   Additional Comments rollator   Ambulation/Elevation   Gait pattern Excessively slow;Short stride;Forward Flexion;Improper Weight shift;Shuffling;L Foot drag   Gait Assistance   (CS w/ rollator; [place] w/o AD)   Additional items Verbal cues;Tactile cues  (cues for improved foot clearance + heel strike throughout; increased forward trunk flexion noted w/o AD)   Assistive Device 4-wheeled walker;None   Distance 110' w/ rollator + 70' w/o AD   Stair Management Assistance 4  Minimal assist  (CGA)   Additional items Assist x 1;Verbal cues;Tactile cues   Stair Management Technique Two rails;Reciprocal   Number of Stairs 7   Balance   Static Sitting Fair +   Dynamic Sitting Fair   Static Standing Fair   Dynamic Standing Fair -   Ambulatory Poor +  (RW)   Endurance Deficit   Endurance Deficit Yes   Endurance Deficit Description weakness, fatigue, SpO2 WNL on 6L   Activity Tolerance   Activity Tolerance Patient tolerated treatment well   Assessment    Prognosis Fair   Problem List Decreased strength;Decreased endurance;Impaired balance;Decreased mobility;Impaired judgement;Decreased safety awareness   Assessment Pt participated in skilled PT treatment session w/ emphasis on gait training w/ LRAD + stair training. Pt demonstrated good safety w/ rollator after initial instructions for brake management. Gait training progressed to no AD use. Pt more unsteady + w/ increasing trunk flexion w/o AD, posing her at risk for falls. No REVELES noted throughout session. SpO2 WNL. Pt progressing towards independence but still overall limited by impaired balance, core strength, + activity tolerance. Pt will benefit from continued skilled PT to address these impairments + rehab upon d/c.   Barriers to Discharge Inaccessible home environment;Decreased caregiver support   Goals   Patient Goals get better   Plan   Treatment/Interventions Functional transfer training;LE strengthening/ROM;Elevations;Therapeutic exercise;Endurance training;Patient/family training;Equipment eval/education;Bed mobility;Gait training;Compensatory technique education   Progress Progressing toward goals   PT Frequency 3-5x/wk   Discharge Recommendation   Rehab Resource Intensity Level, PT II (Moderate Resource Intensity)   Equipment Recommended Walker   Walker Package Recommended Rollator   Change/add to Walker Package? No   AM-PAC Basic Mobility Inpatient   Turning in Flat Bed Without Bedrails 3   Lying on Back to Sitting on Edge of Flat Bed Without Bedrails 3   Moving Bed to Chair 3   Standing Up From Chair Using Arms 3   Walk in Room 3   Climb 3-5 Stairs With Railing 3   Basic Mobility Inpatient Raw Score 18   Basic Mobility Standardized Score 41.05   [facility] Highest Level Of Mobility   -HLM Goal 6: Walk 10 steps or more   -HLM Achieved 7: Walk 25 feet or more   Education   Education Provided Mobility training;Assistive device   Patient Demonstrates acceptance/verbal understanding;Reinforcement  needed   End of Consult   Patient Position at End of Consult Bedside chair;Bed/Chair alarm activated;All needs within reach  (on waffle cushion, all lines in tact)       Angi Davis, PT, DPT

## 2025-07-09 NOTE — PLAN OF CARE
Problem: Potential for Falls  Goal: Patient will remain free of falls  Description: INTERVENTIONS:  - Educate patient/family on patient safety including physical limitations  - Instruct patient to call for assistance with activity   - Consider consulting OT/PT to assist with strengthening/mobility based on AM PAC & JH-HLM score  - Consult OT/PT to assist with strengthening/mobility   - Keep Call bell within reach  - Keep bed low and locked with side rails adjusted as appropriate  - Keep care items and personal belongings within reach  - Initiate and maintain comfort rounds  - Make Fall Risk Sign visible to staff  - Offer Toileting every  Hours, in advance of need  - Initiate/Maintain bed alarm  - Obtain necessary fall risk management equipment:   - Apply yellow socks and bracelet for high fall risk patients  - Consider moving patient to room near nurses station  Outcome: Progressing     Problem: Prexisting or High Potential for Compromised Skin Integrity  Goal: Skin integrity is maintained or improved  Description: INTERVENTIONS:  - Identify patients at risk for skin breakdown  - Assess and monitor skin integrity including under and around medical devices   - Assess and monitor nutrition and hydration status  - Monitor labs  - Assess for incontinence   - Turn and reposition patient  - Assist with mobility/ambulation  - Relieve pressure over oneil prominences   - Avoid friction and shearing  - Provide appropriate hygiene as needed including keeping skin clean and dry  - Evaluate need for skin moisturizer/barrier cream  - Collaborate with interdisciplinary team  - Patient/family teaching  - Consider wound care consult    Assess:  - Review Jonn scale daily  - Clean and moisturize skin every   - Inspect skin when repositioning, toileting, and assisting with ADLS  - Assess under medical devices such as every   - Assess extremities for adequate circulation and sensation     Bed Management:  - Have minimal linens on  bed & keep smooth, unwrinkled  - Change linens as needed when moist or perspiring  - Avoid sitting or lying in one position for more than  hours while in bed?Keep HOB at degrees   - Toileting:  - Offer bedside commode  - Assess for incontinence every   - Use incontinent care products after each incontinent episode such as     Activity:  - Mobilize patient  times a day  - Encourage activity and walks on unit  - Encourage or provide ROM exercises   - Turn and reposition patient every  Hours  - Use appropriate equipment to lift or move patient in bed  - Instruct/ Assist with weight shifting every  when out of bed in chair  - Consider limitation of chair time  hour intervals    Skin Care:  - Avoid use of baby powder, tape, friction and shearing, hot water or constrictive clothing  - Relieve pressure over bony prominences using   - Do not massage red bony areas    Next Steps:  - Teach patient strategies to minimize risks such as   - Consider consults to  interdisciplinary teams such as   Outcome: Progressing     Problem: PAIN - ADULT  Goal: Verbalizes/displays adequate comfort level or baseline comfort level  Description: Interventions:  - Encourage patient to monitor pain and request assistance  - Assess pain using appropriate pain scale  - Administer analgesics as ordered based on type and severity of pain and evaluate response  - Implement non-pharmacological measures as appropriate and evaluate response  - Consider cultural and social influences on pain and pain management  - Notify physician/advanced practitioner if interventions unsuccessful or patient reports new pain  - Educate patient/family on pain management process including their role and importance of  reporting pain   - Provide non-pharmacologic/complimentary pain relief interventions  Outcome: Progressing     Problem: INFECTION - ADULT  Goal: Absence or prevention of progression during hospitalization  Description: INTERVENTIONS:  - Assess and monitor for  signs and symptoms of infection  - Monitor lab/diagnostic results  - Monitor all insertion sites, i.e. indwelling lines, tubes, and drains  - Monitor endotracheal if appropriate and nasal secretions for changes in amount and color  - [place] appropriate cooling/warming therapies per order  - Administer medications as ordered  - Instruct and encourage patient and family to use good hand hygiene technique  - Identify and instruct in appropriate isolation precautions for identified infection/condition  Outcome: Progressing  Goal: Absence of fever/infection during neutropenic period  Description: INTERVENTIONS:  - Monitor WBC  - Perform strict hand hygiene  - Limit to healthy visitors only  - No plants, dried, fresh or silk flowers with beckett in patient room  Outcome: Progressing     Problem: SAFETY ADULT  Goal: Patient will remain free of falls  Description: INTERVENTIONS:  - Educate patient/family on patient safety including physical limitations  - Instruct patient to call for assistance with activity   - Consider consulting OT/PT to assist with strengthening/mobility based on AM PAC & -HLM score  - Consult OT/PT to assist with strengthening/mobility   - Keep Call bell within reach  - Keep bed low and locked with side rails adjusted as appropriate  - Keep care items and personal belongings within reach  - Initiate and maintain comfort rounds  - Make Fall Risk Sign visible to staff  - Offer Toileting every  Hours, in advance of need  - Initiate/Maintain alarm  - Obtain necessary fall risk management equipment:   - Apply yellow socks and bracelet for high fall risk patients  - Consider moving patient to room near nurses station  Outcome: Progressing  Goal: Maintain or return to baseline ADL function  Description: INTERVENTIONS:  -  Assess patient's ability to carry out ADLs; assess patient's baseline for ADL function and identify physical deficits which impact ability to perform ADLs (bathing, care of mouth/teeth,  toileting, grooming, dressing, etc.)  - Assess/evaluate cause of self-care deficits   - Assess range of motion  - Assess patient's mobility; develop plan if impaired  - Assess patient's need for assistive devices and provide as appropriate  - Encourage maximum independence but intervene and supervise when necessary  - Involve family in performance of ADLs  - Assess for home care needs following discharge   - Consider OT consult to assist with ADL evaluation and planning for discharge  - Provide patient education as appropriate  - Monitor functional capacity and physical performance, use of AM PAC & JH-HLM   - Monitor gait, balance and fatigue with ambulation    Outcome: Progressing  Goal: Maintains/Returns to pre admission functional level  Description: INTERVENTIONS:  - Perform AM-PAC 6 Click Basic Mobility/ Daily Activity assessment daily.  - Set and communicate daily mobility goal to care team and patient/family/caregiver.   - Collaborate with rehabilitation services on mobility goals if consulted  - Perform Range of Motion  times a day.  - Reposition patient every  hours.  - Dangle patient  times a day  - Stand patient  times a day  - Ambulate patient  times a day  - Out of bed to chair  times a day   - Out of bed for meals  times a day  - Out of bed for toileting  - Record patient progress and toleration of activity level   Outcome: Progressing     Problem: DISCHARGE PLANNING  Goal: Discharge to home or other facility with appropriate resources  Description: INTERVENTIONS:  - Identify barriers to discharge w/patient and caregiver  - Arrange for needed discharge resources and transportation as appropriate  - Identify discharge learning needs (meds, wound care, etc.)  - Arrange for interpretive services to assist at discharge as needed  - Refer to Case Management Department for coordinating discharge planning if the patient needs post-hospital services based on physician/advanced practitioner order or complex  needs related to functional status, cognitive ability, or social support system  Outcome: Progressing     Problem: Knowledge Deficit  Goal: Patient/family/caregiver demonstrates understanding of disease process, treatment plan, medications, and discharge instructions  Description: Complete learning assessment and assess knowledge base.  Interventions:  - Provide teaching at level of understanding  - Provide teaching via preferred learning methods  Outcome: Progressing     Problem: Nutrition/Hydration-ADULT  Goal: Nutrient/Hydration intake appropriate for improving, restoring or maintaining nutritional needs  Description: Monitor and assess patient's nutrition/hydration status for malnutrition. Collaborate with interdisciplinary team and initiate plan and interventions as ordered.  Monitor patient's weight and dietary intake as ordered or per policy. Utilize nutrition screening tool and intervene as necessary. Determine patient's food preferences and provide high-protein, high-caloric foods as appropriate.     INTERVENTIONS:  - Monitor oral intake, urinary output, labs, and treatment plans  - Assess nutrition and hydration status and recommend course of action  - Evaluate amount of meals eaten  - Assist patient with eating if necessary   - Allow adequate time for meals  - Recommend/ encourage appropriate diets, oral nutritional supplements, and vitamin/mineral supplements  - Order, calculate, and assess calorie counts as needed  - Recommend, monitor, and adjust tube feedings and TPN/PPN based on assessed needs  - Assess need for intravenous fluids  - Provide specific nutrition/hydration education as appropriate  - Include patient/family/caregiver in decisions related to nutrition  Outcome: Progressing

## 2025-07-09 NOTE — RESTORATIVE TECHNICIAN NOTE
Restorative Technician Note      Patient Name: Danita Palmer     Restorative Tech Visit Date: 07/09/25  Note Type: Mobility  Patient Position Upon Consult: Supine  Activity Performed: Ambulated  Assistive Device: Roller walker; Other (Comment) (6 L o2)  Patient Position at End of Consult: Supine; All needs within reach    Lenin Rice, Restorative Technician

## 2025-07-09 NOTE — RESPIRATORY THERAPY NOTE
07/09/25 0250   Respiratory Assessment   Resp Comments patient continues off midflow nasal cannula, on 4LPM nasal cannula   Oxygen Therapy/Pulse Ox   O2 Device Nasal cannula   O2 Therapy Oxygen humidified   Nasal Cannula O2 Flow Rate (L/min) 4 L/min   Calculated FIO2 (%) - Nasal Cannula 36   SpO2 90 %   $ Pulse Oximetry Spot Check Charge Completed

## 2025-07-09 NOTE — ASSESSMENT & PLAN NOTE
Lab Results   Component Value Date    EGFR 64 07/09/2025    EGFR 67 07/07/2025    EGFR 72 07/06/2025    CREATININE 0.83 07/09/2025    CREATININE 0.80 07/07/2025    CREATININE 0.75 07/06/2025     Renal function at baseline, monitor with BMP.  Avoid/limit nephrotoxins and hypotension

## 2025-07-09 NOTE — ASSESSMENT & PLAN NOTE
Multifactorial due to atelectasis in the setting of rib fracture, pneumonia, underlying COPD   Persistent requirement for midflow O2   Currently requiring 8 L   CT C/A/P - Interval appearance of innumerable right lower lobe micronodules with new dense left greater than right basilar consolidations.  Interval appearance of proximal bilateral lower lobe bronchial occlusions. Findings consistent aspiration pneumonia/atelectasis.  Completed 7 days of antibiotics  Continue supportive care with Mucinex, flutter valve  Chest PT/vest being avoided in the setting of rib fracture  Incentive spirometry  Pulm input appreciated  Stable on 4-5 L  Chest x-ray with mild volume.  IV diuretics today, echo ordered, BNP pending per pulmonology

## 2025-07-09 NOTE — ASSESSMENT & PLAN NOTE
Multifactorial etiology including atelectasis, pneumonia, rib fracture and underlying COPD. Currently maintains a saturation of 90% on 3L oxygen via nasal cannula.  Plan:  Wean supplemental oxygen to maintain saturation greater than 88%  Adequate pain management

## 2025-07-09 NOTE — ASSESSMENT & PLAN NOTE
Likely secondary to atelectasis in the setting of rib fractures.   Plan:  Antibiotics given for 7 days  No episodes of fever and WBC counts on decreasing trend  Flutter valve  Continue PT OT and out of bed for most of the day   Repeat chest x-ray shows new blunting of bilateral blunting of CP angles. Ordered lasix once and Echocardiography for the patient.

## 2025-07-09 NOTE — PROGRESS NOTES
Progress Note - Pulmonology   Name: Danita Palmer 84 y.o. female I MRN: [id number]  Unit/Bed#: [facility] 918-01 I Date of Admission: 6/25/2025   Date of Service: 7/9/2025 I Hospital Day: 14     Assessment & Plan  Acute hypoxic respiratory failure (HCC)  Multifactorial etiology including atelectasis, pneumonia, rib fracture and underlying COPD. Currently maintains a saturation of 90% on 3L oxygen via nasal cannula.  Plan:  Wean supplemental oxygen to maintain saturation greater than 88%  Adequate pain management  Pneumonia  Likely secondary to atelectasis in the setting of rib fractures.   Plan:  Antibiotics given for 7 days  No episodes of fever and WBC counts on decreasing trend  Flutter valve  Continue PT OT and out of bed for most of the day   Repeat chest x-ray shows new blunting of bilateral blunting of CP angles. Ordered lasix once and Echocardiography for the patient.  Left rib fracture  Fall at home, initial encounter  Nondisplaced fractures of the anterolateral left 4th and 5th ribs   Plan:  Pain control per primary team  Rib fracture protocol  I encouraged her to use incentive spirometer on at least an hourly basis  Needs to be getting out of bed, ambulating  Chronic obstructive pulmonary disease (HCC)  PFTs with moderate obstruction and CT scan shows emphysema  Home regimen: Anoro once daily, albuterol as needed  Does not require supplemental oxygen at baseline  Does not appear to be in acute exacerbation    Plan:  Continue home Anoro once daily  Xopenex TID PRN - tried scheduled but patient did not report benefit and did not like the nebulizers  Would benefit from smoking cessation  Would also benefit from outpatient pulmonary rehab  Cigarette nicotine dependence without complication  Recommend smoking cessation  Incidental lung nodule,RUL, > 3mm and < 8mm  In the setting of extensive smoking history, will need to rule out malignancy  Plan:  Repeat CT chest in 3 months with navigation protocol  If  persistent/enlarging, will refer to Dr. Martínez for navigational lung biopsy  Diarrhea  Patient complains of diarrhea. C.difficile toxin PCR negative. Stool enteric bacterial panel PCR negative. Continue monitoring. Further management by the primary team.  Severe protein-calorie malnutrition (HCC)  Malnutrition Findings:   Adult Malnutrition type: Chronic illness  Adult Degree of Malnutrition: Other severe protein calorie malnutrition  Malnutrition Characteristics: Fat loss, Muscle loss                  360 Statement: Severe protein calorie malnutrition of chronic illness related to increased energy needs as evidenced by hollow orbits, depression at the temple and protruding clavicles.  Treated with Regular diet and PT declined all supplements    BMI Findings:           Body mass index is 20.78 kg/m².       24 Hour Events :None  Subjective :Patient when seen in the morning, was comfortably resting in the bed. She mentioned reduced breathlessness compared to yesterday.     Objective :  Temp:  [98 °F (36.7 °C)-98.3 °F (36.8 °C)] 98.3 °F (36.8 °C)  HR:  [63-74] 66  BP: (121-143)/(51-65) 121/51  Resp:  [16] 16  SpO2:  [88 %-93 %] 91 %  O2 Device: Nasal cannula  Nasal Cannula O2 Flow Rate (L/min):  [3 L/min-6 L/min] 3 L/min    Physical Exam  HENT:      Head: Normocephalic.      Nose: Nose normal.      Mouth/Throat:      Mouth: Mucous membranes are moist.     Eyes:      Pupils: Pupils are equal, round, and reactive to light.       Cardiovascular:      Rate and Rhythm: Normal rate and regular rhythm.      Pulses: Normal pulses.   Pulmonary:      Comments: Bilateral basal crackles appreciated (Left>Right)  Cough present    Skin:     Capillary Refill: Capillary refill takes less than 2 seconds.     Neurological:      Mental Status: She is alert and oriented to person, place, and time.     Psychiatric:         Mood and Affect: Mood normal.           Lab Results: I have reviewed the following results:   .      07/09/25  0618 07/09/25  1025   WBC 10.89*  --    HGB 11.6  --    HCT 35.2  --    *  --    SODIUM 140  --    K 3.6  --      --    CO2 29  --    BUN 20  --    CREATININE 0.83  --    GLUC 86  --    BNP  --  96     ABG: No new results in last 24 hours.    Imaging Results Review: I reviewed radiology reports from this admission including: chest xray and CT chest.  Other Study Results Review: EKG was reviewed.     Leodan Pena MD

## 2025-07-09 NOTE — MALNUTRITION/BMI
This medical record reflects one or more clinical indicators suggestive of malnutrition.    Malnutrition Findings:   Adult Malnutrition type: Chronic illness  Adult Degree of Malnutrition: Other severe protein calorie malnutrition  Malnutrition Characteristics: Fat loss, Muscle loss    360 Statement: Severe protein calorie malnutrition of chronic illness related to increased energy needs as evidenced by hollow orbits, depression at the temple and protruding clavicles.  Treated with Regular diet and PT declined all supplements    BMI Findings:    Body mass index is 20.78 kg/m².     See Nutrition note dated 7/8/2025 for additional details.  Completed nutrition assessment is viewable in the nutrition documentation.

## 2025-07-09 NOTE — PROGRESS NOTES
Progress Note - Hospitalist   Name: Danita Palmer 84 y.o. female I MRN: [id number]  [facility] I Date of Admission: 6/25/2025   Date of Service: 7/9/2025 I Hospital Day: 14    Assessment & Plan  Generalized weakness  Presented on 6/25/25 after a fall in her neighbors driveway, trauma imaging all negative for acute traumatic injuries. During ambulatory trial in ED was unable to safely ambulate due to marked weakness  Labs with mild hyponatremia otherwise without marked abnormalities  CT head negative for acute intracranial pathology.    With sepsis secondary to pneumonia as below  PT/OT evaluations recommend level 2 rehab intensity  Left rib fracture  Nondisplaced fractures of the anterolateral left 4th and 5th ribs  Incentive spirometry and rib fracture protocol ordered  Pain control including lidocaine patch and scheduled Tylenol ordered  Fall at home, initial encounter  Presented after a fall in neighbors driveway, states she was walking when she could not stop her self and fell forward onto her knees without dizziness/lightheadedness or loss of consciousness  Patient with weakness with inability to safely ambulate  PT/OT evaluations recommend rehab, case management assistance appreciated  Acute hypoxic respiratory failure (HCC)  Multifactorial due to atelectasis in the setting of rib fracture, pneumonia, underlying COPD   Persistent requirement for midflow O2   Currently requiring 8 L   CT C/A/P - Interval appearance of innumerable right lower lobe micronodules with new dense left greater than right basilar consolidations.  Interval appearance of proximal bilateral lower lobe bronchial occlusions. Findings consistent aspiration pneumonia/atelectasis.  Completed 7 days of antibiotics  Continue supportive care with Mucinex, flutter valve  Chest PT/vest being avoided in the setting of rib fracture  Incentive spirometry  Pulm input appreciated  Stable on 4-5 L  Chest x-ray with mild volume.  IV diuretics  today, echo ordered, BNP pending per pulmonology  Sepsis ([facility])  With tachycardia and leukocytosis. Also with low grade temperatures.   Due to pneumonia  Tachycardia and leukocytosis resolved   Completed 7 days of IV ceftriaxone on 7/7  CKD stage G3a/A1, GFR 45-59 and albumin creatinine ratio <30 mg/g ([facility])  Lab Results   Component Value Date    EGFR 64 07/09/2025    EGFR 67 07/07/2025    EGFR 72 07/06/2025    CREATININE 0.83 07/09/2025    CREATININE 0.80 07/07/2025    CREATININE 0.75 07/06/2025     Renal function at baseline, monitor with BMP.  Avoid/limit nephrotoxins and hypotension  Incidental lung nodule,RUL, > 3mm and < 8mm  7 mm spiculated nodule to right upper lobe incidentally noted on CT chest  Repeat noncontrast CT chest advised at 6-12 months, patient to follow-up with PCP to arrange this.  Previous provider reviewed results with patient and her son  Disc degeneration, lumbar  Chronic, patient denies worsening back pain  Continue gabapentin, dose was reduced to 300 mg 3 times daily as patient somewhat sleepy during admission evaluation  Cigarette nicotine dependence without complication  Discussed need for cessation.    Nicotine patch   Essential hypertension  BP acceptable   Continue PTA lisinopril 10 mg daily   Monitor BP  Pneumonia  See above plan under sepsis/respiratory failure  Chronic obstructive pulmonary disease (HCC)  Not in acute exacerbation, continue PTA inhalers    Diarrhea  Several episodes of diarrhea started 7/5/25 associated with generalized abdominal pain  On exam abdomen soft, no distention, no guarding, no rigidity.  Does have diffuse tenderness  Did not receive any stool softener or laxative since 7/2/2025  C. difficile negative and stool enteric panel negative on 7/6  Recurrence of diarrhea today.  Will continue to monitor for now.  Loperamide as needed.  Severe protein-calorie malnutrition (HCC)  Malnutrition Findings:   Adult Malnutrition type: Chronic illness  Adult Degree of  Malnutrition: Other severe protein calorie malnutrition  Malnutrition Characteristics: Fat loss, Muscle loss                  360 Statement: Severe protein calorie malnutrition of chronic illness related to increased energy needs as evidenced by hollow orbits, depression at the temple and protruding clavicles.  Treated with Regular diet and PT declined all supplements    BMI Findings:           Body mass index is 20.78 kg/m².       VTE Pharmacologic Prophylaxis: VTE Score: 3 Moderate Risk (Score 3-4) - Pharmacological DVT Prophylaxis Ordered: heparin.    Mobility:   Basic Mobility Inpatient Raw Score: 18  JH-HLM Goal: 6: Walk 10 steps or more  JH-HLM Achieved: 7: Walk 25 feet or more  JH-HLM Goal achieved. Continue to encourage appropriate mobility.    Patient Centered Rounds: I performed bedside rounds with nursing staff today.   Discussions with Specialists or Other Care Team Provider: Pulmonology    Education and Discussions with Family / Patient: Patient declined call to .     Current Length of Stay: 14 day(s)  Current Patient Status: Inpatient   Certification Statement: The patient will continue to require additional inpatient hospital stay due to IV diuretics, echo pending  Discharge Plan: Anticipate discharge in 24-48 hrs to rehab facility.    Code Status: Level 1 - Full Code    Subjective   Patient assessed at bedside.  No acute complaints.  Remains on 4 L nasal cannula.  After further questioning reports she had 3 bowel movements, loose this morning.  Mild abdominal bloating pain.    Objective :  Temp:  [98.3 °F (36.8 °C)-98.5 °F (36.9 °C)] 98.5 °F (36.9 °C)  HR:  [63-76] 76  BP: (113-143)/(51-65) 113/58  Resp:  [16] 16  SpO2:  [87 %-93 %] 87 %  O2 Device: Nasal cannula  Nasal Cannula O2 Flow Rate (L/min):  [3 L/min-4 L/min] 3 L/min    Body mass index is 20.78 kg/m².     Input and Output Summary (last 24 hours):     Intake/Output Summary (Last 24 hours) at 7/9/2025 1644  Last data filed at  7/9/2025 0734  Gross per 24 hour   Intake 120 ml   Output 200 ml   Net -80 ml       Physical Exam  Vitals reviewed.   Constitutional:       General: She is not in acute distress.     Appearance: Normal appearance. She is not ill-appearing.   HENT:      Head: Normocephalic and atraumatic.     Cardiovascular:      Rate and Rhythm: Normal rate and regular rhythm.      Heart sounds: Normal heart sounds.   Pulmonary:      Effort: Pulmonary effort is normal. No respiratory distress.   Abdominal:      Palpations: Abdomen is soft.      Tenderness: There is no abdominal tenderness.     Musculoskeletal:      Right lower leg: No edema.      Left lower leg: No edema.     Neurological:      Mental Status: She is alert and oriented to person, place, and time.           Lines/Drains:              Lab Results: I have reviewed the following results:   Results from last 7 days   Lab Units 07/09/25  0618 07/06/25  0534 07/05/25  0538   WBC Thousand/uL 10.89*   < > 9.71   HEMOGLOBIN g/dL 11.6   < > 11.7   HEMATOCRIT % 35.2   < > 37.2   PLATELETS Thousands/uL 536*   < > 513*   BANDS PCT %  --   --  3   SEGS PCT % 77*   < >  --    LYMPHO PCT % 12*   < > 9*   MONO PCT % 8   < > 6   EOS PCT % 1   < > 0    < > = values in this interval not displayed.     Results from last 7 days   Lab Units 07/09/25  0618   SODIUM mmol/L 140   POTASSIUM mmol/L 3.6   CHLORIDE mmol/L 107   CO2 mmol/L 29   BUN mg/dL 20   CREATININE mg/dL 0.83   ANION GAP mmol/L 4   CALCIUM mg/dL 9.1   GLUCOSE RANDOM mg/dL 86                 Results from last 7 days   Lab Units 07/07/25  0628   PROCALCITONIN ng/ml 0.13       Recent Cultures (last 7 days):   Results from last 7 days   Lab Units 07/08/25  1039 07/06/25  0849   SPUTUM CULTURE  Culture too young- will reincubate  --    GRAM STAIN RESULT  1+ Epithelial cells per low power field  No Polys or Bacteria seen  --    C DIFF TOXIN B BY PCR   --  Negative             Last 24 Hours Medication List:     Current  Facility-Administered Medications:     acetaminophen (TYLENOL) tablet 975 mg, Q8H JOSE MANUEL    albuterol (PROVENTIL HFA,VENTOLIN HFA) inhaler 1 puff, Q4H PRN    Cholecalciferol (VITAMIN D3) tablet 2,000 Units, Daily    gabapentin (NEURONTIN) capsule 300 mg, TID    heparin (porcine) subcutaneous injection 5,000 Units, Q8H JOSE MANUEL    latanoprost (XALATAN) 0.005 % ophthalmic solution 1 drop, HS    levalbuterol (XOPENEX) inhalation solution 1.25 mg, TID    lidocaine (LIDODERM) 5 % patch 1 patch, Daily    lisinopril (ZESTRIL) tablet 10 mg, Daily    loperamide (IMODIUM) capsule 2 mg, 4x Daily PRN    methocarbamol (ROBAXIN) tablet 500 mg, Q8H JOSE MANUEL    nicotine (NICODERM CQ) 14 mg/24hr TD 24 hr patch 1 patch, Daily    ondansetron (ZOFRAN) injection 4 mg, Q6H PRN    oxyCODONE (ROXICODONE) split tablet 2.5 mg, Q4H PRN **OR** oxyCODONE (ROXICODONE) IR tablet 5 mg, Q4H PRN    pantoprazole (PROTONIX) EC tablet 40 mg, Early Morning    sodium chloride 3 % inhalation solution 4 mL, TID    umeclidinium-vilanterol 62.5-25 mcg/actuation inhaler 1 puff, Daily    Administrative Statements   Today, Patient Was Seen By: Omar Casiano DO      **Please Note: This note may have been constructed using a voice recognition system.**

## 2025-07-09 NOTE — PLAN OF CARE
Problem: Prexisting or High Potential for Compromised Skin Integrity  Goal: Skin integrity is maintained or improved  Description: INTERVENTIONS:  - Identify patients at risk for skin breakdown  - Assess and monitor skin integrity including under and around medical devices   - Assess and monitor nutrition and hydration status  - Monitor labs  - Assess for incontinence   - Turn and reposition patient  - Assist with mobility/ambulation  - Relieve pressure over oneil prominences   - Avoid friction and shearing  - Provide appropriate hygiene as needed including keeping skin clean and dry  - Evaluate need for skin moisturizer/barrier cream  - Collaborate with interdisciplinary team  - Patient/family teaching  - Consider wound care consult

## 2025-07-09 NOTE — PLAN OF CARE
Problem: PHYSICAL THERAPY ADULT  Goal: Performs mobility at highest level of function for planned discharge setting.  See evaluation for individualized goals.  Description: Treatment/Interventions: Functional transfer training, LE strengthening/ROM, Therapeutic exercise, Elevations, Endurance training, Equipment eval/education, Patient/family training, Bed mobility, Gait training, Continued evaluation, Cognitive reorientation, Compensatory technique education, Spoke to nursing, OT  Equipment Recommended:  (TBD with further mobility)       See flowsheet documentation for full assessment, interventions and recommendations.  Outcome: Progressing  Note: Prognosis: Fair  Problem List: Decreased strength, Decreased endurance, Impaired balance, Decreased mobility, Impaired judgement, Decreased safety awareness  Assessment: Pt participated in skilled PT treatment session w/ emphasis on gait training w/ LRAD + stair training. Pt demonstrated good safety w/ rollator after initial instructions for brake management. Gait training progressed to no AD use. Pt more unsteady + w/ increasing trunk flexion w/o AD, posing her at risk for falls. No REVELES noted throughout session. SpO2 WNL. Pt progressing towards independence but still overall limited by impaired balance, core strength, + activity tolerance. Pt will benefit from continued skilled PT to address these impairments + rehab upon d/c.  Barriers to Discharge: Inaccessible home environment, Decreased caregiver support     Rehab Resource Intensity Level, PT: II (Moderate Resource Intensity)    See flowsheet documentation for full assessment.

## 2025-07-09 NOTE — ASSESSMENT & PLAN NOTE
Malnutrition Findings:   Adult Malnutrition type: Chronic illness  Adult Degree of Malnutrition: Other severe protein calorie malnutrition  Malnutrition Characteristics: Fat loss, Muscle loss                  360 Statement: Severe protein calorie malnutrition of chronic illness related to increased energy needs as evidenced by hollow orbits, depression at the temple and protruding clavicles.  Treated with Regular diet and PT declined all supplements    BMI Findings:           Body mass index is 20.78 kg/m².

## 2025-07-09 NOTE — ASSESSMENT & PLAN NOTE
Several episodes of diarrhea started 7/5/25 associated with generalized abdominal pain  On exam abdomen soft, no distention, no guarding, no rigidity.  Does have diffuse tenderness  Did not receive any stool softener or laxative since 7/2/2025  C. difficile negative and stool enteric panel negative on 7/6  Recurrence of diarrhea today.  Will continue to monitor for now.  Loperamide as needed.

## 2025-07-09 NOTE — CASE MANAGEMENT
Case Management Discharge Planning Note    Patient name Danita Palmer  Location [facility] 918/[facility] 918-01 MRN [id number]  : 1940 Date 2025       Current Admission Date: 2025  Current Admission Diagnosis:Generalized weakness   Patient Active Problem List    Diagnosis Date Noted    Severe protein-calorie malnutrition (HCC) 2025    Diarrhea 2025    Pneumonia 2025    Sepsis (HCC) 2025    Acute hypoxic respiratory failure (HCC) 2025    Left rib fracture 2025    Generalized weakness 2025    Fall at home, initial encounter 2025    Incidental lung nodule,RUL, > 3mm and < 8mm 2025    Hypertriglyceridemia 10/23/2024    Neuropathy 2024    Mild protein-calorie malnutrition (HCC) 2023    H/O lumpectomy 2021    Essential hypertension 2020    Cigarette nicotine dependence without complication 2019    CKD stage G3a/A1, GFR 45-59 and albumin creatinine ratio <30 mg/g (HCC) 2017    Suspicious nevus 2017    Disc degeneration, lumbar 2017    Post herpetic neuralgia 2016    Liver cyst 2016    Glaucoma 2014    Osteopenia 2013    Chronic obstructive pulmonary disease (HCC) 2012      LOS (days): 14  Geometric Mean LOS (GMLOS) (days): 3  Days to GMLOS:-11.2     OBJECTIVE:  Risk of Unplanned Readmission Score: 12.75         Current admission status: Inpatient   Preferred Pharmacy:   [id number] - [place], [place] - [address]  [address]  [place] [address]  Phone: [phone] Fax: [phone]    Primary Care Provider: Duke Barnes MD    Primary Insurance: [employer] [facility]  Secondary Insurance:     DISCHARGE DETAILS:    Other Referral/Resources/Interventions Provided:  Referral Comments: Per SLIM, patient not yet medically stable for DC, CM to follow

## 2025-07-09 NOTE — DISCHARGE SUPPORT
Case Management Assessment & Discharge Planning Note    Patient name Danita Palmer  Location [facility] 918/[facility] 918-01 MRN [id number]  : 1940 Date 2025       Current Admission Date: 2025  Current Admission Diagnosis:Generalized weakness   Patient Active Problem List    Diagnosis Date Noted    Severe protein-calorie malnutrition (HCC) 2025    Diarrhea 2025    Pneumonia 2025    Sepsis (HCC) 2025    Acute hypoxic respiratory failure (HCC) 2025    Left rib fracture 2025    Generalized weakness 2025    Fall at home, initial encounter 2025    Incidental lung nodule,RUL, > 3mm and < 8mm 2025    Hypertriglyceridemia 10/23/2024    Neuropathy 2024    Mild protein-calorie malnutrition (HCC) 2023    H/O lumpectomy 2021    Essential hypertension 2020    Cigarette nicotine dependence without complication 2019    CKD stage G3a/A1, GFR 45-59 and albumin creatinine ratio <30 mg/g (HCC) 2017    Suspicious nevus 2017    Disc degeneration, lumbar 2017    Post herpetic neuralgia 2016    Liver cyst 2016    Glaucoma 2014    Osteopenia 2013    Chronic obstructive pulmonary disease (HCC) 2012      LOS (days): 14  Geometric Mean LOS (GMLOS) (days): 3  Days to GMLOS:-11.1   Facility Authorization Approved  [facility] received approved auth for: SNF  Insurance: [employer]  Determination made after peer to peer was completed?: Not applicable  Authorization Obtained Via: Portal  [facility] Name: [facility]  Approved Authorization Number: [id number]  Start of Care Date: 25  Next Review Date: 07/10/25  Submit Next Review to: ALKA#: [phone] / H&CC Portal   notified: Maura SANDHU     Updates to authorization status will be noted in chart.    Please reach out to CM for updates on any clinical information.

## 2025-07-10 LAB
BACTERIA SPT RESP CULT: ABNORMAL
BACTERIA SPT RESP CULT: ABNORMAL
GRAM STN SPEC: ABNORMAL
GRAM STN SPEC: ABNORMAL

## 2025-07-10 PROCEDURE — 99232 SBSQ HOSP IP/OBS MODERATE 35: CPT | Performed by: INTERNAL MEDICINE

## 2025-07-10 PROCEDURE — 94640 AIRWAY INHALATION TREATMENT: CPT

## 2025-07-10 PROCEDURE — 94760 N-INVAS EAR/PLS OXIMETRY 1: CPT

## 2025-07-10 PROCEDURE — 99232 SBSQ HOSP IP/OBS MODERATE 35: CPT | Performed by: STUDENT IN AN ORGANIZED HEALTH CARE EDUCATION/TRAINING PROGRAM

## 2025-07-10 PROCEDURE — 94664 DEMO&/EVAL PT USE INHALER: CPT

## 2025-07-10 RX ADMIN — HEPARIN SODIUM 5000 UNITS: 5000 INJECTION INTRAVENOUS; SUBCUTANEOUS at 06:35

## 2025-07-10 RX ADMIN — SODIUM CHLORIDE SOLN NEBU 3% 4 ML: 3 NEBU SOLN at 07:12

## 2025-07-10 RX ADMIN — SODIUM CHLORIDE SOLN NEBU 3% 4 ML: 3 NEBU SOLN at 13:53

## 2025-07-10 RX ADMIN — Medication 2000 UNITS: at 09:21

## 2025-07-10 RX ADMIN — HEPARIN SODIUM 5000 UNITS: 5000 INJECTION INTRAVENOUS; SUBCUTANEOUS at 21:52

## 2025-07-10 RX ADMIN — LEVALBUTEROL HYDROCHLORIDE 1.25 MG: 1.25 SOLUTION RESPIRATORY (INHALATION) at 07:12

## 2025-07-10 RX ADMIN — ACETAMINOPHEN 975 MG: 325 TABLET ORAL at 06:35

## 2025-07-10 RX ADMIN — LEVALBUTEROL HYDROCHLORIDE 1.25 MG: 1.25 SOLUTION RESPIRATORY (INHALATION) at 13:53

## 2025-07-10 RX ADMIN — PANTOPRAZOLE SODIUM 40 MG: 40 TABLET, DELAYED RELEASE ORAL at 06:34

## 2025-07-10 RX ADMIN — GABAPENTIN 300 MG: 300 CAPSULE ORAL at 18:16

## 2025-07-10 RX ADMIN — HEPARIN SODIUM 5000 UNITS: 5000 INJECTION INTRAVENOUS; SUBCUTANEOUS at 14:48

## 2025-07-10 RX ADMIN — NICOTINE 1 PATCH: 14 PATCH, EXTENDED RELEASE TRANSDERMAL at 09:27

## 2025-07-10 RX ADMIN — LEVALBUTEROL HYDROCHLORIDE 1.25 MG: 1.25 SOLUTION RESPIRATORY (INHALATION) at 19:26

## 2025-07-10 RX ADMIN — LATANOPROST 1 DROP: 50 SOLUTION OPHTHALMIC at 21:52

## 2025-07-10 RX ADMIN — UMECLIDINIUM BROMIDE AND VILANTEROL TRIFENATATE 1 PUFF: 62.5; 25 POWDER RESPIRATORY (INHALATION) at 18:16

## 2025-07-10 RX ADMIN — GABAPENTIN 300 MG: 300 CAPSULE ORAL at 09:21

## 2025-07-10 RX ADMIN — METHOCARBAMOL 500 MG: 500 TABLET ORAL at 06:34

## 2025-07-10 NOTE — RESPIRATORY THERAPY NOTE
07/10/25 0712   Inhalation Therapy Tx   $ Inhalation Therapy Performed Yes   $ Pulse Oximetry Spot Check Charge Completed   SpO2 92 %   Pre-Treatment Pulse 70   Pre-Treatment Respirations 14   Breath Sounds Pre-Treatment Bilateral Diminished   Breath Sounds Post-Treatment Bilateral Diminished   Post-Treatment Pulse 72   Post-Treatment Respirations 14   Delivery Source UDN;Oxygen   Position Semi Nguyen's   Treatment Tolerance Tolerated well   Resp Comments pt appears comfortable and tolerating treatments well at this time

## 2025-07-10 NOTE — ASSESSMENT & PLAN NOTE
Multifactorial due to atelectasis in the setting of rib fracture, pneumonia, underlying COPD   CT C/A/P - Interval appearance of innumerable right lower lobe micronodules with new dense left greater than right basilar consolidations.  Interval appearance of proximal bilateral lower lobe bronchial occlusions. Findings consistent aspiration pneumonia/atelectasis.  Completed 7 days of antibiotics  Continue supportive care with Mucinex, flutter valve  Chest PT/vest being avoided in the setting of rib fracture  Incentive spirometry  Pulm input appreciated  Requiring 5 to 6 L nasal cannula with up to 8 L with exertion when working with physical therapy today.  S/p IV diuretics on 7/9 without any improvement in oxygen requirements  7/9 chest x-ray with bibasilar consolidations stable and bilateral small pleural effusions  7/9 TTE EF 60%, grade 1 diastolic dysfunction, normal IVC

## 2025-07-10 NOTE — ASSESSMENT & PLAN NOTE
Several episodes of diarrhea started 7/5/25 associated with generalized abdominal pain  On exam abdomen soft, no distention, no guarding, no rigidity.  Does have diffuse tenderness  Did not receive any stool softener or laxative since 7/2/2025  C. difficile negative and stool enteric panel negative on 7/6  Only 1 bowel movement today.  Improving.  Monitor.

## 2025-07-10 NOTE — ASSESSMENT & PLAN NOTE
PFTs with moderate obstruction and CT scan shows emphysema. On Anoro and albuterol PRN. Does not require supplemental oxygen at baseline. Does not appear to be in acute exacerbation  Continue home Anoro once daily  Would benefit from smoking cessation  Would also benefit from outpatient pulmonary rehab

## 2025-07-10 NOTE — ASSESSMENT & PLAN NOTE
Likely secondary to atelectasis in the setting of rib fractures. Received 7 day course of antibiotics  Continue to monitor for signs of infection

## 2025-07-10 NOTE — ASSESSMENT & PLAN NOTE
Multifactorial etiology including atelectasis, pneumonia, rib fracture and underlying COPD  Continue supplemental oxygen to maintain SpO2 > 88%  Wean supplemental oxygen as tolerated  Encourage OOB as tolerated, incentive spirometry, flutter valve

## 2025-07-10 NOTE — PROGRESS NOTES
Progress Note - Pulmonology   Name: Danita Palmer 84 y.o. female I MRN: [id number]  Unit/Bed#: [facility] 918-01 I Date of Admission: 6/25/2025   Date of Service: 7/10/2025 I Hospital Day: 15     Assessment & Plan  Acute hypoxic respiratory failure (HCC)  Multifactorial etiology including atelectasis, pneumonia, rib fracture and underlying COPD  Continue supplemental oxygen to maintain SpO2 > 88%  Wean supplemental oxygen as tolerated  Encourage OOB as tolerated, incentive spirometry, flutter valve  Pneumonia  Likely secondary to atelectasis in the setting of rib fractures. Received 7 day course of antibiotics  Continue to monitor for signs of infection  Left rib fracture  Fall at home, initial encounter  Nondisplaced fractures of the anterolateral left 4th and 5th ribs   Pain control per primary team  Rib fracture protocol  I encouraged her to use incentive spirometer on at least an hourly basis  Encourage OOB as tolerated   Chronic obstructive pulmonary disease (HCC)  PFTs with moderate obstruction and CT scan shows emphysema. On Anoro and albuterol PRN. Does not require supplemental oxygen at baseline. Does not appear to be in acute exacerbation  Continue home Anoro once daily  Would benefit from smoking cessation  Would also benefit from outpatient pulmonary rehab  Cigarette nicotine dependence without complication  Recommend smoking cessation  Incidental lung nodule,RUL, > 3mm and < 8mm  In the setting of extensive smoking history, will need to rule out malignancy  Plan:  Repeat CT chest in 3 months with navigation protocol  If persistent/enlarging, will refer to Dr. Martínez for navigational lung biopsy  Diarrhea  Patient complains of diarrhea. C.difficile toxin PCR negative. Stool enteric bacterial panel PCR negative. Continue monitoring. Further management by the primary team.    24 Hour Events : No acute overnight events.   Subjective :Patient when seen in the morning, was comfortably resting in the bed. She  mentioned reduced breathlessness compared to yesterday.     Objective :  Temp:  [97.9 °F (36.6 °C)-98.5 °F (36.9 °C)] 98.4 °F (36.9 °C)  HR:  [68-84] 76  BP: ()/(49-66) 98/49  Resp:  [18-23] 23  SpO2:  [84 %-92 %] 92 %  O2 Device: Nasal cannula  Nasal Cannula O2 Flow Rate (L/min):  [5 L/min-6 L/min] 6 L/min    Physical Exam  HENT:      Head: Normocephalic.      Nose: Nose normal.      Mouth/Throat:      Mouth: Mucous membranes are moist.     Eyes:      Pupils: Pupils are equal, round, and reactive to light.       Cardiovascular:      Rate and Rhythm: Normal rate and regular rhythm.      Pulses: Normal pulses.   Pulmonary:      Comments: Bilateral basal crackles appreciated (Left>Right)  Cough present    Skin:     Capillary Refill: Capillary refill takes less than 2 seconds.     Neurological:      Mental Status: She is alert and oriented to person, place, and time.     Psychiatric:         Mood and Affect: Mood normal.           Lab Results: I have reviewed the following results:   No new results in last 24 hours.    ABG: No new results in last 24 hours.    Imaging Results Review: I reviewed radiology reports from this admission including: chest xray and CT chest.  Other Study Results Review: EKG was reviewed.     Lea Leigh MD

## 2025-07-10 NOTE — RESTORATIVE TECHNICIAN NOTE
Restorative Technician Note      Patient Name: Danita Palmer     Restorative Tech Visit Date: 07/10/25  Note Type: Mobility  Patient Position Upon Consult: Supine  Activity Performed: Ambulated (To and from BR)  Assistive Device: Other (Comment) (None)  Patient Position at End of Consult: Supine; All needs within reach    Lenin Rice, Restorative Technician

## 2025-07-10 NOTE — ASSESSMENT & PLAN NOTE
Nondisplaced fractures of the anterolateral left 4th and 5th ribs   Pain control per primary team  Rib fracture protocol  I encouraged her to use incentive spirometer on at least an hourly basis  Encourage OOB as tolerated

## 2025-07-10 NOTE — PROGRESS NOTES
Progress Note - Hospitalist   Name: Danita Palmer 84 y.o. female I MRN: [id number]  [facility] I Date of Admission: 6/25/2025   Date of Service: 7/10/2025 I Hospital Day: 15    Assessment & Plan  Generalized weakness  Presented on 6/25/25 after a fall in her neighbors driveway, trauma imaging all negative for acute traumatic injuries. During ambulatory trial in ED was unable to safely ambulate due to marked weakness  Labs with mild hyponatremia otherwise without marked abnormalities  CT head negative for acute intracranial pathology.    With sepsis secondary to pneumonia as below  PT/OT evaluations recommend level 2 rehab intensity  Left rib fracture  Nondisplaced fractures of the anterolateral left 4th and 5th ribs  Incentive spirometry and rib fracture protocol ordered  Pain control including lidocaine patch and scheduled Tylenol ordered  Fall at home, initial encounter  Presented after a fall in neighbors driveway, states she was walking when she could not stop her self and fell forward onto her knees without dizziness/lightheadedness or loss of consciousness  Patient with weakness with inability to safely ambulate  PT/OT evaluations recommend rehab, case management assistance appreciated  Acute hypoxic respiratory failure (HCC)  Multifactorial due to atelectasis in the setting of rib fracture, pneumonia, underlying COPD   CT C/A/P - Interval appearance of innumerable right lower lobe micronodules with new dense left greater than right basilar consolidations.  Interval appearance of proximal bilateral lower lobe bronchial occlusions. Findings consistent aspiration pneumonia/atelectasis.  Completed 7 days of antibiotics  Continue supportive care with Mucinex, flutter valve  Chest PT/vest being avoided in the setting of rib fracture  Incentive spirometry  Pulm input appreciated  Requiring 5 to 6 L nasal cannula with up to 8 L with exertion when working with physical therapy today.  S/p IV diuretics on  7/9 without any improvement in oxygen requirements  7/9 chest x-ray with bibasilar consolidations stable and bilateral small pleural effusions  7/9 TTE EF 60%, grade 1 diastolic dysfunction, normal IVC  Sepsis (HCC)  With tachycardia and leukocytosis. Also with low grade temperatures.   Due to pneumonia  Tachycardia and leukocytosis resolved   Completed 7 days of IV ceftriaxone on 7/7  CKD stage G3a/A1, GFR 45-59 and albumin creatinine ratio <30 mg/g ([facility])  Lab Results   Component Value Date    EGFR 64 07/09/2025    EGFR 67 07/07/2025    EGFR 72 07/06/2025    CREATININE 0.83 07/09/2025    CREATININE 0.80 07/07/2025    CREATININE 0.75 07/06/2025     Renal function at baseline, monitor with BMP.  Avoid/limit nephrotoxins and hypotension  Incidental lung nodule,RUL, > 3mm and < 8mm  7 mm spiculated nodule to right upper lobe incidentally noted on CT chest  Repeat noncontrast CT chest advised at 6-12 months, patient to follow-up with PCP to arrange this.  Previous provider reviewed results with patient and her son  Disc degeneration, lumbar  Chronic, patient denies worsening back pain  Continue gabapentin, dose was reduced to 300 mg 3 times daily as patient somewhat sleepy during admission evaluation  Cigarette nicotine dependence without complication  Discussed need for cessation.    Nicotine patch   Essential hypertension  BP acceptable   Continue PTA lisinopril 10 mg daily   Monitor BP  Pneumonia  See above plan under sepsis/respiratory failure  Chronic obstructive pulmonary disease (HCC)  Not in acute exacerbation, continue PTA inhalers    Diarrhea  Several episodes of diarrhea started 7/5/25 associated with generalized abdominal pain  On exam abdomen soft, no distention, no guarding, no rigidity.  Does have diffuse tenderness  Did not receive any stool softener or laxative since 7/2/2025  C. difficile negative and stool enteric panel negative on 7/6  Only 1 bowel movement today.  Improving.  Monitor.  Severe  protein-calorie malnutrition (HCC)  Malnutrition Findings:   Adult Malnutrition type: Chronic illness  Adult Degree of Malnutrition: Other severe protein calorie malnutrition  Malnutrition Characteristics: Fat loss, Muscle loss                  360 Statement: Severe protein calorie malnutrition of chronic illness related to increased energy needs as evidenced by hollow orbits, depression at the temple and protruding clavicles.  Treated with Regular diet and PT declined all supplements    BMI Findings:           Body mass index is 20.78 kg/m².       VTE Pharmacologic Prophylaxis: VTE Score: 3 Moderate Risk (Score 3-4) - Pharmacological DVT Prophylaxis Ordered: heparin.    Mobility:   Basic Mobility Inpatient Raw Score: 18  JH-HLM Goal: 6: Walk 10 steps or more  JH-HLM Achieved: 6: Walk 10 steps or more  JH-HLM Goal achieved. Continue to encourage appropriate mobility.    Patient Centered Rounds: I performed bedside rounds with nursing staff today.   Discussions with Specialists or Other Care Team Provider: Pulmonology    Education and Discussions with Family / Patient: Will update later at bedside.     Current Length of Stay: 15 day(s)  Current Patient Status: Inpatient   Certification Statement: The patient will continue to require additional inpatient hospital stay due to respiratory failure requiring oxygen above goal for his discharge to SNF  Discharge Plan: Anticipate discharge in 24-48 hrs to rehab facility.    Code Status: Level 1 - Full Code    Subjective   Patient assessed at bedside.  No acute complaints.  Diarrhea resolving.  Continues to have shallow breaths.  Denies any shortness of breath.  Encouraged incentive spirometer.    Objective :  Temp:  [97.9 °F (36.6 °C)-98.4 °F (36.9 °C)] 98 °F (36.7 °C)  HR:  [68-87] 87  BP: ()/(49-66) 101/49  Resp:  [18-23] 18  SpO2:  [84 %-94 %] 90 %  O2 Device: Nasal cannula  Nasal Cannula O2 Flow Rate (L/min):  [5 L/min-6 L/min] 6 L/min    Body mass index is 20.78  kg/m².     Input and Output Summary (last 24 hours):     Intake/Output Summary (Last 24 hours) at 7/10/2025 1657  Last data filed at 7/10/2025 0601  Gross per 24 hour   Intake 240 ml   Output --   Net 240 ml       Physical Exam  Vitals reviewed.   Constitutional:       General: She is not in acute distress.     Appearance: Normal appearance. She is not ill-appearing.   HENT:      Head: Normocephalic and atraumatic.     Cardiovascular:      Rate and Rhythm: Normal rate and regular rhythm.      Heart sounds: Normal heart sounds.   Pulmonary:      Effort: Pulmonary effort is normal. No respiratory distress.      Breath sounds: No wheezing or rales.   Abdominal:      Palpations: Abdomen is soft.      Tenderness: There is no abdominal tenderness.     Musculoskeletal:      Right lower leg: No edema.      Left lower leg: No edema.     Neurological:      Mental Status: She is alert and oriented to person, place, and time.           Lines/Drains:              Lab Results: I have reviewed the following results:   Results from last 7 days   Lab Units 07/09/25  0618 07/06/25  0534 07/05/25  0538   WBC Thousand/uL 10.89*   < > 9.71   HEMOGLOBIN g/dL 11.6   < > 11.7   HEMATOCRIT % 35.2   < > 37.2   PLATELETS Thousands/uL 536*   < > 513*   BANDS PCT %  --   --  3   SEGS PCT % 77*   < >  --    LYMPHO PCT % 12*   < > 9*   MONO PCT % 8   < > 6   EOS PCT % 1   < > 0    < > = values in this interval not displayed.     Results from last 7 days   Lab Units 07/09/25  0618   SODIUM mmol/L 140   POTASSIUM mmol/L 3.6   CHLORIDE mmol/L 107   CO2 mmol/L 29   BUN mg/dL 20   CREATININE mg/dL 0.83   ANION GAP mmol/L 4   CALCIUM mg/dL 9.1   GLUCOSE RANDOM mg/dL 86                 Results from last 7 days   Lab Units 07/07/25  0628   PROCALCITONIN ng/ml 0.13       Recent Cultures (last 7 days):   Results from last 7 days   Lab Units 07/08/25  1039 07/06/25  0849   SPUTUM CULTURE  1+ Growth of Candida albicans*  1+ Growth of  --    GRAM STAIN  RESULT  1+ Epithelial cells per low power field  No Polys or Bacteria seen  --    C DIFF TOXIN B BY PCR   --  Negative       Imaging Results Review: I reviewed radiology reports from this admission including: Echocardiogram.  Other Study Results Review: No additional pertinent studies reviewed.    Last 24 Hours Medication List:     Current Facility-Administered Medications:     acetaminophen (TYLENOL) tablet 975 mg, Q8H JOSE MANUEL    albuterol (PROVENTIL HFA,VENTOLIN HFA) inhaler 1 puff, Q4H PRN    Cholecalciferol (VITAMIN D3) tablet 2,000 Units, Daily    gabapentin (NEURONTIN) capsule 300 mg, TID    heparin (porcine) subcutaneous injection 5,000 Units, Q8H JOSE MANUEL    latanoprost (XALATAN) 0.005 % ophthalmic solution 1 drop, HS    levalbuterol (XOPENEX) inhalation solution 1.25 mg, TID    lidocaine (LIDODERM) 5 % patch 1 patch, Daily    lisinopril (ZESTRIL) tablet 10 mg, Daily    loperamide (IMODIUM) capsule 2 mg, 4x Daily PRN    methocarbamol (ROBAXIN) tablet 500 mg, Q8H JOSE MANUEL    nicotine (NICODERM CQ) 14 mg/24hr TD 24 hr patch 1 patch, Daily    ondansetron (ZOFRAN) injection 4 mg, Q6H PRN    oxyCODONE (ROXICODONE) split tablet 2.5 mg, Q4H PRN **OR** oxyCODONE (ROXICODONE) IR tablet 5 mg, Q4H PRN    pantoprazole (PROTONIX) EC tablet 40 mg, Early Morning    sodium chloride 3 % inhalation solution 4 mL, TID    umeclidinium-vilanterol 62.5-25 mcg/actuation inhaler 1 puff, Daily    Administrative Statements   Today, Patient Was Seen By: Omar Casiano, DO  I have spent a total time of 35 minutes in caring for this patient on the day of the visit/encounter including Impressions, Counseling / Coordination of care, Documenting in the medical record, Reviewing/placing orders in the medical record (including tests, medications, and/or procedures), Obtaining or reviewing history  , and Communicating with other healthcare professionals .    **Please Note: This note may have been constructed using a voice recognition system.**

## 2025-07-10 NOTE — RESPIRATORY THERAPY NOTE
07/09/25 2002   Respiratory Assessment   Resp Comments patient refusing UDN treatment at this time. States she does not want. Patient continues on nasal cannula, 5LPM, tolerating at this time   Oxygen Therapy/Pulse Ox   O2 Device Nasal cannula   O2 Therapy Oxygen humidified   Nasal Cannula O2 Flow Rate (L/min) 5 L/min   Calculated FIO2 (%) - Nasal Cannula 40   $ Pulse Oximetry Spot Check Charge Completed

## 2025-07-10 NOTE — ASSESSMENT & PLAN NOTE
Nondisplaced fractures of the anterolateral left 4th and 5th ribs   Pain control per primary team  Rib fracture protocol  I encouraged her to use incentive spirometer on at least an hourly basis  Encourage OOB as tolerated    SURGICAL CONSULTATION    This consultation was requested by Dr. David Kimball for an evaluation regarding scalp mass.    HISTORY OF THE PRESENT ILLNESS:       This  66 year old male presents with 2 large scalp masses on the right parietal scalp.  This was actually evaluated by dermatology on 31 January 2023 by Dr. Farhad Syed notified \"seborrheic keratosis (noninflamed) of the right scalp and recommended cryosurgery for curettage.  This did not occur.    The patient reports that he has discomfort in this area.  He wishes to have this addressed.      PAST HISTORY:  1.   Past Medical History:   Diagnosis Date    Backache, unspecified     Chronic pain     Hernia of unspecified site of abdominal cavity without mention of obstruction or gangrene     inguinal    Hyperplastic colon polyp 12/06/2023    Dr. Sharpe: one <1 cm HP    Internal hemorrhoids 12/06/2023    Dr. Sharpe    Other acquired calcaneus deformity     Paroxysmal supraventricular tachycardia (CMD) 02/01/2016    for 3 minutes at time of colonocscopy at 180    PMH of 01/01/2015    meprobamate on urine sample    Synovial cyst of popliteal space 01/01/2004    left    Tubular adenoma of colon 02/01/2016     2 TUBULAR ADENOMAS     Tubular adenoma of colon 12/06/2023    Dr. Sharpe: one <1 cm TA    Weight loss 08/30/2024       2.   Past Surgical History:   Procedure Laterality Date    Arthrotomy/explore/treat knee joint  10/18/2004    Morse Cyst removal - Dr Bah    Colonoscopy diagnostic  12/06/2023    Dr. Sharpe: one <1 cm TA polyp, one <1 cm hyperplastic polyp, internal hemorrhoids, 7 yr recall    Inject epidural anest,lumbar,single      Open access colonoscopy  02/16 REPAT 5 YEARS    Colonoscopy, Screen    Removal of sperm duct(s)      Vasectomy    Repair ing hernia,5+y/o,reducibl      Hernia, inguinal    Repair ing hernia,5+y/o,reducibl  04/01/2008    Dr. Rodriguez, Rt Inguinal Hernia         REVIEW OF SYSTEMS:  The patient denies any recent history of  chest pain, palpitations, SOB, ELIZONDO,  weight changes, pedal edema, or a tendency to bleed easily.There is no history of PUD, DVT, PE, diabetes, hypertension, hepatitis. The patient denies any recent history of melena, hematochezia, hematuria, dysuria, recent development of a rash, calf or hip claudication, or neurological hemispheric symptoms. The remainder of the 13 review of systems is negative.    PATIENT PROFILE  1. Social History  Social History     Socioeconomic History    Marital status: /Civil Union     Spouse name: Yoko    Number of children: 2    Years of education: 12    Highest education level: Not on file   Occupational History    Occupation:      Comment: concept machine   Tobacco Use    Smoking status: Former     Current packs/day: 0.00     Average packs/day: 0.5 packs/day for 42.0 years (21.0 ttl pk-yrs)     Types: Cigarettes     Start date: 1979     Quit date: 2021     Years since quittin.2    Smokeless tobacco: Never   Vaping Use    Vaping status: Some Days   Substance and Sexual Activity    Alcohol use: Yes     Alcohol/week: 24.0 standard drinks of alcohol     Types: 24 Cans of beer per week    Drug use: Never    Sexual activity: Yes     Partners: Female   Other Topics Concern    Not on file   Social History Narrative    Not on file     Social Drivers of Health     Financial Resource Strain: Not on file   Food Insecurity: Low Risk  (10/2/2024)    Food Insecurity     Worried about Food: Never true     Food is Gone: Never true   Transportation Needs: Not At Risk (10/2/2024)    Transportation Needs     Lack of Reliable Transportation: No   Physical Activity: Not on file   Stress: Not on file   Social Connections: Not on file   Feeling safe in your relationship: Not At Risk (2023)    Interpersonal Safety     Social Determinants: Intimate Partner Violence Past Fear: No     Social Determinants: Intimate Partner Violence Current Fear: No       2. Habits:   Social  History     Tobacco Use   Smoking Status Former    Current packs/day: 0.00    Average packs/day: 0.5 packs/day for 42.0 years (21.0 ttl pk-yrs)    Types: Cigarettes    Start date: 1979    Quit date: 2021    Years since quittin.2   Smokeless Tobacco Never       Alcohol Use: Yes           14.4 oz/week      3. Allergies: Reviewed and accepted in medical record.    4. Medications: Reviewed and accepted on medication reconciliation list.    5.  Family History   Problem Relation Age of Onset    Other Mother         lupus    Diabetes Father     Cancer Sister     No family history of gastrointestinal malignancies.    PHYSICAL EXAMINATION:  Vital signs:Blood pressure (!) 170/97, pulse (!) 107, resp. rate 16, height 5' 11\" (1.803 m), weight 69.7 kg (153 lb 8.8 oz), SpO2 97%.    General: Patient resting comfortably in NAD.  HEENT: Normocephalic, PERRLA, EOMI, sclerae nonicteric, nasopharynx  moist without erythema.  The patient has 2 large masses of the right parietal scalp measuring each approximately 3 cm in diameter.  These have a verrucous appearance.  Neck: Supple without thyromegaly, JVD, cervical bruits, or adenopathy.  Lungs: Clear bilaterally, trachea in the midline.  Heart: Cor without a murmur, S1 and S2 wnl, PMI not displaced.  Abdomen:Soft without distention, BS active and normal pitch, no masses, no tenderness, no hepatomegaly.  Back: No CVA tenderness.  Extremities: Bilateral femoral pulses, no femoral or axillary lymphadenopathy, no pedal edema.  Neurological: Alert, oriented x 3.        IMPRESSION:  1. Verrucous skin lesions of the scalp, suspect actinic keratosis.    PLAN:  1. I have recommended dermatology evaluation for treatment of the scalp lesions.  I indicated to him that excisional biopsies of these masses of the scalp is not recommended since full-thickness skin excision would leave a very large defect on the scalp that would require plastic surgery for coverage.  The patient agrees with  this plan.  A dermatology consult has been placed.    An electronic copy of this consultation will be sent to Dr. David Kimball.    Freddie Rodriguez MD

## 2025-07-10 NOTE — RESTORATIVE TECHNICIAN NOTE
Restorative Technician Note      Patient Name: Danita Palmer     Restorative Tech Visit Date: 07/10/25  Note Type: Mobility  Patient Position Upon Consult: Supine  Activity Performed: Ambulated  Assistive Device: Other (Comment) (Rollator)  Patient Position at End of Consult: Supine; All needs within reach    Lenin Rice, Restorative Technician

## 2025-07-11 LAB
ANION GAP SERPL CALCULATED.3IONS-SCNC: 9 MMOL/L (ref 4–13)
BASOPHILS # BLD AUTO: 0.06 THOUSANDS/ÂΜL (ref 0–0.1)
BASOPHILS NFR BLD AUTO: 1 % (ref 0–1)
BUN SERPL-MCNC: 14 MG/DL (ref 5–25)
CALCIUM SERPL-MCNC: 8.9 MG/DL (ref 8.4–10.2)
CHLORIDE SERPL-SCNC: 102 MMOL/L (ref 96–108)
CO2 SERPL-SCNC: 28 MMOL/L (ref 21–32)
CREAT SERPL-MCNC: 0.87 MG/DL (ref 0.6–1.3)
EOSINOPHIL # BLD AUTO: 0.11 THOUSAND/ÂΜL (ref 0–0.61)
EOSINOPHIL NFR BLD AUTO: 1 % (ref 0–6)
ERYTHROCYTE [DISTWIDTH] IN BLOOD BY AUTOMATED COUNT: 13.7 % (ref 11.6–15.1)
GFR SERPL CREATININE-BSD FRML MDRD: 60 ML/MIN/1.73SQ M
GLUCOSE SERPL-MCNC: 91 MG/DL (ref 65–140)
HCT VFR BLD AUTO: 32.8 % (ref 34.8–46.1)
HGB BLD-MCNC: 10.9 G/DL (ref 11.5–15.4)
IMM GRANULOCYTES # BLD AUTO: 0.09 THOUSAND/UL (ref 0–0.2)
IMM GRANULOCYTES NFR BLD AUTO: 1 % (ref 0–2)
LYMPHOCYTES # BLD AUTO: 1.39 THOUSANDS/ÂΜL (ref 0.6–4.47)
LYMPHOCYTES NFR BLD AUTO: 14 % (ref 14–44)
MCH RBC QN AUTO: 29.8 PG (ref 26.8–34.3)
MCHC RBC AUTO-ENTMCNC: 33.2 G/DL (ref 31.4–37.4)
MCV RBC AUTO: 90 FL (ref 82–98)
MONOCYTES # BLD AUTO: 1.01 THOUSAND/ÂΜL (ref 0.17–1.22)
MONOCYTES NFR BLD AUTO: 10 % (ref 4–12)
NEUTROPHILS # BLD AUTO: 7.02 THOUSANDS/ÂΜL (ref 1.85–7.62)
NEUTS SEG NFR BLD AUTO: 73 % (ref 43–75)
NRBC BLD AUTO-RTO: 0 /100 WBCS
PLATELET # BLD AUTO: 496 THOUSANDS/UL (ref 149–390)
PMV BLD AUTO: 8.7 FL (ref 8.9–12.7)
POTASSIUM SERPL-SCNC: 3.9 MMOL/L (ref 3.5–5.3)
RBC # BLD AUTO: 3.66 MILLION/UL (ref 3.81–5.12)
SODIUM SERPL-SCNC: 139 MMOL/L (ref 135–147)
WBC # BLD AUTO: 9.68 THOUSAND/UL (ref 4.31–10.16)

## 2025-07-11 PROCEDURE — 85025 COMPLETE CBC W/AUTO DIFF WBC: CPT | Performed by: STUDENT IN AN ORGANIZED HEALTH CARE EDUCATION/TRAINING PROGRAM

## 2025-07-11 PROCEDURE — 99232 SBSQ HOSP IP/OBS MODERATE 35: CPT | Performed by: INTERNAL MEDICINE

## 2025-07-11 PROCEDURE — 80048 BASIC METABOLIC PNL TOTAL CA: CPT | Performed by: STUDENT IN AN ORGANIZED HEALTH CARE EDUCATION/TRAINING PROGRAM

## 2025-07-11 PROCEDURE — 99232 SBSQ HOSP IP/OBS MODERATE 35: CPT | Performed by: STUDENT IN AN ORGANIZED HEALTH CARE EDUCATION/TRAINING PROGRAM

## 2025-07-11 RX ORDER — LOPERAMIDE HYDROCHLORIDE 2 MG/1
2 CAPSULE ORAL 4 TIMES DAILY PRN
Status: CANCELLED
Start: 2025-07-11 | End: 2025-07-16

## 2025-07-11 RX ADMIN — HEPARIN SODIUM 5000 UNITS: 5000 INJECTION INTRAVENOUS; SUBCUTANEOUS at 21:49

## 2025-07-11 RX ADMIN — GABAPENTIN 300 MG: 300 CAPSULE ORAL at 09:25

## 2025-07-11 RX ADMIN — PANTOPRAZOLE SODIUM 40 MG: 40 TABLET, DELAYED RELEASE ORAL at 05:25

## 2025-07-11 RX ADMIN — GABAPENTIN 300 MG: 300 CAPSULE ORAL at 16:09

## 2025-07-11 RX ADMIN — Medication 2000 UNITS: at 09:25

## 2025-07-11 RX ADMIN — HEPARIN SODIUM 5000 UNITS: 5000 INJECTION INTRAVENOUS; SUBCUTANEOUS at 05:25

## 2025-07-11 RX ADMIN — METHOCARBAMOL 500 MG: 500 TABLET ORAL at 21:49

## 2025-07-11 RX ADMIN — GABAPENTIN 300 MG: 300 CAPSULE ORAL at 21:49

## 2025-07-11 RX ADMIN — UMECLIDINIUM BROMIDE AND VILANTEROL TRIFENATATE 1 PUFF: 62.5; 25 POWDER RESPIRATORY (INHALATION) at 18:29

## 2025-07-11 RX ADMIN — LATANOPROST 1 DROP: 50 SOLUTION OPHTHALMIC at 21:49

## 2025-07-11 RX ADMIN — NICOTINE 1 PATCH: 14 PATCH, EXTENDED RELEASE TRANSDERMAL at 09:25

## 2025-07-11 RX ADMIN — HEPARIN SODIUM 5000 UNITS: 5000 INJECTION INTRAVENOUS; SUBCUTANEOUS at 16:09

## 2025-07-11 NOTE — RESTORATIVE TECHNICIAN NOTE
Restorative Technician Note      Patient Name: Danita Palmer     Restorative Tech Visit Date: 07/11/25  Note Type: Mobility  Patient Position Upon Consult: Supine  Activity Performed: Ambulated  Assistive Device: Roller walker; Other (Comment) (4L O2)  Patient Position at End of Consult: Bedside chair; All needs within reach    Lenin Rice, Restorative Technician

## 2025-07-11 NOTE — PLAN OF CARE
Problem: Potential for Falls  Goal: Patient will remain free of falls  Description: INTERVENTIONS:  - Educate patient/family on patient safety including physical limitations  - Instruct patient to call for assistance with activity   - Consider consulting OT/PT to assist with strengthening/mobility based on AM PAC & JH-HLM score  - Consult OT/PT to assist with strengthening/mobility   - Keep Call bell within reach  - Keep bed low and locked with side rails adjusted as appropriate  - Keep care items and personal belongings within reach  - Initiate and maintain comfort rounds  - Make Fall Risk Sign visible to staff  - Offer Toileting every  Hours, in advance of need  - Initiate/Maintain alarm  - Obtain necessary fall risk management equipment:   - Apply yellow socks and bracelet for high fall risk patients  - Consider moving patient to room near nurses station  Outcome: Progressing      Problem: DISCHARGE PLANNING  Goal: Discharge to home or other facility with appropriate resources  Description: INTERVENTIONS:  - Identify barriers to discharge w/patient and caregiver  - Arrange for needed discharge resources and transportation as appropriate  - Identify discharge learning needs (meds, wound care, etc.)  - Arrange for interpretive services to assist at discharge as needed  - Refer to Case Management Department for coordinating discharge planning if the patient needs post-hospital services based on physician/advanced practitioner order or complex needs related to functional status, cognitive ability, or social support system  Outcome: Progressing

## 2025-07-11 NOTE — CASE MANAGEMENT
Case Management Discharge Planning Note    Patient name Danita Palmer  Location [facility] 918/[facility] 918-01 MRN [id number]  : 1940 Date 2025       Current Admission Date: 2025  Current Admission Diagnosis:Generalized weakness   Patient Active Problem List    Diagnosis Date Noted    Severe protein-calorie malnutrition (HCC) 2025    Diarrhea 2025    Pneumonia 2025    Sepsis (HCC) 2025    Acute hypoxic respiratory failure (HCC) 2025    Left rib fracture 2025    Generalized weakness 2025    Fall at home, initial encounter 2025    Incidental lung nodule,RUL, > 3mm and < 8mm 2025    Hypertriglyceridemia 10/23/2024    Neuropathy 2024    Mild protein-calorie malnutrition (HCC) 2023    H/O lumpectomy 2021    Essential hypertension 2020    Cigarette nicotine dependence without complication 2019    CKD stage G3a/A1, GFR 45-59 and albumin creatinine ratio <30 mg/g (HCC) 2017    Suspicious nevus 2017    Disc degeneration, lumbar 2017    Post herpetic neuralgia 2016    Liver cyst 2016    Glaucoma 2014    Osteopenia 2013    Chronic obstructive pulmonary disease (HCC) 2012      LOS (days): 16  Geometric Mean LOS (GMLOS) (days): 4.4  Days to GMLOS:-12     OBJECTIVE:  Risk of Unplanned Readmission Score: 14.32         Current admission status: Inpatient   Preferred Pharmacy:   [id number] - [place], [place] - [address]  [address]  [place] [address]  Phone: [phone] Fax: [phone]    Primary Care Provider: Duke Barnes MD    Primary Insurance: [employer] [facility]  Secondary Insurance:     DISCHARGE DETAILS:     Other Referral/Resources/Interventions Provided:  Referral Comments: Pt is medically cleared.  Message sent to Zahrae to see if she can come today.  Pending confirmation.  CM will set up transport once confirmed.     Pt auth   7/10/25, CM requested updated auth.

## 2025-07-11 NOTE — RESTORATIVE TECHNICIAN NOTE
Restorative Technician Note      Patient Name: Danita Palmer     Restorative Tech Visit Date: 07/11/25  Note Type: Mobility  Patient Position Upon Consult: Supine  Activity Performed: Ambulated  Assistive Device: Other (Comment) (Rollator)  Patient Position at End of Consult: Supine; All needs within reach    Lenin Rice, Restorative Technician

## 2025-07-11 NOTE — ASSESSMENT & PLAN NOTE
Multifactorial due to atelectasis in the setting of rib fracture, pneumonia, underlying COPD   CT C/A/P - Interval appearance of innumerable right lower lobe micronodules with new dense left greater than right basilar consolidations.  Interval appearance of proximal bilateral lower lobe bronchial occlusions. Findings consistent aspiration pneumonia/atelectasis.  Completed 7 days of antibiotics  Continue supportive care with Mucinex, flutter valve  Chest PT/vest being avoided in the setting of rib fracture  Incentive spirometry  Pulm input appreciated  S/p IV diuretics on 7/9 without any improvement in oxygen requirements  7/9 chest x-ray with bibasilar consolidations stable and bilateral small pleural effusions  7/9 TTE EF 60%, grade 1 diastolic dysfunction, normal IVC  Currently stable on 4 L

## 2025-07-11 NOTE — ASSESSMENT & PLAN NOTE
Lab Results   Component Value Date    EGFR 60 07/11/2025    EGFR 64 07/09/2025    EGFR 67 07/07/2025    CREATININE 0.87 07/11/2025    CREATININE 0.83 07/09/2025    CREATININE 0.80 07/07/2025     Renal function at baseline, monitor with BMP.  Avoid/limit nephrotoxins and hypotension

## 2025-07-11 NOTE — ASSESSMENT & PLAN NOTE
Multifactorial due to atelectasis in the setting of rib fracture, pneumonia, underlying COPD   CT C/A/P - Interval appearance of innumerable right lower lobe micronodules with new dense left greater than right basilar consolidations.  Interval appearance of proximal bilateral lower lobe bronchial occlusions. Findings consistent aspiration pneumonia/atelectasis.  Completed 7 days of antibiotics  Continue supportive care with Mucinex, flutter valve  Chest PT/vest being avoided in the setting of rib fracture  S/p IV diuretics on 7/9 without any improvement in oxygen requirements  7/9 chest x-ray with bibasilar consolidations stable and bilateral small pleural effusions  7/9 TTE EF 60%, grade 1 diastolic dysfunction, normal IVC  Currently stable on 4 L, stable for discharge  Encourage incentive spirometer  Appreciate pulmonary evaluation

## 2025-07-11 NOTE — ASSESSMENT & PLAN NOTE
Presented after a fall in her neighbors driveway, trauma imaging all negative for acute traumatic injuries. During ambulatory trial in ED was unable to safely ambulate due to marked weakness. CT head negative for acute intracranial pathology. PT/OT evaluations recommend level 2 rehab intensity

## 2025-07-11 NOTE — PROGRESS NOTES
Progress Note - Hospitalist   Name: Danita Palmer 84 y.o. female I MRN: [id number]  [facility] I Date of Admission: 6/25/2025   Date of Service: 7/11/2025 I Hospital Day: 16    Assessment & Plan  Generalized weakness  Presented on 6/25/25 after a fall in her neighbors driveway, trauma imaging all negative for acute traumatic injuries. During ambulatory trial in ED was unable to safely ambulate due to marked weakness  Labs with mild hyponatremia otherwise without marked abnormalities  CT head negative for acute intracranial pathology.    With sepsis secondary to pneumonia as below  PT/OT evaluations recommend level 2 rehab intensity  Left rib fracture  Nondisplaced fractures of the anterolateral left 4th and 5th ribs  Incentive spirometry and rib fracture protocol ordered  Pain control including lidocaine patch and scheduled Tylenol ordered  Fall at home, initial encounter  Presented after a fall in neighbors driveway, states she was walking when she could not stop her self and fell forward onto her knees without dizziness/lightheadedness or loss of consciousness  Patient with weakness with inability to safely ambulate  PT/OT evaluations recommend rehab, case management assistance appreciated  Acute hypoxic respiratory failure (HCC)  Multifactorial due to atelectasis in the setting of rib fracture, pneumonia, underlying COPD   CT C/A/P - Interval appearance of innumerable right lower lobe micronodules with new dense left greater than right basilar consolidations.  Interval appearance of proximal bilateral lower lobe bronchial occlusions. Findings consistent aspiration pneumonia/atelectasis.  Completed 7 days of antibiotics  Continue supportive care with Mucinex, flutter valve  Chest PT/vest being avoided in the setting of rib fracture  S/p IV diuretics on 7/9 without any improvement in oxygen requirements  7/9 chest x-ray with bibasilar consolidations stable and bilateral small pleural effusions  7/9 TTE  EF 60%, grade 1 diastolic dysfunction, normal IVC  Currently stable on 4 L, stable for discharge  Encourage incentive spirometer  Appreciate pulmonary evaluation  Sepsis ([facility])  With tachycardia and leukocytosis. Also with low grade temperatures.   Due to pneumonia  Tachycardia and leukocytosis resolved   Completed 7 days of IV ceftriaxone on 7/7  CKD stage G3a/A1, GFR 45-59 and albumin creatinine ratio <30 mg/g ([facility])  Lab Results   Component Value Date    EGFR 60 07/11/2025    EGFR 64 07/09/2025    EGFR 67 07/07/2025    CREATININE 0.87 07/11/2025    CREATININE 0.83 07/09/2025    CREATININE 0.80 07/07/2025     Renal function at baseline, monitor with BMP.  Avoid/limit nephrotoxins and hypotension  Incidental lung nodule,RUL, > 3mm and < 8mm  7 mm spiculated nodule to right upper lobe incidentally noted on CT chest  Repeat noncontrast CT chest advised at 6-12 months, patient to follow-up with PCP to arrange this.  Previous provider reviewed results with patient and her son  Disc degeneration, lumbar  Chronic, patient denies worsening back pain  Continue gabapentin, dose was reduced to 300 mg 3 times daily as patient somewhat sleepy during admission evaluation  Cigarette nicotine dependence without complication  Discussed need for cessation.    Nicotine patch   Essential hypertension  BP acceptable   Continue PTA lisinopril 10 mg daily   Monitor BP  Pneumonia  See above plan under sepsis/respiratory failure  Chronic obstructive pulmonary disease (HCC)  Not in acute exacerbation, continue PTA inhalers    Diarrhea  Several episodes of diarrhea started 7/5/25 associated with generalized abdominal pain  On exam abdomen soft, no distention, no guarding, no rigidity.  Does have diffuse tenderness  Did not receive any stool softener or laxative since 7/2/2025  C. difficile negative and stool enteric panel negative on 7/6  No further diarrhea  Severe protein-calorie malnutrition (HCC)  Malnutrition Findings:   Adult  Malnutrition type: Chronic illness  Adult Degree of Malnutrition: Other severe protein calorie malnutrition  Malnutrition Characteristics: Fat loss, Muscle loss                  360 Statement: Severe protein calorie malnutrition of chronic illness related to increased energy needs as evidenced by hollow orbits, depression at the temple and protruding clavicles.  Treated with Regular diet and PT declined all supplements    BMI Findings:           Body mass index is 20.78 kg/m².       VTE Pharmacologic Prophylaxis: VTE Score: 3 Moderate Risk (Score 3-4) - Pharmacological DVT Prophylaxis Ordered: heparin.    Mobility:   Basic Mobility Inpatient Raw Score: 18  JH-HLM Goal: 6: Walk 10 steps or more  JH-HLM Achieved: 8: Walk 250 feet ot more  JH-HLM Goal achieved. Continue to encourage appropriate mobility.    Patient Centered Rounds: I performed bedside rounds with nursing staff today.   Discussions with Specialists or Other Care Team Provider: Pulmonology    Education and Discussions with Family / Patient: Updated  (son) via phone.    Current Length of Stay: 16 day(s)  Current Patient Status: Inpatient   Certification Statement: The patient will continue to require additional inpatient hospital stay due to awaiting placement to rehab for safe discharge  Discharge Plan: Anticipate discharge tomorrow to rehab facility.    Code Status: Level 1 - Full Code    Subjective   Patient assessed at bedside.  No complaints.  Ambulated with RN and briefly desaturated to 87% on 4 L however currently stable on 4 L at rest.    Objective :  Temp:  [32 °F (0 °C)-98.4 °F (36.9 °C)] 98.4 °F (36.9 °C)  HR:  [65-68] 65  BP: (102-104)/(47-51) 104/51  Resp:  [17-18] 17  SpO2:  [95 %-97 %] 97 %  O2 Device: Nasal cannula  Nasal Cannula O2 Flow Rate (L/min):  [4 L/min-5 L/min] 4 L/min    Body mass index is 20.78 kg/m².     Input and Output Summary (last 24 hours):     Intake/Output Summary (Last 24 hours) at 7/11/2025 1905  Last  data filed at 7/11/2025 0856  Gross per 24 hour   Intake 180 ml   Output --   Net 180 ml       Physical Exam  Vitals reviewed.   Constitutional:       General: She is not in acute distress.     Appearance: Normal appearance. She is not ill-appearing.   HENT:      Head: Normocephalic and atraumatic.     Cardiovascular:      Rate and Rhythm: Normal rate and regular rhythm.      Heart sounds: Normal heart sounds.   Pulmonary:      Effort: Pulmonary effort is normal. No respiratory distress.      Breath sounds: No wheezing or rales.   Abdominal:      Palpations: Abdomen is soft.      Tenderness: There is no abdominal tenderness.     Musculoskeletal:      Right lower leg: No edema.      Left lower leg: No edema.     Neurological:      Mental Status: She is alert and oriented to person, place, and time.           Lines/Drains:              Lab Results: I have reviewed the following results:   Results from last 7 days   Lab Units 07/11/25  0508 07/06/25  0534 07/05/25  0538   WBC Thousand/uL 9.68   < > 9.71   HEMOGLOBIN g/dL 10.9*   < > 11.7   HEMATOCRIT % 32.8*   < > 37.2   PLATELETS Thousands/uL 496*   < > 513*   BANDS PCT %  --   --  3   SEGS PCT % 73   < >  --    LYMPHO PCT % 14   < > 9*   MONO PCT % 10   < > 6   EOS PCT % 1   < > 0    < > = values in this interval not displayed.     Results from last 7 days   Lab Units 07/11/25  0508   SODIUM mmol/L 139   POTASSIUM mmol/L 3.9   CHLORIDE mmol/L 102   CO2 mmol/L 28   BUN mg/dL 14   CREATININE mg/dL 0.87   ANION GAP mmol/L 9   CALCIUM mg/dL 8.9   GLUCOSE RANDOM mg/dL 91                 Results from last 7 days   Lab Units 07/07/25  0628   PROCALCITONIN ng/ml 0.13       Recent Cultures (last 7 days):   Results from last 7 days   Lab Units 07/08/25  1039 07/06/25  0849   SPUTUM CULTURE  1+ Growth of Candida albicans*  1+ Growth of  --    GRAM STAIN RESULT  1+ Epithelial cells per low power field  No Polys or Bacteria seen  --    C DIFF TOXIN B BY PCR   --  Negative        Imaging Results Review: No pertinent imaging studies reviewed.  Other Study Results Review: No additional pertinent studies reviewed.    Last 24 Hours Medication List:     Current Facility-Administered Medications:     acetaminophen (TYLENOL) tablet 975 mg, Q8H JOSE MANUEL    albuterol (PROVENTIL HFA,VENTOLIN HFA) inhaler 1 puff, Q4H PRN    Cholecalciferol (VITAMIN D3) tablet 2,000 Units, Daily    gabapentin (NEURONTIN) capsule 300 mg, TID    heparin (porcine) subcutaneous injection 5,000 Units, Q8H JOSE MANUEL    latanoprost (XALATAN) 0.005 % ophthalmic solution 1 drop, HS    lidocaine (LIDODERM) 5 % patch 1 patch, Daily    lisinopril (ZESTRIL) tablet 10 mg, Daily    loperamide (IMODIUM) capsule 2 mg, 4x Daily PRN    methocarbamol (ROBAXIN) tablet 500 mg, Q8H JOSE MANUEL    nicotine (NICODERM CQ) 14 mg/24hr TD 24 hr patch 1 patch, Daily    ondansetron (ZOFRAN) injection 4 mg, Q6H PRN    oxyCODONE (ROXICODONE) split tablet 2.5 mg, Q4H PRN **OR** oxyCODONE (ROXICODONE) IR tablet 5 mg, Q4H PRN    pantoprazole (PROTONIX) EC tablet 40 mg, Early Morning    umeclidinium-vilanterol 62.5-25 mcg/actuation inhaler 1 puff, Daily    Administrative Statements   Today, Patient Was Seen By: Omar Casiano, DO  I have spent a total time of 35 minutes in caring for this patient on the day of the visit/encounter including Impressions, Counseling / Coordination of care, Documenting in the medical record, Reviewing/placing orders in the medical record (including tests, medications, and/or procedures), Obtaining or reviewing history  , and Communicating with other healthcare professionals .    **Please Note: This note may have been constructed using a voice recognition system.**

## 2025-07-11 NOTE — PLAN OF CARE
Problem: Nutrition/Hydration-ADULT  Goal: Nutrient/Hydration intake appropriate for improving, restoring or maintaining nutritional needs  Description: Monitor and assess patient's nutrition/hydration status for malnutrition. Collaborate with interdisciplinary team and initiate plan and interventions as ordered.  Monitor patient's weight and dietary intake as ordered or per policy. Utilize nutrition screening tool and intervene as necessary. Determine patient's food preferences and provide high-protein, high-caloric foods as appropriate.     INTERVENTIONS:  - Monitor oral intake, urinary output, labs, and treatment plans  - Assess nutrition and hydration status and recommend course of action  - Evaluate amount of meals eaten  - Assist patient with eating if necessary   - Allow adequate time for meals  - Recommend/ encourage appropriate diets, oral nutritional supplements, and vitamin/mineral supplements  - Order, calculate, and assess calorie counts as needed  - Recommend, monitor, and adjust tube feedings and TPN/PPN based on assessed needs  - Assess need for intravenous fluids  - Provide specific nutrition/hydration education as appropriate  - Include patient/family/caregiver in decisions related to nutrition  Outcome: Progressing     Problem: Nutrition/Hydration-ADULT  Goal: Nutrient/Hydration intake appropriate for improving, restoring or maintaining nutritional needs  Description: Monitor and assess patient's nutrition/hydration status for malnutrition. Collaborate with interdisciplinary team and initiate plan and interventions as ordered.  Monitor patient's weight and dietary intake as ordered or per policy. Utilize nutrition screening tool and intervene as necessary. Determine patient's food preferences and provide high-protein, high-caloric foods as appropriate.     INTERVENTIONS:  - Monitor oral intake, urinary output, labs, and treatment plans  - Assess nutrition and hydration status and recommend course  of action  - Evaluate amount of meals eaten  - Assist patient with eating if necessary   - Allow adequate time for meals  - Recommend/ encourage appropriate diets, oral nutritional supplements, and vitamin/mineral supplements  - Order, calculate, and assess calorie counts as needed  - Recommend, monitor, and adjust tube feedings and TPN/PPN based on assessed needs  - Assess need for intravenous fluids  - Provide specific nutrition/hydration education as appropriate  - Include patient/family/caregiver in decisions related to nutrition  Outcome: Progressing

## 2025-07-11 NOTE — CASE MANAGEMENT
Case Management Discharge Planning Note    Patient name Danita Palmer  Location [facility] 918/[facility] 918-01 MRN [id number]  : 1940 Date 2025       Current Admission Date: 2025  Current Admission Diagnosis:Generalized weakness   Patient Active Problem List    Diagnosis Date Noted    Severe protein-calorie malnutrition (HCC) 2025    Diarrhea 2025    Pneumonia 2025    Sepsis (HCC) 2025    Acute hypoxic respiratory failure (HCC) 2025    Left rib fracture 2025    Generalized weakness 2025    Fall at home, initial encounter 2025    Incidental lung nodule,RUL, > 3mm and < 8mm 2025    Hypertriglyceridemia 10/23/2024    Neuropathy 2024    Mild protein-calorie malnutrition (HCC) 2023    H/O lumpectomy 2021    Essential hypertension 2020    Cigarette nicotine dependence without complication 2019    CKD stage G3a/A1, GFR 45-59 and albumin creatinine ratio <30 mg/g (HCC) 2017    Suspicious nevus 2017    Disc degeneration, lumbar 2017    Post herpetic neuralgia 2016    Liver cyst 2016    Glaucoma 2014    Osteopenia 2013    Chronic obstructive pulmonary disease (HCC) 2012      LOS (days): 16  Geometric Mean LOS (GMLOS) (days): 4.4  Days to GMLOS:-12     OBJECTIVE:  Risk of Unplanned Readmission Score: 14.32         Current admission status: Inpatient   Preferred Pharmacy:   [id number] - [place], [place] - [address]  [address]  [place] [address]  Phone: [phone] Fax: [phone]    Primary Care Provider: Duke Barnes MD    Primary Insurance: [employer]  Secondary Insurance:     DISCHARGE DETAILS:    Discharge planning discussed with:: Patient and son Sharon via phone   Contacts  Patient Contacts: son Sharon  Relationship to Patient:: Family  Contact Method: Phone  Phone Number: [phone]  Reason/Outcome: Referral,  Discharge Planning  Other Referral/Resources/Interventions Provided:  Referral Comments: Pt auth will need to be renewed and pt needs new PT/OT notes. PT/OT updated, they will see pt in the AM.  CM will resubmit when notes are updated.  Zahrasimin said she can come as soon as it is received again.

## 2025-07-11 NOTE — NURSING NOTE
Restorative tech performed ambulatory pule ox-pt's oxygen level fell to 87% while ambulating on 4L 02. No distress noted. Maintaining sats >90% at rest on 4L 02. SLIM aware.

## 2025-07-11 NOTE — ASSESSMENT & PLAN NOTE
Several episodes of diarrhea started 7/5/25 associated with generalized abdominal pain  On exam abdomen soft, no distention, no guarding, no rigidity.  Does have diffuse tenderness  Did not receive any stool softener or laxative since 7/2/2025  C. difficile negative and stool enteric panel negative on 7/6  No further diarrhea

## 2025-07-11 NOTE — ASSESSMENT & PLAN NOTE
Multifactorial etiology including atelectasis, pneumonia, rib fracture and underlying COPD. Maintaining a saturation of 93% on 5L oxygen via nasal cannula.  Continue supplemental oxygen to maintain SpO2 > 88-90%  Wean supplemental oxygen as tolerated  Encourage OOB as tolerated, incentive spirometry, flutter valve

## 2025-07-11 NOTE — PLAN OF CARE
Problem: Potential for Falls  Goal: Patient will remain free of falls  Description: INTERVENTIONS:  - Educate patient/family on patient safety including physical limitations  - Instruct patient to call for assistance with activity   - Consider consulting OT/PT to assist with strengthening/mobility based on AM PAC & JH-HLM score  - Consult OT/PT to assist with strengthening/mobility   - Keep Call bell within reach  - Keep bed low and locked with side rails adjusted as appropriate  - Keep care items and personal belongings within reach  - Initiate and maintain comfort rounds  - Make Fall Risk Sign visible to staff  - Offer Toileting every 2  Hours, in advance of need  - Obtain necessary fall risk management equipment: socks  - Apply yellow socks and bracelet for high fall risk patients  - Consider moving patient to room near nurses station  Outcome: Progressing     Problem: PAIN - ADULT  Goal: Verbalizes/displays adequate comfort level or baseline comfort level  Description: Interventions:  - Encourage patient to monitor pain and request assistance  - Assess pain using appropriate pain scale  - Administer analgesics as ordered based on type and severity of pain and evaluate response  - Implement non-pharmacological measures as appropriate and evaluate response  - Consider cultural and social influences on pain and pain management  - Notify physician/advanced practitioner if interventions unsuccessful or patient reports new pain  - Educate patient/family on pain management process including their role and importance of  reporting pain   - Provide non-pharmacologic/complimentary pain relief interventions  Outcome: Progressing     Problem: DISCHARGE PLANNING  Goal: Discharge to home or other facility with appropriate resources  Description: INTERVENTIONS:  - Identify barriers to discharge w/patient and caregiver  - Arrange for needed discharge resources and transportation as appropriate  - Identify discharge learning  needs (meds, wound care, etc.)  - Arrange for interpretive services to assist at discharge as needed  - Refer to Case Management Department for coordinating discharge planning if the patient needs post-hospital services based on physician/advanced practitioner order or complex needs related to functional status, cognitive ability, or social support system  Outcome: Progressing     Problem: SAFETY ADULT  Goal: Maintain or return to baseline ADL function  Description: INTERVENTIONS:  -  Assess patient's ability to carry out ADLs; assess patient's baseline for ADL function and identify physical deficits which impact ability to perform ADLs (bathing, care of mouth/teeth, toileting, grooming, dressing, etc.)  - Assess/evaluate cause of self-care deficits   - Assess range of motion  - Assess patient's mobility; develop plan if impaired  - Assess patient's need for assistive devices and provide as appropriate  - Encourage maximum independence but intervene and supervise when necessary  - Involve family in performance of ADLs  - Assess for home care needs following discharge   - Consider OT consult to assist with ADL evaluation and planning for discharge  - Provide patient education as appropriate  - Monitor functional capacity and physical performance, use of AM PAC & JH-HLM   - Monitor gait, balance and fatigue with ambulation    Outcome: Progressing

## 2025-07-11 NOTE — PROGRESS NOTES
Progress Note - Pulmonology   Name: Danita Palmer 84 y.o. female I MRN: [id number]  Unit/Bed#: [facility] 918-01 I Date of Admission: 6/25/2025   Date of Service: 7/11/2025 I Hospital Day: 16     Assessment & Plan  Acute hypoxic respiratory failure (HCC)  Multifactorial etiology including atelectasis, pneumonia, rib fracture and underlying COPD. Maintaining a saturation of 93% on 5L oxygen via nasal cannula.  Continue supplemental oxygen to maintain SpO2 > 88-90%  Wean supplemental oxygen as tolerated  Encourage OOB as tolerated, incentive spirometry, flutter valve  Pneumonia  Likely secondary to atelectasis in the setting of rib fractures. Received 7 day course of antibiotics  Continue to monitor for signs of infection  Left rib fracture  Fall at home, initial encounter  Nondisplaced fractures of the anterolateral left 4th and 5th ribs   Pain control per primary team  Rib fracture protocol  I encouraged her to use incentive spirometer on at least an hourly basis  Encourage OOB as tolerated   Chronic obstructive pulmonary disease (HCC)  PFTs with moderate obstruction and CT scan shows emphysema. On Anoro and albuterol PRN. Does not require supplemental oxygen at baseline. Does not appear to be in acute exacerbation  Continue home Anoro once daily  Would benefit from smoking cessation  Would also benefit from outpatient pulmonary rehab  Cigarette nicotine dependence without complication  Recommend smoking cessation  Incidental lung nodule,RUL, > 3mm and < 8mm  In the setting of extensive smoking history, will need to rule out malignancy  Plan:  Repeat CT chest in 3 months with navigation protocol  If persistent/enlarging, will refer to Dr. Martínez for navigational lung biopsy  Diarrhea  Only 1 bowel movement today. Improving. Monitor.   Generalized weakness  Presented after a fall in her neighbors driveway, trauma imaging all negative for acute traumatic injuries. During ambulatory trial in ED was unable to safely  ambulate due to marked weakness. CT head negative for acute intracranial pathology. PT/OT evaluations recommend level 2 rehab intensity    24 Hour Events : None  Subjective : Patient assessed at the bedside. Laying comfortably in bed, mentions marked improvement in breathing from yesterday. She also mentions she tolerated ambulation well. No pain at the site of rib fracture. Encouraged the use of incentive spirometer.     Objective :  Temp:  [32 °F (0 °C)-98.4 °F (36.9 °C)] 98.4 °F (36.9 °C)  HR:  [65-87] 65  BP: (101-111)/(47-51) 104/51  Resp:  [17-18] 17  SpO2:  [90 %-97 %] 97 %  O2 Device: Nasal cannula  Nasal Cannula O2 Flow Rate (L/min):  [5 L/min-6 L/min] 5 L/min    Physical Exam  Constitutional:       Appearance: Normal appearance.   HENT:      Head: Normocephalic.     Eyes:      Pupils: Pupils are equal, round, and reactive to light.       Cardiovascular:      Rate and Rhythm: Normal rate and regular rhythm.      Pulses: Normal pulses.      Heart sounds: Normal heart sounds.   Pulmonary:      Breath sounds: No stridor. No wheezing or rales.     Musculoskeletal:      Right lower leg: No edema.      Left lower leg: No edema.     Neurological:      Mental Status: She is alert and oriented to person, place, and time.           Lab Results: I have reviewed the following results:   .     07/11/25  0508   WBC 9.68   HGB 10.9*   HCT 32.8*   *   SODIUM 139   K 3.9      CO2 28   BUN 14   CREATININE 0.87   GLUC 91     ABG: No new results in last 24 hours.    Imaging Results Review: I reviewed radiology reports from this admission including: chest xray and CT chest.  Other Study Results Review: EKG was reviewed.       Leodan Pena MD

## 2025-07-12 VITALS
HEART RATE: 76 BPM | BODY MASS INDEX: 20.77 KG/M2 | SYSTOLIC BLOOD PRESSURE: 115 MMHG | RESPIRATION RATE: 16 BRPM | WEIGHT: 110 LBS | HEIGHT: 61 IN | DIASTOLIC BLOOD PRESSURE: 55 MMHG | TEMPERATURE: 98 F | OXYGEN SATURATION: 91 %

## 2025-07-12 PROCEDURE — 97116 GAIT TRAINING THERAPY: CPT

## 2025-07-12 PROCEDURE — 97535 SELF CARE MNGMENT TRAINING: CPT

## 2025-07-12 PROCEDURE — 99239 HOSP IP/OBS DSCHRG MGMT >30: CPT | Performed by: STUDENT IN AN ORGANIZED HEALTH CARE EDUCATION/TRAINING PROGRAM

## 2025-07-12 PROCEDURE — NC001 PR NO CHARGE: Performed by: STUDENT IN AN ORGANIZED HEALTH CARE EDUCATION/TRAINING PROGRAM

## 2025-07-12 PROCEDURE — 97530 THERAPEUTIC ACTIVITIES: CPT

## 2025-07-12 PROCEDURE — 97164 PT RE-EVAL EST PLAN CARE: CPT

## 2025-07-12 RX ORDER — METHOCARBAMOL 500 MG/1
500 TABLET, FILM COATED ORAL 3 TIMES DAILY PRN
Start: 2025-07-12 | End: 2025-07-17

## 2025-07-12 RX ORDER — PANTOPRAZOLE SODIUM 40 MG/1
40 TABLET, DELAYED RELEASE ORAL
Start: 2025-07-12 | End: 2025-08-11

## 2025-07-12 RX ORDER — LIDOCAINE 50 MG/G
1 PATCH TOPICAL DAILY
Start: 2025-07-12

## 2025-07-12 RX ORDER — ACETAMINOPHEN 325 MG/1
650 TABLET ORAL EVERY 6 HOURS PRN
Start: 2025-07-12

## 2025-07-12 RX ADMIN — HEPARIN SODIUM 5000 UNITS: 5000 INJECTION INTRAVENOUS; SUBCUTANEOUS at 06:32

## 2025-07-12 RX ADMIN — NICOTINE 1 PATCH: 14 PATCH, EXTENDED RELEASE TRANSDERMAL at 09:04

## 2025-07-12 RX ADMIN — GABAPENTIN 300 MG: 300 CAPSULE ORAL at 15:56

## 2025-07-12 RX ADMIN — HEPARIN SODIUM 5000 UNITS: 5000 INJECTION INTRAVENOUS; SUBCUTANEOUS at 14:00

## 2025-07-12 RX ADMIN — PANTOPRAZOLE SODIUM 40 MG: 40 TABLET, DELAYED RELEASE ORAL at 09:04

## 2025-07-12 RX ADMIN — Medication 2000 UNITS: at 09:04

## 2025-07-12 RX ADMIN — GABAPENTIN 300 MG: 300 CAPSULE ORAL at 09:04

## 2025-07-12 NOTE — DISCHARGE SUMMARY
Discharge Summary - Hospitalist   Name: Danita Palmer 84 y.o. female I MRN: [id number]  [facility] I Date of Admission: 6/25/2025   Date of Service: 7/12/2025 I Hospital Day: 17     Assessment & Plan  Generalized weakness  Presented on 6/25/25 after a fall in her neighbors driveway, trauma imaging all negative for acute traumatic injuries. During ambulatory trial in ED was unable to safely ambulate due to marked weakness  Labs with mild hyponatremia otherwise without marked abnormalities  CT head negative for acute intracranial pathology.    With sepsis secondary to pneumonia as below  PT/OT evaluations recommend level 2 rehab intensity  Left rib fracture  Nondisplaced fractures of the anterolateral left 4th and 5th ribs  Incentive spirometry and rib fracture protocol ordered  Pain control including lidocaine patch and scheduled Tylenol ordered  Fall at home, initial encounter  Presented after a fall in neighbors driveway, states she was walking when she could not stop her self and fell forward onto her knees without dizziness/lightheadedness or loss of consciousness  Patient with weakness with inability to safely ambulate  PT/OT evaluations recommend rehab, case management assistance appreciated  Acute hypoxic respiratory failure (HCC)  Multifactorial due to atelectasis in the setting of rib fracture, pneumonia, underlying COPD   CT C/A/P - Interval appearance of innumerable right lower lobe micronodules with new dense left greater than right basilar consolidations.  Interval appearance of proximal bilateral lower lobe bronchial occlusions. Findings consistent aspiration pneumonia/atelectasis.  Completed 7 days of antibiotics  Continue supportive care with Mucinex, flutter valve  Chest PT/vest being avoided in the setting of rib fracture  S/p IV diuretics on 7/9 without any improvement in oxygen requirements  7/9 chest x-ray with bibasilar consolidations stable and bilateral small pleural effusions  7/9  TTE EF 60%, grade 1 diastolic dysfunction, normal IVC  Currently stable on 4 L, stable for discharge  Encourage incentive spirometer  Appreciate pulmonary evaluation  Sepsis (HCC)  With tachycardia and leukocytosis. Also with low grade temperatures.   Due to pneumonia  Tachycardia and leukocytosis resolved   Completed 7 days of IV ceftriaxone on 7/7  CKD stage G3a/A1, GFR 45-59 and albumin creatinine ratio <30 mg/g ([facility])  Lab Results   Component Value Date    EGFR 60 07/11/2025    EGFR 64 07/09/2025    EGFR 67 07/07/2025    CREATININE 0.87 07/11/2025    CREATININE 0.83 07/09/2025    CREATININE 0.80 07/07/2025     Renal function at baseline, monitor with BMP.  Avoid/limit nephrotoxins and hypotension  Incidental lung nodule,RUL, > 3mm and < 8mm  7 mm spiculated nodule to right upper lobe incidentally noted on CT chest  Repeat noncontrast CT chest advised at 6-12 months, patient to follow-up with PCP to arrange this.  Previous provider reviewed results with patient and her son  Disc degeneration, lumbar  Chronic, patient denies worsening back pain  Continue gabapentin, dose was reduced to 300 mg 3 times daily as patient somewhat sleepy during admission evaluation  Cigarette nicotine dependence without complication  Discussed need for cessation.    Nicotine patch   Essential hypertension  BP acceptable   Continue PTA lisinopril 10 mg daily   Monitor BP  Pneumonia  See above plan under sepsis/respiratory failure  Chronic obstructive pulmonary disease (HCC)  Not in acute exacerbation, continue PTA inhalers    Diarrhea  Several episodes of diarrhea started 7/5/25 associated with generalized abdominal pain  On exam abdomen soft, no distention, no guarding, no rigidity.  Does have diffuse tenderness  Did not receive any stool softener or laxative since 7/2/2025  C. difficile negative and stool enteric panel negative on 7/6  No further diarrhea  Severe protein-calorie malnutrition (HCC)  Malnutrition Findings:   Adult  Malnutrition type: Chronic illness  Adult Degree of Malnutrition: Other severe protein calorie malnutrition  Malnutrition Characteristics: Fat loss, Muscle loss                  360 Statement: Severe protein calorie malnutrition of chronic illness related to increased energy needs as evidenced by hollow orbits, depression at the temple and protruding clavicles.  Treated with Regular diet and PT declined all supplements    BMI Findings:           Body mass index is 20.78 kg/m².        Medical Problems       Resolved Problems  Date Reviewed: 7/3/2025          Resolved    Constipation 7/6/2025     Resolved by  Fredy Ingram MD        Discharging Physician / Practitioner: Omar Casiano DO  PCP: Duke Barnes MD  Admission Date:   Admission Orders (From admission, onward)       Ordered        06/25/25 0554  Inpatient Admission  Once                          Discharge Date: 07/12/25    Next Steps for Physician/AP Assuming Care:  Pulmonary follow-up outpatient with repeat CT chest in 3 months    Test Results Pending at Discharge (will require follow up):  None    Medication Changes for Discharge & Rationale:   See after visit summary for reconciled discharge medications provided to patient and/or family.     Consultations During Hospital Stay:  Pulmonology    Procedures Performed:   None    Significant Findings / Test Results:   XR chest portable  Result Date: 7/10/2025  Impression: Stable bibasilar consolidations and small bilateral pleural effusions. Workstation performed: [id number]      Echo follow up/limited w/ contrast if indicated  Result Date: 7/9/2025  Narrative:   Left Ventricle: Left ventricular cavity size is normal. Wall thickness is normal. The left ventricular ejection fraction is 60%. Systolic function is normal. Wall motion is normal. Diastolic function is mildly abnormal, consistent with grade I (abnormal) relaxation.      XR chest portable  Result Date: 7/7/2025  Impression: Stable  bibasilar opacity and left pleural effusion consistent with aspiration pneumonia. Workstation performed: [id number]      CT chest abdomen pelvis wo contrast  Result Date: 6/29/2025  Impression: Interval appearance of innumerable right lower lobe micronodules with new dense left greater than right basilar consolidations. Interval appearance of proximal bilateral lower lobe bronchial occlusions. Findings consistent aspiration pneumonia/atelectasis. Stable 7 mm spiculated right upper lobe nodule. Continued recommendations of recent CT chest of 6-12 month follow-up. No acute abdominopelvic abnormality. Large colonic stool burden, no acute inflammation. The study was marked in EPIC for immediate notification. Computerized Assisted Algorithm (CAA) may have aided analysis of applicable images. Workstation performed: [id number]      XR hand 3+ views LEFT  Result Date: 6/25/2025  Impression: No acute osseous abnormality. Computerized Assisted Algorithm (CAA) may have been used to analyze all applicable images. Workstation performed: [id number]      XR knee 4+ vw left injury  Result Date: 6/25/2025  Impression: No acute osseous abnormality. Computerized Assisted Algorithm (CAA) may have been used to analyze all applicable images. Workstation performed: [id number]      XR knee 4+ vw right injury  Result Date: 6/25/2025  Impression: Mild arthritis. No acute findings Computerized Assisted Algorithm (CAA) may have been used to analyze all applicable images. Workstation performed: [id number]      XR hand 3+ views RIGHT  Result Date: 6/25/2025  Impression: No acute osseous abnormality. Computerized Assisted Algorithm (CAA) may have been used to analyze all applicable images. Workstation performed: [id number]      XR spine thoracic 3 views  Result Date: 6/25/2025  Impression: Mild inferior endplate compression deformity of T11 which is favored to be chronic although clinical correlation is advised. Multilevel chronic degenerative  disc disease of the lower thoracic spine. Scoliosis Computerized Assisted Algorithm (CAA) may have been used to analyze all applicable images. Workstation performed: [id number]      CT chest without contrast  Result Date: 6/25/2025  Impression: Nondisplaced fractures of the anterolateral left fourth and fifth ribs New 7 mm spiculated nodule at the right upper lobe. Based on current Fleischner Society 2017 Guidelines on incidental pulmonary nodule, follow-up non-contrast CT is recommended at 6-12 months from the initial examination and, if stable at that time, an additional follow-up is recommended for 18-24 months from the initial examination. Considerations related to the patient's age and/or comorbidities may be used to alter these recommendations. Clustered nodules at the left lower which may be infectious/inflammatory in nature and which can also be followed on subsequent CT imaging The study was marked in EPIC for significant notification. Computerized Assisted Algorithm (CAA) may have aided analysis of applicable images. Workstation performed: [id number]      CT head wo contrast  Result Date: 6/25/2025  Impression: No acute intracranial abnormality.  Chronic microangiopathic changes. Workstation performed: [id number]      CT spine cervical without contrast  Result Date: 6/25/2025  Impression: No cervical spine fracture or traumatic malalignment. Workstation performed: [id number]     Incidental Findings:   Pulmonary nodules, see above    Hospital Course:   Danita Palmer is a 84 y.o. female patient who originally presented to the hospital on 6/25/2025 due to fall in her neighbors driveway with trauma workup concerning for nondisplaced fracture of the anterolateral left 4th and 5th ribs.  Patient noted to have progressive oxygen requirements with workup concerning for pneumonia.  Pulmonology was consulted and patient was treated with empiric 7 days of IV antibiotics.  Pulmonology suspected to acute respiratory  failure secondary to COPD, pneumonia, atelectasis and left rib fractures.  Patient's oxygen requirements improved and she was stable for discharge on 4 L nasal cannula to rehab.  She will need follow-up CT chest in 3 months with pulmonology team.  Continue pulmonary rehab after discharge.    Please see above list of diagnoses and related plan for additional information.     Discharge Day Visit / Exam:   * Please refer to separate progress note for these details *    Discussion with Family: Attempted to update  (son) via phone. Unable to contact.    Discharge instructions/Information to patient and family:   See after visit summary for information provided to patient and family.      Provisions for Follow-Up Care:  See after visit summary for information related to follow-up care and any pertinent home health orders.      Mobility at time of Discharge:   Basic Mobility Inpatient Raw Score: 17  JH-HLM Goal: 5: Stand one or more mins  JH-HLM Achieved: 7: Walk 25 feet or more  HLM Goal achieved. Continue to encourage appropriate mobility.     Disposition:   Other Skilled Nursing Facility at [facility]    Planned Readmission: None    Administrative Statements   Discharge Statement:  I have spent a total time of 40 minutes in caring for this patient on the day of the visit/encounter. >30 minutes of time was spent on: Impressions, Counseling / Coordination of care, Documenting in the medical record, Reviewing / ordering tests, medicine, procedures  , and Communicating with other healthcare professionals .    **Please Note: This note may have been constructed using a voice recognition system**

## 2025-07-12 NOTE — DISCHARGE SUPPORT
Case Management Assessment & Discharge Planning Note    Patient name Danita Palmer  Location [facility] 918/[facility] 918-01 MRN [id number]  : 1940 Date 2025       Current Admission Date: 2025  Current Admission Diagnosis:Generalized weakness   Patient Active Problem List    Diagnosis Date Noted    Severe protein-calorie malnutrition (HCC) 2025    Diarrhea 2025    Pneumonia 2025    Sepsis (HCC) 2025    Acute hypoxic respiratory failure (HCC) 2025    Left rib fracture 2025    Generalized weakness 2025    Fall at home, initial encounter 2025    Incidental lung nodule,RUL, > 3mm and < 8mm 2025    Hypertriglyceridemia 10/23/2024    Neuropathy 2024    Mild protein-calorie malnutrition (HCC) 2023    H/O lumpectomy 2021    Essential hypertension 2020    Cigarette nicotine dependence without complication 2019    CKD stage G3a/A1, GFR 45-59 and albumin creatinine ratio <30 mg/g (HCC) 2017    Suspicious nevus 2017    Disc degeneration, lumbar 2017    Post herpetic neuralgia 2016    Liver cyst 2016    Glaucoma 2014    Osteopenia 2013    Chronic obstructive pulmonary disease (HCC) 2012      LOS (days): 17  Geometric Mean LOS (GMLOS) (days): 4.4  Days to GMLOS:-12.9   Facility Authorization Initiated  [facility] received request for auth from : Aleida CAMARILLO  Authorization Request Submitted for: SNF  Requested Start of Care Date: 25  Facility Name: [facility]  Facility NPI: [id number]  Facility MD: Dr Aldair Zamarripa  [facility] MD NPI: [id number]  Authorization initiated by contacting insurance: [employer]  Via: H&CC Portal  Clinicals submitted via: Portal Attachment  Pending reference #: [id number]   notified: Aleida CAMARILLO     Updates to authorization status will be noted in chart.    Please reach out to CM for updates on any clinical  information.

## 2025-07-12 NOTE — PLAN OF CARE
Problem: OCCUPATIONAL THERAPY ADULT  Goal: Performs self-care activities at highest level of function for planned discharge setting.  See evaluation for individualized goals.  Description: Treatment Interventions: ADL retraining, Functional transfer training, UE strengthening/ROM, Endurance training, Cognitive reorientation, Patient/family training, Equipment evaluation/education, Compensatory technique education, Continued evaluation, Energy conservation, Activityengagement          See flowsheet documentation for full assessment, interventions and recommendations.   Note: Limitation: Decreased ADL status, Decreased UE strength, Decreased Safe judgement during ADL, Decreased cognition, Decreased endurance, Decreased self-care trans, Decreased high-level ADLs  Prognosis: Fair  Assessment: Patient participated in Skilled OT session this date with interventions consisting of ADL re training with the use of correct body mechnaics, Energy Conservation techniques, safety awareness and fall prevention techniques,  therapeutic activities to: increase activity tolerance, and increase OOB/ sitting tolerance . Patient agreeable to OT treatment session, upon arrival patient was found supine in bed, alert, responsive , and in no apparent distress.  In comparison to previous session, patient with improvements in ADL completion, OOB tolerance, functional mobility. Patient requiring frequent rest periods and ocassional safety reminders. Pt completed functional STS txfs and functional mobility with min Ax1, RW in stance. Pt required SUP for grooming. Min A for LB dressing. Patient continues to be functioning below baseline level, occupational performance remains limited secondary to factors listed above and increased risk for falls and injury. The patient's raw score on the -PAC Daily Activity Inpatient Short Form is 19. A raw score of greater than or equal to 19 suggests the patient may benefit from discharge to home. Please  refer to the recommendation of the Occupational Therapist for safe discharge planning. From OT standpoint, recommendation at time of d/c would be Level 2 resources.  Patient to benefit from continued Occupational Therapy treatment while in the hospital to address deficits as defined above and maximize level of functional independence with ADLs and functional mobility.     Rehab Resource Intensity Level, OT: II (Moderate Resource Intensity)

## 2025-07-12 NOTE — PLAN OF CARE
Problem: Potential for Falls  Goal: Patient will remain free of falls  Description: INTERVENTIONS:  - Educate patient/family on patient safety including physical limitations  - Instruct patient to call for assistance with activity   - Consider consulting OT/PT to assist with strengthening/mobility based on AM PAC & JH-HLM score  - Consult OT/PT to assist with strengthening/mobility   - Keep Call bell within reach  - Keep bed low and locked with side rails adjusted as appropriate  - Keep care items and personal belongings within reach  - Initiate and maintain comfort rounds  - Make Fall Risk Sign visible to staff  - Offer Toileting every  Hours, in advance of need  - Initiate/Maintain bedalarm  - Obtain necessary fall risk management equipment:   - Apply yellow socks and bracelet for high fall risk patients  - Consider moving patient to room near nurses station  Outcome: Progressing     Problem: Prexisting or High Potential for Compromised Skin Integrity  Goal: Skin integrity is maintained or improved  Description: INTERVENTIONS:  - Identify patients at risk for skin breakdown  - Assess and monitor skin integrity including under and around medical devices   - Assess and monitor nutrition and hydration status  - Monitor labs  - Assess for incontinence   - Turn and reposition patient  - Assist with mobility/ambulation  - Relieve pressure over oneil prominences   - Avoid friction and shearing  - Provide appropriate hygiene as needed including keeping skin clean and dry  - Evaluate need for skin moisturizer/barrier cream  - Collaborate with interdisciplinary team  - Patient/family teaching  - Consider wound care consult    Assess:  - Review Jonn scale daily  - Clean and moisturize skin every   - Inspect skin when repositioning, toileting, and assisting with ADLS  - Assess under medical devices such as  every   - Assess extremities for adequate circulation and sensation     Bed Management:  - Have minimal linens on  bed & keep smooth, unwrinkled  - Change linens as needed when moist or perspiring  - Avoid sitting or lying in one position for more than  hours while in bed?Keep HOB at degrees   - Toileting:  - Offer bedside commode  - Assess for incontinence every   - Use incontinent care products after each incontinent episode such as     Activity:  - Mobilize patient  times a day  - Encourage activity and walks on unit  - Encourage or provide ROM exercises   - Turn and reposition patient every  Hours  - Use appropriate equipment to lift or move patient in bed  - Instruct/ Assist with weight shifting every  when out of bed in chair  - Consider limitation of chair time  hour intervals    Skin Care:  - Avoid use of baby powder, tape, friction and shearing, hot water or constrictive clothing  - Relieve pressure over bony prominences using   - Do not massage red bony areas    Next Steps:  - Teach patient strategies to minimize risks such as   - Consider consults to  interdisciplinary teams such as   Outcome: Progressing     Problem: PAIN - ADULT  Goal: Verbalizes/displays adequate comfort level or baseline comfort level  Description: Interventions:  - Encourage patient to monitor pain and request assistance  - Assess pain using appropriate pain scale  - Administer analgesics as ordered based on type and severity of pain and evaluate response  - Implement non-pharmacological measures as appropriate and evaluate response  - Consider cultural and social influences on pain and pain management  - Notify physician/advanced practitioner if interventions unsuccessful or patient reports new pain  - Educate patient/family on pain management process including their role and importance of  reporting pain   - Provide non-pharmacologic/complimentary pain relief interventions  Outcome: Progressing     Problem: INFECTION - ADULT  Goal: Absence or prevention of progression during hospitalization  Description: INTERVENTIONS:  - Assess and monitor for  signs and symptoms of infection  - Monitor lab/diagnostic results  - Monitor all insertion sites, i.e. indwelling lines, tubes, and drains  - Monitor endotracheal if appropriate and nasal secretions for changes in amount and color  - [place] appropriate cooling/warming therapies per order  - Administer medications as ordered  - Instruct and encourage patient and family to use good hand hygiene technique  - Identify and instruct in appropriate isolation precautions for identified infection/condition  Outcome: Progressing  Goal: Absence of fever/infection during neutropenic period  Description: INTERVENTIONS:  - Monitor WBC  - Perform strict hand hygiene  - Limit to healthy visitors only  - No plants, dried, fresh or silk flowers with beckett in patient room  Outcome: Progressing     Problem: SAFETY ADULT  Goal: Patient will remain free of falls  Description: INTERVENTIONS:  - Educate patient/family on patient safety including physical limitations  - Instruct patient to call for assistance with activity   - Consider consulting OT/PT to assist with strengthening/mobility based on AM PAC & -HLM score  - Consult OT/PT to assist with strengthening/mobility   - Keep Call bell within reach  - Keep bed low and locked with side rails adjusted as appropriate  - Keep care items and personal belongings within reach  - Initiate and maintain comfort rounds  - Make Fall Risk Sign visible to staff  - Offer Toileting every  Hours, in advance of need  - Initiate/Maintain bed alarm  - Obtain necessary fall risk management equipment:   - Apply yellow socks and bracelet for high fall risk patients  - Consider moving patient to room near nurses station  Outcome: Progressing  Goal: Maintain or return to baseline ADL function  Description: INTERVENTIONS:  -  Assess patient's ability to carry out ADLs; assess patient's baseline for ADL function and identify physical deficits which impact ability to perform ADLs (bathing, care of  mouth/teeth, toileting, grooming, dressing, etc.)  - Assess/evaluate cause of self-care deficits   - Assess range of motion  - Assess patient's mobility; develop plan if impaired  - Assess patient's need for assistive devices and provide as appropriate  - Encourage maximum independence but intervene and supervise when necessary  - Involve family in performance of ADLs  - Assess for home care needs following discharge   - Consider OT consult to assist with ADL evaluation and planning for discharge  - Provide patient education as appropriate  - Monitor functional capacity and physical performance, use of AM PAC & JH-HLM   - Monitor gait, balance and fatigue with ambulation    Outcome: Progressing  Goal: Maintains/Returns to pre admission functional level  Description: INTERVENTIONS:  - Perform AM-PAC 6 Click Basic Mobility/ Daily Activity assessment daily.  - Set and communicate daily mobility goal to care team and patient/family/caregiver.   - Collaborate with rehabilitation services on mobility goals if consulted  - Perform Range of Motion  times a day.  - Reposition patient every  hours.  - Dangle patient  times a day  - Stand patient  times a day  - Ambulate patient  times a day  - Out of bed to chair  times a day   - Out of bed for meals  times a day  - Out of bed for toileting  - Record patient progress and toleration of activity level   Outcome: Progressing     Problem: DISCHARGE PLANNING  Goal: Discharge to home or other facility with appropriate resources  Description: INTERVENTIONS:  - Identify barriers to discharge w/patient and caregiver  - Arrange for needed discharge resources and transportation as appropriate  - Identify discharge learning needs (meds, wound care, etc.)  - Arrange for interpretive services to assist at discharge as needed  - Refer to Case Management Department for coordinating discharge planning if the patient needs post-hospital services based on physician/advanced practitioner order  or complex needs related to functional status, cognitive ability, or social support system  Outcome: Progressing     Problem: Knowledge Deficit  Goal: Patient/family/caregiver demonstrates understanding of disease process, treatment plan, medications, and discharge instructions  Description: Complete learning assessment and assess knowledge base.  Interventions:  - Provide teaching at level of understanding  - Provide teaching via preferred learning methods  Outcome: Progressing     Problem: Nutrition/Hydration-ADULT  Goal: Nutrient/Hydration intake appropriate for improving, restoring or maintaining nutritional needs  Description: Monitor and assess patient's nutrition/hydration status for malnutrition. Collaborate with interdisciplinary team and initiate plan and interventions as ordered.  Monitor patient's weight and dietary intake as ordered or per policy. Utilize nutrition screening tool and intervene as necessary. Determine patient's food preferences and provide high-protein, high-caloric foods as appropriate.     INTERVENTIONS:  - Monitor oral intake, urinary output, labs, and treatment plans  - Assess nutrition and hydration status and recommend course of action  - Evaluate amount of meals eaten  - Assist patient with eating if necessary   - Allow adequate time for meals  - Recommend/ encourage appropriate diets, oral nutritional supplements, and vitamin/mineral supplements  - Order, calculate, and assess calorie counts as needed  - Recommend, monitor, and adjust tube feedings and TPN/PPN based on assessed needs  - Assess need for intravenous fluids  - Provide specific nutrition/hydration education as appropriate  - Include patient/family/caregiver in decisions related to nutrition  Outcome: Progressing     Problem: RESPIRATORY - ADULT  Goal: Achieves optimal ventilation and oxygenation  Description: INTERVENTIONS:  - Assess for changes in respiratory status  - Assess for changes in mentation and behavior  -  Position to facilitate oxygenation and minimize respiratory effort  - Oxygen administered by appropriate delivery if ordered  - Initiate smoking cessation education as indicated  - Encourage broncho-pulmonary hygiene including cough, deep breathe, Incentive Spirometry  - Assess the need for suctioning and aspirate as needed  - Assess and instruct to report SOB or any respiratory difficulty  - Respiratory Therapy support as indicated  Outcome: Progressing

## 2025-07-12 NOTE — CASE MANAGEMENT
Case Management Discharge Planning Note    Patient name Danita Palmer  Location [facility] 918/[facility] 918-01 MRN [id number]  : 1940 Date 2025       Current Admission Date: 2025  Current Admission Diagnosis:Generalized weakness   Patient Active Problem List    Diagnosis Date Noted    Severe protein-calorie malnutrition (HCC) 2025    Diarrhea 2025    Pneumonia 2025    Sepsis (HCC) 2025    Acute hypoxic respiratory failure (HCC) 2025    Left rib fracture 2025    Generalized weakness 2025    Fall at home, initial encounter 2025    Incidental lung nodule,RUL, > 3mm and < 8mm 2025    Hypertriglyceridemia 10/23/2024    Neuropathy 2024    Mild protein-calorie malnutrition (HCC) 2023    H/O lumpectomy 2021    Essential hypertension 2020    Cigarette nicotine dependence without complication 2019    CKD stage G3a/A1, GFR 45-59 and albumin creatinine ratio <30 mg/g (HCC) 2017    Suspicious nevus 2017    Disc degeneration, lumbar 2017    Post herpetic neuralgia 2016    Liver cyst 2016    Glaucoma 2014    Osteopenia 2013    Chronic obstructive pulmonary disease (HCC) 2012      LOS (days): 17  Geometric Mean LOS (GMLOS) (days): 4.4  Days to GMLOS:-13     OBJECTIVE:  Risk of Unplanned Readmission Score: 14.38         Current admission status: Inpatient   Preferred Pharmacy:   [id number] - [place], [place] - [address]  [address]  [place] [address]  Phone: [phone] Fax: [phone]    Primary Care Provider: Duke Barnes MD    Primary Insurance: [employer] [facility]  Secondary Insurance:     DISCHARGE DETAILS:     CM received approved authorization for STR.  CM provided authorization information to [facility].  Waiting for response regarding bed availability.

## 2025-07-12 NOTE — PLAN OF CARE
Problem: PHYSICAL THERAPY ADULT  Goal: Performs mobility at highest level of function for planned discharge setting.  See evaluation for individualized goals.  Description: Treatment/Interventions: Functional transfer training, LE strengthening/ROM, Therapeutic exercise, Elevations, Endurance training, Equipment eval/education, Patient/family training, Bed mobility, Gait training, Continued evaluation, Cognitive reorientation, Compensatory technique education, Spoke to nursing, OT  Equipment Recommended:  (TBD with further mobility)       See flowsheet documentation for full assessment, interventions and recommendations.  Outcome: Progressing  Note: Prognosis: Good  Problem List: Decreased strength, Decreased endurance, Impaired balance, Decreased mobility, Decreased cognition, Decreased coordination, Impaired judgement, Decreased safety awareness  Assessment: Pt seen for session for re-eval and treatment.  need for re-eval as goals have .  Session for setup, bed mob, time spent EOB, transfers, gait w/ rest time, repositioning.  Pt cooperative w/ session but needs near continuous cues for safety, hand placement w/ transfers, proper and safe RW mgmt.  several self corrected LOB noted w/ some difficulty w/ obstacle avoidance.  due to fall risk, impulsivity, safety concerns and decreased strength, continue to anticipate the need to Level II (moderate) post acute care therapy needs at d/c  Barriers to Discharge: Inaccessible home environment, Decreased caregiver support     Rehab Resource Intensity Level, PT: II (Moderate Resource Intensity)    See flowsheet documentation for full assessment.

## 2025-07-12 NOTE — PHYSICAL THERAPY NOTE
Physical Therapy Re-Evaluation and Treatment note       07/12/25 0920   PT Last Visit   PT Visit Date 07/12/25   Note Type   Note Type Treatment for insurance authorization   Pain Assessment   Pain Assessment Tool 0-10   Pain Score 1   Restrictions/Precautions   Weight Bearing Precautions Per Order No   Other Precautions Multiple lines;Telemetry;O2;Fall Risk;Pain;Cognitive;Bed Alarm;Chair Alarm  (bed alarm re-armed post session, 3 L O2)   General   Chart Reviewed Yes   Family/Caregiver Present No   Cognition   Overall Cognitive Status Impaired   Arousal/Participation Responsive   Attention Attends with cues to redirect   Orientation Level Oriented to person;Oriented to place   Memory Unable to assess   Following Commands Follows one step commands without difficulty   Subjective   Subjective cooperative.  Impulsive throughout and needs cues to redirect   Bed Mobility   Supine to Sit 4  Minimal assistance   Additional items Assist x 1;Increased time required   Sit to Supine 4  Minimal assistance   Additional items Assist x 1;Increased time required;Verbal cues;LE management   Additional Comments time spent for both setup and repositioning   Transfers   Sit to Stand 4  Minimal assistance   Additional items Assist x 1;Increased time required;Impulsive;Verbal cues  (cues for RW mgmt and safet, hand placement)   Stand to Sit 4  Minimal assistance   Additional items Assist x 1;Increased time required   Ambulation/Elevation   Gait pattern   (slow, increased RW advancement w/ decreased safety w/ same.)   Gait Assistance 4  Minimal assist   Additional items Assist x 1   Assistive Device 4-wheeled walker   Distance 100'x2 w/ extended standing rest.  cues for safety throughout   Balance   Static Sitting Good   Dynamic Sitting Fair   Static Standing Fair -   Dynamic Standing Fair -   Ambulatory Fair -   Activity Tolerance   Activity Tolerance Patient tolerated treatment well;Patient limited by fatigue;Treatment limited  secondary to medical complications (Comment)   Nurse Made Aware yes   Assessment   Prognosis Good   Problem List Decreased strength;Decreased endurance;Impaired balance;Decreased mobility;Decreased cognition;Decreased coordination;Impaired judgement;Decreased safety awareness   Assessment Pt seen for session for re-eval and treatment.  need for re-eval as goals have .  Session for setup, bed mob, time spent EOB, transfers, gait w/ rest time, repositioning.  Pt cooperative w/ session but needs near continuous cues for safety, hand placement w/ transfers, proper and safe RW mgmt.  several self corrected LOB noted w/ some difficulty w/ obstacle avoidance.  due to fall risk, impulsivity, safety concerns and decreased strength, continue to anticipate the need to Level II (moderate) post acute care therapy needs at d/c   Goals   STG Expiration Date 25   Short Term Goal #1 1-2 wks: bed mob and transfers w/ indep, standing balance to good/normal w/ device, ambulate 200-300 ft w/ raollor and mod I w/ 100% safe use, increase b LE strength by 1/2 -1 grade, ambulate 7-14 stairs w/ indep   PT Treatment Day 5   Plan   Treatment/Interventions Functional transfer training;LE strengthening/ROM;Elevations;Therapeutic exercise;Endurance training;Patient/family training;Equipment eval/education;Bed mobility;Gait training   Progress Progressing toward goals   PT Frequency 3-5x/wk   Discharge Recommendation   Rehab Resource Intensity Level, PT II (Moderate Resource Intensity)   AM-PAC Basic Mobility Inpatient   Turning in Flat Bed Without Bedrails 3   Lying on Back to Sitting on Edge of Flat Bed Without Bedrails 3   Moving Bed to Chair 3   Standing Up From Chair Using Arms 3   Walk in Room 3   Climb 3-5 Stairs With Railing 2   Basic Mobility Inpatient Raw Score 17   Basic Mobility Standardized Score 39.67   [facility] Highest Level Of Mobility   -HL Goal 5: Stand one or more mins   -HLM Achieved 7: Walk 25 feet or  more     Kyle Conrad PT, DPT, GCS, CSRS

## 2025-07-12 NOTE — CASE MANAGEMENT
Case Management Discharge Planning Note    Patient name Danita Palmer  Location [facility] 918/[facility] 918-01 MRN [id number]  : 1940 Date 2025       Current Admission Date: 2025  Current Admission Diagnosis:Generalized weakness   Patient Active Problem List    Diagnosis Date Noted    Severe protein-calorie malnutrition (HCC) 2025    Diarrhea 2025    Pneumonia 2025    Sepsis (HCC) 2025    Acute hypoxic respiratory failure (HCC) 2025    Left rib fracture 2025    Generalized weakness 2025    Fall at home, initial encounter 2025    Incidental lung nodule,RUL, > 3mm and < 8mm 2025    Hypertriglyceridemia 10/23/2024    Neuropathy 2024    Mild protein-calorie malnutrition (HCC) 2023    H/O lumpectomy 2021    Essential hypertension 2020    Cigarette nicotine dependence without complication 2019    CKD stage G3a/A1, GFR 45-59 and albumin creatinine ratio <30 mg/g (HCC) 2017    Suspicious nevus 2017    Disc degeneration, lumbar 2017    Post herpetic neuralgia 2016    Liver cyst 2016    Glaucoma 2014    Osteopenia 2013    Chronic obstructive pulmonary disease (HCC) 2012      LOS (days): 17  Geometric Mean LOS (GMLOS) (days): 4.4  Days to GMLOS:-12.9     OBJECTIVE:  Risk of Unplanned Readmission Score: 14.38         Current admission status: Inpatient   Preferred Pharmacy:   [id number] - [place], [place] - [address]  [address]  [place] [address]  Phone: [phone] Fax: [phone]    Primary Care Provider: Duke Barnes MD    Primary Insurance: [employer] [facility]  Secondary Insurance:     DISCHARGE DETAILS:    CM received completed PT/OT notes.  CM submitted auth to discharge support.

## 2025-07-12 NOTE — CASE MANAGEMENT
Case Management Discharge Planning Note    Patient name Danita Palmer  Location [facility] 918/[facility] 918-01 MRN [id number]  : 1940 Date 2025       Current Admission Date: 2025  Current Admission Diagnosis:Generalized weakness   Patient Active Problem List    Diagnosis Date Noted    Severe protein-calorie malnutrition (HCC) 2025    Diarrhea 2025    Pneumonia 2025    Sepsis (HCC) 2025    Acute hypoxic respiratory failure (HCC) 2025    Left rib fracture 2025    Generalized weakness 2025    Fall at home, initial encounter 2025    Incidental lung nodule,RUL, > 3mm and < 8mm 2025    Hypertriglyceridemia 10/23/2024    Neuropathy 2024    Mild protein-calorie malnutrition (HCC) 2023    H/O lumpectomy 2021    Essential hypertension 2020    Cigarette nicotine dependence without complication 2019    CKD stage G3a/A1, GFR 45-59 and albumin creatinine ratio <30 mg/g (HCC) 2017    Suspicious nevus 2017    Disc degeneration, lumbar 2017    Post herpetic neuralgia 2016    Liver cyst 2016    Glaucoma 2014    Osteopenia 2013    Chronic obstructive pulmonary disease (HCC) 2012      LOS (days): 17  Geometric Mean LOS (GMLOS) (days): 4.4  Days to GMLOS:-13     OBJECTIVE:  Risk of Unplanned Readmission Score: 14.38         Current admission status: Inpatient   Preferred Pharmacy:   [id number] - [place], [place] - [address]  [address]  [place] [address]  Phone: [phone] Fax: [phone]    Primary Care Provider: Duke Barnes MD    Primary Insurance: [employer] [facility]  Secondary Insurance:     DISCHARGE DETAILS:    Discharge planning discussed with:: Patient and son at bedside  Freedom of Choice: Yes  Comments - Freedom of Choice: Agreeable to d/c to [facility] CM contacted family/caregiver?: Yes  Were Treatment Team discharge  recommendations reviewed with patient/caregiver?: Yes  Did patient/caregiver verbalize understanding of patient care needs?: N/A- going to facility  Were patient/caregiver advised of the risks associated with not following Treatment Team discharge recommendations?: Yes    Contacts  Patient Contacts: marissa Herrera  Relationship to Patient:: Family  Contact Method: In Person  Reason/Outcome: Discharge Planning, Referral              Other Referral/Resources/Interventions Provided:  Interventions: Short Term Rehab, Transportation  Referral Comments: CM received auth for STR placement at [facility]. Per admissions a bed is avaialble for today.  CM notified pt and family at bedside of auth and bed.  Per provider, pt is medically cleared. CM requested BLS transport, waiting for confirmed transport time.  CM provided RN with phone number for report ([phone])         Treatment Team Recommendation: Short Term Rehab  Expected Discharge Disposition: Short Term Rehab  Additional Discharge Dispositions: Short Term Rehab  Transport at Discharge : BLS Ambulance         IMM Given (Date):: 07/12/25  IMM Given to:: Patient      Transport with [facility] 1430.

## 2025-07-12 NOTE — PLAN OF CARE
Problem: INFECTION - ADULT  Goal: Absence or prevention of progression during hospitalization  Description: INTERVENTIONS:  - Assess and monitor for signs and symptoms of infection  - Monitor lab/diagnostic results  - Monitor all insertion sites, i.e. indwelling lines, tubes, and drains  - Monitor endotracheal if appropriate and nasal secretions for changes in amount and color  - Royal appropriate cooling/warming therapies per order  - Administer medications as ordered  - Instruct and encourage patient and family to use good hand hygiene technique  - Identify and instruct in appropriate isolation precautions for identified infection/condition  Outcome: Progressing  Goal: Absence of fever/infection during neutropenic period  Description: INTERVENTIONS:  - Monitor WBC  - Perform strict hand hygiene  - Limit to healthy visitors only  - No plants, dried, fresh or silk flowers with beckett in patient room  Outcome: Progressing     Problem: RESPIRATORY - ADULT  Goal: Achieves optimal ventilation and oxygenation  Description: INTERVENTIONS:  - Assess for changes in respiratory status  - Assess for changes in mentation and behavior  - Position to facilitate oxygenation and minimize respiratory effort  - Oxygen administered by appropriate delivery if ordered  - Initiate smoking cessation education as indicated  - Encourage broncho-pulmonary hygiene including cough, deep breathe, Incentive Spirometry  - Assess the need for suctioning and aspirate as needed  - Assess and instruct to report SOB or any respiratory difficulty  - Respiratory Therapy support as indicated  Outcome: Progressing

## 2025-07-12 NOTE — DISCHARGE SUPPORT
Case Management Assessment & Discharge Planning Note    Patient name Danita Palmer  Location [facility]-01 MRN [id number]  : 1940 Date 2025       Current Admission Date: 2025  Current Admission Diagnosis:Generalized weakness   Patient Active Problem List    Diagnosis Date Noted    Severe protein-calorie malnutrition (HCC) 2025    Diarrhea 2025    Pneumonia 2025    Sepsis (HCC) 2025    Acute hypoxic respiratory failure (HCC) 2025    Left rib fracture 2025    Generalized weakness 2025    Fall at home, initial encounter 2025    Incidental lung nodule,RUL, > 3mm and < 8mm 2025    Hypertriglyceridemia 10/23/2024    Neuropathy 2024    Mild protein-calorie malnutrition (HCC) 2023    H/O lumpectomy 2021    Essential hypertension 2020    Cigarette nicotine dependence without complication 2019    CKD stage G3a/A1, GFR 45-59 and albumin creatinine ratio <30 mg/g (HCC) 2017    Suspicious nevus 2017    Disc degeneration, lumbar 2017    Post herpetic neuralgia 2016    Liver cyst 2016    Glaucoma 2014    Osteopenia 2013    Chronic obstructive pulmonary disease (HCC) 2012      LOS (days): 17  Geometric Mean LOS (GMLOS) (days): 4.4  Days to GMLOS:-13   Facility Authorization Approved  [place] [facility] received approved auth for: SNF  Insurance: [employer]  Determination made after peer to peer was completed?: Not applicable  Authorization Obtained Via: Portal  [facility]ility [facility]  Approved Authorization Number: [id number]  Start of Care Date: 25  Next Review Date: 07/15/25  Submit Next Review to: H&CC portal or F: [phone]   notified: Aleida CAMARILLO     Updates to authorization status will be noted in chart.    Please reach out to CM for updates on any clinical information.

## 2025-07-12 NOTE — OCCUPATIONAL THERAPY NOTE
Occupational Therapy Progress Note     Patient Name: Danita Palmer  Today's Date: 7/12/2025  Problem List  Principal Problem:    Generalized weakness  Active Problems:    CKD stage G3a/A1, GFR 45-59 and albumin creatinine ratio <30 mg/g (HCC)    Chronic obstructive pulmonary disease (HCC)    Disc degeneration, lumbar    Cigarette nicotine dependence without complication    Essential hypertension    Fall at home, initial encounter    Incidental lung nodule,RUL, > 3mm and < 8mm    Left rib fracture    Acute hypoxic respiratory failure (HCC)    Sepsis (HCC)    Pneumonia    Diarrhea    Severe protein-calorie malnutrition (HCC)              07/12/25 1220   OT Last Visit   OT Visit Date 07/12/25   Note Type   Note Type Treatment for insurance authorization   Pain Assessment   Pain Assessment Tool 0-10   Pain Score No Pain   Restrictions/Precautions   Weight Bearing Precautions Per Order No   Other Precautions Cognitive;Chair Alarm;Bed Alarm;Multiple lines;Fall Risk;Pain  (4L O2)   Lifestyle   Autonomy I w/ ADLs, receives assistance w/ IADLs, I w/ functional mobility and transfers   Reciprocal Relationships Daughter   Service to Others Retired   Intrinsic Gratification Going to the [employer], passionate about watching TV   ADL   Where Assessed Chair   Grooming Assistance 5  Supervision/Setup   Grooming Deficit Wash/dry hands;Wash/dry face;Brushing hair   Grooming Comments brushing hair seated in recliner   LB Dressing Assistance 4  Minimal Assistance   LB Dressing Deficit Don/doff R sock;Don/doff L sock   Bed Mobility   Supine to Sit 4  Minimal assistance   Additional items Assist x 1;Increased time required;Verbal cues;HOB elevated;Bedrails   Sit to Supine Unable to assess   Additional Comments Pt OOB in recliner post session, all needs met, call bell within reach. +chair alarm on   Transfers   Sit to Stand 4  Minimal assistance   Additional items Assist x 1;Increased time required;Verbal cues   Stand to Sit 4  Minimal  "assistance   Additional items Assist x 1;Increased time required;Verbal cues   Additional Comments Rollator, VCs for hand placement and RW management   Functional Mobility   Functional Mobility 4  Minimal assistance   Additional Comments Ax1, household distances. VCs for safety   Additional items Rolling walker  (rollator)   Subjective   Subjective \"I used to live 2 blocks down\"   Cognition   Overall Cognitive Status Impaired   Arousal/Participation Alert;Cooperative   Attention Attends with cues to redirect   Orientation Level Oriented to person;Oriented to place;Oriented to situation   Memory Decreased recall of precautions   Following Commands Follows one step commands with increased time or repetition   Comments Pt very pleasant and cooperative. Requires some VCs for safety awareness, especially regarding RW management. Redirection to task.   Activity Tolerance   Activity Tolerance Patient limited by fatigue;Patient tolerated treatment well   Medical Staff Made Aware RN cleared for therapy   Assessment   Assessment Patient participated in Skilled OT session this date with interventions consisting of ADL re training with the use of correct body mechnaics, Energy Conservation techniques, safety awareness and fall prevention techniques,  therapeutic activities to: increase activity tolerance, and increase OOB/ sitting tolerance . Patient agreeable to OT treatment session, upon arrival patient was found supine in bed, alert, responsive , and in no apparent distress.  In comparison to previous session, patient with improvements in ADL completion, OOB tolerance, functional mobility. Patient requiring frequent rest periods and ocassional safety reminders. Pt completed functional STS txfs and functional mobility with min Ax1, RW in stance. Pt required SUP for grooming. Min A for LB dressing. Patient continues to be functioning below baseline level, occupational performance remains limited secondary to factors listed " above and increased risk for falls and injury. The patient's raw score on the -PAC Daily Activity Inpatient Short Form is 19. A raw score of greater than or equal to 19 suggests the patient may benefit from discharge to home. Please refer to the recommendation of the Occupational Therapist for safe discharge planning. From OT standpoint, recommendation at time of d/c would be Level 2 resources.  Patient to benefit from continued Occupational Therapy treatment while in the hospital to address deficits as defined above and maximize level of functional independence with ADLs and functional mobility.   Plan   Treatment Interventions ADL retraining;Functional transfer training;Endurance training;Cognitive reorientation;Compensatory technique education;Continued evaluation;Energy conservation;Activityengagement   Goal Expiration Date 07/20/25   OT Treatment Day 4   OT Frequency 2-3x/wk   Discharge Recommendation   Rehab Resource Intensity Level, OT II (Moderate Resource Intensity)   -PAC Daily Activity Inpatient   Lower Body Dressing 2   Bathing 3   Toileting 3   Upper Body Dressing 3   Grooming 4   Eating 4   Daily Activity Raw Score 19   Daily Activity Standardized Score (Calc for Raw Score >=11) 40.22   AM-PAC Applied Cognition Inpatient   Following a Speech/Presentation 3   Understanding Ordinary Conversation 4   Taking Medications 4   Remembering Where Things Are Placed or Put Away 4   Remembering List of 4-5 Errands 4   Taking Care of Complicated Tasks 3   Applied Cognition Raw Score 22   Applied Cognition Standardized Score 47.83   End of Consult   Education Provided Yes;Family or social support of family present for education by provider   Patient Position at End of Consult Bedside chair;Bed/Chair alarm activated;All needs within reach   Nurse Communication Nurse aware of consult         Yuriy Griffin MS, OTR/L     [facility] CERTIFIED RATER ID [id number]

## 2025-07-13 NOTE — UTILIZATION REVIEW
NOTIFICATION OF ADMISSION DISCHARGE   This is a Notification of Discharge from [facility]. Please be advised that this patient has been discharge from our facility. Below you will find the admission and discharge date and time including the patient’s disposition.   UTILIZATION REVIEW CONTACT:  Utilization Review Assistants  Network Utilization Review Department  Phone: [phone] x carefully listen to the prompts. All voicemails are confidential.  Email: [email or web address]     ADMISSION INFORMATION  PRESENTATION DATE: 6/25/2025 12:59 AM  OBERVATION ADMISSION DATE: N/A  INPATIENT ADMISSION DATE: 6/25/25  5:54 AM   DISCHARGE DATE: 7/12/2025  5:37 PM   DISPOSITION:Non [facility] SNF/TCU/SNU    Network Utilization Review Department  ATTENTION: Please call with any questions or concerns to [phone] and carefully listen to the prompts so that you are directed to the right person. All voicemails are confidential.   For Discharge needs, contact Care Management DC Support Team at [phone] opt. 2  Send all requests for admission clinical reviews, approved or denied determinations and any other requests to dedicated fax number below belonging to the campus where the patient is receiving treatment. List of dedicated fax numbers for the Facilities:  FACILITY NAME UR FAX NUMBER   ADMISSION DENIALS (Administrative/Medical Necessity) [phone]   DISCHARGE SUPPORT TEAM ([facility]) [phone]   PARENT CHILD HEALTH (Maternity/NICU/Pediatrics) [phone]   [facility] [phone]   [facility] [phone]   [facility] [phone]   [facility] [phone]   [facility] [phone]   [facility] [phone]   [facility] [phone]   [facility] [phone]   [facility]  [facility] [phone]   [facility] [phone]   [facility] [phone]   [facility] [phone]

## 2025-07-14 ENCOUNTER — TRANSITIONAL CARE MANAGEMENT (OUTPATIENT)
Dept: FAMILY MEDICINE CLINIC | Facility: CLINIC | Age: 85
End: 2025-07-14

## 2025-07-25 ENCOUNTER — HOME CARE VISIT (OUTPATIENT)
Dept: HOME HEALTH SERVICES | Facility: HOME HEALTHCARE | Age: 85
End: 2025-07-25

## 2025-07-26 ENCOUNTER — HOME CARE VISIT (OUTPATIENT)
Dept: HOME HEALTH SERVICES | Facility: HOME HEALTHCARE | Age: 85
End: 2025-07-26

## 2025-07-26 NOTE — CASE COMMUNICATION
7.26.25   REFUSED ALL SERVICES   As per Yvonne Beauchamp RN this pt declined ALL home health services.

## 2025-07-30 PROBLEM — J18.9 PNEUMONIA: Status: RESOLVED | Noted: 2025-06-30 | Resolved: 2025-07-30

## 2025-07-30 PROBLEM — A41.9 SEPSIS (HCC): Status: RESOLVED | Noted: 2025-06-29 | Resolved: 2025-07-30

## 2025-08-05 ENCOUNTER — TELEPHONE (OUTPATIENT)
Age: 85
End: 2025-08-05

## 2025-08-11 ENCOUNTER — OFFICE VISIT (OUTPATIENT)
Dept: PULMONOLOGY | Facility: CLINIC | Age: 85
End: 2025-08-11
Payer: COMMERCIAL

## 2025-08-11 PROBLEM — Z87.891 HISTORY OF TOBACCO ABUSE: Status: ACTIVE | Noted: 2025-08-11

## 2025-08-11 PROBLEM — R19.7 DIARRHEA: Status: RESOLVED | Noted: 2025-07-05 | Resolved: 2025-08-11
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
## 2025-08-11 PROBLEM — D22.9 SUSPICIOUS NEVUS: Status: RESOLVED | Noted: 2017-12-28 | Resolved: 2025-08-11

## 2025-08-11 PROBLEM — J96.01 ACUTE HYPOXIC RESPIRATORY FAILURE (HCC): Status: RESOLVED | Noted: 2025-06-28 | Resolved: 2025-08-11
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
## 2025-08-11 PROBLEM — Z91.81 HISTORY OF FALLING: Status: ACTIVE | Noted: 2025-08-11
